# Patient Record
Sex: FEMALE | Race: WHITE | NOT HISPANIC OR LATINO | Employment: PART TIME | URBAN - METROPOLITAN AREA
[De-identification: names, ages, dates, MRNs, and addresses within clinical notes are randomized per-mention and may not be internally consistent; named-entity substitution may affect disease eponyms.]

---

## 2018-07-19 LAB — TSH SERPL-ACNC: 1.62 UIU/ML (ref 0.35–4.94)

## 2019-04-08 LAB
CHOLEST SERPL-MCNC: 193 MG/DL (ref 100–199)
HDLC SERPL-MCNC: 38 MG/DL
LDLC SERPL CALC-MCNC: 136 MG/DL
NONHDLC SERPL-MCNC: 155 MG/DL
PAP-ABSTRACT: NORMAL
TRIGL SERPL-MCNC: 93 MG/DL

## 2019-07-02 LAB — HEP C HIM: NORMAL

## 2019-08-09 LAB
CREAT SERPL-MCNC: 0.6 MG/DL (ref 0.57–1.11)
GFR SERPL CREATININE-BSD FRML MDRD: >60 ML/MIN/1.73M2
GLUCOSE SERPL-MCNC: 86 MG/DL (ref 65–100)
POTASSIUM SERPL-SCNC: 3.5 MMOL/L (ref 3.5–5)

## 2019-08-28 ENCOUNTER — TRANSFERRED RECORDS (OUTPATIENT)
Dept: MULTI SPECIALTY CLINIC | Facility: CLINIC | Age: 29
End: 2019-08-28

## 2019-08-28 LAB
C TRACH DNA SPEC QL PROBE+SIG AMP: NEGATIVE
N GONORRHOEA DNA SPEC QL PROBE+SIG AMP: NEGATIVE
SPECIMEN DESCRIP: NORMAL
SPECIMEN DESCRIPTION: NORMAL

## 2019-09-19 ENCOUNTER — NURSE TRIAGE (OUTPATIENT)
Dept: NURSING | Facility: CLINIC | Age: 29
End: 2019-09-19
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
## 2019-09-20 ENCOUNTER — PRENATAL OFFICE VISIT (OUTPATIENT)
Dept: OBGYN | Facility: CLINIC | Age: 29
End: 2019-09-20
Payer: COMMERCIAL

## 2019-09-20 VITALS
HEIGHT: 62 IN | BODY MASS INDEX: 34.27 KG/M2 | WEIGHT: 186.25 LBS | OXYGEN SATURATION: 99 % | SYSTOLIC BLOOD PRESSURE: 117 MMHG | HEART RATE: 108 BPM | DIASTOLIC BLOOD PRESSURE: 75 MMHG | TEMPERATURE: 98.3 F

## 2019-09-20 DIAGNOSIS — O34.219 PREVIOUS CESAREAN SECTION COMPLICATING PREGNANCY, ANTEPARTUM CONDITION OR COMPLICATION: Primary | ICD-10-CM

## 2019-09-20 DIAGNOSIS — F43.23 ADJUSTMENT DISORDER WITH MIXED ANXIETY AND DEPRESSED MOOD: ICD-10-CM

## 2019-09-20 LAB
ABO + RH BLD: NORMAL
ABO + RH BLD: NORMAL
BLD GP AB SCN SERPL QL: NORMAL
BLOOD BANK CMNT PATIENT-IMP: NORMAL
RUBV IGG SERPL IA-ACNC: 29 IU/ML
SPECIMEN EXP DATE BLD: NORMAL

## 2019-09-20 PROCEDURE — 86900 BLOOD TYPING SEROLOGIC ABO: CPT | Performed by: NURSE PRACTITIONER

## 2019-09-20 PROCEDURE — 36415 COLL VENOUS BLD VENIPUNCTURE: CPT | Performed by: NURSE PRACTITIONER

## 2019-09-20 PROCEDURE — 99207 ZZC PRENATAL VISIT: CPT | Performed by: NURSE PRACTITIONER

## 2019-09-20 PROCEDURE — 87086 URINE CULTURE/COLONY COUNT: CPT | Performed by: NURSE PRACTITIONER

## 2019-09-20 PROCEDURE — 86762 RUBELLA ANTIBODY: CPT | Performed by: NURSE PRACTITIONER

## 2019-09-20 PROCEDURE — 86901 BLOOD TYPING SEROLOGIC RH(D): CPT | Performed by: NURSE PRACTITIONER

## 2019-09-20 PROCEDURE — 86850 RBC ANTIBODY SCREEN: CPT | Performed by: NURSE PRACTITIONER

## 2019-09-20 RX ORDER — PYRIDOXINE HCL (VITAMIN B6) 25 MG
25 TABLET ORAL DAILY
COMMUNITY
End: 2019-12-16

## 2019-09-20 RX ORDER — CITALOPRAM HYDROBROMIDE 20 MG/1
TABLET ORAL
Qty: 30 TABLET | Refills: 1 | Status: SHIPPED | OUTPATIENT
Start: 2019-09-20 | End: 2019-12-16

## 2019-09-20 RX ORDER — ONDANSETRON 4 MG/1
4 TABLET, ORALLY DISINTEGRATING ORAL EVERY 8 HOURS PRN
COMMUNITY
End: 2020-01-13

## 2019-09-20 ASSESSMENT — ANXIETY QUESTIONNAIRES
3. WORRYING TOO MUCH ABOUT DIFFERENT THINGS: NEARLY EVERY DAY
IF YOU CHECKED OFF ANY PROBLEMS ON THIS QUESTIONNAIRE, HOW DIFFICULT HAVE THESE PROBLEMS MADE IT FOR YOU TO DO YOUR WORK, TAKE CARE OF THINGS AT HOME, OR GET ALONG WITH OTHER PEOPLE: VERY DIFFICULT
6. BECOMING EASILY ANNOYED OR IRRITABLE: NEARLY EVERY DAY
1. FEELING NERVOUS, ANXIOUS, OR ON EDGE: NEARLY EVERY DAY
5. BEING SO RESTLESS THAT IT IS HARD TO SIT STILL: NEARLY EVERY DAY
2. NOT BEING ABLE TO STOP OR CONTROL WORRYING: NEARLY EVERY DAY
7. FEELING AFRAID AS IF SOMETHING AWFUL MIGHT HAPPEN: MORE THAN HALF THE DAYS
GAD7 TOTAL SCORE: 20

## 2019-09-20 ASSESSMENT — PATIENT HEALTH QUESTIONNAIRE - PHQ9
SUM OF ALL RESPONSES TO PHQ QUESTIONS 1-9: 19
5. POOR APPETITE OR OVEREATING: NEARLY EVERY DAY

## 2019-09-20 ASSESSMENT — MIFFLIN-ST. JEOR: SCORE: 1529.1

## 2019-09-20 NOTE — PROGRESS NOTES
Patient presents for routine prenatal visit. Transfer from [facility]. Prenatal flowsheet reviewed and updated as needed.  Labs:   19: NIL pap  19:  HIV NR  VZV: Imm  CBC: WNL  HCV: NR  Antibody screen: Neg  HBSAg: NR  RPR: Neg  UA: Neg  Gonorrhea: Neg  Chlamydia: Neg  Early 1 hour GTT: normal  7/10/19: Dating us    Transfer of care due to insurance change.  Patient planning repeat  section.   Patient with periumbilical hernia this pregnancy.  History of anxiety and depression. Off medication, but would like to start. Previously used Prozac and Zoloft-did not like Zoloft.  PHQ-9 and GENARO given. Patient will start Celexa. Discussed taking it, possible side effects, amount of time before it may be effective. Prescription sent. Will follow up at next visit.  Plan 24 week labs at next visit.  Screening ultrasound ordered.  Denies vaginal bleeding, loss of fluid, contractions or cramping.    Some labs not completed this pregnancy and will be drawn today-Rubella, ABORh, Urine Culture.    Advice as per Anticipatory Guidance/Checklist updated.  PE: See OB vitals    Discussed prenatal visit schedule, delivery hospital, providers. Continue PNV, smoking cessation encouraged.  Questions asked and answered. Next OB visit in 4 week(s) with Dr. Grier.    Shannan DENG CNP

## 2019-09-20 NOTE — PROGRESS NOTES
"Patient presents for routine prenatal visit. Prenatal flowsheet reviewed and updated as needed.  Denies vaginal bleeding, loss of fluid, contractions or cramping.  Patient {w-w/o:935297::\"without\"} complaint. ***  Advice as per Anticipatory Guidance/Checklist updated.  PE: See OB vitals    Questions asked and answered. Next OB visit in *** week(s) with Dr. Grier.    Shannan Rodrigues APRN CNP    "

## 2019-09-20 NOTE — Clinical Note
Please abstract the following data from this visit with this patient into the appropriate field in Epic:Tests that can be patient reported without a hard copy:Pap smear done on this date: 4/8/19 (approximately), by this group: Kit, results were NIL. Other Tests found in the patient's chart through Chart Review/Care Everywhere:{Abstract Quality List (Optional):811720}Note to Abstraction: If this section is blank, no results were found via Chart Review/Care Everywhere.

## 2019-09-21 LAB
BACTERIA SPEC CULT: NORMAL
SPECIMEN SOURCE: NORMAL

## 2019-09-21 ASSESSMENT — ANXIETY QUESTIONNAIRES: GAD7 TOTAL SCORE: 20

## 2019-09-28 ENCOUNTER — OFFICE VISIT (OUTPATIENT)
Dept: URGENT CARE | Facility: URGENT CARE | Age: 29
End: 2019-09-28
Payer: COMMERCIAL

## 2019-09-28 VITALS
SYSTOLIC BLOOD PRESSURE: 107 MMHG | OXYGEN SATURATION: 97 % | TEMPERATURE: 98.4 F | HEART RATE: 110 BPM | DIASTOLIC BLOOD PRESSURE: 71 MMHG | WEIGHT: 186 LBS | BODY MASS INDEX: 34.3 KG/M2

## 2019-09-28 DIAGNOSIS — Z33.1 PREGNANT STATE, INCIDENTAL: ICD-10-CM

## 2019-09-28 DIAGNOSIS — M25.511 ACUTE PAIN OF RIGHT SHOULDER: Primary | ICD-10-CM

## 2019-09-28 PROCEDURE — 99204 OFFICE O/P NEW MOD 45 MIN: CPT | Performed by: INTERNAL MEDICINE

## 2019-09-28 NOTE — LETTER
September 28, 2019      Joanne Escalante  [address] [facility] [address]        To Whom It May Concern:    Joanne Escalante was seen in our clinic. She is not to lift, push, or pull or use her right arm for anything through 10/2/2019.   On 10/3/2019, she may return to work without restrictions.      Sincerely,        Bina Mead MD

## 2019-09-28 NOTE — PROGRESS NOTES
SUBJECTIVE:  Joanne Escalante is an 28 year old female who presents for right shoulder pain.  Has had pain for a couple weeks.  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  Today seemed to have worsening of the pain and having pain in shoulder radiating to neck and down arm to hand.  Had slight numbness for a little but better now.  Nothing new she did that triggered pain.  Has pain in shoulder when moves arm, for a while, but worse today.  No fevers, chills, sweats.  Is pregnant so some nausea and vomiting, but no changes.  Tylenol taken for pain which helps some.  No redness, bruising, or swelling.  No other joint pains.  No recent travel.  Pain increases when moves arm.    PMH:   has a past medical history of Anxiety, Depressive disorder, Endometriosis, and Periumbilical hernia.    Social History     Socioeconomic History     Marital status: Single     Spouse name: None     Number of children: None     Years of education: None     Highest education level: None   Occupational History     None   Social Needs     Financial resource strain: None     Food insecurity:     Worry: None     Inability: None     Transportation needs:     Medical: None     Non-medical: None   Tobacco Use     Smoking status: Current Every Day Smoker     Types: Cigarettes     Smokeless tobacco: Never Used   Substance and Sexual Activity     Alcohol use: Not Currently     Drug use: Never     Sexual activity: Yes     Partners: Male     Birth control/protection: None   Lifestyle     Physical activity:     Days per week: None     Minutes per session: None     Stress: None   Relationships     Social connections:     Talks on phone: None     Gets together: None     Attends Church service: None     Active member of club or organization: None     Attends meetings of clubs or organizations: None     Relationship status: None     Intimate partner violence:     Fear of current or ex partner: None      Emotionally abused: None     Physically abused: None     Forced sexual activity: None   Other Topics Concern     Parent/sibling w/ CABG, MI or angioplasty before 65F 55M? Not Asked   Social History Narrative     None     Family History   Problem Relation Age of Onset     Depression Mother      Depression Father        ALLERGIES:  Sulfa drugs    Current Outpatient Medications   Medication     doxylamine (UNISOM) 25 MG TABS tablet     ondansetron (ZOFRAN-ODT) 4 MG ODT tab     Prenatal Vit-Fe Fumarate-FA (PRENATAL VITAMIN PO)     vitamin B6 (VITAMIN  B-6) 25 MG tablet     citalopram (CELEXA) 20 MG tablet     No current facility-administered medications for this visit.          ROS:  ROS is done and is negative for general/constitutional, eye, ENT, Respiratory, cardiovascular, GI, , Skin, musculoskeletal except as noted elsewhere.  All other review of systems negative except as noted elsewhere.      OBJECTIVE:  /71   Pulse 110   Temp 98.4  F (36.9  C) (Oral)   Wt 84.4 kg (186 lb)   LMP 05/05/2019   SpO2 97%   Breastfeeding? No   BMI 34.30 kg/m    GENERAL APPEARANCE: Alert, in no acute distress, appears uncomfortable and is holding right arm still.  EYES: normal  NOSE:normal  OROPHARYNX:normal  NECK:No adenopathy,masses or thyromegaly  RESP: normal and clear to auscultation  CV:regular rate and rhythm and no murmurs, clicks, or gallops  ABDOMEN: Abdomen soft, non-tender. BS normal. No masses, organomegaly  SKIN: no ulcers, lesions or rash  MUSCULOSKELETAL: right shoulder - tenderness along anterior joint line, limited rom due to pain, no erythema, no edema, moderate muscle tightness in area extending to right neck region, no bruising  NEURO:  Strength 5/5 and symmetric in bilateral upper  extremities.  DTRs 2+ and symmetric in bilateral upper extremities.  Sensation to light touch grossly intact in bilateral upper extremities.    RESULTS  .  No results found for this or any previous visit (from the past 48  hour(s)).    ASSESSMENT/PLAN:    ASSESSMENT / PLAN:  (E12.880) Acute pain of right shoulder  (primary encounter diagnosis)  Comment: currently c/w musculoskeletal etiology.  Given location of tenderness along joint line, consider rotator cuff injury.    Plan: [facility] REFERRAL, ORTHO          REFERRAL        As is pregnant currently medication options are limited.  Continue prn tylenol.  Also advised icing the area frequently and letting area rest from any lifting, pushing, pulling, or carrying.  Refer to PT as they may have some non medication helps.  I reviewed supportive care, otc meds to use if needed, expected course, and signs of concern.  Follow up with ortho if she does not improve within 1 week(s) or if worsens in any way.  Reviewed red flag symptoms and is to go to the ER if experiences any of these.  Work note done.    (Z33.1) Pregnant state, incidental  Comment: her pregnancy reduces medication options to treat her shoulder pain  Plan: as above.   Continue routine f/u with ob.      See [facility] for orders, medications, letters, patient instructions    Bina Mead M.D.

## 2019-09-30 ENCOUNTER — ANCILLARY PROCEDURE (OUTPATIENT)
Dept: ULTRASOUND IMAGING | Facility: CLINIC | Age: 29
End: 2019-09-30
Attending: NURSE PRACTITIONER
Payer: COMMERCIAL

## 2019-09-30 DIAGNOSIS — O34.219 PREVIOUS CESAREAN SECTION COMPLICATING PREGNANCY, ANTEPARTUM CONDITION OR COMPLICATION: ICD-10-CM

## 2019-09-30 PROCEDURE — 76805 OB US >/= 14 WKS SNGL FETUS: CPT

## 2019-10-03 ENCOUNTER — ANCILLARY PROCEDURE (OUTPATIENT)
Dept: GENERAL RADIOLOGY | Facility: CLINIC | Age: 29
End: 2019-10-03
Attending: PHYSICIAN ASSISTANT
Payer: COMMERCIAL

## 2019-10-03 ENCOUNTER — OFFICE VISIT (OUTPATIENT)
Dept: ORTHOPEDICS | Facility: CLINIC | Age: 29
End: 2019-10-03
Payer: COMMERCIAL

## 2019-10-03 VITALS
BODY MASS INDEX: 34.3 KG/M2 | SYSTOLIC BLOOD PRESSURE: 116 MMHG | RESPIRATION RATE: 16 BRPM | DIASTOLIC BLOOD PRESSURE: 72 MMHG | WEIGHT: 186 LBS

## 2019-10-03 DIAGNOSIS — M25.511 CHRONIC RIGHT SHOULDER PAIN: Primary | ICD-10-CM

## 2019-10-03 DIAGNOSIS — S16.1XXA NECK STRAIN, INITIAL ENCOUNTER: ICD-10-CM

## 2019-10-03 DIAGNOSIS — G89.29 CHRONIC RIGHT SHOULDER PAIN: ICD-10-CM

## 2019-10-03 DIAGNOSIS — G89.29 CHRONIC RIGHT SHOULDER PAIN: Primary | ICD-10-CM

## 2019-10-03 DIAGNOSIS — M25.511 CHRONIC RIGHT SHOULDER PAIN: ICD-10-CM

## 2019-10-03 PROCEDURE — 99203 OFFICE O/P NEW LOW 30 MIN: CPT | Performed by: ORTHOPAEDIC SURGERY

## 2019-10-03 PROCEDURE — 73030 X-RAY EXAM OF SHOULDER: CPT | Mod: RT

## 2019-10-03 ASSESSMENT — PAIN SCALES - GENERAL: PAINLEVEL: MODERATE PAIN (5)

## 2019-10-03 NOTE — PROGRESS NOTES
"CHIEF COMPLAINT:   Chief Complaint   Patient presents with     Right Shoulder - Pain     Right shoulder pain that starts at the base of the neck and radiates down the lateral upper arm. This started about 2 weeks ago. 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 No treatment other than ice   .  Joanne Escalante is seen today in the [facility] for evaluation of right shoulder pain at the request of Dr. Bina Mead MD      HISTORY:  Joanne Escalante is a 28 year old female, right  -hand dominant, 22 weeks pregnant, who is seen in consultation at the request of Dr Bina Mead for right shoulder pain that started about 2 weeks ago. No known injury. 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 She locates the pain starting at the right side of her neck and over the top of the shoulder and at times down to the right elbow. Had some numbness and tingling at first but seems to be getting better. Felt like the shoulder \"locked up\" one time. Presented to Urgent Care and referred to ortho. Treatment with ice, tylenol, rest. She's not been working. Overall improving since being off work. She has been referred to Physical Therapy but hasn't started yet.    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    Other PMH:  has a past medical history of Anxiety, Depressive disorder, Endometriosis, and Periumbilical hernia.  Patient Active Problem List   Diagnosis     Evaluate anatomy not seen on prior sonogram       Surgical Hx:  " has a past surgical history that includes  section (2018); Laparoscopy diagnostic (gyn) (2015); and ORAL SURGERY PROCEDURE.    Medications:   Current Outpatient Medications:      citalopram (CELEXA) 20 MG tablet, Take 0.5 tablets (10 mg) by mouth daily for 10 days, THEN 1 tablet (20 mg) daily for 20 days., Disp: 30 tablet, Rfl: 1     doxylamine (UNISOM) 25 MG TABS tablet, Take 25 mg by mouth At Bedtime, Disp: , Rfl:      ondansetron (ZOFRAN-ODT) 4 MG ODT tab, Take 4 mg by mouth every 8 hours as needed for nausea, Disp: , Rfl:      Prenatal Vit-Fe Fumarate-FA (PRENATAL VITAMIN PO), , Disp: , Rfl:      vitamin B6 (VITAMIN  B-6) 25 MG tablet, Take 25 mg by mouth daily, Disp: , Rfl:     Allergies:   Allergies   Allergen Reactions     Sulfa Drugs        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 She has never used smokeless tobacco. She reports previous alcohol use. She reports that she does not use drugs.    Family Hx: family history includes Depression in her father and mother..    REVIEW OF SYSTEMS: 10 point ROS neg other than the symptoms noted above in the HPI and PMH. Notables include  CONSTITUTIONAL:NEGATIVE for fever, chills, change in weight  INTEGUMENTARY/SKIN: NEGATIVE for worrisome rashes, moles or lesions  MUSCULOSKELETAL:See HPI above  NEURO: NEGATIVE for weakness, dizziness or paresthesias    PHYSICAL EXAM:  /72   Resp 16   Wt 84.4 kg (186 lb)   LMP 2019   BMI 34.30 kg/m     GENERAL APPEARANCE: healthy, alert, no distress  SKIN: no suspicious lesions or rashes  NEURO: Normal strength and tone, mentation intact and speech normal  PSYCH:  mentation appears normal and affect normal, not anxious  RESPIRATORY: No increased work of breathing.  VASCULAR: Radial pulses 2+ and brisk cappillary refill   LYMPH: no palpable axillary lymphadenopathy or cervical neck lymphadenopathy.      MUSCULOSKELETAL:    NECK:  Cervical range of motion:  full, causes pain in neck, reproduces pain into top of shoulder  Posterior cervical spine nontender to palpation over midline bony prominences  There is moderate tender to palpation along neck paraspinals and trapezius muscles      RIGHT UPPER EXTREMITY:  Sensation intact to light touch in median, radial, ulnar and axillary nerve distributions  Palpable 2+ radial pulse, brisk capillary refill to all fingers, wwp  Intact epl fpl fdp edc wrist flexion/extension biceps triceps deltoid    RIGHT SHOULDER:  Shoulder Inspection: no swelling, bruising, discoloration, or obvious deformity or asymmetry  Tender: upper trapezius muscle, rhomboids and paraspinals  Non-tender: SC joint, proximal-mid clavicle, mid-distal clavicle, AC joint, acromion, anterior capsule, proximal bicep tendon, greater tuberosity, proximal humerus, supraspinatus  and infraspinatus  Range of Motion:   Active:all normal, pain-free  Strength: forward flexion 5/5, External rotation 5/5   Impingement: negative.  Special tests: Afton's: Negative  Yergason's: Negative  Speed: Negative  Spurling's: equivocal  Belly Press: Negative  Empty Can: Negative    LEFT UPPER EXTREMITY:  Sensation intact to light touch in median, radial, ulnar and axillary nerve distributions  Palpable 2+ radial pulse, brisk capillary refill to all fingers, wwp  Intact epl fpl fdp edc wrist flexion/extension biceps triceps deltoid    LEFT SHOULDER:  Shoulder Inspection: no swelling, bruising, discoloration, or obvious deformity or asymmetry  Tender: rhomboids  Non-tender: SC joint, proximal-mid clavicle, mid-distal clavicle, AC joint, acromion, anterior capsule, proximal bicep tendon, greater tuberosity, proximal humerus, supraspinatus  and infraspinatus  Range of Motion:   Active:all normal  Strength: forward flexion 5/5, External rotation 5/5   Impingement: negative.  Special tests: Empty Can: Negative      X-RAY INTERPRETATION: 3 views right  shoulder obtained 10/3/2019 were  reviewed personally in clinic today with the patient. On my review, No obvious fracture or dislocation. No obvious bony abnormality or lesion. Possible cyst of distal clavicle.      ASSESSMENT: Joanne Escalante is a 28 year old female, right  -hand dominant with acute right sided neck pain and strain.      PLAN:   * images and exam findings discussed. Appears more muscular of the neck and upper trapezius/shoulder, consistent with a strain.  * recommend rest, Physical Therapy, over the counter pain control as advised by your OB.  * plan return to work next week.  * return to clinic as needed. This may best be managed by Sports Medicine or nonsurgical orthopaedics in future as needed.    Alvaro Estevez M.D., M.S.  Dept. of Orthopaedic Surgery  [facility]

## 2019-10-03 NOTE — LETTER
[facility] [place]  [address]  [address]  [place] [address] [phone]      WORKABILITY    [facility], Dr. Alvaro Estevez M.D., [address] [place]        10/3/2019      RE: Joanne Escalante    [address]  [facility] [address]        To whom it may concern:     Joanne Escalante is under my professional care for right shoulder pain.     Ms. Escalante should not return to work at this time. She can return to work on 10/9/19 with no restrictions.      Next appointment: As needed        Zaire Blunt PA-C, CAQ (Ortho)  Supervising Physician: Alvaro Estevez M.D., M.S.  Dept. of Orthopaedic Surgery  [facility]

## 2019-10-03 NOTE — LETTER
"    10/3/2019         RE: Joanne Escalante  [address]  [place] [address]        Dear Colleague,    Thank you for referring your patient, Joanne Escalante, to the [facility] [place]. Please see a copy of my visit note below.    CHIEF COMPLAINT:   Chief Complaint   Patient presents with     Right Shoulder - Pain     Right shoulder pain that starts at the base of the neck and radiates down the lateral upper arm. This started about 2 weeks ago. 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 No treatment other than ice   .  Joanne Escalante is seen today in the [facility] for evaluation of right shoulder pain at the request of Dr. Bina Mead MD      HISTORY:  Joanne Escalante is a 28 year old female, right  -hand dominant, 22 weeks pregnant, who is seen in consultation at the request of Dr Bina Mead for right shoulder pain that started about 2 weeks ago. No known injury. 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 She locates the pain starting at the right side of her neck and over the top of the shoulder and at times down to the right elbow. Had some numbness and tingling at first but seems to be getting better. Felt like the shoulder \"locked up\" one time. Presented to Urgent Care and referred to ortho. Treatment with ice, tylenol, rest. She's not been working. Overall improving since being off work. She has been referred to Physical Therapy but hasn't started yet.    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    Other PMH:  has a past medical history of Anxiety, Depressive disorder, Endometriosis, and Periumbilical hernia.  Patient Active Problem List   Diagnosis     Evaluate anatomy not seen on prior sonogram       Surgical Hx:  has a past surgical history that includes  section (2018); Laparoscopy diagnostic (gyn) (2015); and ORAL SURGERY PROCEDURE.    Medications:   Current Outpatient Medications:      citalopram (CELEXA) 20 MG tablet, Take 0.5 tablets (10 mg) by mouth daily for 10 days, THEN 1 tablet (20 mg) daily for 20 days., Disp: 30 tablet, Rfl: 1     doxylamine (UNISOM) 25 MG TABS tablet, Take 25 mg by mouth At Bedtime, Disp: , Rfl:      ondansetron (ZOFRAN-ODT) 4 MG ODT tab, Take 4 mg by mouth every 8 hours as needed for nausea, Disp: , Rfl:      Prenatal Vit-Fe Fumarate-FA (PRENATAL VITAMIN PO), , Disp: , Rfl:      vitamin B6 (VITAMIN  B-6) 25 MG tablet, Take 25 mg by mouth daily, Disp: , Rfl:     Allergies:   Allergies   Allergen Reactions     Sulfa Drugs        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 She has never used smokeless tobacco. She reports previous alcohol use. She reports that she does not use drugs.    Family Hx: family history includes Depression in her father and mother..    REVIEW OF SYSTEMS: 10 point ROS neg other than the symptoms noted above in the HPI and PMH. Notables include  CONSTITUTIONAL:NEGATIVE for fever, chills, change in weight  INTEGUMENTARY/SKIN: NEGATIVE for worrisome rashes, moles or lesions  MUSCULOSKELETAL:See HPI above  NEURO: NEGATIVE for weakness, dizziness or paresthesias    PHYSICAL EXAM:  /72   Resp 16   Wt 84.4 kg (186 lb)   LMP 2019   BMI 34.30 kg/m      GENERAL APPEARANCE: healthy, alert, no distress  SKIN: no suspicious lesions or rashes  NEURO: Normal strength and tone, mentation intact and speech normal  PSYCH:  mentation appears normal and affect  normal, not anxious  RESPIRATORY: No increased work of breathing.  VASCULAR: Radial pulses 2+ and brisk cappillary refill   LYMPH: no palpable axillary lymphadenopathy or cervical neck lymphadenopathy.      MUSCULOSKELETAL:    NECK:  Cervical range of motion: full, causes pain in neck, reproduces pain into top of shoulder  Posterior cervical spine nontender to palpation over midline bony prominences  There is moderate tender to palpation along neck paraspinals and trapezius muscles      RIGHT UPPER EXTREMITY:  Sensation intact to light touch in median, radial, ulnar and axillary nerve distributions  Palpable 2+ radial pulse, brisk capillary refill to all fingers, wwp  Intact epl fpl fdp edc wrist flexion/extension biceps triceps deltoid    RIGHT SHOULDER:  Shoulder Inspection: no swelling, bruising, discoloration, or obvious deformity or asymmetry  Tender: upper trapezius muscle, rhomboids and paraspinals  Non-tender: SC joint, proximal-mid clavicle, mid-distal clavicle, AC joint, acromion, anterior capsule, proximal bicep tendon, greater tuberosity, proximal humerus, supraspinatus  and infraspinatus  Range of Motion:   Active:all normal, pain-free  Strength: forward flexion 5/5, External rotation 5/5   Impingement: negative.  Special tests: Morenci's: Negative  Yergason's: Negative  Speed: Negative  Spurling's: equivocal  Belly Press: Negative  Empty Can: Negative    LEFT UPPER EXTREMITY:  Sensation intact to light touch in median, radial, ulnar and axillary nerve distributions  Palpable 2+ radial pulse, brisk capillary refill to all fingers, wwp  Intact epl fpl fdp edc wrist flexion/extension biceps triceps deltoid    LEFT SHOULDER:  Shoulder Inspection: no swelling, bruising, discoloration, or obvious deformity or asymmetry  Tender: rhomboids  Non-tender: SC joint, proximal-mid clavicle, mid-distal clavicle, AC joint, acromion, anterior capsule, proximal bicep tendon, greater tuberosity, proximal humerus,  supraspinatus  and infraspinatus  Range of Motion:   Active:all normal  Strength: forward flexion 5/5, External rotation 5/5   Impingement: negative.  Special tests: Empty Can: Negative      X-RAY INTERPRETATION: 3 views right  shoulder obtained 10/3/2019 were reviewed personally in clinic today with the patient. On my review, No obvious fracture or dislocation. No obvious bony abnormality or lesion. Possible cyst of distal clavicle.      ASSESSMENT: Joanne Escalante is a 28 year old female, right  -hand dominant with acute right sided neck pain and strain.      PLAN:   * images and exam findings discussed. Appears more muscular of the neck and upper trapezius/shoulder, consistent with a strain.  * recommend rest, Physical Therapy, over the counter pain control as advised by your OB.  * plan return to work next week.  * return to clinic as needed. This may best be managed by Sports Medicine or nonsurgical orthopaedics in future as needed.    Alvaro Estevez M.D., M.S.  Dept. of Orthopaedic Surgery  [facility]    Again, thank you for allowing me to participate in the care of your patient.        Sincerely,        Alvaro Estevez MD

## 2019-10-14 ENCOUNTER — PRENATAL OFFICE VISIT (OUTPATIENT)
Dept: OBGYN | Facility: CLINIC | Age: 29
End: 2019-10-14
Payer: COMMERCIAL

## 2019-10-14 VITALS
SYSTOLIC BLOOD PRESSURE: 117 MMHG | BODY MASS INDEX: 34.85 KG/M2 | WEIGHT: 189 LBS | HEART RATE: 104 BPM | DIASTOLIC BLOOD PRESSURE: 81 MMHG

## 2019-10-14 DIAGNOSIS — O34.219 PREVIOUS CESAREAN SECTION COMPLICATING PREGNANCY, ANTEPARTUM CONDITION OR COMPLICATION: Primary | ICD-10-CM

## 2019-10-14 PROCEDURE — 99207 ZZC PRENATAL VISIT: CPT | Performed by: NURSE PRACTITIONER

## 2019-10-14 NOTE — NURSING NOTE
"Chief Complaint   Patient presents with     Prenatal Care     23w6d       Initial /81 (BP Location: Right arm, Cuff Size: Adult Large)   Pulse 104   Wt 85.7 kg (189 lb)   LMP 2019   BMI 34.85 kg/m   Estimated body mass index is 34.85 kg/m  as calculated from the following:    Height as of 19: 1.568 m (5' 1.75\").    Weight as of this encounter: 85.7 kg (189 lb).  BP completed using cuff size: regular        PHQ-9 score:    PHQ-9 SCORE 2019   PHQ-9 Total Score 19     Marcela Gant MA on 10/14/2019 at 8:21 AM        "

## 2019-10-17 ENCOUNTER — NURSE TRIAGE (OUTPATIENT)
Dept: NURSING | Facility: CLINIC | Age: 29
End: 2019-10-17

## 2019-10-18 NOTE — TELEPHONE ENCOUNTER
"    Reason for Disposition    [1] MILD abdominal pain (e.g., doesn't interfere with normal activities) AND [2] constant AND [3] present > 2 hours    Additional Information    Negative: Shock suspected (e.g., cold/pale/clammy skin, too weak to stand, low BP, rapid pulse)    Negative: Difficult to awaken or acting confused (e.g., disoriented, slurred speech)    Negative: Passed out (i.e., lost consciousness, collapsed and was not responding)    Negative: Sounds like a life-threatening emergency to the triager    Negative: Chest pain    Negative: Pain is mainly in upper abdomen  (if needed ask: \"is it mainly above the belly button?\")    Negative: Followed an abdomen (stomach) injury    Negative: [1] Abdominal pain AND [2] pregnant < 20 weeks    [1] Abdominal pain AND [2] pregnant > 20 weeks    Negative: Passed out (i.e., lost consciousness, collapsed and was not responding)    Negative: Shock suspected (e.g., cold/pale/clammy skin, too weak to stand, low BP, rapid pulse)    Negative: Difficult to awaken or acting confused (e.g., disoriented, slurred speech)    Negative: [1] SEVERE abdominal pain (e.g., excruciating) AND [2] constant AND [3] present > 1 hour    Negative: SEVERE vaginal bleeding (e.g., continuous red blood from vagina, or large blood clots)    Negative: Sounds like a life-threatening emergency to the triager    Negative: Followed an abdomen (stomach) injury    Negative: [1] Having contractions or other symptoms of labor (such as vaginal pressure) AND [2] >= 37 weeks pregnant (i.e., term pregnancy)    Negative: [1] Having contractions or other symptoms of labor (such as vaginal pressure) AND [2] < 37 weeks pregnant (i.e., )    Negative: [1] Abdominal pain AND [2] pregnant < 20 weeks    Negative: [1] Vomiting AND [2] contains red blood or black (\"coffee ground\") material  (Exception: few red streaks in vomit that only happened once)    Negative: MODERATE-SEVERE abdominal pain (e.g., interferes with " normal activities, awakens from sleep)    Negative: Vaginal bleeding or spotting    Negative: [1] Baby moving less today (e.g., kick count < 5 in 1 hour or < 10 in 2 hours) AND [2] pregnant 23 or more weeks    Negative: Leakage of fluid from vagina    Negative: New hand or face swelling    Negative: New blurred vision or vision changes    Negative: [1] SEVERE headache AND [2] not relieved with acetaminophen (e.g., Tylenol)    Protocols used: PREGNANCY - ABDOMINAL PAIN GREATER THAN 20 WEEKS EGA-A-AH, ABDOMINAL PAIN - FEMALE-A-AH    Patient reports that she believes she is having round ligament pain, worse over the past 2 days.  However, the patient reports that pain has become constant.  Writer advised that patient be seen in the ED per guideline.  Patient with plans to go to [place] ED for further evaluation and work up.    Francine Ortiz RN  [facility]

## 2019-10-24 ENCOUNTER — HOSPITAL ENCOUNTER (OUTPATIENT)
Dept: ULTRASOUND IMAGING | Facility: CLINIC | Age: 29
Discharge: HOME | End: 2019-10-24
Attending: NURSE PRACTITIONER | Admitting: NURSE PRACTITIONER
Payer: COMMERCIAL

## 2019-10-24 DIAGNOSIS — O34.219 PREVIOUS CESAREAN SECTION COMPLICATING PREGNANCY, ANTEPARTUM CONDITION OR COMPLICATION: ICD-10-CM

## 2019-10-24 PROCEDURE — 76816 OB US FOLLOW-UP PER FETUS: CPT

## 2019-10-27 ENCOUNTER — NURSE TRIAGE (OUTPATIENT)
Dept: NURSING | Facility: CLINIC | Age: 29
End: 2019-10-27

## 2019-10-28 NOTE — TELEPHONE ENCOUNTER
"Pregnant 26 weeks.  Abdominal pain 3/10 for couple days.    Should go to Hospital labor and delivery, but not sure if it is [place].     Paged on call provider to [phone] via answering service.     Angela Schaefer RN  [facility]          Reason for Disposition    [1] MILD abdominal pain (e.g., doesn't interfere with normal activities) AND [2] constant AND [3] present > 2 hours    Additional Information    Negative: Passed out (i.e., lost consciousness, collapsed and was not responding)    Negative: Shock suspected (e.g., cold/pale/clammy skin, too weak to stand, low BP, rapid pulse)    Negative: Difficult to awaken or acting confused (e.g., disoriented, slurred speech)    Negative: [1] SEVERE abdominal pain (e.g., excruciating) AND [2] constant AND [3] present > 1 hour    Negative: SEVERE vaginal bleeding (e.g., continuous red blood from vagina, or large blood clots)    Negative: Sounds like a life-threatening emergency to the triager    Negative: [1] Vomiting AND [2] contains red blood or black (\"coffee ground\") material  (Exception: few red streaks in vomit that only happened once)    Negative: MODERATE-SEVERE abdominal pain (e.g., interferes with normal activities, awakens from sleep)    Negative: Vaginal bleeding or spotting    Negative: [1] Baby moving less today (e.g., kick count < 5 in 1 hour or < 10 in 2 hours) AND [2] pregnant 23 or more weeks    Negative: Leakage of fluid from vagina    Negative: New hand or face swelling    Negative: New blurred vision or vision changes    Negative: [1] SEVERE headache AND [2] not relieved with acetaminophen (e.g., Tylenol)    Protocols used: PREGNANCY - ABDOMINAL PAIN GREATER THAN 20 WEEKS EGA-A-AH      "

## 2019-10-31 ENCOUNTER — NURSE TRIAGE (OUTPATIENT)
Dept: NURSING | Facility: CLINIC | Age: 29
End: 2019-10-31

## 2019-11-01 ENCOUNTER — PRENATAL OFFICE VISIT (OUTPATIENT)
Dept: OBGYN | Facility: CLINIC | Age: 29
End: 2019-11-01
Payer: COMMERCIAL

## 2019-11-01 VITALS
DIASTOLIC BLOOD PRESSURE: 81 MMHG | BODY MASS INDEX: 34.92 KG/M2 | SYSTOLIC BLOOD PRESSURE: 121 MMHG | HEART RATE: 114 BPM | WEIGHT: 189.4 LBS

## 2019-11-01 DIAGNOSIS — O34.219 PREVIOUS CESAREAN SECTION COMPLICATING PREGNANCY, ANTEPARTUM CONDITION OR COMPLICATION: Primary | ICD-10-CM

## 2019-11-01 PROCEDURE — 99207 ZZC PRENATAL VISIT: CPT | Performed by: OBSTETRICS & GYNECOLOGY

## 2019-11-01 NOTE — PROGRESS NOTES
26w3d She presents today due to billateral groin and periumbilical pains last night after trick and treating with her daughter. She thought her umbilical hernia was the problem.Denies any umbilical masses. The pains have all resolved this AM.   Routine anticipatory guidance.  US was normal.  She plans her 24 weeks labs next week.Orders had been placed at her prevoius OB visit.  She has her next appointment with Dr. Grier in 2 weeks to discuss and schedule her repeat C/S.    ICD-10-CM    1. Previous  section complicating pregnancy, antepartum condition or complication O34.219      CEPHAS AGBEH, MD.

## 2019-11-01 NOTE — PATIENT INSTRUCTIONS
If you have any questions regarding your visit, Please contact your care team.  [facility]: [phone]  [facility] CLINIC HOURS TELEPHONE NUMBER   Cephas Agbeh, M.D. Lisa -       Natasha Fox         Monday-[place]    8:00a.m-4:45 p.m    Tuesday--[place]     8:00a.m-4:45 p.m.    Thursday-[place]    8:00a.m-4:45 p.m.    Friday-[place]    8:00a.m-4:45 p.m    [facility]   [address] [place] [address]   [phone]-Ask for [facility]   Fax [phone]   [phone]     [facility]   [address] [place] [address] [place] [address]   [phone]    [facility]  [address] [facility] [address]  [phone]  [phone]   Urgent Care locations:    [facility] Monday-Friday  5 pm - 9 pm  Saturday and Sunday   9 am - 5 pm   Monday-Friday   5 pm - 9 pm  Saturday and Sunday  9 am - 5 pm    [phone]   If you need a medication refill, please contact your pharmacy. Please allow 3 business days for your refill to be completed.  As always, Thank you for trusting us with your healthcare needs!

## 2019-11-01 NOTE — TELEPHONE ENCOUNTER
Pregnant 26 weeks.  Has hernia above belly button and unable reduce it.  Is a bit painful and tender to touch. Will go to ER per protocol.    Angela Schaefer RN  [facility]      Reason for Disposition    Hernia is painful or tender to touch    Additional Information    Negative: SEVERE abdominal pain    Protocols used: HERNIA-A-AH

## 2019-11-03 ENCOUNTER — TELEPHONE (OUTPATIENT)
Dept: OBGYN | Facility: CLINIC | Age: 29
End: 2019-11-03

## 2019-11-03 ENCOUNTER — NURSE TRIAGE (OUTPATIENT)
Dept: NURSING | Facility: CLINIC | Age: 29
End: 2019-11-03

## 2019-11-03 NOTE — TELEPHONE ENCOUNTER
"Patient is 26 weeks pregnant. EDC 2/5/2020. Pregnancy #2.  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  States \"I used gloves and was exposed to Patient's urine.\"  States is very concerned and would like to discuss with Provider.    Currently denies vaginal bleeding.  Denies clear vaginal fluid leaking.  No contractions by report.  Baby moving as usual by report.  States saw Dr. Agbeh OB/Gyn at [facility] recently.    Protocol-  Information Only Call- Adult  Care advice reviewed.   Disposition- Paged the On Call OB/Gyn Provider.  Caller states understanding of the recommended disposition.   Advised to call back if further questions or concerns.     Patient's primary provider is Dr. Agbeh at the [facility]. Dr. Crowley is on call for this clinic and was paged at 8:07am via page  [phone] to call Patient at [phone].  Patient was advised to call back if they have not heard from the provider within 20 minutes.     Radha Parra, MILADISN RN  [facility]     Reason for Disposition    [1] Caller requesting NON-URGENT health information AND [2] PCP's office is the best resource    Protocols used: INFORMATION ONLY CALL-A-    "

## 2019-11-03 NOTE — TELEPHONE ENCOUNTER
8:37am Patient called [facility]NA back and spoke with this RN.  States has not received a call from OB/Gyn On call Provider.  8:39am This RN spoke with page  ([phone]) and On Call (Dr. Crowley) was re-paged.  Patient's telephone number verified ([phone]).     MAGGIE Ram RN  [facility]

## 2019-11-03 NOTE — TELEPHONE ENCOUNTER
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 At work, she had touched the skin of a patient who was later noted to be on some oral chemotherapy. She also handled some urine saturated items from that patient but was wearing gloves. No symptoms now. Type of chemotherapy for this possible limited exposure is unknown. No other significant or specific sx.  History and available/pertinent chart info rev'd.  Discussed condition, E&M considerations, prognosis, appropriate precautions and instructions and follow-up.   Reviewed clinic priority, specialty, Urgent Care, ED and Hospital evaluation indications as appropriate.  Medications and prescriptions given as noted.  I reviewed side effects, risks, benefits and instructions on proper use.  Reviewed supportive care measures.  Plan: I do not think that this exposure is likely to cause harm but suggested that patient report it to her supervisor and the Occupational Health Services there who could provide more information. Unlikely any detox or treatment needed. Discussed potential ways medication exposure could be harmful at this time in pregnancy including effects on fetal nervous system or growth but overall reassured.  Patient understands and agrees with plan. All questions answered. Patient to call if additional questions, concerns or significant changes in status.    Shimon Crowley MD

## 2019-11-16 ENCOUNTER — NURSE TRIAGE (OUTPATIENT)
Dept: NURSING | Facility: CLINIC | Age: 29
End: 2019-11-16

## 2019-11-16 NOTE — TELEPHONE ENCOUNTER
"Joanne is 28w4d pregnant    She reports a \"pulled muscle\" pain in the left side of her groin that started about 9:00 pm.    Pain raited about 2/10 when sitting  Rated about 6-8/10 when walking.    She was planning to be evaluated tomorrow and wanted to know if an urgent care could see her or would they want her to go to L&D.    Triage offered.  Per protocol, ER advised.  She is currently at work at [employer].  She will go up to the L&D department.    Ema Feldman RN  [facility]        Reason for Disposition    MODERATE-SEVERE abdominal pain (e.g., interferes with normal activities, awakens from sleep)    Additional Information    Negative: Passed out (i.e., lost consciousness, collapsed and was not responding)    Negative: Shock suspected (e.g., cold/pale/clammy skin, too weak to stand, low BP, rapid pulse)    Negative: Difficult to awaken or acting confused (e.g., disoriented, slurred speech)    Negative: [1] SEVERE abdominal pain (e.g., excruciating) AND [2] constant AND [3] present > 1 hour    Negative: SEVERE vaginal bleeding (e.g., continuous red blood from vagina, or large blood clots)    Negative: Sounds like a life-threatening emergency to the triager    Negative: [1] Vomiting AND [2] contains red blood or black (\"coffee ground\") material  (Exception: few red streaks in vomit that only happened once)    Protocols used: PREGNANCY - ABDOMINAL PAIN GREATER THAN 20 WEEKS EGA-A-AH      "

## 2019-11-21 ENCOUNTER — PRENATAL OFFICE VISIT (OUTPATIENT)
Dept: OBGYN | Facility: CLINIC | Age: 29
End: 2019-11-21
Payer: COMMERCIAL

## 2019-11-21 ENCOUNTER — TELEPHONE (OUTPATIENT)
Dept: OBGYN | Facility: CLINIC | Age: 29
End: 2019-11-21

## 2019-11-21 VITALS
DIASTOLIC BLOOD PRESSURE: 82 MMHG | WEIGHT: 189 LBS | OXYGEN SATURATION: 98 % | HEART RATE: 106 BPM | TEMPERATURE: 97.9 F | SYSTOLIC BLOOD PRESSURE: 129 MMHG | BODY MASS INDEX: 34.85 KG/M2

## 2019-11-21 DIAGNOSIS — R10.2 PELVIC PAIN COMPLICATING PREGNANCY, ANTEPARTUM: ICD-10-CM

## 2019-11-21 DIAGNOSIS — O26.899 PELVIC PAIN COMPLICATING PREGNANCY, ANTEPARTUM: ICD-10-CM

## 2019-11-21 DIAGNOSIS — O34.219 PREVIOUS CESAREAN DELIVERY, ANTEPARTUM CONDITION OR COMPLICATION: Primary | ICD-10-CM

## 2019-11-21 LAB
GLUCOSE 1H P 50 G GLC PO SERPL-MCNC: 142 MG/DL (ref 60–129)
HGB BLD-MCNC: 11.3 G/DL (ref 11.7–15.7)

## 2019-11-21 PROCEDURE — 87086 URINE CULTURE/COLONY COUNT: CPT | Performed by: OBSTETRICS & GYNECOLOGY

## 2019-11-21 PROCEDURE — 82950 GLUCOSE TEST: CPT | Performed by: NURSE PRACTITIONER

## 2019-11-21 PROCEDURE — 85018 HEMOGLOBIN: CPT | Performed by: NURSE PRACTITIONER

## 2019-11-21 PROCEDURE — 99207 ZZC PRENATAL VISIT: CPT | Performed by: OBSTETRICS & GYNECOLOGY

## 2019-11-21 PROCEDURE — 36415 COLL VENOUS BLD VENIPUNCTURE: CPT | Performed by: NURSE PRACTITIONER

## 2019-11-21 NOTE — PROGRESS NOTES
Patient presents for routine prenatal visit at 29w2d.  Patient with complaint. She reports pelvic pain, appears to be ligamentous  Recommend she see PT  PE: See OB vitals  There is no height or weight on file to calculate BMI.    Doing well.  No concerns today.  Schedule repeat C/S  No vaginal bleeding, loss of fluid, or contractions      Questions asked and answered.      Sebas Grier MD FACOG

## 2019-11-21 NOTE — TELEPHONE ENCOUNTER
Surgery Pre-Certification    Medical Record Number: [id number]  Joanne Escalante  YOB: 1990   Phone: [phone] (home)   Primary Provider: [facility]    Reason for Admit:  C/Section    Surgeon: CLARA Grier MD  Surgical Procedure: Repeat  Section   ICD-9 Coded: Z98.891  Date of Surgery: 2020  Consent signed? N/A     Hospital: [facility]  Inpatient- Length of stay:  3 days.    Requestor:  Nancy Flores MA     Location:  [facility] [phone]      Surgery Scheduled.    Date of Surgery 2020 Time of Surgery 7:30 AM  Procedure: Repeat  Section   Hospital/Surgical Facility: [facility]  Surgeon: Dr. Sebas Grier  Type of Anesthesia Anticipated: Spinal  Pre-op: will call  6 week post op will call  Pre-certification 2019  Consent signed: n/a  Hospital Stay 3 days        surgery packet mailed to patient's home address.  Patient instructed NPO 12 hours prior to surgery, arrive 1.5 hours prior to surgery, must  have a .  Patient understood and agrees to the plan.       Answer Comment   Procedure name(s) - multi select  Delivery    Reason for procedure Previous     Is this a multi surgeon case? No    Laterality N/A    Request for additional equipment Other (see comments) None   Anesthesia Spinal    Positioning (if different from preference card): Supine    Initiate Pre-op orders for above procedure: Yes, as ordered in Epic additional orders noted there also   Location of Case: [facility]    Operating room  requested: Yes    Urgency of Surgery: Routine    Surgeon Procedure Time (incision to closure) in minutes (per procedure as applicable) 60    Note:  Surgical Case Time Needed (in minutes)   Surgery Date:   or later   Patient Class (for admit prior to surgery, specify number of days in comments): Surgery admit admit day of surgery   Length of Stay: 3 days    H&P To Be Completed By: Other (See  Comments) Shannan   Post-Op Appointment 4 weeks    Vendor Needed? No

## 2019-11-22 DIAGNOSIS — O99.810 ABNORMAL GLUCOSE TOLERANCE TEST IN PREGNANCY: Primary | ICD-10-CM

## 2019-11-22 PROBLEM — D50.9 IRON DEFICIENCY ANEMIA: Status: ACTIVE | Noted: 2019-11-22

## 2019-11-22 LAB
BACTERIA SPEC CULT: NORMAL
SPECIMEN SOURCE: NORMAL

## 2019-11-25 ENCOUNTER — TELEPHONE (OUTPATIENT)
Dept: SCHEDULING | Facility: CLINIC | Age: 29
End: 2019-11-25

## 2019-11-25 NOTE — TELEPHONE ENCOUNTER
RN called and spoke with patient. She is currently at Urgent Care with her young child for assessment of chicken pox.     RN verified that mother/patient has had a history of chicken pox and shingles herself. She did verify this was true. RN had consulted with provider in clinic and advised patient that since she has been previously exposed that her and baby (in utero) are at a very low risk of reactivation. Baby is protected by mother's immunity while in utero.     Patient will monitor for reactivation symptoms on herself and updated us as needed.    Patient verbalized understanding and agreed to plan.     Joanne Liriano RN on 11/25/2019 at 11:18 AM

## 2019-11-25 NOTE — TELEPHONE ENCOUNTER
Reason for call:  Other   Patient called regarding (reason for call): call back  Additional comments: Patients son has chicken pox and she would like to speak with someone about her exposure. She has previously had chicken pox and shingles.     Phone number to reach patient:  Cell number on file:    Telephone Information:   Mobile [phone]       Best Time:  anytime    Can we leave a detailed message on this number?  YES

## 2019-11-29 ENCOUNTER — NURSE TRIAGE (OUTPATIENT)
Dept: NURSING | Facility: CLINIC | Age: 29
End: 2019-11-29

## 2019-11-29 ENCOUNTER — OFFICE VISIT (OUTPATIENT)
Dept: FAMILY MEDICINE | Facility: CLINIC | Age: 29
End: 2019-11-29
Payer: COMMERCIAL

## 2019-11-29 VITALS
SYSTOLIC BLOOD PRESSURE: 121 MMHG | TEMPERATURE: 97.7 F | WEIGHT: 191 LBS | BODY MASS INDEX: 35.22 KG/M2 | OXYGEN SATURATION: 99 % | HEART RATE: 93 BPM | DIASTOLIC BLOOD PRESSURE: 85 MMHG

## 2019-11-29 DIAGNOSIS — M25.431 WRIST SWELLING, RIGHT: Primary | ICD-10-CM

## 2019-11-29 PROCEDURE — 99202 OFFICE O/P NEW SF 15 MIN: CPT | Performed by: FAMILY MEDICINE

## 2019-11-29 NOTE — PROGRESS NOTES
Subjective     Joanne Escalante is a 28 year old female who presents to clinic today for the following health issues:    HPI   Hand Swelling/tingling      Duration: started last night    Description (location/character/radiation): right hand    Intensity:  moderate    Accompanying signs and symptoms: swelling, tingling, little pain    History (similar episodes/previous evaluation): None    Precipitating or alleviating factors: using hand    Therapies tried and outcome: None     Swelling and tingling of the right hand   No fall no injury no trauma.  Started last night , woke form sleep and was swollen   Can be painful occasionally  No weakness.   Patient was sick last night, vomiting and diarrhea   Now is better    Patient Active Problem List   Diagnosis     Iron deficiency anemia     Past Surgical History:   Procedure Laterality Date     C ORAL SURGERY PROCEDURE      [place] teeth      SECTION  2018     LAPAROSCOPY DIAGNOSTIC (GYN)  2015    Endometriosis       Social History     Tobacco Use     Smoking status: Current Every Day Smoker     Packs/day: 0.00     Types: Cigarettes     Smokeless tobacco: Never Used   Substance Use Topics     Alcohol use: Not Currently     Family History   Problem Relation Age of Onset     Depression Mother      Depression Father          Current Outpatient Medications   Medication Sig Dispense Refill     doxylamine (UNISOM) 25 MG TABS tablet Take 25 mg by mouth At Bedtime       ondansetron (ZOFRAN-ODT) 4 MG ODT tab Take 4 mg by mouth every 8 hours as needed for nausea       Prenatal Vit-Fe Fumarate-FA (PRENATAL VITAMIN PO)        vitamin B6 (VITAMIN  B-6) 25 MG tablet Take 25 mg by mouth daily       citalopram (CELEXA) 20 MG tablet Take 0.5 tablets (10 mg) by mouth daily for 10 days, THEN 1 tablet (20 mg) daily for 20 days. (Patient not taking: Reported on 10/14/2019) 30 tablet 1     Allergies   Allergen Reactions     Sulfa Drugs      Recent Labs   Lab Test 19  07/19/18   LDL  --  136*  --    HDL  --  38*  --    TRIG  --  93  --    CR 0.60  --   --    GFRESTIMATED >60  --   --    GFRESTBLACK >60  --   --    POTASSIUM 3.5  --   --    TSH  --   --  1.62      BP Readings from Last 3 Encounters:   11/29/19 121/85   11/21/19 129/82   11/01/19 121/81    Wt Readings from Last 3 Encounters:   11/29/19 86.6 kg (191 lb)   11/21/19 85.7 kg (189 lb)   11/01/19 85.9 kg (189 lb 6.4 oz)                    Reviewed and updated as needed this visit by Provider  Tobacco  Allergies  Meds  Problems  Med Hx  Surg Hx  Fam Hx         Review of Systems         Objective    /85   Pulse 93   Temp 97.7  F (36.5  C) (Oral)   Wt 86.6 kg (191 lb)   LMP 05/05/2019   SpO2 99%   BMI 35.22 kg/m    Body mass index is 35.22 kg/m .  Physical Exam  Vitals signs and nursing note reviewed.   Constitutional:       General: She is not in acute distress.     Appearance: Normal appearance. She is not toxic-appearing or diaphoretic.   HENT:      Head: Normocephalic and atraumatic.      Nose: Nose normal. No congestion or rhinorrhea.      Mouth/Throat:      Pharynx: No oropharyngeal exudate or posterior oropharyngeal erythema.   Eyes:      General:         Right eye: No discharge.         Left eye: No discharge.      Pupils: Pupils are equal, round, and reactive to light.   Neck:      Musculoskeletal: Normal range of motion and neck supple. No neck rigidity or muscular tenderness.   Cardiovascular:      Rate and Rhythm: Normal rate and regular rhythm.      Pulses: Normal pulses.      Heart sounds: Normal heart sounds. No murmur. No gallop.    Pulmonary:      Effort: Pulmonary effort is normal. No respiratory distress.      Breath sounds: No stridor. No wheezing or rales.   Chest:      Chest wall: No tenderness.   Musculoskeletal:      Right wrist: She exhibits normal range of motion, no tenderness, no bony tenderness, no swelling, no effusion, no crepitus, no deformity and no laceration.      Left  wrist: Normal.   Lymphadenopathy:      Cervical: No cervical adenopathy.   Skin:     Capillary Refill: Capillary refill takes less than 2 seconds.      Coloration: Skin is not jaundiced or pale.      Findings: No bruising, erythema, lesion or rash.   Neurological:      General: No focal deficit present.      Mental Status: She is alert and oriented to person, place, and time. Mental status is at baseline.      Cranial Nerves: No cranial nerve deficit.   Psychiatric:         Mood and Affect: Mood normal.                    Assessment & Plan     1. Wrist swelling, right  Patient presents with right hand swelling started last night after she woke up from sleep  Denies any trauma or injury.  No numbness or pain.  She reports mild tingling.  Exam unremarkable.  Normal strength, normal sensation,.  Vibration sense intact.  Patient was reassured.  Discussed red flags and warning signs that would mandate going to the emergency room.  Encouraged patient to use water intake.          Return if symptoms worsen or fail to improve.    Aleks Red MD  [facility]

## 2019-11-29 NOTE — TELEPHONE ENCOUNTER
"Patient reporting tingling and swelling in right hand. Symptoms starting this morning. Stating she has been awake since 930 a.m.. Hand is tight. Warm to touch. Patient is 30 weeks gestation.  Per guidelines advised to be seen with in 24 hours. Caller verbalized understanding. Denies further questions.    Josie Emmanuel RN  [facility]      Reason for Disposition    Numbness (i.e., loss of sensation) in hand or fingers (Exception: just tingling; numbness present > 2 weeks)    Additional Information    Negative: [1] Similar pain previously AND [2] it was from \"heart attack\"    Negative: [1] Similar pain previously AND [2] it was from \"angina\" AND [3] not relieved by nitroglycerin    Negative: Sounds like a life-threatening emergency to the triager    Negative: [1] Swollen joint AND [2] fever    Negative: [1] Red area or streak AND [2] fever    Negative: Patient sounds very sick or weak to the triager    Negative: [1] SEVERE pain (e.g., excruciating, unable to use hand at all) AND [2] not improved after 2 hours of pain medicine    Negative: [1] Looks infected (spreading redness, pus) AND [2] large red area (> 2 in. or 5 cm)    Negative: Weakness (i.e., loss of strength) of new onset in hand or fingers  (Exceptions: not truly weak, hand feels weak because of pain; weakness present > 2 weeks)    Protocols used: HAND AND WRIST PAIN-A-AH      "

## 2019-11-29 NOTE — PATIENT INSTRUCTIONS
Patient Education     Hand and Wrist Exercises: Finger  and Release      This exercise stretches and strengthens your hands and wrists. Before starting, read through all the instructions. While exercising, breathe normally. If you feel any pain, stop the exercise. If pain persists, inform your healthcare provider.    Make a tight fist. (Or you can grasp a sponge or ball.) Hold for _____ seconds. Then relax.    Spread your fingers as far apart as possible. Hold for _____ seconds. Then relax.    Repeat _____ times for each hand. Do _____ sets a day.  Date Last Reviewed: 2/1/2018 2000-2018 [employer]. [address] [place] [address]. All rights reserved. This information is not intended as a substitute for professional medical care. Always follow your healthcare professional's instructions.  Patient Education     Wrist Extension (Strength)     This exercise is written for your right elbow. Switch sides for your left elbow.  1. Sit in a chair. Hold a hand weight in your right hand. Your healthcare provider will tell you what size of hand weight to use.  2. Lean your forearm on your thigh or a table. Hang your wrist off the end of your knee or edge of the table, palm down.  3. Keep your forearm in place and bend your wrist upward. Lift the hand weight as high as you can.  4. Slowly lower the hand weight back down.  5. Repeat 5 times, or as instructed.  Date Last Reviewed: 3/10/2016    7757-2410 [employer]. [address] [place] [address]. All rights reserved. This information is not intended as a substitute for professional medical care. Always follow your healthcare professional's instructions.  Patient Education     Wrist Flexion (Flexibility)    1. Stand or sit and hold your arms straight out in front of you.  2. Dangle your right hand at the wrist. Gently press down on your right hand with your left hand. Feel the stretch in your right wrist and the top of your  hand.  3. Hold for 30 to 60 seconds, then relax.  4. Switch arms and repeat 2 times.   Date Last Reviewed: 3/10/2016    1330-9012 The "[employer] [address] [place] [address]. All rights reserved. This information is not intended as a substitute for professional medical care. Always follow your healthcare professional's instructions.    Patient Education     Wrist Extension (Strength)     This exercise is written for your right elbow. Switch sides for your left elbow.  6. Sit in a chair. Hold a hand weight in your right hand. Your healthcare provider will tell you what size of hand weight to use.  7. Lean your forearm on your thigh or a table. Hang your wrist off the end of your knee or edge of the table, palm down.  8. Keep your forearm in place and bend your wrist upward. Lift the hand weight as high as you can.  9. Slowly lower the hand weight back down.  10. Repeat 5 times, or as instructed.  Date Last Reviewed: 3/10/2016    7347-3667 The "[employer]". [address] [place] [address]. All rights reserved. This information is not intended as a substitute for professional medical care. Always follow your healthcare professional's instructions.

## 2019-11-30 NOTE — TELEPHONE ENCOUNTER
Reason for Disposition    [1] MILD abdominal pain (e.g., doesn't interfere with normal activities) AND [2] constant AND [3] present > 2 hours    Protocols used: PREGNANCY - ABDOMINAL PAIN GREATER THAN 20 WEEKS EGA-A-AH

## 2019-11-30 NOTE — TELEPHONE ENCOUNTER
"Joanne is 30 weeks pregnant and is calling with mild abdominal pain. She was diagnosed with an umbilical hernia at 16 weeks pregnant and is now reporting that her hernia looks \"opened up\". There is no bleeding or open areas. She is rating her abdominal pain 5/10. It started 2.5 hrs ago.  Paged on-call provider Dr. France at 6:30pm to call patient back at: [phone]. FNA advised patient to phone back FNA in 20 minutes if no response from on call MD and patient agreed.    Chanel Ballesteros, RN, BSN  [facility]    Additional Information    Negative: Passed out (i.e., lost consciousness, collapsed and was not responding)    Negative: Shock suspected (e.g., cold/pale/clammy skin, too weak to stand, low BP, rapid pulse)    Negative: Difficult to awaken or acting confused (e.g., disoriented, slurred speech)    Negative: [1] SEVERE abdominal pain (e.g., excruciating) AND [2] constant AND [3] present > 1 hour    Negative: SEVERE vaginal bleeding (e.g., continuous red blood from vagina, or large blood clots)    Negative: Sounds like a life-threatening emergency to the triager    Negative: Followed an abdomen (stomach) injury    Negative: [1] Having contractions or other symptoms of labor (such as vaginal pressure) AND [2] >= 37 weeks pregnant (i.e., term pregnancy)    Negative: [1] Having contractions or other symptoms of labor (such as vaginal pressure) AND [2] < 37 weeks pregnant (i.e., )    Negative: [1] Abdominal pain AND [2] pregnant < 20 weeks    Negative: [1] Vomiting AND [2] contains red blood or black (\"coffee ground\") material  (Exception: few red streaks in vomit that only happened once)    Negative: Vaginal bleeding or spotting    Negative: MODERATE-SEVERE abdominal pain (e.g., interferes with normal activities, awakens from sleep)    Negative: [1] Baby moving less today (e.g., kick count < 5 in 1 hour or < 10 in 2 hours) AND [2] pregnant 23 or more weeks    Negative: Leakage of fluid from vagina    " Negative: New hand or face swelling    Negative: New blurred vision or vision changes    Negative: [1] SEVERE headache AND [2] not relieved with acetaminophen (e.g., Tylenol)    Protocols used: PREGNANCY - ABDOMINAL PAIN GREATER THAN 20 WEEKS EGA-A-AH

## 2019-12-08 ENCOUNTER — NURSE TRIAGE (OUTPATIENT)
Dept: NURSING | Facility: CLINIC | Age: 29
End: 2019-12-08

## 2019-12-08 NOTE — TELEPHONE ENCOUNTER
"Caller is pregnant and MISAEL is 20. Caller states she has an abdominal hernia that is causing her severe pain. Caller rates pain 3/10 when sitting and 8/10 when moving around. Caller states she needs some relief. Tylenol taken, but the pain comes back. Triage guidelines recommend to go to ED. Caller verbalized and understands directives.    Reason for Disposition    [1] MILD abdominal pain (e.g., doesn't interfere with normal activities) AND [2] constant AND [3] present > 2 hours    Additional Information    Negative: Passed out (i.e., lost consciousness, collapsed and was not responding)    Negative: Shock suspected (e.g., cold/pale/clammy skin, too weak to stand, low BP, rapid pulse)    Negative: Difficult to awaken or acting confused (e.g., disoriented, slurred speech)    Negative: [1] SEVERE abdominal pain (e.g., excruciating) AND [2] constant AND [3] present > 1 hour    Negative: SEVERE vaginal bleeding (e.g., continuous red blood from vagina, or large blood clots)    Negative: Sounds like a life-threatening emergency to the triager    Negative: Followed an abdomen (stomach) injury    Negative: [1] Having contractions or other symptoms of labor (such as vaginal pressure) AND [2] >= 37 weeks pregnant (i.e., term pregnancy)    Negative: [1] Having contractions or other symptoms of labor (such as vaginal pressure) AND [2] < 37 weeks pregnant (i.e., )    Negative: [1] Abdominal pain AND [2] pregnant < 20 weeks    Negative: [1] Vomiting AND [2] contains red blood or black (\"coffee ground\") material  (Exception: few red streaks in vomit that only happened once)    Negative: MODERATE-SEVERE abdominal pain (e.g., interferes with normal activities, awakens from sleep)    Negative: Vaginal bleeding or spotting    Negative: [1] Baby moving less today (e.g., kick count < 5 in 1 hour or < 10 in 2 hours) AND [2] pregnant 23 or more weeks    Negative: Leakage of fluid from vagina    Negative: New hand or face " swelling    Negative: New blurred vision or vision changes    Negative: [1] SEVERE headache AND [2] not relieved with acetaminophen (e.g., Tylenol)    Protocols used: PREGNANCY - ABDOMINAL PAIN GREATER THAN 20 WEEKS EGA-A-AH

## 2019-12-11 ENCOUNTER — NURSE TRIAGE (OUTPATIENT)
Dept: NURSING | Facility: CLINIC | Age: 29
End: 2019-12-11

## 2019-12-11 NOTE — TELEPHONE ENCOUNTER
"Joanne is  GA [id number] weeks today. Has concerns of increased pelvic pressure which goes on for an hour, but is intermittent, feels increased pelvic tightness. States that \"feeling of pressures\" are far apart > 10 minutes. Baby moving okay. Also reports that she has abdominal hernia which may be causing the pressure. Patient states she missed hr prenatal appointment today due to a car problem. PCP Dr. Grier/Lilia GARCIA.    RN paged on call provider for Dr. Ge  via answering service at 4:59  pm to call Joanne back directly at [phone].      FNA advised patient to phone back FNA in 20 minutes if no response from on call MD and patient agreed.    Monet Ordoñez RN/[facility]      Reason for Disposition    Increased pressure in pelvic area    Additional Information    Negative: Passed out (i.e., lost consciousness, collapsed and was not responding)    Negative: Shock suspected (e.g., cold/pale/clammy skin, too weak to stand, low BP, rapid pulse)    Negative: Difficult to awaken or acting confused (e.g., disoriented, slurred speech)    Negative: [1] SEVERE abdominal pain (e.g., excruciating) AND [2] constant AND [3] present > 1 hour    Negative: Severe bleeding (e.g., continuous red blood from vagina, or large blood clots)    Negative: Uncontrollable urge to push (i.e., feels like baby is coming out now)    Negative: Umbilical cord hanging out of the vagina (shiny, white, curled appearance, \"like telephone cord\")    Negative: Can see baby    Negative: Sounds like a life-threatening emergency to the triager    Negative: MODERATE-SEVERE abdominal pain    Negative: Contractions < 10 minutes apart for 1 hour (i.e., 6 or more contractions an hour)    Negative: [1] Contractions > 10 minutes apart AND [2] persist > 24 hours AND [3] no improvement using CARE ADVICE    Negative: [1] Contractions AND [2] any vaginal bleeding (including: red blood, clots, spotting, or pink/brown mucous)    Negative: " Vaginal bleeding  (Exception: brief spotting after intercourse or pelvic exam, or slight pinkish or brownish mucous discharge)    Negative: Leakage of fluid from vagina    Negative: [1] Baby moving less today (e.g., kick count < 5 in 1 hour or < 10 in 2 hours) AND [2] pregnant 23 or more weeks    Negative: No movement of baby for 8 hours    Negative: Severe headache or headache that won't go away    Negative: New blurred vision or vision changes    Negative: Fever > 100.4 F (38.0 C)    Protocols used: PREGNANCY - LABOR - [id number]

## 2019-12-12 ENCOUNTER — TELEPHONE (OUTPATIENT)
Dept: ONCOLOGY | Facility: CLINIC | Age: 29
End: 2019-12-12

## 2019-12-12 NOTE — TELEPHONE ENCOUNTER
"Patient is a 29 year old, , currently 32 weeks and 2 days. She spoke with a on-call provider last night regarding symptoms and was advised to be seen before Monday. Her appointment yesterday was cancelled due to her car wouldn't start. She was disappointed cause she really felt like she needed to be seen.     RN called and spoke to patient. She continues to have \"a lot of pressure\". This started a couple days ago. She says the pressure and contraction are inconsistent, she is still up and moving around. She did note extra mucus this morning after using the bathroom but denies leakage of fluid. Patient states that baby is still very active and did not note any decreased fetal movement. However, this pressure is concerning to her as she did not have this with her last child.   With her inconsistent contractions she denies any uterine tightening but she is having hardened abdomen, and some upper abdominal pain. Patient states she has a hernia at top of belly button so it's not uncommon for her to have belly pain, however, she is feeling more pressure.     Patient tried taking a warm bath and rested in bed last night and this was helpful.     RN offered her to be seen in [place] with Adele Max today, but patient is adamant on being further evaluated at L&D, due to her concerns associated with symptoms. RN reassured her but also validated that it's best to be evaluated and cleared.     Patient has ride and assistance for 20 month old.    Patient verbalized understanding and agreed to plan.     Joanne Liriano RN on 2019 at 9:33 AM      "

## 2019-12-12 NOTE — TELEPHONE ENCOUNTER
"Per patient she needs to be seen before Monday per on-call MD.Having \"pressure\" unsure if contractions or not about 32 weeks. She can be reached at [phone].   "

## 2019-12-16 ENCOUNTER — PRENATAL OFFICE VISIT (OUTPATIENT)
Dept: OBGYN | Facility: CLINIC | Age: 29
End: 2019-12-16
Payer: COMMERCIAL

## 2019-12-16 VITALS
HEIGHT: 62 IN | HEART RATE: 110 BPM | TEMPERATURE: 98.4 F | WEIGHT: 187.8 LBS | SYSTOLIC BLOOD PRESSURE: 120 MMHG | DIASTOLIC BLOOD PRESSURE: 68 MMHG | OXYGEN SATURATION: 97 % | BODY MASS INDEX: 34.56 KG/M2

## 2019-12-16 DIAGNOSIS — Z23 NEED FOR TDAP VACCINATION: ICD-10-CM

## 2019-12-16 DIAGNOSIS — O34.219 PREVIOUS CESAREAN SECTION COMPLICATING PREGNANCY, ANTEPARTUM CONDITION OR COMPLICATION: Primary | ICD-10-CM

## 2019-12-16 PROCEDURE — 99207 ZZC PRENATAL VISIT: CPT | Performed by: NURSE PRACTITIONER

## 2019-12-16 PROCEDURE — 90471 IMMUNIZATION ADMIN: CPT | Performed by: NURSE PRACTITIONER

## 2019-12-16 PROCEDURE — 90715 TDAP VACCINE 7 YRS/> IM: CPT | Performed by: NURSE PRACTITIONER

## 2019-12-16 RX ORDER — PNV NO.95/FERROUS FUM/FOLIC AC 28MG-0.8MG
1 TABLET ORAL DAILY
COMMUNITY
End: 2020-02-18

## 2019-12-16 ASSESSMENT — MIFFLIN-ST. JEOR: SCORE: 1527.17

## 2019-12-16 ASSESSMENT — PAIN SCALES - GENERAL: PAINLEVEL: NO PAIN (0)

## 2019-12-16 NOTE — PROGRESS NOTES
Prior to immunization administration, verified patients identity using patient s name and date of birth. Please see Immunization Activity for additional information.     Screening Questionnaire for Adult Immunization    Are you sick today?   Yes   Do you have allergies to medications, food, a vaccine component or latex?   Yes   Have you ever had a serious reaction after receiving a vaccination?   No   Do you have a long-term health problem with heart, lung, kidney, or metabolic disease (e.g., diabetes), asthma, a blood disorder, no spleen, complement component deficiency, a cochlear implant, or a spinal fluid leak?  Are you on long-term aspirin therapy?   No   Do you have cancer, leukemia, HIV/AIDS, or any other immune system problem?   No   Do you have a parent, brother, or sister with an immune system problem?   No   In the past 3 months, have you taken medications that affect  your immune system, such as prednisone, other steroids, or anticancer drugs; drugs for the treatment of rheumatoid arthritis, Crohn s disease, or psoriasis; or have you had radiation treatments?   No   Have you had a seizure, or a brain or other nervous system problem?   No   During the past year, have you received a transfusion of blood or blood    products, or been given immune (gamma) globulin or antiviral drug?   No   For women: Are you pregnant or is there a chance you could become       pregnant during the next month?   Yes   Have you received any vaccinations in the past 4 weeks?   No     Immunization questionnaire was positive for at least one answer.  Notified Shannan DENG CNP.        Per orders of Shannan DENG CNP, injection of Tdap given by Terri Patiño CMA. Patient instructed to remain in clinic for 15 minutes afterwards, and to report any adverse reaction to me immediately.       Screening performed by Terri Patiño CMA on 12/16/2019 at 10:39 AM.

## 2019-12-16 NOTE — LETTER
[facility]  [address] [address]  [phone]    RE: Joanne Escalante  : 1990    DATE: 2019      To Whom It May Concern,        Joanne Escalante is under my care for her pregnancy. Due to complications at this time, we are recommending we restrict some of her work duties for the remainder of the pregnancy.   We are recommending:  * No lifting of more than 20 lbs at a time  * No repositioning or pulling of patients on the unit      Thank you.      Sincerely,            Shannan DENG CNP

## 2019-12-16 NOTE — PROGRESS NOTES
Patient presents for routine prenatal visit. Prenatal flowsheet reviewed and updated as needed.  Denies vaginal bleeding, loss of fluid, contractions or cramping. Denies headache, nausea/vomiting, upper abdominal pain, vision changes, lower extremity swelling, chest pain or shortness of breath.  Patient with complaint. Seen in L&D last week due to abdominal pain. Was monitored, SVE was closed, thick, high. Does have a known hernia-will be seeing general surgery for consult after delivery.  Also has appointment tomorrow with physical therapy for this pain.    Requesting light duty note for work, works as a nursing assistant part time. Letter provided.  Tdap given.  Still needs to return to clinic for 3 hour GTT-strongly encouraged to complete this week.   Advice as per Anticipatory Guidance/Checklist updated.  PE: See OB vitals    Questions asked and answered. Next OB visit in 2 week(s) with Dr. Grier.    Shannan DENG CNP

## 2019-12-21 ENCOUNTER — NURSE TRIAGE (OUTPATIENT)
Dept: NURSING | Facility: CLINIC | Age: 29
End: 2019-12-21

## 2019-12-22 ENCOUNTER — NURSE TRIAGE (OUTPATIENT)
Dept: NURSING | Facility: CLINIC | Age: 29
End: 2019-12-22

## 2019-12-22 NOTE — TELEPHONE ENCOUNTER
"    Reason for Disposition    [1] MILD abdominal pain (e.g., doesn't interfere with normal activities) AND [2] constant AND [3] present > 2 hours    Additional Information    Negative: Sounds like a life-threatening emergency to the triager    Negative: Injury to abdomen    Negative: [1] Pregnant > 36 weeks AND [2] having contractions or other symptoms of labor    Negative: [1] Pregnant < 37 weeks AND [2] having contractions or other symptoms of labor    [1] Pregnant > 20 weeks AND [2] abdominal pain    Negative: Passed out (i.e., lost consciousness, collapsed and was not responding)    Negative: Shock suspected (e.g., cold/pale/clammy skin, too weak to stand, low BP, rapid pulse)    Negative: Difficult to awaken or acting confused (e.g., disoriented, slurred speech)    Negative: [1] SEVERE abdominal pain (e.g., excruciating) AND [2] constant AND [3] present > 1 hour    Negative: SEVERE vaginal bleeding (e.g., continuous red blood from vagina, or large blood clots)    Negative: Sounds like a life-threatening emergency to the triager    Negative: Followed an abdomen (stomach) injury    Negative: [1] Having contractions or other symptoms of labor (such as vaginal pressure) AND [2] >= 37 weeks pregnant (i.e., term pregnancy)    Negative: [1] Having contractions or other symptoms of labor (such as vaginal pressure) AND [2] < 37 weeks pregnant (i.e., )    Negative: [1] Abdominal pain AND [2] pregnant < 20 weeks    Negative: [1] Vomiting AND [2] contains red blood or black (\"coffee ground\") material  (Exception: few red streaks in vomit that only happened once)    Negative: MODERATE-SEVERE abdominal pain (e.g., interferes with normal activities, awakens from sleep)    Negative: Vaginal bleeding or spotting    Negative: [1] Baby moving less today (e.g., kick count < 5 in 1 hour or < 10 in 2 hours) AND [2] pregnant 23 or more weeks    Negative: Leakage of fluid from vagina    Negative: New hand or face swelling    " "Negative: New blurred vision or vision changes    Negative: [1] SEVERE headache AND [2] not relieved with acetaminophen (e.g., Tylenol)    Answer Assessment - Initial Assessment Questions  1. FETAL MOVEMENT: \"Has the baby's movement decreased or changed significantly from normal?\" (e.g., yes, no; describe)    No mvement since noon has been felt   2. MISAEL: \"What date are you expecting to deliver?\"       01/27  3. PREGNANCY: \"How many weeks pregnant are you?\"       34  4. OTHER SYMPTOMS: \"Do you have any other symptoms?\" (e.g., abdominal pain, leaking fluid from vagina, vaginal bleeding, etc.)      Mild abdominal pain has been present throughout this pregnancy    Protocols used: PREGNANCY - ABDOMINAL PAIN GREATER THAN 20 WEEKS EGA-A-AH, PREGNANCY - DECREASED FETAL MOVEMENT-A-AH    Joanne has noticed she  Has not felt much movement today from baby. She thinks noon was last she remembers. She has laid down, and had juice, and ice water, and has been trying to poke her stomach, jostle and has not felt any movement.She has has low grade abdominal pain thru out this pregnancy, and has it today. She was having pain yesterday, and called in to talk to someone. Agrees to come in now.  "

## 2019-12-22 NOTE — TELEPHONE ENCOUNTER
"Caller is  30 weeks pregnant and has increased  clear vaginal discharge and some vaginal discomfort; denies any contraction, bleeding or  decreased fetal movement; urinary symptoms    triage protocol reviewed   advised in home care and to call or  Follow up with PCP  If an new or worsening symptoms     Caller understands and will comply   Kenia Major RN  FNA      Additional Information    Vaginal discharge is main symptom    Negative: [1] Vaginal bleeding AND [2] pregnant > 20 weeks    Negative: [1] Vaginal bleeding AND [2] pregnant < 20 weeks    Negative: [1] Having contractions or other symptoms of labor AND [2] < 37 weeks pregnant (i.e., )    Negative: [1] Having contractions or other symptoms of labor AND [2] > 36 weeks pregnant (i.e., term pregnancy)    Negative: Leakage of fluid (trickle, gush) from the vagina    Negative: Foreign body in vagina (e.g., tampon)    Negative: Pain or burning with urination is main symptom    Negative: [1] Pregnant 23 or more weeks AND [2] baby is moving less today (e.g., kick count < 5 in 1 hour or < 10 in 2 hours)    Negative: Patient sounds very sick or weak to the triager    Negative: [1] Constant abdominal pain AND [2] present > 2 hours    Negative: [1] Intermittent lower abdominal pain AND [2] present > 24 hours    Negative: [1] Pregnant 24-36 weeks () AND [2] pinkish or brownish mucous discharge    Negative: [1] Yellow or green vaginal discharge AND [2] fever    Negative: Painful rash with tiny water blisters    Negative: [1] Rash (e.g., redness, tiny bumps, sore) of genital area AND [2] present > 24 hours    Negative: Abnormal color vaginal discharge (i.e., yellow, green, gray)    Negative: Bad smelling vaginal discharge    Negative: Tender lump (swelling or \"ball\") at vaginal opening    Negative: [1] Symptoms of a \"yeast infection\" (i.e., itchy, white discharge, not bad smelling) AND [2] not improved > 3 days following CARE ADVICE    Negative: Patient is " worried about sexually transmitted disease (STD)    Negative: Pain with sexual intercourse (dyspareunia)    Normal vaginal discharge in pregnancy    Protocols used: PREGNANCY - VULVAR SYMPTOMS-A-AH, PREGNANCY - VAGINAL [id number]

## 2019-12-30 ENCOUNTER — PRENATAL OFFICE VISIT (OUTPATIENT)
Dept: OBGYN | Facility: CLINIC | Age: 29
End: 2019-12-30
Payer: COMMERCIAL

## 2019-12-30 VITALS
HEART RATE: 101 BPM | TEMPERATURE: 98.1 F | HEIGHT: 62 IN | OXYGEN SATURATION: 95 % | BODY MASS INDEX: 35.41 KG/M2 | SYSTOLIC BLOOD PRESSURE: 124 MMHG | WEIGHT: 192.4 LBS | DIASTOLIC BLOOD PRESSURE: 84 MMHG

## 2019-12-30 DIAGNOSIS — O34.219 PREVIOUS CESAREAN SECTION COMPLICATING PREGNANCY, ANTEPARTUM CONDITION OR COMPLICATION: Primary | ICD-10-CM

## 2019-12-30 PROCEDURE — 99207 ZZC PRENATAL VISIT: CPT | Performed by: NURSE PRACTITIONER

## 2019-12-30 ASSESSMENT — MIFFLIN-ST. JEOR: SCORE: 1548.03

## 2019-12-30 ASSESSMENT — PAIN SCALES - GENERAL: PAINLEVEL: MODERATE PAIN (4)

## 2019-12-30 NOTE — LETTER
[facility]  [address] [address]  [phone]      RE: Joanne Escalante  : 1990    DATE: 2019      To Whom It May Concern,        Joanne Escalante is under our care for her pregnancy. At this time, due to complications with her pregnancy, we recommend she be off of work for the remainder of her pregnancy.         Thank you.      Sincerely,            Shannan DENG CNP

## 2019-12-30 NOTE — PROGRESS NOTES
Patient presents for routine prenatal visit. Prenatal flowsheet reviewed and updated as needed.  Denies vaginal bleeding, loss of fluid, contractions or cramping.  Patient without complaint. Scheduled for 3 hour GTT tomorrow. If not done by next visit, needs growth ultrasound.  Per request, letter given to be off work remainder of pregnancy-will send FMLA paperwork if she needs it completed.  Has not done physical therapy.  Advice as per Anticipatory Guidance/Checklist updated.  PE: See OB vitals    Questions asked and answered. Next OB visit in 2 week(s) with Dr. Grier.    Shannan DENG CNP

## 2020-01-01 ENCOUNTER — NURSE TRIAGE (OUTPATIENT)
Dept: NURSING | Facility: CLINIC | Age: 30
End: 2020-01-01

## 2020-01-02 NOTE — TELEPHONE ENCOUNTER
Calling because super itchy, no rash, no redness. Started itching around 6pm, no changes in diet or laundry soap. No change in hygiene products.  Instructed patient to see physician within 24 hours. Looked up benadryl in pregnancy and class B. Instructed could try cool bath, non-scented lotion and no scratching. Verbalizes understanding.    Reason for Disposition    [1] MODERATE-SEVERE widespread itching (i.e., interferes with sleep, normal activities or school) AND [2] not improved after 24 hours of itching Care Advice    Additional Information    Negative: [1] Life-threatening reaction (anaphylaxis) in the past to similar substance (e.g., food, insect bite/sting, chemical, etc.) AND [2] < 2 hours since exposure    Negative: Difficulty breathing or wheezing    Negative: [1] Difficulty swallowing or slurred speech AND [2] sudden onset    Negative: Sounds like a life-threatening emergency to the triager    Negative: Patient sounds very sick or weak to the triager    Protocols used: ITCHING - WIDESPREAD-A-

## 2020-01-03 NOTE — TELEPHONE ENCOUNTER
Patient has [employer] insurance, when checking online. Checked on CPT code: 39307, code(s) are valid and billable, no auth or pre-d is needed on this plan, based on medical necessity. Call ref. # 01/03/20 8:50am

## 2020-01-07 ENCOUNTER — TELEPHONE (OUTPATIENT)
Dept: NURSING | Facility: CLINIC | Age: 30
End: 2020-01-07

## 2020-01-07 ENCOUNTER — PRENATAL OFFICE VISIT (OUTPATIENT)
Dept: OBGYN | Facility: CLINIC | Age: 30
End: 2020-01-07
Payer: COMMERCIAL

## 2020-01-07 VITALS
BODY MASS INDEX: 33.65 KG/M2 | SYSTOLIC BLOOD PRESSURE: 109 MMHG | DIASTOLIC BLOOD PRESSURE: 64 MMHG | WEIGHT: 181 LBS | HEART RATE: 108 BPM

## 2020-01-07 DIAGNOSIS — Z34.83 ENCOUNTER FOR SUPERVISION OF OTHER NORMAL PREGNANCY, THIRD TRIMESTER: Primary | ICD-10-CM

## 2020-01-07 DIAGNOSIS — O99.810 ABNORMAL GLUCOSE TOLERANCE TEST IN PREGNANCY: ICD-10-CM

## 2020-01-07 DIAGNOSIS — L29.9 ITCHING: ICD-10-CM

## 2020-01-07 LAB
ALBUMIN SERPL-MCNC: 2.7 G/DL (ref 3.4–5)
ALP SERPL-CCNC: 106 U/L (ref 40–150)
ALT SERPL W P-5'-P-CCNC: 8 U/L (ref 0–50)
AST SERPL W P-5'-P-CCNC: 11 U/L (ref 0–45)
BILIRUB DIRECT SERPL-MCNC: <0.1 MG/DL (ref 0–0.2)
BILIRUB SERPL-MCNC: 0.3 MG/DL (ref 0.2–1.3)
GLUCOSE 1H P 100 G GLC PO SERPL-MCNC: 226 MG/DL (ref 60–179)
GLUCOSE 2H P 100 G GLC PO SERPL-MCNC: 162 MG/DL (ref 60–154)
GLUCOSE 3H P 100 G GLC PO SERPL-MCNC: 47 MG/DL (ref 60–139)
GLUCOSE P FAST SERPL-MCNC: 101 MG/DL (ref 60–94)
PROT SERPL-MCNC: 6.3 G/DL (ref 6.8–8.8)

## 2020-01-07 PROCEDURE — 83789 MASS SPECTROMETRY QUAL/QUAN: CPT | Mod: 90 | Performed by: ADVANCED PRACTICE MIDWIFE

## 2020-01-07 PROCEDURE — 82952 GTT-ADDED SAMPLES: CPT | Performed by: NURSE PRACTITIONER

## 2020-01-07 PROCEDURE — 82951 GLUCOSE TOLERANCE TEST (GTT): CPT | Performed by: NURSE PRACTITIONER

## 2020-01-07 PROCEDURE — 80076 HEPATIC FUNCTION PANEL: CPT | Performed by: ADVANCED PRACTICE MIDWIFE

## 2020-01-07 PROCEDURE — 99000 SPECIMEN HANDLING OFFICE-LAB: CPT | Performed by: ADVANCED PRACTICE MIDWIFE

## 2020-01-07 PROCEDURE — 87653 STREP B DNA AMP PROBE: CPT | Performed by: ADVANCED PRACTICE MIDWIFE

## 2020-01-07 PROCEDURE — 87186 SC STD MICRODIL/AGAR DIL: CPT | Performed by: ADVANCED PRACTICE MIDWIFE

## 2020-01-07 PROCEDURE — 36415 COLL VENOUS BLD VENIPUNCTURE: CPT | Performed by: NURSE PRACTITIONER

## 2020-01-07 PROCEDURE — 99207 ZZC PRENATAL VISIT: CPT | Performed by: ADVANCED PRACTICE MIDWIFE

## 2020-01-07 NOTE — PROGRESS NOTES
Feeling well.  No complaints other than itching for the last few days   Started primarily on her feet.  Has used benadryl, which has helped.   Will do labs today to r/o ICP  Boy, will circ.  Will breast feed.  Uncertain about BC.  Partner will get vasectomy but doesn't know what she will use other than condoms until effective.   GBS today.  Has c/s scheduled.  Doing GTT today  RTC 1 week.  Adele Max, APRN, CNM

## 2020-01-07 NOTE — NURSING NOTE
"Chief Complaint   Patient presents with     Prenatal Care       Initial /64 (BP Location: Right arm, Patient Position: Sitting, Cuff Size: Adult Large)   Pulse 108   Wt 82.1 kg (181 lb)   LMP 05/05/2019   BMI 33.65 kg/m   Estimated body mass index is 33.65 kg/m  as calculated from the following:    Height as of 12/30/19: 1.562 m (5' 1.5\").    Weight as of this encounter: 82.1 kg (181 lb).  Medication Reconciliation: complete    Yen Pathak CMA    " no apparent

## 2020-01-08 ENCOUNTER — TELEPHONE (OUTPATIENT)
Dept: OBGYN | Facility: CLINIC | Age: 30
End: 2020-01-08

## 2020-01-08 DIAGNOSIS — O24.419 GESTATIONAL DIABETES MELLITUS (GDM) IN THIRD TRIMESTER, GESTATIONAL DIABETES METHOD OF CONTROL UNSPECIFIED: Primary | ICD-10-CM

## 2020-01-08 PROBLEM — O24.410 DIET CONTROLLED GESTATIONAL DIABETES MELLITUS (GDM): Status: ACTIVE | Noted: 2020-01-08

## 2020-01-08 LAB
GP B STREP DNA SPEC QL NAA+PROBE: POSITIVE
SPECIMEN SOURCE: ABNORMAL

## 2020-01-08 NOTE — TELEPHONE ENCOUNTER
"\"I had a 3 hour glucose test done this morning. I got a call earlier and I am returning it. I know that my glucose was 47 when I left there today. I stopped and got a muffin and drink. Since home I feel a lot better. I had dinner. I am just wondering if I need to come back in sooner than Monday?\" Nothing noted per epic other than results that were given to Dr. Miller.  Advised to call OB in am to discuss. Call back if you develop and sx, or as needed.  Melanie Jorge RN [facility]      "

## 2020-01-08 NOTE — TELEPHONE ENCOUNTER
Please contact patient-her 3 hour GTT was abnormal for all results. I have entered a referral to diabetes education. Given that she is 36 weeks gestation already, I would strongly recommend she get in to see them ASAP so they can start to get her glucose under control before her scheduled c/section in 3 weeks. Thank you. Shannan DENG CNP

## 2020-01-08 NOTE — TELEPHONE ENCOUNTER
Patient calling had 3 hour glucose done yesterday and received a phone call back with her results later in the day. Has further question she would like to ask on this and the numbers it showed.  Please call back today please.

## 2020-01-08 NOTE — TELEPHONE ENCOUNTER
Spoke with pt and relayed both of Shannan's messages below.  I gave pt the number to call diabetic ed. So she can schedule an appt asap.  Pt states her boyfriend has type 1 diabetes so she knows a little bit about testing her BS and what to eat and what not to eat.  I explained that that is very helpful and the diabetic educator will go through all of those things as well as how to test BS, when to test BS, what her BS should be and what she needs to do if BS are in a certain range.  Pt will call to schedule an appt with DE.    Pt was under the impression she was seeing an MD yesterday when she saw Adele Max CNM.  I explained to her that Adele is a certified midwife and it is important that she sees an MD next week as it has been awhile since she has seen an MD.  Pt understands.  Scheduled her with Dr. Grier on 1/13/2020.    Alison Cordoba RN

## 2020-01-08 NOTE — TELEPHONE ENCOUNTER
Also, patient was seen yesterday, needs appointment early next week and needs to see MD as she has not seen one in a few visits. Thank you. Shannan DENG CNP

## 2020-01-09 PROBLEM — O99.820 GROUP B STREPTOCOCCUS CARRIER, +RV CULTURE, CURRENTLY PREGNANT: Status: ACTIVE | Noted: 2020-01-09

## 2020-01-10 ENCOUNTER — ALLIED HEALTH/NURSE VISIT (OUTPATIENT)
Dept: EDUCATION SERVICES | Facility: CLINIC | Age: 30
End: 2020-01-10
Payer: COMMERCIAL

## 2020-01-10 VITALS — HEIGHT: 62 IN | WEIGHT: 196 LBS | BODY MASS INDEX: 36.07 KG/M2

## 2020-01-10 DIAGNOSIS — O24.410 DIET CONTROLLED GESTATIONAL DIABETES MELLITUS (GDM) IN THIRD TRIMESTER: Primary | ICD-10-CM

## 2020-01-10 LAB
BILE AC SERPL-SCNC: 0.3 UMOL/L (ref 0–2.5)
CDCAE SERPL-SCNC: 0.3 UMOL/L (ref 0–3.4)
CHOLATE SERPL-SCNC: 1.2 UMOL/L (ref 0–7)
DO-CHOLATE SERPL-SCNC: 0.3 UMOL/L (ref 0–1.9)
URSODEOXYCHOLATE SERPL-SCNC: 0.3 UMOL/L (ref 0–1)

## 2020-01-10 PROCEDURE — G0108 DIAB MANAGE TRN  PER INDIV: HCPCS

## 2020-01-10 ASSESSMENT — MIFFLIN-ST. JEOR: SCORE: 1568.33

## 2020-01-10 NOTE — PROGRESS NOTES
Diabetes Self-Management Education & Support    Gestational Diabetes Self-Management Education & Support    SUBJECTIVE/OBJECTIVE:  Presents for education related to gestational diabetes.    Accompanied by: Spouse( has Type 1)  Diabetes management related comments/concerns: Says concerned over BG dropping to 47 mg/dL.  Says last time passed by 1 number.     Says gets below 90 mg and feels shaky.  Went out and purchased the meter with her diagnosis so started to check blood glucose already.    Cultural Influences/Ethnic Background:  American      Estimated Date of Delivery: 2020    1 hour OGTT  Lab Results   Component Value Date    GLU1 142 (H) 2019       3 hour OGTT    Fasting  Lab Results   Component Value Date     (H) 2020       1 hour  Lab Results   Component Value Date    GL1 226 (H) 2020       2 hour  Lab Results   Component Value Date    GL2 162 (H) 2020       3 hour  Lab Results   Component Value Date    GL3 47 (L) 2020       Lifestyle and Health Behaviors:  Pre-pregnancy weight (lbs): 200  Exercise:: Currently not exercising(Not go to gym but chases 20 month toddler)  Barrier to exercise: Time  Meals include: Breakfast, Dinner, Snacks  Beverages: Water, Coffee, Diet soda  Cultural/Rastafari diet restrictions?: No  Biggest challenges to healthy eating: Portion control, Emotional eating, Evening snacking  Pre-vandana vitamin?: Yes  Supplements?: Yes  Experiencing nausea?: Yes      Diet recall:     Wakes: 7:30am  B: 8:30am: white toast (1-2 slices) with PB, coffee and water   AM: none  L: 11:30-noon: beef, cheddar and potato from [employer] OR Triscuits, hummus, 1 slice PB toast  PM: none  D: cheese, meat and 6 crackers OR 1 pancake, 2 eggs, corned beef hash  HS: none OR chips and salsa  Bed: 11-midnight   Trouble sleeping but says not eating.       Healthy Coping:  Emotional response to diabetes: Ready to learn, Acceptance  Informal Support system:: Children, Family,  Partner  Stage of change: ACTION (Actively working towards change)    Current Management:  Taking medications for gestational diabetes?: No  Difficulty affording diabetes management supplies?: No    ASSESSMENT:  Reviewed target blood glucose values, sharps disposal, diagnosis criteria for GDM and importance of good blood glucose management for health of mom and baby.   Patient advised to call if 3 blood glucose elevated before returns next week. Instructed on ketone checking and told to call if unable to get ketones negative.      Discussed carbohydrate sources and impact on blood glucose. Reviewed basics of healthy eating and incorporating a variety of foods into meal plan. Instructed on carbohydrate counting and label reading and recommended patient consume 2 CHO for breakfast, 4 CHO for lunch and dinner and 1-2 CHO for each snack, 3 snacks a day. Discussed importance of not going too low in CHO since that may cause liver to produce excess glucose and contribute to elevated blood glucose readings and ketone formation. To also help prevent against ketone formation, protein was encouraged with meals and snacks, especially with the night snack. Reviewed benefits of exercising to help lower blood glucose and encouraged her to target exercise after meals if feel consumed too many CHO or having problem with BG being elevated after a meal.     Since seeing elevated fasting blood glucose, requested patient check blood glucose at least 1 time overnight to rule out hypoglycemia overnight, which may be contributing to hyperglycemia the next morning.  If blood glucose normal overnight and fasting blood glucose high, may need insulin at bedtime.  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  Insulin injection technique reviewed using a Vial and Syringe. Patient verbalized understanding and was able to perform an  accurate return demonstration of injection technique.  Discussed sharps, new needle each use, action of insulin and hypoglycemia signs, symptoms and treatment.      Pt verbalized understanding of concepts discussed and recommendations provided.    Estimated Energy Needs: 2278 kcal/day (15-19 CHO)      INTERVENTION:  Patient already using Rel[employer]-On meter.  Blood glucose from past 3 days (usually before meals and 2 hours after eating):             Educational topics covered today:  GDM diagnosis, pathophysiology, Risks and Complications of GDM, Means of controlling GDM, Using a Blood Glucose Monitor, Blood Glucose Goals, Logging and Interpreting Glucose Results, Ketone Testing, When to Call a Diabetes Educator or OB Provider, Healthy Eating During Pregnancy, Counting Carbohydrates, Meal Planning for GDM, Insulin Use (Vial and Syringe), Hypoglycemia and Physical Activity    Educational materials provided today:   [facility] Understanding Gestational Diabetes  GDM Log Book    Pt verbalized understanding of concepts discussed and recommendations provided today.     PLAN:  Check glucose 4 times daily, before breakfast and 1 hour after each meal.     Check Ketones daily for one week, if negative, reduce testing to once a week.     Physical activity recommended: after meals as tolerated and per MD approval is seeing elevated blood glucose.    Meal plan: 2 carbs at breakfast, 3-4 carbs at lunch, 3-4 carbs at supper, 1-2 carbs at 3 snacks a day.  Follow consistent CHO meal plan, eat CHO and protein/fat at all meals/snacks.    EMAIL CONSENT SIGNED: [email or web address]    Call/e-mail/Thomas-Krenn message diabetes educator if 3 or more blood sugars are above the goal in 1 week, if ketones are positive, or with questions/concerns.    Maria Esther Keith RD, LD, CDE   Time Spent: 65 minutes  Encounter Type: Individual    Any diabetes medication dose changes were made via the CDE Protocol and Collaborative Practice Agreement with  the patient's referring provider. A copy of this encounter was shared with the provider.

## 2020-01-10 NOTE — PATIENT INSTRUCTIONS
1. Check blood glucose at least one time overnight.  If you find you are low (<70 mg), eat a few saltines (3-6 or 7-15 gm carbs).    2. Try trail mix (1/4 cup) OR sweeter cereal with fiber (1/2 cup Cinnamon Life or Frosted Mini wheats OR Special K with protein) for snacks    3. Very cheesy nachos with salsa (15-30 gm) OR chicken nuggets and fries (30 gm carbohydrates)    4. If insulin is started, make sure to have a source of rapid-acting carbs with you at all times (1/2 cup OJ OR 6 saltines OR carlyn crackers squares).    5. Check blood glucose before breakfast and 1 hour after start of each meal.     6. Check urine ketones when you wake up every morning for 7 days. If negative everyday, reduce testing to once a week.    8.  Follow the recommended meal plan: eat something every 2-3 hours, include protein/fat and carbohydrate at every meal and snack, have 2 carbs at breakfast, 3-4 carbs at lunch, 3-4 carbs at supper, 1-2 carbs at 3 snacks per day.     9. Add activity to every day, try walking or being active after each meal to help control blood sugar levels.    Call or e-mail educator if 3 or more blood sugars are above goal in 1 week. Call or e-mail with questions or concerns.    FOLLOW UP: Wednesday, January 15th at 2:30pm at [facility]    Maria Esther Keith RD, CARINA, CDE   [phone]  Instructions for emailing to the Diabetes Educator    If you need to communicate a non-urgent message with a Diabetes Educator via email, please send to [email or web address].    Please follow the following email guidelines:    Subject line: Secure: clinic name  Body of email include: First name, middle initial, last name and date of birth.    We will be in touch with you within one (1) business days.

## 2020-01-12 LAB
BACTERIA SPEC CULT: ABNORMAL
SPECIMEN SOURCE: ABNORMAL

## 2020-01-13 ENCOUNTER — PRENATAL OFFICE VISIT (OUTPATIENT)
Dept: OBGYN | Facility: CLINIC | Age: 30
End: 2020-01-13
Payer: COMMERCIAL

## 2020-01-13 VITALS
BODY MASS INDEX: 36.1 KG/M2 | HEART RATE: 105 BPM | SYSTOLIC BLOOD PRESSURE: 113 MMHG | WEIGHT: 195.8 LBS | DIASTOLIC BLOOD PRESSURE: 72 MMHG

## 2020-01-13 DIAGNOSIS — O99.820 GROUP B STREPTOCOCCUS CARRIER, +RV CULTURE, CURRENTLY PREGNANT: Primary | ICD-10-CM

## 2020-01-13 DIAGNOSIS — O24.410 DIET CONTROLLED GESTATIONAL DIABETES MELLITUS (GDM) IN THIRD TRIMESTER: ICD-10-CM

## 2020-01-13 PROCEDURE — 59426 ANTEPARTUM CARE ONLY: CPT | Performed by: OBSTETRICS & GYNECOLOGY

## 2020-01-13 PROCEDURE — 99207 ZZC PRENATAL VISIT: CPT | Performed by: OBSTETRICS & GYNECOLOGY

## 2020-01-13 NOTE — PATIENT INSTRUCTIONS
If you have any questions regarding your visit, Please contact your care team.    [facility]: [phone]      Women s Health CLINIC HOURS TELEPHONE NUMBER   MD Lauren Marsh CMA Susie -    DANIEL Rubio       Monday:       7:30-4:15 [place]  Wednesday:      Administrative  Thursday:       7:30-4:15 [place]  Friday:       Administrative     [facility]  [address] [place]  [address]  [phone] ask for [facility]  [address] [place] [address]  [phone] ask for [facility]    Imaging Scheduling for [place]:  [phone]    Imaging Scheduling for [place]: [phone]       Urgent Care locations:    [facility] Saturday and Sunday   9 am - 5 pm    Monday-Friday   12 pm - 8 pm  Saturday and Sunday   9 am - 5 pm   [phone]     [facility] Labor and Delivery:  [phone]    If you need a medication refill, please contact your pharmacy. Please allow 3 business days for your refill to be completed.  As always, Thank you for trusting us with your healthcare needs!

## 2020-01-13 NOTE — PROGRESS NOTES
Patient presents for routine prenatal visit at 36w6d.  Patient without complaint.   PE: See OB vitals  There is no height or weight on file to calculate BMI.  Doing well.  No concerns today.  No vaginal bleeding, LOF, contractions.  No HA, RUQ pain, N/V, visual changes.    Transabdominal ultrasound was performed to determine presentation.  A viable intrauterine pregnancy was seen.  The fetus is noted in VERTEX .  Fetal heart motion was visualized at 145 bpm.    Normal Amniotic Fluid Volume is present.  Questions asked and answered.    Sebas Grier MD FACOG

## 2020-01-16 ENCOUNTER — TELEPHONE (OUTPATIENT)
Dept: OBGYN | Facility: CLINIC | Age: 30
End: 2020-01-16

## 2020-01-16 ENCOUNTER — NURSE TRIAGE (OUTPATIENT)
Dept: NURSING | Facility: CLINIC | Age: 30
End: 2020-01-16

## 2020-01-16 NOTE — TELEPHONE ENCOUNTER
Pregnant, gestational diabetes, fever. I connected the patient with scheduling, for an appointment. Influenza in the household. Her symptoms started last night. She is 37 wk, 2 days pregnant.  Marisa Gutierrez RN-[facility]      Additional Information    Negative: Severe difficulty breathing (e.g., struggling for each breath, speaks in single words)    Negative: Bluish (or gray) lips or face    Negative: Shock suspected (e.g., cold/pale/clammy skin, too weak to stand, low BP, rapid pulse)    Negative: Sounds like a life-threatening emergency to the triager    Negative: Sounds like a cold and there is no fever     Symptoms started last night.    Negative: Cough and there is no fever    Negative: Severe cough    Negative: Throat pain and there is no fever    Negative: Severe sore throat    Negative: Influenza vaccine reaction is suspected    Negative: Headache and stiff neck (can't touch chin to chest)    Negative: Chest pain (EXCEPTION: MILD central chest pain, present only when coughing)    Negative: Difficulty breathing that is not severe and not relieved by cleaning out the nose    Negative: Patient sounds very sick or weak to the triager    Negative: Fever > 104 F (40 C)    Negative: Fever > 101 F (38.3 C) and over 60 years of age    Fever > 100.0 F (37.8 C) and diabetes mellitus or weak immune system (e.g., HIV positive, cancer chemo, splenectomy, organ transplant, chronic steroids)    Protocols used: INFLUENZA - SEASONAL-A-OH

## 2020-01-16 NOTE — TELEPHONE ENCOUNTER
Pt was scheduled on Shannan Rodrigues's schedule today, 1/16/20, for cold symptoms.  Called pt to triage her symptoms and if she wanted/needed to be seen to help her reschedule her appt with an FP provider instead of OB/GYN.    Pt states she has a head cold, nasal congestion.  Her blood sugars are high.    She is going to call the diabetic educator to see what medications she can take for her cold symptoms that won't affect her blood sugars.  I also suggested that if her blood sugars remain high even after discussing options with the diabetic educator that she should follow up with a family practice provider.  I let pt know that I was canceling her appt with Shannan since she needs to be seen by an FP provider for her symptoms.  She will call to schedule an appt with an FP provider after she talks with the DE.  Pt verbalized understanding and agreed to plan.    Alison Cordoba RN

## 2020-01-18 ENCOUNTER — NURSE TRIAGE (OUTPATIENT)
Dept: NURSING | Facility: CLINIC | Age: 30
End: 2020-01-18

## 2020-01-19 NOTE — TELEPHONE ENCOUNTER
"[phone]      Caller is 37.5 weeks second baby;  Water broke and  is leaking in spurts; no contractions   triage protocol reviewed   Advised to proceed to L&D at [facility]   L&D notifiedd   Kenia Major RN  FNA      Reason for Disposition    [1] Leakage of fluid from vagina AND [2] leakage started < 4 hours ago    Additional Information    Negative: Passed out (i.e., lost consciousness, collapsed and was not responding)    Negative: Shock suspected (e.g., cold/pale/clammy skin, too weak to stand, low BP, rapid pulse)    Negative: Difficult to awaken or acting confused (e.g., disoriented, slurred speech)    Negative: [1] SEVERE abdominal pain (e.g., excruciating) AND [2] constant AND [3] present > 1 hour    Negative: Severe bleeding (e.g., continuous red blood from vagina, or large blood clots)    Negative: Umbilical cord hanging out of the vagina (shiny, white, curled appearance, \"like telephone cord\")    Negative: Uncontrollable urge to push (i.e., feels like baby is coming out now)    Negative: Can see baby    Negative: Sounds like a life-threatening emergency to the triager    Negative: Pregnant < 37 weeks (i.e., )    Negative: [1] Uncertain delivery date AND [2] possibly pregnant < 37 weeks (i.e., )    Negative: [1] First baby (primipara) AND [2] contractions < 6 minutes apart  AND [3] present 2 hours    Negative: [1] History of prior delivery (multipara) AND [2] contractions < 10 minutes apart AND [3] present 1 hour    Negative: [1] History of rapid prior delivery AND [2] contractions < 10 minutes apart    Negative: [1] Leakage of fluid from vagina AND [2] green or brown in color    Negative: [1] Leakage of fluid from vagina AND [2] leakage started > 4 hours ago    Negative: Vaginal bleeding or spotting    Negative: Baby moving less today (e.g., kick count < 5 in 1 hour or < 10 in 2 hours)    Negative: Severe headache or headache that won't go away    Negative: New blurred vision or vision " changes    Protocols used: PREGNANCY - LABOR-A-AH

## 2020-01-22 ENCOUNTER — NURSE TRIAGE (OUTPATIENT)
Dept: NURSING | Facility: CLINIC | Age: 30
End: 2020-01-22

## 2020-01-23 NOTE — TELEPHONE ENCOUNTER
Caller states she is 5 days post-partum and is having swelling in legs up to knees. Caller denies any pain and states swelling started within the last 24 hours. Caller denies any fever and is taking in adequate fluids. Triage guidelines recommend to see pcp within 24 hours. Caller verbalized and understands directives.    Reason for Disposition    [1] MODERATE or MILD swelling AND [2] new onset or worsening    Additional Information    Negative: Severe difficulty breathing (e.g., struggling for each breath, speaks in single words)    Negative: Sounds like a life-threatening emergency to the triager    Negative: Followed a leg injury    Negative: [1] Small area of swelling AND [2] followed an insect bite to the area    Negative: Swelling only of ankle    Negative: Swelling only of knee    Negative: Chest pain    Negative: [1] Difficulty breathing AND [2] new onset or worsening    Negative: SEVERE leg swelling (e.g., swelling extends above knee, entire leg is swollen)    Negative: New blurred vision or vision changes    Negative: Severe headache    Negative: Upper abdominal pain lasts > 1 hour    Negative: [1] Red area or streak AND [2] fever    Negative: [1] Swelling is painful to touch AND [2] fever    Negative: [1] Cast on leg or ankle AND [2] now increased pain    Negative: Patient sounds very sick or weak to the triager    Negative: [1] Thigh or calf pain AND [2] only 1 side AND [3] present > 1 hour    Negative: Swelling of face, arm or hands  (Exception: slight puffiness of fingers)    Negative: Preeclampsia or high blood pressure during pregnancy    Negative: [1] Thigh, calf, or ankle swelling AND [2] only 1 side    Negative: [1] Thigh, calf, or ankle swelling AND [2] bilateral AND [3] 1 side is more swollen    Negative: [1] Red area or streak [2] large (> 2 in. or 5 cm)    Negative: [1] MODERATE leg swelling (e.g., more than just ankles, below knees) AND [2] lasts > 5 days postpartum    Protocols used:  POSTPARTUM - LEG SWELLING AND EDEMA-A-AH

## 2020-01-29 ENCOUNTER — TELEPHONE (OUTPATIENT)
Dept: OBGYN | Facility: CLINIC | Age: 30
End: 2020-01-29

## 2020-01-29 ENCOUNTER — NURSE TRIAGE (OUTPATIENT)
Dept: NURSING | Facility: CLINIC | Age: 30
End: 2020-01-29

## 2020-01-29 NOTE — TELEPHONE ENCOUNTER
Forms received at  [place] location for patient's FMLA, she needs to sign the forms giving permission for OB Dept to send needed information to [employer]. We will have forms at  for her. She needs to complete page 2 section 1. Please notify her. Thank you. Shannan DENG CNP

## 2020-01-29 NOTE — TELEPHONE ENCOUNTER
Reason for Call:  Other call back    Detailed comments: patient is returning call, please call to advise. Thank you.    Phone Number Patient can be reached at: Home number on file [phone] (home)    Best Time:     Can we leave a detailed message on this number? YES    Call taken on 1/29/2020 at 3:22 PM by Bonnie Vanegas

## 2020-01-29 NOTE — TELEPHONE ENCOUNTER
Called informed patient FMLA forms received. Instructed patient page 2 section 1 needs to be filled out prior to us completing forms.  Patient will stop in Friday 01/31/2020 and complete/sign FMLA forms.      Terri Patiño CMA

## 2020-01-30 ENCOUNTER — OFFICE VISIT (OUTPATIENT)
Dept: FAMILY MEDICINE | Facility: CLINIC | Age: 30
End: 2020-01-30
Payer: COMMERCIAL

## 2020-01-30 VITALS
WEIGHT: 181.6 LBS | SYSTOLIC BLOOD PRESSURE: 128 MMHG | HEIGHT: 62 IN | OXYGEN SATURATION: 99 % | HEART RATE: 82 BPM | TEMPERATURE: 97 F | BODY MASS INDEX: 33.42 KG/M2 | DIASTOLIC BLOOD PRESSURE: 85 MMHG | RESPIRATION RATE: 18 BRPM

## 2020-01-30 DIAGNOSIS — T81.41XA SUPERFICIAL INCISIONAL SURGICAL SITE INFECTION: Primary | ICD-10-CM

## 2020-01-30 DIAGNOSIS — Z98.891 S/P REPEAT LOW TRANSVERSE C-SECTION: ICD-10-CM

## 2020-01-30 PROCEDURE — 99213 OFFICE O/P EST LOW 20 MIN: CPT | Performed by: FAMILY MEDICINE

## 2020-01-30 RX ORDER — CEPHALEXIN 500 MG/1
500 CAPSULE ORAL 2 TIMES DAILY
Qty: 14 CAPSULE | Refills: 0 | Status: SHIPPED | OUTPATIENT
Start: 2020-01-30 | End: 2020-02-18

## 2020-01-30 ASSESSMENT — MIFFLIN-ST. JEOR: SCORE: 1503.01

## 2020-01-30 NOTE — PATIENT INSTRUCTIONS
Patient Education     Wound Check After Surgery: Infection  Your surgical wound has become infected. Infection after surgery usually involves just the top layers of skin. Sometimes the infection is deeper in the wound and may involve a collection of fluid or pus. Treatment will depend on the type of infection you have.  Once antibiotic treatment is started, the area of redness should not increase. Pain should start to decrease after 2 days of treatment.  Home care  Different types of surgery require different types of care and dressing changes. It is important to follow all instructions and advice from your surgeon, as well as other members of your healthcare team.  Wound care    Keep the wound clean, as directed by your healthcare provider.    Change the dressing as directed. Change the dressing sooner if it becomes wet or stained with blood or fluid from the wound.    Bathe with a sponge (no shower or tub baths) for the first few days, or until there is no more drainage from the wound. Unless your surgeon gave you different instructions, you can then shower. Don't soak the area in water (no baths or swimming) until the tape, sutures, or staples are removed and any wound opening has dried out and healed.    If you smoke, stop smoking. Ask your doctor about ways to quit.  Changing the dressing    Wash your hands with plain soap and water before changing the dressings. Or use an alcohol-based hand .    Carefully remove the dressing and tape. Don t just yank it off. If it sticks to the wound, you may need to wet it a little to remove it, unless your healthcare provider told you not to wet it.    Wash your hands again before putting on a new, clean dressing.    Gently clean the wound with clean water (or saline) using gauze or a clean washcloth. Don't rub it or pick at it.    Don't use soap, alcohol, hydrogen peroxide, or any other cleanser.    If you were told to dry the wound before putting on a new  dressing, gently pat it dry. Don't rub.    Put the old dressing in a sealed plastic bag and throw it out. Don't reuse it!    Wash your hands again when you are done.  Types of dressings  Your healthcare team will tell you what type of dressing to put on your wound. Follow your healthcare team s instructions carefully, and contact them if you have any questions. Two common types of dressings are described below. You may have one of these or another type.    Dry dressing. Use dry gauze. If the wound is still draining, use a  nonadherent  dressing, which shouldn t stick to the wound.    Wet-to-dry dressing. Wet the gauze and squeeze out the extra water (or saline) before putting it on. Then cover this with a dry pad.  Medicines    If you were given antibiotics, take them until they are used up or your healthcare provider tells you to stop. It is important to finish the antibiotics even though you feel better, to make sure the infection has cleared.    You can take acetaminophen or ibuprofen for pain, unless you were given a different pain medicine to use. Talk with your doctor before using these medicines if you have chronic liver or kidney disease. Also talk with your doctor if you have ever had a stomach ulcer or digestive bleeding, or are taking blood-thinner medicines.    Aspirin should never be used in anyone under 18 years of age who is ill with a fever. It may cause severe liver damage.  Follow-up care  Follow up with your healthcare provider for your next wound check or to remove your sutures, staples, or tape.    If a culture was done, you will be told if the results will affect your treatment. You can call as directed for the results.    If imaging tests, such as X-rays, an ultrasound, or CT scan were done, they will be looked at by a specialist. You will be told of the results, especially if they affect treatment.  Call 911  Call 911 if any of these occur:    Trouble breathing or swallowing,  wheezing    Hoarse voice or trouble speaking    Extreme confusion    Extreme drowsiness or trouble awakening    Fainting or loss of consciousness    Rapid heart rate or very slow heart rate    Vomiting blood, or large amounts of blood in stool    Discomfort in the center of the chest that feels like pressure, squeezing, a sense of fullness, or pain    Discomfort or pain in other upper body areas, such as the back, one or both arms, neck, jaw, or stomach    Stroke symptoms (spot a stroke  FAST ):  ? F: Face drooping. One side of the face is numb or droops.  ? A: Arm weakness. One arm feels weak or numb.  ? S: Speech difficulty: Speech is slurred, or you can't speak.  ? T: Time to call 911. Even if symptoms go away, call 911.  When to seek medical advice  Call your healthcare provider right away if any of these occur:    Increasing pain at the site of surgery    Pain not controlled by medicine prescribed    Fever of 100.4 F (38 C) or higher, or as directed by your healthcare provider    Increasing redness around the wound    Fluid, pus, or blood that continues to drain from the wound after 5 days of treatment    Vomiting, constipation, or diarrhea  Date Last Reviewed: 4/1/2018 2000-2019 [employer]. [address] [place] [address], [place] [address]. All rights reserved. This information is not intended as a substitute for professional medical care. Always follow your healthcare professional's instructions.

## 2020-01-30 NOTE — PROGRESS NOTES
Subjective     Joanne Escalante is a 29 year old female who presents to clinic today for the following health issues:    HPI     C- Section- Possible Infection  Had a repeat low transverse C- section done through [facility], POD # 12  Reporting odor and some drainage, some pain around the incision site.  Has been showering every other day and patting wound dry.   Some of her steri strips have came off, feels it is not healing properly.     Denies having any fever or chills.     Patient Active Problem List   Diagnosis     Iron deficiency anemia     Diet controlled gestational diabetes mellitus (GDM)     Group B Streptococcus carrier, +RV culture, currently pregnant     Past Surgical History:   Procedure Laterality Date     C ORAL SURGERY PROCEDURE      [place] teeth      SECTION  2018     LAPAROSCOPY DIAGNOSTIC (GYN)  2015    Endometriosis       Social History     Tobacco Use     Smoking status: Current Every Day Smoker     Packs/day: 0.25     Types: Cigarettes     Smokeless tobacco: Never Used   Substance Use Topics     Alcohol use: Not Currently     Family History   Problem Relation Age of Onset     Depression Mother      Depression Father          Current Outpatient Medications   Medication Sig Dispense Refill     cephALEXin (KEFLEX) 500 MG capsule Take 1 capsule (500 mg) by mouth 2 times daily for 7 days 14 capsule 0     Ferrous Sulfate (IRON) 325 (65 Fe) MG tablet Take 1 tablet by mouth daily       Prenatal Vit-Fe Fumarate-FA (PRENATAL VITAMIN PO)        acetone urine (KETOSTIX) test strip Use to check urine for ketones every morning. 20 each 3     doxylamine (UNISOM) 25 MG TABS tablet Take 25 mg by mouth At Bedtime       Allergies   Allergen Reactions     Sulfa Drugs        Reviewed and updated as needed this visit by Provider         Review of Systems   ROS COMP: Constitutional, HEENT, cardiovascular, pulmonary, gi and gu systems are negative, except as otherwise noted.     "  Objective    /85   Pulse 82   Temp 97  F (36.1  C) (Tympanic)   Resp 18   Ht 1.568 m (5' 1.75\")   Wt 82.4 kg (181 lb 9.6 oz)   LMP 2019   SpO2 99%   Breastfeeding No   BMI 33.48 kg/m    Body mass index is 33.48 kg/m .  Physical Exam   GENERAL: healthy, alert and no distress  SKIN: C section scar that appears erythematous with # 2 sub centimeter wound dehiscence along the surgical site with foul smelling discharged and wet steri-strips.     Diagnostic Test Results:  Labs reviewed in Epic        Assessment & Plan     Joanne was seen today for wound check.    Diagnoses and all orders for this visit:    Superficial incisional surgical site infection S/P repeat low transverse . POD # 12  -   The steri-strips were removed. The incision site was cleaned with alcohol wipes. Dried. Bacitracin ointment applied. Wound dressing applied.     - Start: cephALEXin (KEFLEX) 500 MG capsule; Take 1 capsule (500 mg) by mouth 2 times daily for 7 days      Patient education on daily wound dressing given and Handout with home care instructions given. See AVS for details.     Tobacco Cessation:   reports that she has been smoking cigarettes. She has been smoking about 0.25 packs per day. She has never used smokeless tobacco.  Tobacco Cessation Action Plan: Self help information given to patient        Return in about 1 week (around 2020), or if symptoms worsen or fail to improve.    Shantal Inman MD  [facility]      "

## 2020-01-30 NOTE — TELEPHONE ENCOUNTER
"Joanne had a  on 20. She is calling with concerns about her incision. She reports that it is \"red and smells funny\". Her current temperature is 98.2 (oral). Triage protocol recommends being seen within 24 hrs. Warm transferred to scheduling to make an appointment. Advised to call FNA back with any futher questions or concerns.    Chanel Ballesteros, RN, BSN  [facility]    Reason for Disposition    [1] Small red area or streak AND [2] no fever    Additional Information    Negative: [1] Wound gaping open AND [2] visible internal organs    Negative: Sounds like a life-threatening emergency to the triager    Negative: [1] Bleeding from incision AND [2] won't stop after 10 minutes of direct pressure    Negative: [1] Widespread rash AND [2] bright red, sunburn-like    Negative: Fever > 100.4 F (38.0 C)    Negative: Severe pain in the incision    Negative: [1] Incision gaping open AND [2] length of opening > 1/2 inch (1 cm)    Negative: Patient sounds very sick or weak to the triager    Negative: [1] Incision looks infected (spreading redness, pain) AND [2] fever > 99.5 F (37.5 C)    Negative: [1] Incision looks infected (spreading redness, pain) AND [2] large red area (> 2 in. or 5 cm)    Negative: [1] Pus or bad-smelling fluid draining from incision AND [2] no fever    Negative: Dressing soaked with blood or body fluid (e.g., drainage)    Negative: [1] Caller has URGENT question AND [2] triager unable to answer question    Negative: Suture or staple removal is overdue    Protocols used: POSTPARTUM -  INCISION SYMPTOMS-A-AH      "

## 2020-02-16 ENCOUNTER — OFFICE VISIT (OUTPATIENT)
Dept: URGENT CARE | Facility: URGENT CARE | Age: 30
End: 2020-02-16
Payer: COMMERCIAL

## 2020-02-16 VITALS
WEIGHT: 182.25 LBS | BODY MASS INDEX: 33.6 KG/M2 | HEART RATE: 107 BPM | OXYGEN SATURATION: 97 % | DIASTOLIC BLOOD PRESSURE: 77 MMHG | TEMPERATURE: 98.6 F | SYSTOLIC BLOOD PRESSURE: 118 MMHG | RESPIRATION RATE: 16 BRPM

## 2020-02-16 DIAGNOSIS — S76.912A STRAIN OF HIP AND THIGH, LEFT, INITIAL ENCOUNTER: ICD-10-CM

## 2020-02-16 DIAGNOSIS — T81.41XA SUPERFICIAL INCISIONAL SURGICAL SITE INFECTION: Primary | ICD-10-CM

## 2020-02-16 DIAGNOSIS — S76.012A STRAIN OF HIP AND THIGH, LEFT, INITIAL ENCOUNTER: ICD-10-CM

## 2020-02-16 PROCEDURE — 99214 OFFICE O/P EST MOD 30 MIN: CPT | Performed by: PHYSICIAN ASSISTANT

## 2020-02-16 PROCEDURE — 87077 CULTURE AEROBIC IDENTIFY: CPT | Performed by: PHYSICIAN ASSISTANT

## 2020-02-16 PROCEDURE — 87186 SC STD MICRODIL/AGAR DIL: CPT | Performed by: PHYSICIAN ASSISTANT

## 2020-02-16 PROCEDURE — 87070 CULTURE OTHR SPECIMN AEROBIC: CPT | Performed by: PHYSICIAN ASSISTANT

## 2020-02-16 RX ORDER — CEPHALEXIN 500 MG/1
500 CAPSULE ORAL 3 TIMES DAILY
Qty: 21 CAPSULE | Refills: 0 | Status: SHIPPED | OUTPATIENT
Start: 2020-02-16 | End: 2020-03-02

## 2020-02-16 RX ORDER — OXYCODONE HYDROCHLORIDE 5 MG/1
5-10 TABLET ORAL
COMMUNITY
Start: 2020-01-20 | End: 2020-02-18

## 2020-02-16 NOTE — PROGRESS NOTES
S: 29-year-old female presents for CC section incisional infection.      through  she was placed on Keflex for superficial  incision.  Now over the last 2 days she has noted small portion of the incision has opened.  Some oozing and draining.  No fever.  No increased incisional pain.    Also left hip pain for 2 days that she noted after chasing her kids around the house.  Felt a tweak as she stepped.  Did not fall.      Allergies   Allergen Reactions     Sulfa Drugs        Past Medical History:   Diagnosis Date     Anxiety      Depressive disorder      Endometriosis      Periumbilical hernia        oxyCODONE (ROXICODONE) 5 MG tablet, Take 5-10 mg by mouth  Prenatal Vit-Fe Fumarate-FA (PRENATAL VITAMIN PO),   acetone urine (KETOSTIX) test strip, Use to check urine for ketones every morning. (Patient not taking: Reported on 2020)  [] cephALEXin (KEFLEX) 500 MG capsule, Take 1 capsule (500 mg) by mouth 2 times daily for 7 days  doxylamine (UNISOM) 25 MG TABS tablet, Take 25 mg by mouth At Bedtime  Ferrous Sulfate (IRON) 325 (65 Fe) MG tablet, Take 1 tablet by mouth daily    No current facility-administered medications on file prior to visit.       Social History     Tobacco Use     Smoking status: Current Every Day Smoker     Packs/day: 0.25     Types: Cigarettes     Smokeless tobacco: Never Used   Substance Use Topics     Alcohol use: Not Currently     Drug use: Never       ROS:  General: negative for fever  SKIN: + as above  Musculoskeletal-as above    Physcial Exam:  /77 (BP Location: Left arm, Patient Position: Chair, Cuff Size: Adult Regular)   Pulse 107   Temp 98.6  F (37  C) (Oral)   Resp 16   Wt 82.7 kg (182 lb 4 oz)   LMP 2019   SpO2 97%   Breastfeeding Yes   BMI 33.60 kg/m      GENERAL: alert, no acute distress  EYES: conjunctival clear  RESP: Regular breathing rate  NEURO: awake .  SKIN:  incision has healed well other than a small open  area about 3 mm.  There is some serosanguineous fluid.  Wound culture obtained.  Does not appear like a wound dehiscence at all.  Incision is very moist with panniculi over lying on it. Very mild redness.    Left hip with tenderness over the trochanteric area.  Some discomfort with range of motion.    ASSESSMENT:    ICD-10-CM    1. Superficial incisional surgical site infection T81.41XA Wound Culture Aerobic Bacterial     cephALEXin (KEFLEX) 500 MG capsule   2. Strain of hip and thigh, left, initial encounter S76.012A     S76.912A        PLAN: We will restart Keflex.  She should see her OB/GYN in 1 to 2 days.  If the incision opens further in the meantime go to the ER.  I do not think it is wound dehiscence.  This is rather late for that.  I think part of the wound opened as the incision is so moist.  Told her to try to keep it dry with gauze.  No bacitracin.  Could do a alcohol swab to it daily.  Ice hip 2 times a day.  Can use Tylenol as needed for the pain.  Veronica De La Paz PA-C

## 2020-02-17 ENCOUNTER — TELEPHONE (OUTPATIENT)
Dept: NURSING | Facility: CLINIC | Age: 30
End: 2020-02-17

## 2020-02-17 ENCOUNTER — NURSE TRIAGE (OUTPATIENT)
Dept: NURSING | Facility: CLINIC | Age: 30
End: 2020-02-17

## 2020-02-17 NOTE — TELEPHONE ENCOUNTER
Patient seen in UC yesterday and wound culture was obtained and started on Keflex. Patient to continue antibiotics, keep scheduled appointment. If fever above 100.4 tonight, then needs to be seen in ED. Also to ED if open area on the incision gets bigger, sees redness surrounding the incision, worsening pain. Shannan DENG CNP

## 2020-02-17 NOTE — PROGRESS NOTES
"Subjective     Joanne Escalante is a 29 year old female who presents to clinic today for the following health issues:    HPI   Follow up superficial incisional surgical site infection    Patient had a repeat  section 2020. Was seen by FP provider 2020 for a superficial wound infection and started on Keflex BID x 7 days. Seen in Urgent Care 2020 for the same, had noted a small portion of the incision was open and oozing. Wound culture was obtained and she was started on Keflex TID x 1 week. Culture is still in process. Was to follow up with OB in few days.  Patient had low grade fever yesterday of 99.7. Did take 1 dose of Ibuprofen, but none since mid afternoon yesterday. Today is afebrile. Denies any redness or additional open areas.     Patient Active Problem List   Diagnosis     Iron deficiency anemia     Diet controlled gestational diabetes mellitus (GDM)     Group B Streptococcus carrier, +RV culture, currently pregnant     Past Surgical History:   Procedure Laterality Date     C ORAL SURGERY PROCEDURE      [place] teeth      SECTION  2018     LAPAROSCOPY DIAGNOSTIC (GYN)  2015    Endometriosis       Social History     Tobacco Use     Smoking status: Current Every Day Smoker     Packs/day: 0.25     Types: Cigarettes     Smokeless tobacco: Never Used   Substance Use Topics     Alcohol use: Not Currently     Family History   Problem Relation Age of Onset     Depression Mother      Depression Father          Reviewed and updated as needed this visit by Provider         Review of Systems   ROS COMP: Constitutional, HEENT, cardiovascular, pulmonary, gi and gu systems are negative, except as otherwise noted.      Objective    /83 (BP Location: Right arm, Patient Position: Sitting, Cuff Size: Adult Regular)   Pulse 87   Temp 98.3  F (36.8  C) (Oral)   Ht 1.562 m (5' 1.5\")   Wt 82.9 kg (182 lb 12.8 oz)   LMP 2019   SpO2 96%   Breastfeeding Yes   BMI 33.98 kg/m  "   Body mass index is 33.98 kg/m .  Physical Exam   GENERAL: healthy, alert and no distress  RESP: lungs clear to auscultation - no rales, rhonchi or wheezes  CV: regular rate and rhythm, normal S1 S2, no S3 or S4, no murmur, click or rub, no peripheral edema and peripheral pulses strong  ABDOMEN: soft, nontender, no hepatosplenomegaly, no masses and bowel sounds normal  Incision: overall, the incision is healing well. There is no induration or discharge. On the right side of the incision are 2 separate approximately 1-2 mm areas that are superficially open and with visible serosanguinous discharge. Mild erythema at the right edge of the incision-questionable skin irritation.  No palpable masses suggestive of seroma or hematoma.  MS: no gross musculoskeletal defects noted, no edema  SKIN: no suspicious lesions or rashes  PSYCH: mentation appears normal, affect normal/bright    Assessment & Plan     1. Superficial incisional infection of surgical site  Discussed with patient there does not appear to be any palpable masses, hematoma, seroma at this time. Defect in the incision is superficial. Incision cleaned and steri strips loosely applied to right portion of the incision. We reviewed home care measures. Patient to complete Keflex and await culture results. Discussed symptoms of progressive infection, abscess formation to monitor for and report. Reviewed when to proceed to ED. Plan follow up in clinic in 1-2 weeks for postpartum exam and can reevaluate incision, but needs to be seen sooner if symptoms worsening. Patient is given an opportunity to ask questions and have them answered.    ISH Butler [facility]

## 2020-02-17 NOTE — TELEPHONE ENCOUNTER
"   Message to provider re appt tomorrow - wondering if needs to be seen sooner         4wks post partum     C section keeps opening and getting infected       Now with fever   99.7F today     Was seen at Urgent care yesterday - was swabbed      Is opening where should be healed       Told to \"Keep dry\" at urgent care yesterday      Pt tele# [phone]        Prieto duggan rn                             "

## 2020-02-17 NOTE — TELEPHONE ENCOUNTER
Please see triage note from today re current status --     wondering if she needs to be seen sooner for eval

## 2020-02-17 NOTE — TELEPHONE ENCOUNTER
Pt is scheduled with ISH Arroyo CNP, on 2/18/2020.  Notified pt of Shannan's message below.  Pt verbalized understanding and agreed to plan.    Alison Cordoba RN

## 2020-02-18 ENCOUNTER — MEDICAL CORRESPONDENCE (OUTPATIENT)
Dept: HEALTH INFORMATION MANAGEMENT | Facility: CLINIC | Age: 30
End: 2020-02-18

## 2020-02-18 ENCOUNTER — DOCUMENTATION ONLY (OUTPATIENT)
Dept: PEDIATRICS | Facility: CLINIC | Age: 30
End: 2020-02-18

## 2020-02-18 ENCOUNTER — OFFICE VISIT (OUTPATIENT)
Dept: OBGYN | Facility: CLINIC | Age: 30
End: 2020-02-18
Payer: COMMERCIAL

## 2020-02-18 VITALS
WEIGHT: 182.8 LBS | HEART RATE: 87 BPM | BODY MASS INDEX: 33.64 KG/M2 | SYSTOLIC BLOOD PRESSURE: 131 MMHG | DIASTOLIC BLOOD PRESSURE: 83 MMHG | HEIGHT: 62 IN | TEMPERATURE: 98.3 F | OXYGEN SATURATION: 96 %

## 2020-02-18 DIAGNOSIS — T81.41XA SUPERFICIAL INCISIONAL INFECTION OF SURGICAL SITE: Primary | ICD-10-CM

## 2020-02-18 LAB
BACTERIA SPEC CULT: ABNORMAL
Lab: ABNORMAL
SPECIMEN SOURCE: ABNORMAL

## 2020-02-18 PROCEDURE — 99024 POSTOP FOLLOW-UP VISIT: CPT | Performed by: NURSE PRACTITIONER

## 2020-02-18 ASSESSMENT — MIFFLIN-ST. JEOR: SCORE: 1504.49

## 2020-02-18 ASSESSMENT — PAIN SCALES - GENERAL: PAINLEVEL: MILD PAIN (2)

## 2020-02-18 NOTE — PROGRESS NOTES
Routing this message to Shannan for your information. You saw patient today and will see in 1 to 2 weeks for post partum exam. Thanks !  Yen Pathak CMA

## 2020-02-18 NOTE — PROGRESS NOTES
EPDS was done in clinic today during child's 1 month visit and score was 14 with negative psychosis and negative screen for suicidal ideation.   Mom has situational issues that is triggering some depression, parents are getting  and dad is moving to [place].  Mo is going to start talk therapy next week. MGM has mental health issues, mom thinks she is may be bipolar.    Copy of EPDS was given to Hiram today as well as educational material about postpartum depression.     Plan:   Mom is starting therapy next week.  Before pregnant she had depression and was on meds and is hoping to not go back on meds.    Behavioral health referral was not placed.     Phoebe Padilla, ISH, CNP

## 2020-02-19 ENCOUNTER — NURSE TRIAGE (OUTPATIENT)
Dept: NURSING | Facility: CLINIC | Age: 30
End: 2020-02-19

## 2020-02-19 ENCOUNTER — TELEPHONE (OUTPATIENT)
Dept: URGENT CARE | Facility: URGENT CARE | Age: 30
End: 2020-02-19

## 2020-02-19 ENCOUNTER — TELEPHONE (OUTPATIENT)
Dept: OBGYN | Facility: CLINIC | Age: 30
End: 2020-02-19

## 2020-02-19 DIAGNOSIS — B37.0 THRUSH: Primary | ICD-10-CM

## 2020-02-19 DIAGNOSIS — B37.9 CANDIDA INFECTION: Primary | ICD-10-CM

## 2020-02-19 DIAGNOSIS — T81.41XA SUPERFICIAL INCISIONAL SURGICAL SITE INFECTION: Primary | ICD-10-CM

## 2020-02-19 RX ORDER — NYSTATIN 100000/ML
500000 SUSPENSION, ORAL (FINAL DOSE FORM) ORAL 4 TIMES DAILY
Qty: 200 ML | Refills: 0 | Status: SHIPPED | OUTPATIENT
Start: 2020-02-19 | End: 2020-03-02

## 2020-02-19 RX ORDER — CLOTRIMAZOLE 1 %
CREAM (GRAM) TOPICAL 2 TIMES DAILY
Qty: 30 G | Refills: 0 | Status: SHIPPED | OUTPATIENT
Start: 2020-02-19 | End: 2020-03-02

## 2020-02-19 RX ORDER — CIPROFLOXACIN 500 MG/1
500 TABLET, FILM COATED ORAL 2 TIMES DAILY
Qty: 14 TABLET | Refills: 0 | Status: SHIPPED | OUTPATIENT
Start: 2020-02-19 | End: 2020-03-02

## 2020-02-19 NOTE — TELEPHONE ENCOUNTER
Reason for call:  Other   Patient called regarding (reason for call): call back  Additional comments: Patient is breast feeding and has thrush on hr nipples, wants to know is there anything she can do or does she need to make an appt    Phone number to reach patient:  Cell number on file:    Telephone Information:   Mobile [phone]       Best Time:  Anytime    Can we leave a detailed message on this number?  YES    Travel screening: Not Applicable

## 2020-02-19 NOTE — TELEPHONE ENCOUNTER
Had not mentioned concern at visit yesterday. Agree with RN recommendation that baby likely needs treatment too.  I have sent a prescription for Clotrimazole. Can use twice daily for up to 14 days. Recommend wiping off gently prior to nursing. Shannan DENG CNP

## 2020-02-19 NOTE — TELEPHONE ENCOUNTER
Pt was just seen in clinic yesterday by ISH Arroyo CNP, regarding incision follow up.  Pt is currently taking an antibiotic for an incision infection.  Pt states she started noticing today that her nipples burn, itch, are red and flaky.  She states her son does not have any signs of thrush: no white patches in his mouth or diaper rash.  But he has been very fussy.  Explained to pt that since she is taking an antibiotic she is at greater risk of developing thrush on her nipples.  I also let her know that this is contagious and her son may get it as well.  She states her son did have his month check up yesterday but they did not mention anything about thrush.  I suggested that if Shannan is willing to treat her thrush on her nipples without being seen in clinic then she should also contact her son's pediatrician so he can be treated as well so they don't pass it back and forth.  Will route to Shannan to request rx for thrush on nipples.    Alison Cordoba RN

## 2020-02-19 NOTE — TELEPHONE ENCOUNTER
Relayed Shannan's message to pt.  Pt was wondering if she needs to pump or if she needs to get rid of milk that she has pumped.  I told her it was up to her if she wants to pump instead of nurse until her nipples heal.  I explained that her breast milk is safe for baby to continue to drink while being treated for thrush, just make sure to gently wipe the cream off of the nipple prior to nursing.  I suggested it may be more comfortable for her to pump and feed baby through a bottle instead of nurse until her thrush has resolved.  Pt has also contacted her son's pediatrician to get something to treat him so he doesn't get it.  They have not contacted her yet.    I suggested that if they do not treat and he does not have symptoms to pump/bottle feed until her thrush has healed so she doesn't pass it to him.  She believes her son has already gotten it since he has been nursing all day while she has had symptoms.    Alison Cordoba RN

## 2020-02-20 ENCOUNTER — NURSE TRIAGE (OUTPATIENT)
Dept: NURSING | Facility: CLINIC | Age: 30
End: 2020-02-20

## 2020-02-20 ENCOUNTER — TRANSFERRED RECORDS (OUTPATIENT)
Dept: HEALTH INFORMATION MANAGEMENT | Facility: CLINIC | Age: 30
End: 2020-02-20

## 2020-02-20 NOTE — TELEPHONE ENCOUNTER
----- Message from Ti Shelton PA-C sent at 2/19/2020  8:47 PM CST -----  Please let patient know that the antibiotic she is on most likely will not work for her infection.  I have sent a new Rx in to her pharmacy.  Stop the keflex and start the new antibiotic.    Ti Shelton PA-C

## 2020-02-20 NOTE — TELEPHONE ENCOUNTER
Patient is calling    She is breastfeeding her son   On antibiotics for thrush on her nipples  Baby also has thrush- baby was given a prescription from his pediatrician    Patient's antibiotic was switched today and she was prescribed Nystatin liquid tonight- pharmacy told her to swish and spit, and swab her mouth. Patient is wondering why something was prescribed for her mouth    Discussed that because she has thrush on the nipples, she has a higher chance of developing it elsewhere, in her mouth for example, so the prescription is meant to be used in the mouth.    Patient verbalized understanding and had no further questions.    Joana Lindquist, RN/M [facility] Nurse Advisors    Reason for Disposition    Caller has medication question only, adult not sick, and triager answers question    Additional Information    Negative: Drug overdose and nurse unable to answer question    Negative: Caller requesting information not related to medicine    Negative: Caller requesting a prescription for Strep throat and has a positive culture result    Negative: Rash while taking a medication or within 3 days of stopping it    Negative: Immunization reaction suspected    Negative: [1] Asthma and [2] having symptoms of asthma (cough, wheezing, etc)    Negative: MORE THAN A DOUBLE DOSE of a prescription or over-the-counter (OTC) drug    Negative: [1] DOUBLE DOSE (an extra dose or lesser amount) of over-the-counter (OTC) drug AND [2] any symptoms (e.g., dizziness, nausea, pain, sleepiness)    Negative: [1] DOUBLE DOSE (an extra dose or lesser amount) of prescription drug AND [2] any symptoms (e.g., dizziness, nausea, pain, sleepiness)    Negative: Took another person's prescription drug    Negative: [1] DOUBLE DOSE (an extra dose or lesser amount) of prescription drug AND [2] NO symptoms (Exception: a double dose of antibiotics)    Negative: Diabetes drug error or overdose (e.g., insulin or extra dose)    Negative: [1] Request  "for URGENT new prescription or refill of \"essential\" medication (i.e., likelihood of harm to patient if not taken) AND [2] triager unable to fill per unit policy    Negative: [1] Prescription not at pharmacy AND [2] was prescribed today by PCP    Negative: Pharmacy calling with prescription questions and triager unable to answer question    Negative: Caller has URGENT medication question about med that PCP prescribed and triager unable to answer question    Negative: Caller has medication question about med not prescribed by PCP and triager unable to answer question (e.g., compatibility with other med, storage)    Negative: Caller has NON-URGENT medication question about med that PCP prescribed and triager unable to answer question    Negative: Caller requesting a NON-URGENT new prescription or refill and triager unable to refill per unit policy    Negative: [1] DOUBLE DOSE (an extra dose or lesser amount) of over-the-counter (OTC) drug AND [2] NO symptoms    Negative: [1] DOUBLE DOSE (an extra dose or lesser amount) of antibiotic drug AND [2] NO symptoms    Protocols used: MEDICATION QUESTION CALL-A-AH      "

## 2020-02-20 NOTE — TELEPHONE ENCOUNTER
"Caller wants to verify that she is suppose to be taking cipro due to her antibiotic being changed because of sensitivities. Triager was able to validate she was ordered cipro. Caller verbalized and understands directives.    Reason for Disposition    Caller has medication question only, adult not sick, and triager answers question    Additional Information    Negative: Drug overdose and nurse unable to answer question    Negative: Caller requesting information not related to medicine    Negative: Caller requesting a prescription for Strep throat and has a positive culture result    Negative: Rash while taking a medication or within 3 days of stopping it    Negative: Immunization reaction suspected    Negative: [1] Asthma and [2] having symptoms of asthma (cough, wheezing, etc)    Negative: MORE THAN A DOUBLE DOSE of a prescription or over-the-counter (OTC) drug    Negative: [1] DOUBLE DOSE (an extra dose or lesser amount) of over-the-counter (OTC) drug AND [2] any symptoms (e.g., dizziness, nausea, pain, sleepiness)    Negative: [1] DOUBLE DOSE (an extra dose or lesser amount) of prescription drug AND [2] any symptoms (e.g., dizziness, nausea, pain, sleepiness)    Negative: Took another person's prescription drug    Negative: [1] DOUBLE DOSE (an extra dose or lesser amount) of prescription drug AND [2] NO symptoms (Exception: a double dose of antibiotics)    Negative: Diabetes drug error or overdose (e.g., insulin or extra dose)    Negative: [1] Request for URGENT new prescription or refill of \"essential\" medication (i.e., likelihood of harm to patient if not taken) AND [2] triager unable to fill per unit policy    Negative: [1] Prescription not at pharmacy AND [2] was prescribed today by PCP    Negative: Pharmacy calling with prescription questions and triager unable to answer question    Negative: Caller has URGENT medication question about med that PCP prescribed and triager unable to answer question    Negative: " Caller has NON-URGENT medication question about med that PCP prescribed and triager unable to answer question    Negative: Caller requesting a NON-URGENT new prescription or refill and triager unable to refill per unit policy    Negative: Caller has medication question about med not prescribed by PCP and triager unable to answer question (e.g., compatibility with other med, storage)    Negative: [1] DOUBLE DOSE (an extra dose or lesser amount) of over-the-counter (OTC) drug AND [2] NO symptoms    Negative: [1] DOUBLE DOSE (an extra dose or lesser amount) of antibiotic drug AND [2] NO symptoms    Protocols used: MEDICATION QUESTION CALL-A-AH

## 2020-02-20 NOTE — TELEPHONE ENCOUNTER
Has csection scar that is bothering her.    Has been feeling very yucky and had fever last night. Drinking ice water so cannot take current temp.    Had baby 5 weeks ago.    Started new antibiotic last night. Talked to provider yesterday but feeling worse today.    Was told to be seen if it feels like lump under scar and she says it does today.    She is going to the ED as she is feeling terrible and thinks she may need the incision opened.    Cesia Aden RN  [facility] Nurse Advisor      Reason for Disposition    Fever > 100.4 F (38.0 C)    Additional Information    Negative: [1] Wound gaping open AND [2] visible internal organs    Negative: Sounds like a life-threatening emergency to the triager    Negative: [1] Bleeding from incision AND [2] won't stop after 10 minutes of direct pressure    Negative: [1] Widespread rash AND [2] bright red, sunburn-like    Protocols used: POSTPARTUM -  INCISION SYMPTOMS-A-AH

## 2020-02-20 NOTE — TELEPHONE ENCOUNTER
Pt was ok with the new prescription. Pt has thrush. Will this help it or make it worse?   Call pt back before we leave.  Gina Gutierrez, CMA

## 2020-02-23 ENCOUNTER — NURSE TRIAGE (OUTPATIENT)
Dept: NURSING | Facility: CLINIC | Age: 30
End: 2020-02-23

## 2020-02-23 NOTE — TELEPHONE ENCOUNTER
"Patient calling, states her PCP told her to go to ER for evaluation and treatment of abscess. She wants to verify that this is what she should do. Advised that she should do as her PCP directed.  Rossi Dumont RN  [place] [facility]    Reason for Disposition    Patient sounds very sick or weak to the triager    Additional Information    Negative: [1] Widespread rash AND [2] bright red, sunburn-like AND [3] too weak to stand    Negative: Sounds like a life-threatening emergency to the triager    Negative: Painful lump or swelling at opening to anus (rectum)    Negative: Painful lump or swelling at opening to vagina (on labia)    Negative: Painful lump or swelling on scrotum    Negative: Impetigo suspected or diagnosed    Negative: Doesn't match the SYMPTOMS of a boil    Negative: MRSA, questions about (No boil or other skin lesion)    Negative: Widespread red rash    Negative: Black (necrotic) color or blisters develop in wound    Answer Assessment - Initial Assessment Questions  1. APPEARANCE of BOIL: \"What does the boil look like?\"       Getting larger  2. LOCATION: \"Where is the boil located?\"        incision  3. NUMBER: \"How many boils are there?\"       one  4. SIZE: \"How big is the boil?\" (e.g., inches, cm; compare to size of a coin or other object)      Getting larger  5. ONSET: \"When did the boil start?\"      Last week  6. PAIN: \"Is there any pain?\" If so, ask: \"How bad is the pain?\"   (Scale 1-10; or mild, moderate, severe)      yes  7. FEVER: \"Do you have a fever?\" If so, ask: \"What is it, how was it measured, and when did it start?\"       no  8. SOURCE: \"Have you been around anyone with boils or other Staph infections?\" \"Have you ever had boils before?\"      ?  9. OTHER SYMPTOMS: \"Do you have any other symptoms?\" (e.g., shaking chills, weakness, rash elsewhere on body)      no  10. PREGNANCY: \"Is there any chance you are pregnant?\" \"When was your last menstrual period?\"        S/p  " at [facility]    Protocols used: BOIL (SKIN ABSCESS)-A-AH

## 2020-03-02 ENCOUNTER — PRENATAL OFFICE VISIT (OUTPATIENT)
Dept: OBGYN | Facility: CLINIC | Age: 30
End: 2020-03-02
Payer: COMMERCIAL

## 2020-03-02 VITALS
HEIGHT: 62 IN | OXYGEN SATURATION: 97 % | WEIGHT: 179 LBS | TEMPERATURE: 98.1 F | SYSTOLIC BLOOD PRESSURE: 120 MMHG | BODY MASS INDEX: 32.94 KG/M2 | DIASTOLIC BLOOD PRESSURE: 77 MMHG | HEART RATE: 86 BPM

## 2020-03-02 DIAGNOSIS — F43.23 ADJUSTMENT DISORDER WITH MIXED ANXIETY AND DEPRESSED MOOD: ICD-10-CM

## 2020-03-02 PROBLEM — O99.820 GROUP B STREPTOCOCCUS CARRIER, +RV CULTURE, CURRENTLY PREGNANT: Status: RESOLVED | Noted: 2020-01-09 | Resolved: 2020-03-02

## 2020-03-02 PROCEDURE — 99207 ZZC POST PARTUM EXAM: CPT | Performed by: NURSE PRACTITIONER

## 2020-03-02 PROCEDURE — 99213 OFFICE O/P EST LOW 20 MIN: CPT | Mod: 24 | Performed by: NURSE PRACTITIONER

## 2020-03-02 RX ORDER — CITALOPRAM HYDROBROMIDE 20 MG/1
TABLET ORAL
Qty: 30 TABLET | Refills: 1 | Status: SHIPPED | OUTPATIENT
Start: 2020-03-02 | End: 2020-04-08

## 2020-03-02 RX ORDER — CEPHALEXIN 500 MG/1
CAPSULE ORAL
COMMUNITY
Start: 2020-02-16 | End: 2020-04-08

## 2020-03-02 RX ORDER — NYSTATIN 100000/ML
SUSPENSION, ORAL (FINAL DOSE FORM) ORAL
COMMUNITY
Start: 2020-02-19 | End: 2020-03-10

## 2020-03-02 ASSESSMENT — PAIN SCALES - GENERAL: PAINLEVEL: NO PAIN (0)

## 2020-03-02 ASSESSMENT — MIFFLIN-ST. JEOR: SCORE: 1487.25

## 2020-03-02 ASSESSMENT — PATIENT HEALTH QUESTIONNAIRE - PHQ9
SUM OF ALL RESPONSES TO PHQ QUESTIONS 1-9: 10
SUM OF ALL RESPONSES TO PHQ QUESTIONS 1-9: 10
10. IF YOU CHECKED OFF ANY PROBLEMS, HOW DIFFICULT HAVE THESE PROBLEMS MADE IT FOR YOU TO DO YOUR WORK, TAKE CARE OF THINGS AT HOME, OR GET ALONG WITH OTHER PEOPLE: VERY DIFFICULT

## 2020-03-02 NOTE — PROGRESS NOTES
Joanne is here for a postpartum checkup    She had a  section.  OB History    Para Term  AB Living   2 2 2 0 0 2   SAB TAB Ectopic Multiple Live Births   0 0 0 0 2      # Outcome Date GA Lbr Derrick/2nd Weight Sex Delivery Anes PTL Lv   2 Term 20 37w4d   M CS-LTranv   EDILBERTO   1 Term 19    M CS-LTranv   EDILBERTO      Since delivery, she has been breast feeding.  She has not had a normal menses.  She has not had intercourse.  Patient screened for postpartum depression and complaints are positive for depressed moods, decrease interested in activity, fatigue. Patient relays her parents are in the process of divorce and she will be purchasing their home. This is putting more stress on her as she is put in the middle. Feels this is situational, but feels it is time for medication, this had been prescribed in the pregnancy, but she decided not to take it. Bonding well with baby. Denies thoughts or plans for harm to self or others. Screening has also been completed for intimate partner violence.  Since delivery, seen several times for wound infection, treated with Keflex x 2, then changed to Cipro based on wound culture. Most recent seen in the ED x 2-last was a week ago and had Dr. Agbeh on call evaluate patient and patient was given reassurance. Started on Clindamycin and is still taking this.   She feels there is still a small open area and intermittent drainage that is clear. No longer foul odor with it. No further fever.     O: This is a well appearing female in no acute distress. Answers questions and maintains eye contact appropriately. Vital signs noted.  RESPIRATORY: Clear to auscultation bilaterally.  CV: Regular rate and rhythm without murmur, gallop, rub  ABDOMEN: Soft, nontender, nondistended, normoactive bowel sounds. No hepatosplenomegaly. No guarding, rebounding, or rigidity.  Incision: well healing-approximately 1/2cm of open area visible, towards right side. No current visible  drainage.   Vulva: No external lesions, normal hair distribution, no adenopathy  BUS:  Normal, no masses noted  Vagina: Moist, pink, no abnormal discharge, well rugated, no lesions  Cervix: Pink, nulliparous, midline. Without cervical motion tenderness.  Uterus: Normal size and shape, non-tender, mobile  Ovaries: No masses, non-tender, mobile    A/P  Routine Postpartum     - I discussed the new pap recommendations regarding screening.  Explained the rationale for increased intervals between paps.  Questions asked and answered.  She does agree to this regiment.   - Pap was not performed   - Contraception: Declines for now  - Incision: will finish Clindamycin and monitor at this time. We reviewed symptoms of infection for which follow up in clinic would be needed.  - Depression. We reviewed her PHQ-9, prior history. Patient now ready to start medication. Discussed use, possible side effects, how long before she may feel improvement and how long until fully effective. Discussed titrating her dose. Plan follow up after 4 weeks, sooner as needed. To ER immediately with thoughts of harm to self or other and she verbalizes understanding.     Shannan DENG CNP  Answers for HPI/ROS submitted by the patient on 3/2/2020   If you checked off any problems, how difficult have these problems made it for you to do your work, take care of things at home, or get along with other people?: Very difficult  PHQ9 TOTAL SCORE: 10

## 2020-03-03 ENCOUNTER — TELEPHONE (OUTPATIENT)
Dept: OBGYN | Facility: CLINIC | Age: 30
End: 2020-03-03

## 2020-03-03 ASSESSMENT — PATIENT HEALTH QUESTIONNAIRE - PHQ9: SUM OF ALL RESPONSES TO PHQ QUESTIONS 1-9: 10

## 2020-03-03 NOTE — TELEPHONE ENCOUNTER
Spoke with patient, patient would like to return to work 3/09/2020      Marcela Gant MA on 3/3/2020 at 3:44 PM

## 2020-03-03 NOTE — LETTER
RE: Joanne Escalante  : 1990    DATE: March 3, 2020      To Whom It May Concern,        Joanne Escalante is able to return to work without restrictions on 3/9/2020.      Thank you.      Sincerely,            Shannan DENG CNP

## 2020-03-10 ENCOUNTER — NURSE TRIAGE (OUTPATIENT)
Dept: NURSING | Facility: CLINIC | Age: 30
End: 2020-03-10

## 2020-03-10 ENCOUNTER — TRANSFERRED RECORDS (OUTPATIENT)
Dept: HEALTH INFORMATION MANAGEMENT | Facility: CLINIC | Age: 30
End: 2020-03-10

## 2020-03-10 ENCOUNTER — OFFICE VISIT (OUTPATIENT)
Dept: OBGYN | Facility: CLINIC | Age: 30
End: 2020-03-10
Payer: COMMERCIAL

## 2020-03-10 VITALS
DIASTOLIC BLOOD PRESSURE: 77 MMHG | SYSTOLIC BLOOD PRESSURE: 117 MMHG | HEART RATE: 84 BPM | WEIGHT: 183 LBS | BODY MASS INDEX: 34.02 KG/M2

## 2020-03-10 DIAGNOSIS — T81.49XA ABDOMINAL WALL ABSCESS AT SITE OF SURGICAL WOUND: Primary | ICD-10-CM

## 2020-03-10 LAB
CREAT SERPL-MCNC: 0.65 MG/DL (ref 0.55–1.02)
GFR SERPL CREATININE-BSD FRML MDRD: >60 ML/MIN
GLUCOSE SERPL-MCNC: 96 MG/DL (ref 70–110)
GRAM STN SPEC: ABNORMAL
Lab: ABNORMAL
POTASSIUM SERPL-SCNC: 4.2 MMOL/L (ref 3.5–5.1)
SPECIMEN SOURCE: ABNORMAL

## 2020-03-10 PROCEDURE — 87077 CULTURE AEROBIC IDENTIFY: CPT | Performed by: ADVANCED PRACTICE MIDWIFE

## 2020-03-10 PROCEDURE — 87205 SMEAR GRAM STAIN: CPT | Performed by: ADVANCED PRACTICE MIDWIFE

## 2020-03-10 PROCEDURE — 99024 POSTOP FOLLOW-UP VISIT: CPT | Performed by: ADVANCED PRACTICE MIDWIFE

## 2020-03-10 PROCEDURE — 87070 CULTURE OTHR SPECIMN AEROBIC: CPT | Performed by: ADVANCED PRACTICE MIDWIFE

## 2020-03-10 NOTE — TELEPHONE ENCOUNTER
Attempted to reach pt to further triage her symptoms and assist her in scheduling an appt if needed.    Unable to reach patient via phone. Left message to call clinic back at [phone] and ask for Women's Health.    Just noticed that pt is already scheduled to see Adele Max CNM, today at 10 am to assess her  incision.  Will close this encounter.    Alison Cordoba RN

## 2020-03-10 NOTE — NURSING NOTE
"Chief Complaint   Patient presents with     Wound Check     check  incision       Initial /77 (BP Location: Right arm, Patient Position: Sitting, Cuff Size: Adult Regular)   Pulse 84   Wt 83 kg (183 lb)   Breastfeeding Yes   BMI 34.02 kg/m   Estimated body mass index is 34.02 kg/m  as calculated from the following:    Height as of 3/2/20: 1.562 m (5' 1.5\").    Weight as of this encounter: 83 kg (183 lb).  Medication Reconciliation: complete    Yen Pathak CMA    "

## 2020-03-10 NOTE — TELEPHONE ENCOUNTER
8 weeks post c section, was bleeding this morning.    Hard for her to see.  Black dot with red around it.  No fever.    Had her 6 week check already.    She cannot see how big the opening is?? I/2 or less and bleeding has stopped.    Please call patient.  Needs to be seen today.    Cesia FISCHER [facility] Nurse Advisor        Reason for Disposition    Dressing soaked with blood or body fluid (e.g., drainage)    Additional Information    Negative: [1] Wound gaping open AND [2] visible internal organs    Negative: Sounds like a life-threatening emergency to the triager    Negative: [1] Bleeding from incision AND [2] won't stop after 10 minutes of direct pressure    Negative: [1] Widespread rash AND [2] bright red, sunburn-like    Negative: Fever > 100.4 F (38.0 C)    Negative: Severe pain in the incision    Negative: [1] Incision gaping open AND [2] length of opening > 1/2 inch (1 cm)    Negative: Patient sounds very sick or weak to the triager    Negative: [1] Incision looks infected (spreading redness, pain) AND [2] fever > 99.5 F (37.5 C)    Negative: [1] Incision looks infected (spreading redness, pain) AND [2] large red area (> 2 in. or 5 cm)    Negative: [1] Pus or bad-smelling fluid draining from incision AND [2] no fever    Protocols used: POSTPARTUM -  INCISION SYMPTOMS-A-AH

## 2020-03-10 NOTE — PROGRESS NOTES
Joanne Escalante is a 29 year old who presents to the clinic for evaluation of discharge from her  scar.   C/S   Was diagnosed with and incisional infection on .  Started on Keflex.   Seen again on  and re prescribed keflex and cultured  Seen again on  given cipro based on the culture results.   Seen again on  antibiotic changed to clinda.   Comes today with drainage, pain, increased redness and odor.   Denies fever and chills.       Histories reviewed and updated  Past Medical History:   Diagnosis Date     Anxiety      Depressive disorder      Endometriosis      Periumbilical hernia      Past Surgical History:   Procedure Laterality Date     C ORAL SURGERY PROCEDURE      [place] teeth      SECTION  2018     LAPAROSCOPY DIAGNOSTIC (GYN)  2015    Endometriosis     Social History     Socioeconomic History     Marital status: Single     Spouse name: Not on file     Number of children: Not on file     Years of education: Not on file     Highest education level: Not on file   Occupational History     Not on file   Social Needs     Financial resource strain: Not on file     Food insecurity     Worry: Not on file     Inability: Not on file     Transportation needs     Medical: Not on file     Non-medical: Not on file   Tobacco Use     Smoking status: Current Every Day Smoker     Packs/day: 0.25     Types: Cigarettes     Smokeless tobacco: Never Used   Substance and Sexual Activity     Alcohol use: Not Currently     Drug use: Never     Sexual activity: Not Currently     Partners: Male     Birth control/protection: Condom   Lifestyle     Physical activity     Days per week: Not on file     Minutes per session: Not on file     Stress: Not on file   Relationships     Social connections     Talks on phone: Not on file     Gets together: Not on file     Attends Confucianism service: Not on file     Active member of club or organization: Not on file     Attends meetings of clubs or  organizations: Not on file     Relationship status: Not on file     Intimate partner violence     Fear of current or ex partner: Not on file     Emotionally abused: Not on file     Physically abused: Not on file     Forced sexual activity: Not on file   Other Topics Concern     Parent/sibling w/ CABG, MI or angioplasty before 65F 55M? Not Asked   Social History Narrative     Not on file     Family History   Problem Relation Age of Onset     Depression Mother      Depression Father           ROS: 10 point ROS neg other than the symptoms noted above in the HPI.    EXAM:  /77 (BP Location: Right arm, Patient Position: Sitting, Cuff Size: Adult Regular)   Pulse 84   Wt 83 kg (183 lb)   Breastfeeding Yes   BMI 34.02 kg/m    GENERAL:  Pleasant female, no acute distress  EYES: sclera clear  RESP: breathing unlabored  CV: extremities without edema  M/S: no gross deformities  Neuro:  normal strength and sensation  ABD:  Incision is flaming red on the right side about 3 cm either side of the incision and from just past the incision to nearly the mid line.   There is a small opening about 2 cm long to the right of the midline.  There is an area of firmness superior to the incision in the same area that when pressure is applied causes a small amount of pus to drain from the incision.   : PELVIC EXAM:  Psych:  alert, with normal speech and behavior        ASSESSMENT/PLAN:  (T81.49XA) Abdominal wall abscess at site of surgical wound  (primary encounter diagnosis)  Comment:   Plan: Wound Culture Aerobic Bacterial, Gram stain            Consult with Dr Colbert.   Will plan to go to  ED.  Needs imaging to evaluate the need for drainage.       Visit length 20 minutes was spent face to face with the patient today discussing her history, diagnosis, and follow-up plan as noted above.  Over 50% of the visit was spent in counseling and coordination of care.    Time noted does not include time required to complete procedures.

## 2020-03-12 ENCOUNTER — OFFICE VISIT (OUTPATIENT)
Dept: OBGYN | Facility: CLINIC | Age: 30
End: 2020-03-12
Payer: COMMERCIAL

## 2020-03-12 VITALS
HEART RATE: 86 BPM | TEMPERATURE: 98.3 F | SYSTOLIC BLOOD PRESSURE: 131 MMHG | WEIGHT: 186.8 LBS | BODY MASS INDEX: 34.72 KG/M2 | DIASTOLIC BLOOD PRESSURE: 81 MMHG

## 2020-03-12 DIAGNOSIS — T14.8XXA WOUND INFECTION: Primary | ICD-10-CM

## 2020-03-12 DIAGNOSIS — L08.9 WOUND INFECTION: Primary | ICD-10-CM

## 2020-03-12 PROCEDURE — 99024 POSTOP FOLLOW-UP VISIT: CPT | Performed by: OBSTETRICS & GYNECOLOGY

## 2020-03-12 NOTE — LETTER
March 12, 2020      Joanne Escalante  [address] [facility] [address]            To whom it may concern,    We recommend Joanne remain off of work through Monday March 16th, 2020.        Sincerely,      Sebas Grier MD

## 2020-03-12 NOTE — PROGRESS NOTES
Joanne presents today for her post-operative check.  She has had a long history of an ongoing wound infection that won't heal.    She complains of feeling poorly and having some local pain.  She is on the Keflex, given in the ED.    Incisions: Most of the incision is intact, but there is a small defect and she is having drainage from it.  There is some induration and erythema around the defect.  The area is cleaned and injected with 1% lidocaine.  A 1/4 inch string gauze is then packed into the defect.  It accepted approx 4-5 cm.    Assessment:   6-7eek(s) status post  with a wound infection.  It has waxed and waned over that time.  She was seen and CT doesn't show a significant superficial or deep infection.  Plan: Continue ABX and follow up Monday to have the packing changed.   Return next week.  Sebas Grier MD FACOG

## 2020-03-13 LAB
BACTERIA SPEC CULT: ABNORMAL
BACTERIA SPEC CULT: ABNORMAL
Lab: ABNORMAL
SPECIMEN SOURCE: ABNORMAL

## 2020-03-16 ENCOUNTER — OFFICE VISIT (OUTPATIENT)
Dept: OBGYN | Facility: CLINIC | Age: 30
End: 2020-03-16
Payer: COMMERCIAL

## 2020-03-16 VITALS
HEART RATE: 93 BPM | TEMPERATURE: 98.4 F | BODY MASS INDEX: 33.91 KG/M2 | WEIGHT: 182.4 LBS | SYSTOLIC BLOOD PRESSURE: 124 MMHG | DIASTOLIC BLOOD PRESSURE: 84 MMHG

## 2020-03-16 DIAGNOSIS — T14.8XXA WOUND INFECTION: Primary | ICD-10-CM

## 2020-03-16 DIAGNOSIS — L08.9 WOUND INFECTION: Primary | ICD-10-CM

## 2020-03-16 PROCEDURE — 99024 POSTOP FOLLOW-UP VISIT: CPT | Performed by: OBSTETRICS & GYNECOLOGY

## 2020-03-22 NOTE — PROGRESS NOTES
Joanne is a 29 year old   here for follow up of her post-op wound infection.  She reports feeling much better.  She is still having some drainage..  ROS: No urinary frequency or dysuria, bladder or kidney problems, No fever or chills  ROS: Ten point review of systems was reviewed and negative except the above.    PMH: Her past medical, surgical, and obstetric histories were reviewed and are documented in their appropriate chart areas.    ALL/Meds: Her medication and allergy histories were reviewed and are documented in their appropriate chart areas.    SH/FMH: Reviewed and documented in the appropriate area.    PE: /84   Pulse 93   Temp 98.4  F (36.9  C)   Wt 82.7 kg (182 lb 6.4 oz)   Breastfeeding Yes   BMI 33.91 kg/m    Abd: soft, non tender, no masses  Incision: intact, no erythema, induration or discharge  wound opened, cleansed.  Dressing applied  ; instructions for cleansing and care reviewed with patient    .    A/P:   Joanne presents with (T14.8XXA,  L08.9) Wound infection  (primary encounter diagnosis)  Comment:   Plan: Continue on abx. And dressing changes.        -    No orders of the defined types were placed in this encounter.        Sebas Grier MD FACOG

## 2020-03-23 ENCOUNTER — MEDICAL CORRESPONDENCE (OUTPATIENT)
Dept: HEALTH INFORMATION MANAGEMENT | Facility: CLINIC | Age: 30
End: 2020-03-23

## 2020-03-23 ENCOUNTER — TELEPHONE (OUTPATIENT)
Dept: OBGYN | Facility: CLINIC | Age: 30
End: 2020-03-23

## 2020-03-23 ENCOUNTER — DOCUMENTATION ONLY (OUTPATIENT)
Dept: PEDIATRICS | Facility: CLINIC | Age: 30
End: 2020-03-23

## 2020-03-23 NOTE — TELEPHONE ENCOUNTER
RN called and assisted patient in scheduling appointment to be seen in clinic per Dr. Crowley advisement.    Isabel Buitrago RN on 3/23/2020 at 4:59 PM

## 2020-03-23 NOTE — TELEPHONE ENCOUNTER
Patient inquiring about her  incision and possible infection, has not heard from her provider yet on recommendations. RN will re-route to pool with high priority for advisement.      Joanne Liriano RN on 3/23/2020 at 3:12 PM

## 2020-03-23 NOTE — TELEPHONE ENCOUNTER
Reason for call:  Other   Patient called regarding (reason for call): call back    Additional comments: per patient , c-seaction scar is infected again and she wanted to know if she should make an appointment.     Phone number to reach patient:  Home number on file [phone] (home)    Best Time:  anytime    Can we leave a detailed message on this number?  NO    Travel screening: Not Applicable

## 2020-03-23 NOTE — PROGRESS NOTES
RN called and spoke to patient, relaying provider message below.     Patient verbalized understanding and agreed to plan.     RN assisted in scheduling her for telephone encounter in 2 weeks.     Patient also inquiring about her  incision and possible infection, has not heard from her provider yet on recommendations. RN will re-route to pool with high priority for advisement.     Joanne Liriano RN on 3/23/2020 at 3:12 PM

## 2020-03-23 NOTE — TELEPHONE ENCOUNTER
RN returned call to patient.    Patient states where her incision site was reopened has healed shut but she has a red bump the size of a quarter with a white head that is leaking. Patient states the bump is red, warm to touch with red streaks and is oozing yellow pus. Patient rates the pain a scale of 4/10.     Patient denies fever.    Patient states previously she was prescribed Keflex and that has helped but is wondering if she needs to come in to have it reopened.    RN routing to provider for further advisement.    Patient verbalized understanding and agreed to plan.     Patient was given the opportunity to ask additional questions and have them answered. Patient had no further questions.     Isabel Buitrago RN on 3/23/2020 at 10:27 AM

## 2020-03-23 NOTE — PROGRESS NOTES
Noted-patient will be starting on Celexa. Can we please reach out to her and see if she would like to schedule a phone visit in 2 weeks once the Celexa has been started? Shannan DENG CNP

## 2020-03-23 NOTE — TELEPHONE ENCOUNTER
Unable to reach patient via phone. Left message to call clinic back at [phone] and ask for Women's Health.    RN attempted to call patient to assist with getting her scheduled tomorrow or Wednesday in clinic in [place] for further evaluation of her  incision. When patient calls back please assist with getting her scheduled.     Joanne Liriano RN on 3/23/2020 at 4:30 PM

## 2020-03-23 NOTE — PROGRESS NOTES
EPDS was done in clinic today during child's 2 month visit and score was 17 with negative psychosis and negative screen for suicidal ideation.   Mom has situational depression in past and post partum depression with her first son.. Mom was supposed ot start on Celexa but was told by the pharmacist would not be safe to take when she was breast feeding so mom has not started it.  Mom's dad moved to [place], dad lost his job as a cook at [employer] and her incision is not healing.  Copy of EPDS was given to mom today as well as educational material about postpartum depression.     Plan:   Start Celexa as it is an L2 per Dr. Conte's book and would be safe to use.  Watch for sedation and / or irritability with Hiram.  Peak concentration in blood stream sis 2-4 hours so if she pumps at that time label pumped BM with a C (for citalopram)  and use this last.  Follow up with OB as recommended.  Behavioral health referral was not placed.    Chart sent to OB GYN pool.    Phoebe Padilla, APRN, CNP

## 2020-03-24 ENCOUNTER — TELEPHONE (OUTPATIENT)
Dept: NURSING | Facility: CLINIC | Age: 30
End: 2020-03-24

## 2020-03-24 NOTE — TELEPHONE ENCOUNTER
Patient calling because she is seeing her OB-GYN tomorrow about an incisional abscess that she has been dealing with. She is concerned that she may also have developed a hernia a the site as well.  Advised that her OB can evaluation the situation and refer if needed.Call back for further questions or concerns.  Rossi Dumont RN  [facility]

## 2020-03-25 ENCOUNTER — OFFICE VISIT (OUTPATIENT)
Dept: OBGYN | Facility: CLINIC | Age: 30
End: 2020-03-25
Payer: COMMERCIAL

## 2020-03-25 VITALS
WEIGHT: 183.8 LBS | DIASTOLIC BLOOD PRESSURE: 89 MMHG | OXYGEN SATURATION: 97 % | BODY MASS INDEX: 34.17 KG/M2 | SYSTOLIC BLOOD PRESSURE: 128 MMHG | HEART RATE: 83 BPM | TEMPERATURE: 97.7 F

## 2020-03-25 DIAGNOSIS — K42.9 UMBILICAL HERNIA WITHOUT OBSTRUCTION AND WITHOUT GANGRENE: Primary | ICD-10-CM

## 2020-03-25 PROCEDURE — 99024 POSTOP FOLLOW-UP VISIT: CPT | Performed by: OBSTETRICS & GYNECOLOGY

## 2020-03-25 NOTE — PATIENT INSTRUCTIONS
If you have any questions regarding your visit, Please contact your care team.    [facility]: [phone]      [facility] CLINIC HOURS TELEPHONE NUMBER   Leslie Ge .    [facility] -  [place] -       DANIEL Rosas, RN  Isabel, RN     Monday, Wednesday, Thursday and Friday, [place]  8:30a.m-5:00 p.m   [facility]  [address] [place] [address]  [phone] ask for [facility]    Imaging [phone]       Urgent Care locations:    [facility] Saturday and Sunday   9 am - 5 pm    Monday-Friday   12 pm - 8 pm  Saturday and Sunday   9 am - 5 pm   [phone]     [facility] Labor and Delivery:  [phone]    If you need a medication refill, please contact your pharmacy. Please allow 3 business days for your refill to be completed.  As always, Thank you for trusting us with your healthcare needs!

## 2020-03-25 NOTE — PROGRESS NOTES
This 28 y/o female, , presents for a postop check due to her concern that she has an ongoing incisional infection s/p C/S on 2020.  She states that she feels much better after 5 courses of po antibiotic but wants to make sure that healing is normal.  She has noted a foul odor recently but denies any pus-like drainage.  She also is concerned that she may have an umbilical hernia so would like a  referral for follow up.  /89   Pulse 83   Temp 97.7  F (36.5  C) (Oral)   Wt 83.4 kg (183 lb 12.8 oz)   SpO2 97%   Breastfeeding Yes   BMI 34.17 kg/m    ROS:  10 systems were reviewed and the positives were listed under problems.  Her incision appears to be healing well except for one small localized area about 1/3rd from the right end.  I probed this area with a sterile q-tip and a small seroma was drained.  However, she has a topical yeast infection superiorly and inferiorly to the incision and this is where the foul odor is originating from.  There are no excoriations of the skin.  Assessment - postop incisional seroma, topical yeast infection  Plan -  She is to apply topical Lotrimin cream to the affected skin area after showering and drying the area well.  Her seroma was drained today and she is to monitor its healing.  There is no evidence of pus/infection and her temperature is normal.  She denies having taken any Tylenol or Ibuprofen in weeks.

## 2020-03-31 ENCOUNTER — OFFICE VISIT (OUTPATIENT)
Dept: URGENT CARE | Facility: URGENT CARE | Age: 30
End: 2020-03-31
Payer: COMMERCIAL

## 2020-03-31 ENCOUNTER — NURSE TRIAGE (OUTPATIENT)
Dept: NURSING | Facility: CLINIC | Age: 30
End: 2020-03-31

## 2020-03-31 ENCOUNTER — RESULTS ONLY (OUTPATIENT)
Dept: LAB | Age: 30
End: 2020-03-31

## 2020-03-31 ENCOUNTER — VIRTUAL VISIT (OUTPATIENT)
Dept: FAMILY MEDICINE | Facility: OTHER | Age: 30
End: 2020-03-31

## 2020-03-31 DIAGNOSIS — Z20.822 SUSPECTED COVID-19 VIRUS INFECTION: Primary | ICD-10-CM

## 2020-03-31 PROCEDURE — 99207 ZZC NO BILLABLE SERVICE THIS VISIT: CPT

## 2020-03-31 NOTE — PATIENT INSTRUCTIONS
[facility] Employee Health team will receive your Covid 19 test results in the next 1-4 days.  Once your results are received, Employee Health will call you with your test result and discuss your Return to Work timeline and criteria.

## 2020-03-31 NOTE — PROGRESS NOTES
"Date: 2020 15:38:53  Clinician: Ro Bazan  Clinician NPI: [id number]  Patient: Joanne Escalante  Patient : 1990  Patient Address: [address] [place], [place] [address] [address]  Patient Phone: [phone]  Visit Protocol: URI  Patient Summary:  Joanne is a 29 year old ( : 1990 ) female who initiated a Visit for COVID-19 (Coronavirus) evaluation and screening. When asked the question \"Please sign me up to receive news, health information and promotions. \", Joanne responded \"Yes\".    Joanne states her symptoms started 1-2 days ago.   Her symptoms consist of tooth pain, a headache, facial pain or pressure, myalgia, chills, a sore throat, a cough, nasal congestion, malaise, enlarged lymph nodes, and ear pain. Joanne also feels feverish.   Symptom details     Nasal secretions: The color of her mucus is white and clear.    Cough: Joanne coughs a few times an hour and her cough is more bothersome at night. Phlegm does not come into her throat when she coughs. She does not believe her cough is caused by post-nasal drip.     Sore throat: Joanne reports having moderate throat pain (4-6 on a 10 point pain scale), does not have exudate on her tonsils, and can swallow liquids. The lymph nodes in her neck are enlarged. A rash has not appeared on the skin since the sore throat started.     Temperature: Her current temperature is 99.1 degrees Fahrenheit.     Facial pain or pressure: The facial pain or pressure feels worse when bending over or leaning forward.     Headache: She states the headache is mild (1-3 on a 10 point pain scale).     Tooth pain: The tooth pain is not caused by a cavity, recent dental work, or other mouth problems.      Joanne denies having rhinitis and wheezing. She also denies having a sinus infection within the past year and having recent facial or sinus surgery in the past 60 days. She is not experiencing dyspnea.   Precipitating events  Joanne is not sure if she has been exposed to someone " with strep throat. She has not recently been exposed to someone with influenza. Joanne has been in close contact with the following high risk individuals: children under the age of 5 and people with asthma, heart disease or diabetes.   Pertinent COVID-19 (Coronavirus) information  Joanne has not traveled internationally or to the areas where COVID-19 (Coronavirus) is widespread, including cruise ship travel in the last 14 days before the start of her symptoms.   Joanne has not had a close contact with a laboratory-confirmed COVID-19 patient within 14 days of symptom onset. She has had a close contact with a suspected COVID-19 patient within 14 days of symptom onset. 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 She provides direct patient care. She lives with a healthcare worker.   Pertinent medical history  Joanne has taken an antibiotic medication in the past month. Antibiotic details as reported by the patient (free text): Keandrews Rubio typically gets a yeast infection when she takes antibiotics. She has not used fluconazole (Diflucan) to treat previous yeast infections.   Joanne does not need a return to work/school note.   Weight: 186 lbs   Joanne smokes or uses smokeless tobacco.   She denies pregnancy and is breastfeeding. Her last period was over a month ago.   Weight: 186 lbs    MEDICATIONS: Celexa oral, ALLERGIES: Sulfa (Sulfonamide Antibiotics)  Clinician Response:  Dear Joanne,   Based on the information you have provided, you do have symptoms that are consistent with Coronavirus (COVID-19).  The coronavirus causes mild to severe respiratory illness with the most common symptoms including fever, cough and difficulty breathing. Unfortunately, many viruses cause similar symptoms and it can be difficult to distinguish between viruses, especially  in mild cases, so we are presuming that anyone with cough or fever has coronavirus at this time.  Coronavirus/COVID-19 has reached the point of community spread in [place], meaning that we are finding the virus in people with no known exposure risk for martha the virus. Given the increasing commonness of coronavirus in the community we are no longer testing patients who are not critically ill.  If you are a health care worker, you should refer to your employee health office for instructions about testing and returning to work.  For everyone else who has cough or fever, you should assume you are infected with coronavirus. Since you will not be tested but have symptoms that may be consistent with coronavirus, the CDC recommends you stay in self-isolation until these three things have happened:    You have had no fever for at least 72 hours (that is three full days of no fever without the use of medicine that reduces fevers)    AND   Other symptoms have improved (for example, when your cough or shortness of breath have improved)   AND   At least 7 days have passed since your symptoms first appeared.   How to Isolate:   Isolate yourself at home.  Do Not allow any visitors  Do Not go to work or school  Do Not go to Oriental orthodox,  centers, shopping, or other public places.  Do Not shake hands.  Avoid close contact with others (hugging, kissing).   Protect Others:   Cover Your Mouth and Nose with a mask, disposable tissue or wash cloth to avoid spreading germs to others.  Wash your hands and face frequently with soap and water.   We know it can be scary to hear that you might have COVID-19. Our team can help track your symptoms and make sure you are doing ok over the next two weeks using a program called [employer] to keep in touch. When you receive an email from [employer], please consider enrolling in our monitoring program. There is no cost to you for monitoring. Here is a URL where you can learn more:  [email or web address]  Managing Symptoms:   At this time, we primarily recommend Tylenol (Acetaminophen) for fever or pain. If you have liver or kidney problems, contact your primary care provider for instructions on use of tylenol. Adults can take 650 mg (two 325 mg pills) by mouth every 4-6 hours as needed OR 1,000 mg (two 500 mg pills) every 8 hours as needed. MAXIMUM DAILY DOSE: 3,000mg. For children, refer to dosing on bottle based on age or weight.   If you develop significant shortness of breath that prevents you from doing normal activities, please call 911 or proceed to the nearest emergency room and alert them immediately that you have been in self-isolation for possible coronavirus.  If you have a higher risk medical condition such as cancer, heart failure, end stage renal disease on dialysis or have a transplant, please reach out to your specialist's clinic to advise them of your [facility] visit should you not improve within the next two days.   For more information about COVID19 and options for caring for yourself at home, please visit the CDC website at https://www.cdc.gov/coronavirus/2019-ncov/about/steps-when-sick.htmlFor more options for care at [facility], please visit our website at [email or web address]    Diagnosis: Cough  Diagnosis ICD: R05

## 2020-03-31 NOTE — TELEPHONE ENCOUNTER
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 She has questions: she is to quarantine herself. She has 2 mo still breastfeeding. Boyfriend is also Type 1 Diabetic. She is most concerned about baby's possible exposure.   [facility] is baby's pediatrician.   Mother has been unable to find a mask anywhere.       Reason for Disposition    [1] Cough occurs AND [2] within 14 days of COVID-19 EXPOSURE    Protocols used: CORONAVIRUS (COVID-19) EXPOSURE-A- 3.17.20     Advised to call pediatrician's office and discuss.

## 2020-04-01 LAB
SARS-COV-2 RNA SPEC QL NAA+PROBE: NOT DETECTED
SPECIMEN SOURCE: NORMAL

## 2020-04-08 ENCOUNTER — VIRTUAL VISIT (OUTPATIENT)
Dept: OBGYN | Facility: CLINIC | Age: 30
End: 2020-04-08
Payer: COMMERCIAL

## 2020-04-08 DIAGNOSIS — F43.23 ADJUSTMENT DISORDER WITH MIXED ANXIETY AND DEPRESSED MOOD: ICD-10-CM

## 2020-04-08 PROBLEM — D50.9 IRON DEFICIENCY ANEMIA: Status: RESOLVED | Noted: 2019-11-22 | Resolved: 2020-04-08

## 2020-04-08 PROBLEM — O24.410 DIET CONTROLLED GESTATIONAL DIABETES MELLITUS (GDM): Status: RESOLVED | Noted: 2020-01-08 | Resolved: 2020-04-08

## 2020-04-08 PROCEDURE — 99213 OFFICE O/P EST LOW 20 MIN: CPT | Mod: TEL | Performed by: OBSTETRICS & GYNECOLOGY

## 2020-04-08 RX ORDER — CITALOPRAM HYDROBROMIDE 20 MG/1
20 TABLET ORAL DAILY
Qty: 90 TABLET | Refills: 1 | Status: SHIPPED | OUTPATIENT
Start: 2020-04-08 | End: 2020-05-22 | Stop reason: ALTCHOICE

## 2020-04-08 RX ORDER — CITALOPRAM HYDROBROMIDE 20 MG/1
TABLET ORAL
Qty: 30 TABLET | Refills: 1 | Status: CANCELLED | OUTPATIENT
Start: 2020-04-08

## 2020-04-08 ASSESSMENT — PATIENT HEALTH QUESTIONNAIRE - PHQ9: SUM OF ALL RESPONSES TO PHQ QUESTIONS 1-9: 10

## 2020-04-08 NOTE — PROGRESS NOTES
"Joanne Escalante is a 29 year old female who is being evaluated via a billable telephone visit.      The patient has been notified of following:     \"This telephone visit will be conducted via a call between you and your physician/provider. We have found that certain health care needs can be provided without the need for a physical exam.  This service lets us provide the care you need with a short phone conversation.  If a prescription is necessary we can send it directly to your pharmacy.  If lab work is needed we can place an order for that and you can then stop by our lab to have the test done at a later time.    Telephone visits are billed at different rates depending on your insurance coverage. During this emergency period, for some insurers they may be billed the same as an in-person visit.  Please reach out to your insurance provider with any questions.    If during the course of the call the physician/provider feels a telephone visit is not appropriate, you will not be charged for this service.\"    Patient has given verbal consent for Telephone visit?  Yes    Joanne Escalante complains of    Chief Complaint   Patient presents with     Recheck Medication     Celexa       I have reviewed and updated the patient's Past Medical History, Social History, Family History and Medication List.    ALLERGIES  Sulfa drugs    Additional provider notes: Joanne Escalante is a 29 year old year old who is here today for a recheck of depression.  See prior notes.     History of depression in the past with various med treatments. Current life stresses with GM ill, parents , and others. PHQ-9= 10. No harmful or psychotic thoughts. Has good insight into her problem and situation. Infant son doing well. Breast feeding. Has returned to work and managing. Still a smoker and plans to quit later. Tolerating Celexa fine but just started. Has counseling planned too. No signif signs, symptoms or concerns otherwise. C/S wd infection " resolved.     Past medical, obstetrical, surgical, family and social history reviewed and as noted or updated in chart.      ROS:     Systems reviewed include constitutional; breast; gastrointestinal; genitourinary; psychological; hematologic/lymphatic and endocrine. These systems were negative for significant symptoms except for the following additional: none ; see HPI.    Exam: There were no vitals taken for this visit. Constitutionally normal. Exam not repeated.    A/P: Total encounter time (physician together with patient) = 15min. Direct counseling, education and care coordination time (physician together with patient) = 10min. This included the following: I reviewed the condition, causes, differential diagnosis, prognosis, evaluation and management considerations and options. Questions answered and information given.    Satisfactory recheck. Continue Celexa. Plan Psych counseling.   Medications and prescriptions given as noted.  I reviewed side effects, risks, benefits and instructions on proper use.  Return to clinic in 6 months.     Joanne was seen today for recheck medication.    Diagnoses and all orders for this visit:    Adjustment disorder with mixed anxiety and depressed mood  -     citalopram (CELEXA) 20 MG tablet; Take 1 tablet (20 mg) by mouth daily            Phone call duration: 15 minutes    Shimon Crowley MD

## 2020-04-21 ENCOUNTER — NURSE TRIAGE (OUTPATIENT)
Dept: NURSING | Facility: CLINIC | Age: 30
End: 2020-04-21

## 2020-04-21 ENCOUNTER — TRANSFERRED RECORDS (OUTPATIENT)
Dept: HEALTH INFORMATION MANAGEMENT | Facility: CLINIC | Age: 30
End: 2020-04-21

## 2020-04-21 LAB
ALT SERPL-CCNC: 22 IU/L (ref 12–68)
AST SERPL-CCNC: 14 IU/L (ref 15–37)
CREAT SERPL-MCNC: 1.07 MG/DL (ref 0.55–1.02)
GFR SERPL CREATININE-BSD FRML MDRD: >60 ML/MIN
GLUCOSE SERPL-MCNC: 97 MG/DL (ref 70–110)
POTASSIUM SERPL-SCNC: 3.8 MMOL/L (ref 3.5–5.1)

## 2020-04-21 NOTE — TELEPHONE ENCOUNTER
Starting taking Celexa a few weeks ago.  Now taking full dose since Sunday.  Bad headache since taking the full dose.   Pt states her eyes are very dilated and headache behind her eyes.  Yesterday she was lethargic and everything was in slow motion.  Pt states she felt like she was high, states it was horrible.   Took it again yesterday, eyes are very dilated today and headache today.   Pt states she has severe eye pain in just one eye, right eye.   Due to symptoms and both protocols recommended ED evaluation, RN recommended patient go to ED to be evaluated tonight for symptoms.      Pt agreed to plan.     Lauren Melvin RN   [facility] RN Triage     COVID 19 Nurse Triage Plan/Patient Instructions    Please be aware that novel coronavirus (COVID-19) may be circulating in the community. If you develop symptoms such as fever, cough, or SOB or if you have concerns about the presence of another infection including coronavirus (COVID-19), please contact your health care provider or visit [email or web address].     Disposition/Instructions    Patient to go to ED and follow protocol based instructions. Follow System Ambulatory Workflow for COVID 19.     Bring Your Own Device:  Please also bring your smart device(s) (smart phones, tablets, laptops) and their charging cables for your personal use and to communicate with your care team during your visit.    Thank you for limiting contact with others, wearing a simple mask to cover your cough, practice good hand hygiene habits and accessing our virtual services where possible to limit the spread of this virus.    For more information about COVID19 and options for caring for yourself at home, please visit the CDC website at https://www.cdc.gov/coronavirus/2019-ncov/about/steps-when-sick.html  For more options for care at [facility], please visit our website at [email or web address]    For more information, please use the [facility]   Health COVID-19 Website: [email or web address]  [place] Department of Health ([facility]) COVID-19 Hotlines (Interpreters available):      Health questions: Phone Number: [phone] or [phone] and Hours: 7 a.m. to 7 p.m.    Schools and  questions: Phone Number: [phone] or [phone] and Hours 7 a.m. to 7 p.m.                  Reason for Disposition    Severe eye pain    Additional Information    Negative: Followed an eye injury    Negative: Eye pain from chemical in the eye    Negative: Eye pain from foreign body in eye    Negative: Has sinus pain or pressure    Negative: Difficult to awaken or acting confused (e.g., disoriented, slurred speech)    Negative: Weakness of the face, arm or leg on one side of the body and new onset    Negative: Numbness of the face, arm or leg on one side of the body and new onset    Negative: Loss of speech or garbled speech and new onset    Negative: Passed out (i.e., fainted, collapsed and was not responding)    Negative: Sounds like a life-threatening emergency to the triager    Negative: Followed a head injury within last 3 days    Negative: Traumatic Brain Injury (TBI) is suspected    Negative: Sinus pain of forehead and yellow or green nasal discharge    Negative: Pregnant    Negative: Unable to walk without falling    Negative: Stiff neck (can't touch chin to chest)    Negative: Possibility of carbon monoxide exposure    SEVERE headache, states 'worst headache' of life    Severe pain in one eye    Protocols used: EYE PAIN-A-OH, HEADACHE-A-OH

## 2020-05-07 ENCOUNTER — VIRTUAL VISIT (OUTPATIENT)
Dept: PSYCHOLOGY | Facility: CLINIC | Age: 30
End: 2020-05-07
Payer: COMMERCIAL

## 2020-05-07 ENCOUNTER — VIRTUAL VISIT (OUTPATIENT)
Dept: OBGYN | Facility: CLINIC | Age: 30
End: 2020-05-07
Payer: COMMERCIAL

## 2020-05-07 DIAGNOSIS — F33.2 MAJOR DEPRESSIVE DISORDER, RECURRENT EPISODE, SEVERE WITH ANXIOUS DISTRESS (H): Primary | ICD-10-CM

## 2020-05-07 DIAGNOSIS — F32.A ANXIETY AND DEPRESSION: Primary | ICD-10-CM

## 2020-05-07 DIAGNOSIS — F41.9 ANXIETY AND DEPRESSION: Primary | ICD-10-CM

## 2020-05-07 DIAGNOSIS — F43.23 ADJUSTMENT DISORDER WITH MIXED ANXIETY AND DEPRESSED MOOD: ICD-10-CM

## 2020-05-07 PROCEDURE — 90791 PSYCH DIAGNOSTIC EVALUATION: CPT | Mod: TEL | Performed by: SOCIAL WORKER

## 2020-05-07 PROCEDURE — 99213 OFFICE O/P EST LOW 20 MIN: CPT | Mod: 95 | Performed by: OBSTETRICS & GYNECOLOGY

## 2020-05-07 ASSESSMENT — COLUMBIA-SUICIDE SEVERITY RATING SCALE - C-SSRS
6. HAVE YOU EVER DONE ANYTHING, STARTED TO DO ANYTHING, OR PREPARED TO DO ANYTHING TO END YOUR LIFE?: NO
4. HAVE YOU HAD THESE THOUGHTS AND HAD SOME INTENTION OF ACTING ON THEM?: NO
3. HAVE YOU BEEN THINKING ABOUT HOW YOU MIGHT KILL YOURSELF?: NO
TOTAL  NUMBER OF ABORTED OR SELF INTERRUPTED ATTEMPTS PAST LIFETIME: NO
5. HAVE YOU STARTED TO WORK OUT OR WORKED OUT THE DETAILS OF HOW TO KILL YOURSELF? DO YOU INTEND TO CARRY OUT THIS PLAN?: NO
1. IN THE PAST MONTH, HAVE YOU WISHED YOU WERE DEAD OR WISHED YOU COULD GO TO SLEEP AND NOT WAKE UP?: YES
REASONS FOR IDEATION LIFETIME: COMPLETELY TO END OR STOP THE PAIN (YOU COULDN'T GO ON LIVING WITH THE PAIN OR HOW YOU WERE FEELING)
2. HAVE YOU ACTUALLY HAD ANY THOUGHTS OF KILLING YOURSELF LIFETIME?: NO
ATTEMPT LIFETIME: NO
TOTAL  NUMBER OF INTERRUPTED ATTEMPTS LIFETIME: NO
1. IN THE PAST MONTH, HAVE YOU WISHED YOU WERE DEAD OR WISHED YOU COULD GO TO SLEEP AND NOT WAKE UP?: YES
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
## 2020-05-07 NOTE — PROGRESS NOTES
"Joanne Escalante is a 29 year old female who is being evaluated via a billable telephone visit.      The patient has been notified of following:     \"This telephone visit will be conducted via a call between you and your physician/provider. We have found that certain health care needs can be provided without the need for a physical exam.  This service lets us provide the care you need with a short phone conversation.  If a prescription is necessary we can send it directly to your pharmacy.  If lab work is needed we can place an order for that and you can then stop by our lab to have the test done at a later time.    Telephone visits are billed at different rates depending on your insurance coverage. During this emergency period, for some insurers they may be billed the same as an in-person visit.  Please reach out to your insurance provider with any questions.    If during the course of the call the physician/provider feels a telephone visit is not appropriate, you will not be charged for this service.\"    Patient has given verbal consent for Telephone visit?  Yes    What phone number would you like to be contacted at?  [phone]    How would you like to obtain your AVS? Valeria    This 28 y/o female, , complains of worsening depression and anxiety.  She feels that she has had these issues prior to, during, and after pregnancy but discontinued use of Celexa since its using while breastfeeding is not ideal.  She is s/p repeat C/S on 2020 and denies feeling suicidal or like harming her children.  She is working with a therapist but also wants an oral medication to feel better.  She admits to additional stressors in the home - her 's job loss due to COVID, the divorce of her parents and the illness of her grandmother.  She would like to continue to breastfeed so I advised switching from Celexa to Zoloft and instructions were reviewed.  There is less transmission of this medication through the breast " milk so is considered safer to use during breastfeeding.  She is to call back if not feeling better after use or go to the ED if she does feel suicidal or has thoughts of harming her children.  She states that her  has been helpful and supportive with childcare but she is not sleeping well at night due to breastfeeding demands.  All of her questions and concerns were addressed.    Phone call duration: 12 minutes    Leslie Ge DO  FACOG, FACS

## 2020-05-07 NOTE — PATIENT INSTRUCTIONS
Client will return Friday, May 15 at 8am to complete treatment plan. She will contact her PCP to inquire about trying a new medication to treat depression, and practice self-care.

## 2020-05-19 ENCOUNTER — MEDICAL CORRESPONDENCE (OUTPATIENT)
Dept: HEALTH INFORMATION MANAGEMENT | Facility: CLINIC | Age: 30
End: 2020-05-19

## 2020-05-19 ENCOUNTER — DOCUMENTATION ONLY (OUTPATIENT)
Dept: PEDIATRICS | Facility: CLINIC | Age: 30
End: 2020-05-19

## 2020-05-20 ENCOUNTER — NURSE TRIAGE (OUTPATIENT)
Dept: NURSING | Facility: CLINIC | Age: 30
End: 2020-05-20

## 2020-05-20 NOTE — PROGRESS NOTES
This appears to be an FYI as this patient has already been seen by our  OB/GYN providers for depression on 5/7.  Will route to provider as an FYI.    Alison Cordoba RN

## 2020-05-20 NOTE — PROGRESS NOTES
EPDS was done in clinic today during child's 4 month visit and score was 15 with negative psychosis and positive screen for suicidal ideation.   Mom has depression..   Started Zoloft last week and is in therapy.  Copy of EPDS was given to Hiram today as well as educational material about postpartum depression.      Plan:   Follow up as recommended by provider  Behavioral health referral was not placed.     Phoebe Padilla, ISH, CNP

## 2020-05-21 NOTE — TELEPHONE ENCOUNTER
"Caller states she was getting ready for work tonight. She had a  C section 4 mo ago. She has had a yeast infection off and on in her lower abdomen. 15 min ago: when she lifted up her abdomen to put the cream on under the fold, her C section incision \"split open. There is a 1/2\" opening that looks a little deep, circular shaped: it gapes open. It is bleeding\": she put direct pressure on it and the bleeding has stopped. She has had problems with wound healing since her CSection: she had had a stitch abscess in the same area.  Triaged to a disposition of Go to ED now, which she intends to do.     Reason for Disposition    [1] Incision gaping open AND [2] < 48 hours since wound re-opened    Additional Information    Negative: [1] Wound gaping open AND [2] visible internal organs    Negative: Sounds like a life-threatening emergency to the triager    Negative: [1] Bleeding from incision AND [2] won't stop after 10 minutes of direct pressure    Negative: [1] Widespread rash AND [2] bright red, sunburn-like    Negative: Fever > 100.4 F (38.0 C)    Negative: Severe pain in the incision    Negative: [1] Incision gaping open AND [2] length of opening > 2 inches (5 cm)    Protocols used: POSTPARTUM -  INCISION SYMPTOMS-A-AH    COVID 19 Nurse Triage Plan/Patient Instructions    Please be aware that novel coronavirus (COVID-19) may be circulating in the community. If you develop symptoms such as fever, cough, or SOB or if you have concerns about the presence of another infection including coronavirus (COVID-19), please contact your health care provider or visit [email or web address].     Disposition/Instructions    Patient to go to ED and follow protocol based instructions. Follow System Ambulatory Workflow for COVID 19.     Bring Your Own Device:  Please also bring your smart device(s) (smart phones, tablets, laptops) and their charging cables for your personal use and to communicate with your care team during your " visit.    Thank you for limiting contact with others, wearing a simple mask to cover your cough, practice good hand hygiene habits and accessing our virtual services where possible to limit the spread of this virus.    For more information about COVID19 and options for caring for yourself at home, please visit the CDC website at https://www.cdc.gov/coronavirus/2019-ncov/about/steps-when-sick.html  For more options for care at [facility], please visit our website at [email or web address]    For more information, please use the [place] Department of Health COVID-19 Website: https://www.health.[facility].[place]/diseases/coronavirus/index.html  [place] Department of Health ([facility]) COVID-19 Hotlines (Interpreters available):      Health questions: Phone Number: [phone] or [phone] and Hours: 7 a.m. to 7 p.m.    Schools and  questions: Phone Number: [phone] or [phone] and Hours 7 a.m. to 7 p.m.

## 2020-05-22 ENCOUNTER — OFFICE VISIT (OUTPATIENT)
Dept: OBGYN | Facility: CLINIC | Age: 30
End: 2020-05-22
Payer: COMMERCIAL

## 2020-05-22 VITALS
SYSTOLIC BLOOD PRESSURE: 113 MMHG | HEART RATE: 78 BPM | OXYGEN SATURATION: 97 % | BODY MASS INDEX: 35.93 KG/M2 | WEIGHT: 193.3 LBS | DIASTOLIC BLOOD PRESSURE: 80 MMHG

## 2020-05-22 DIAGNOSIS — Z09 POSTOP CHECK: Primary | ICD-10-CM

## 2020-05-22 PROCEDURE — 99213 OFFICE O/P EST LOW 20 MIN: CPT | Performed by: OBSTETRICS & GYNECOLOGY

## 2020-05-22 NOTE — LETTER
[address]  [facility] [address]  Phone: [phone]    05/22/20    Joanne Escalante  [address]  [place] [facility] [address]      To whom it may concern:     Joanne needed to be off from work on May 20-22, 2020 due to a medical issue.  She may return back to work on May 23rd but should have assistance with lifting till further notice since her current solo weight limit is 20 lbs.     Sincerely,        Leslie Ge DO  Dept of OB/GYN  [phone]

## 2020-05-22 NOTE — PROGRESS NOTES
This 28 y/o female, , presents for an incision check s/p repeat C/S on 2020.  She states that 2 days ago she went into the ED because she noted a small amount of clear fluid drain from the right aspect of her skin incision but denied any fever, chills, or pain.  Prior to this time, she had been on a 5-day course of po antibiotic and since then has not noted any redness of the peripheral skin.  She does feel that she gets a topical yeast infection, off and on, so is using a topical antifungal for treatment.  She had GDM during this last pregnancy but her recent glucose test on 2020 was normal.  /80   Pulse 78   Wt 87.7 kg (193 lb 4.8 oz)   SpO2 97%   BMI 35.93 kg/m    ROS:  10 systems were reviewed and the positives were listed under problems.  Her incision appears to be healing well except for a 1 cm shallow opening at the right aspect.  However, the depth measures only 2 mm per sterile gauge and there is no pus or seroma currently.  There is no topical yeast infection but the s/s were reviewed with the patient and she will restart the OTC antifungal prn.  Assessment - C/S incision recheck  Plan - She would like a letter for work to excuse her from her job on May 20- due to her ED visit.  F/u care and instructions were reviewed with the patient and she will return prn.  This was a 15-minute visit and over 50% of the time was spent in direct patient consultation.

## 2020-05-27 ENCOUNTER — MYC MEDICAL ADVICE (OUTPATIENT)
Dept: OBGYN | Facility: CLINIC | Age: 30
End: 2020-05-27

## 2020-05-27 ENCOUNTER — TELEPHONE (OUTPATIENT)
Dept: OBGYN | Facility: CLINIC | Age: 30
End: 2020-05-27

## 2020-05-27 NOTE — TELEPHONE ENCOUNTER
See mychart encounters from today, 5/27/2020.  Pt needs an adjustment on a work note that was written for her today.  The [employer] request was already routed to Dr. Ge so I will close this encounter.    Alison Cordoba RN

## 2020-05-27 NOTE — TELEPHONE ENCOUNTER
[facility] Call Center    Phone Message    May a detailed message be left on voicemail: yes     Reason for Call: Other: patient needing to speak with provider/clinic re a work note she was recently given. says it is time sensitive...    Action Taken: Message routed to:  [phone]    Travel Screening: Not Applicable

## 2020-05-27 NOTE — TELEPHONE ENCOUNTER
This is the letter that Dr. Ge wrote pt today:  To Whom It May Concern,     Joanne may return to work after May 22nd without restrictions since her previous medical issue has now resolved.  Please call if you have any questions or concerns.     Best regards,  Dr. Leslie Ge  [phone]      Pt needs it to say may return to work starting May, 27th without restrictions since she already missed work all weekend due to the restrictions.    Will route back to Dr. Ge to update letter.    Alison Cordoba RN

## 2020-06-03 ENCOUNTER — NURSE TRIAGE (OUTPATIENT)
Dept: NURSING | Facility: CLINIC | Age: 30
End: 2020-06-03

## 2020-06-03 NOTE — TELEPHONE ENCOUNTER
Joanne has a seroma that opened up a couple of weeks ago and went to hospital.  Pain is in  scar opened up a pocket full of fluid.  Denies fever.  Joanne is having slight drainage and smells bad from scar.  Yellowish and bloody discharge.  Joanne was put on weight restrictions for work and asked to be taken off and now this happened.  Joanne states that pain is getting severe.  Joanne currently is in the middle of moving.      Reason for Disposition    [1] SEVERE pain (e.g., excruciating) AND [2] present > 1 hour    Additional Information    Negative: Shock suspected (e.g., cold/pale/clammy skin, too weak to stand, low BP, rapid pulse)    Negative: Difficult to awaken or acting confused (e.g., disoriented, slurred speech)    Negative: Passed out (i.e., lost consciousness, collapsed and was not responding)    Negative: Sounds like a life-threatening emergency to the triager    Protocols used: ABDOMINAL PAIN - FEMALE-A-AH

## 2020-06-04 ENCOUNTER — OFFICE VISIT (OUTPATIENT)
Dept: OBGYN | Facility: CLINIC | Age: 30
End: 2020-06-04
Payer: COMMERCIAL

## 2020-06-04 VITALS
BODY MASS INDEX: 35.41 KG/M2 | HEART RATE: 84 BPM | SYSTOLIC BLOOD PRESSURE: 108 MMHG | TEMPERATURE: 98.7 F | DIASTOLIC BLOOD PRESSURE: 64 MMHG | WEIGHT: 190.5 LBS

## 2020-06-04 DIAGNOSIS — L08.9 WOUND INFECTION: Primary | ICD-10-CM

## 2020-06-04 DIAGNOSIS — T14.8XXA WOUND INFECTION: Primary | ICD-10-CM

## 2020-06-04 PROCEDURE — 99212 OFFICE O/P EST SF 10 MIN: CPT | Performed by: OBSTETRICS & GYNECOLOGY

## 2020-06-04 RX ORDER — CEPHALEXIN 500 MG/1
500 CAPSULE ORAL 4 TIMES DAILY
Qty: 40 CAPSULE | Refills: 1 | Status: SHIPPED | OUTPATIENT
Start: 2020-06-04 | End: 2020-06-22

## 2020-06-10 ENCOUNTER — MEDICAL CORRESPONDENCE (OUTPATIENT)
Dept: HEALTH INFORMATION MANAGEMENT | Facility: CLINIC | Age: 30
End: 2020-06-10

## 2020-06-16 ENCOUNTER — TELEPHONE (OUTPATIENT)
Dept: OBGYN | Facility: CLINIC | Age: 30
End: 2020-06-16

## 2020-06-18 ENCOUNTER — OFFICE VISIT (OUTPATIENT)
Dept: OBGYN | Facility: CLINIC | Age: 30
End: 2020-06-18
Payer: COMMERCIAL

## 2020-06-18 VITALS
DIASTOLIC BLOOD PRESSURE: 82 MMHG | TEMPERATURE: 98.3 F | BODY MASS INDEX: 35.32 KG/M2 | SYSTOLIC BLOOD PRESSURE: 114 MMHG | WEIGHT: 190 LBS

## 2020-06-18 DIAGNOSIS — T14.8XXA WOUND INFECTION: Primary | ICD-10-CM

## 2020-06-18 DIAGNOSIS — L08.9 WOUND INFECTION: Primary | ICD-10-CM

## 2020-06-18 PROCEDURE — 99213 OFFICE O/P EST LOW 20 MIN: CPT | Performed by: OBSTETRICS & GYNECOLOGY

## 2020-06-18 NOTE — PATIENT INSTRUCTIONS
If you have any questions regarding your visit, Please contact your care team.    [employer]: [phone]      Women s Health CLINIC HOURS TELEPHONE NUMBER   MD Lauren Marsh CMA Susie -    DANIEL Rubio       Monday:       7:30-4:15 [place]  Wednesday:      Administrative  Thursday:       7:30-4:15 [place]  Friday:       Administrative     [facility]  [address] [place]  [address]  [phone] ask for Women's [facility]  [address] [place] [address]  [phone] ask for [facility]    Imaging Scheduling for [place]:  [phone]    Imaging Scheduling for [place]: [phone]       Urgent Care locations:    [facility] Saturday and Sunday   9 am - 5 pm    Monday-Friday   12 pm - 8 pm  Saturday and Sunday   9 am - 5 pm   [phone]     [facility] and Delivery:  [phone]    If you need a medication refill, please contact your pharmacy. Please allow 3 business days for your refill to be completed.  As always, Thank you for trusting us with your healthcare needs!

## 2020-06-18 NOTE — PROGRESS NOTES
Joanne presents today for her post-operative check.    She complains of still having some drainage, but it is clear..    Incisions: there is a very small opening, but no erythema or fluctuance.  /82   Temp 98.3  F (36.8  C)   Wt 86.2 kg (190 lb)   Breastfeeding Yes   BMI 35.32 kg/m      Assessment:   Status post  with wound infection.  Plan: Continue cleaning the incision with H2O2   Return 1-2 weeks.  Sebas Grier MD FACOG

## 2020-06-19 ENCOUNTER — TRANSFERRED RECORDS (OUTPATIENT)
Dept: HEALTH INFORMATION MANAGEMENT | Facility: CLINIC | Age: 30
End: 2020-06-19

## 2020-06-22 ENCOUNTER — OFFICE VISIT (OUTPATIENT)
Dept: FAMILY MEDICINE | Facility: CLINIC | Age: 30
End: 2020-06-22
Payer: COMMERCIAL

## 2020-06-22 VITALS
WEIGHT: 192.6 LBS | SYSTOLIC BLOOD PRESSURE: 102 MMHG | DIASTOLIC BLOOD PRESSURE: 72 MMHG | RESPIRATION RATE: 16 BRPM | TEMPERATURE: 96.6 F | HEART RATE: 84 BPM | BODY MASS INDEX: 35.8 KG/M2

## 2020-06-22 DIAGNOSIS — M54.42 ACUTE LEFT-SIDED LOW BACK PAIN WITH LEFT-SIDED SCIATICA: Primary | ICD-10-CM

## 2020-06-22 PROCEDURE — 99213 OFFICE O/P EST LOW 20 MIN: CPT | Performed by: NURSE PRACTITIONER

## 2020-06-22 RX ORDER — METHYLPREDNISOLONE 4 MG
TABLET, DOSE PACK ORAL
Qty: 21 TABLET | Refills: 0 | Status: SHIPPED | OUTPATIENT
Start: 2020-06-22 | End: 2020-06-25

## 2020-06-22 ASSESSMENT — ENCOUNTER SYMPTOMS
SHORTNESS OF BREATH: 0
SORE THROAT: 0
LIGHT-HEADEDNESS: 0
NAUSEA: 0
COUGH: 0
EYE ITCHING: 0
DIZZINESS: 0
DIARRHEA: 0
SINUS PRESSURE: 0
HEADACHES: 0
CHILLS: 0
WHEEZING: 0
FATIGUE: 0
DIAPHORESIS: 0
CONSTIPATION: 0
NUMBNESS: 1
FEVER: 0
RHINORRHEA: 0
BACK PAIN: 1
EYE DISCHARGE: 0

## 2020-06-22 ASSESSMENT — PAIN SCALES - GENERAL: PAINLEVEL: SEVERE PAIN (6)

## 2020-06-22 NOTE — PATIENT INSTRUCTIONS
Continue with ibuprofen and tylenol as needed    Continue with stretching exercises    Continue with ice 20 minutes three times per day.     Let me know if you do not improve.

## 2020-06-22 NOTE — LETTER
My Depression Action Plan  Name: Joanne Escalante   Date of Birth 1990  Date: 6/22/2020    My doctor: Marcela France   My clinic: [facility]  [address]  [place] [id number] [phone]          GREEN    ZONE   Good Control    What it looks like:     Things are going generally well. You have normal up s and down s. You may even feel depressed from time to time, but bad moods usually last less than a day.   What you need to do:  1. Continue to care for yourself (see self care plan)  2. Check your depression survival kit and update it as needed  3. Follow your physician s recommendations including any medication.  4. Do not stop taking medication unless you consult with your physician first.           YELLOW         ZONE Getting Worse    What it looks like:     Depression is starting to interfere with your life.     It may be hard to get out of bed; you may be starting to isolate yourself from others.    Symptoms of depression are starting to last most all day and this has happened for several days.     You may have suicidal thoughts but they are not constant.   What you need to do:     1. Call your care team, your response to treatment will improve if you keep your care team informed of your progress. Yellow periods are signs an adjustment may need to be made.     2. Continue your self-care, even if you have to fake it!    3. Talk to someone in your support network    4. Open up your depression survival kit           RED    ZONE Medical Alert - Get Help    What it looks like:     Depression is seriously interfering with your life.     You may experience these or other symptoms: You can t get out of bed most days, can t work or engage in other necessary activities, you have trouble taking care of basic hygiene, or basic responsibilities, thoughts of suicide or death that will not go away, self-injurious behavior.     What you need to do:  1. Call your care team and request a  same-day appointment. If they are not available (weekends or after hours) call your local crisis line, emergency room or 911.      Electronically signed by: April Gibbs, June 22, 2020    Depression Self Care Plan / Survival Kit    Self-Care for Depression  Here s the deal. Your body and mind are really not as separate as most people think.  What you do and think affects how you feel and how you feel influences what you do and think. This means if you do things that people who feel good do, it will help you feel better.  Sometimes this is all it takes.  There is also a place for medication and therapy depending on how severe your depression is, so be sure to consult with your medical provider and/ or Behavioral Health Consultant if your symptoms are worsening or not improving.     In order to better manage my stress, I will:    Exercise  Get some form of exercise, every day. This will help reduce pain and release endorphins, the  feel good  chemicals in your brain. This is almost as good as taking antidepressants!  This is not the same as joining a gym and then never going! (they count on that by the way ) It can be as simple as just going for a walk or doing some gardening, anything that will get you moving.      Hygiene   Maintain good hygiene (Get out of bed in the morning, Make your bed, Brush your teeth, Take a shower, and Get dressed like you were going to work, even if you are unemployed).  If your clothes don't fit try to get ones that do.    Diet  I will strive to eat foods that are good for me, drink plenty of water, and avoid excessive sugar, caffeine, alcohol, and other mood-altering substances.  Some foods that are helpful in depression are: complex carbohydrates, B vitamins, flaxseed, fish or fish oil, fresh fruits and vegetables.    Psychotherapy  I agree to participate in Individual Therapy (if recommended).    Medication  If prescribed medications, I agree to take them.  Missing doses can result in  serious side effects.  I understand that drinking alcohol, or other illicit drug use, may cause potential side effects.  I will not stop my medication abruptly without first discussing it with my provider.    Staying Connected With Others  I will stay in touch with my friends, family members, and my primary care provider/team.    Use your imagination  Be creative.  We all have a creative side; it doesn t matter if it s oil painting, sand castles, or mud pies! This will also kick up the endorphins.    Witness Beauty  (AKA stop and smell the roses) Take a look outside, even in mid-winter. Notice colors, textures. Watch the squirrels and birds.     Service to others  Be of service to others.  There is always someone else in need.  By helping others we can  get out of ourselves  and remember the really important things.  This also provides opportunities for practicing all the other parts of the program.    Humor  Laugh and be silly!  Adjust your TV habits for less news and crime-drama and more comedy.    Control your stress  Try breathing deep, massage therapy, biofeedback, and meditation. Find time to relax each day.     My support system    Clinic Contact:  Phone number:    Contact 1:  Phone number:    Contact 2:  Phone number:    [employer]/:  Phone number:    Therapist:  Phone number:    Local crisis center:    Phone number:    Other community support:  Phone number:

## 2020-06-22 NOTE — PROGRESS NOTES
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, doesn't recall any specific injury    Description:   Location of pain: low back left  Character of pain: sharp and dull ache  Pain radiation:radiates into the left buttocks and radiates into the left leg, stops at left knee  New numbness or weakness in legs, not attributed to pain:  no     Intensity: Currently 6/10    History:   Pain interferes with job: YES  History of back problems: YES-sciatic pain during pregnancy  Any previous MRI or X-rays: None  Sees a specialist for back pain:  No  Therapies tried without relief: ice, heat, Tylenol, ibuprofen, stretching    Alleviating factors:   Improved by: none      Precipitating factors:  Worsened by: stretching, walking        Accompanying Signs & Symptoms:  Risk of Fracture:  None  Risk of Cauda Equina:  None  Risk of Infection:  None  Risk of Cancer:  None  Risk of Ankylosing Spondylitis:  Onset at age <35, male, AND morning back stiffness. no        Current Outpatient Medications   Medication Sig Dispense Refill     methylPREDNISolone (MEDROL DOSEPAK) 4 MG tablet therapy pack Follow Package Directions 21 tablet 0     Prenatal Vit-Fe Fumarate-FA (PRENATAL VITAMIN PO)        sertraline (ZOLOFT) 50 MG tablet Take 1 tablet (50 mg) by mouth daily 90 tablet 1     Allergies   Allergen Reactions     Sulfa Drugs        Reviewed and updated as needed this visit by Provider       Is a CNA on the neuro floor, and has to do a lot of heavy lifting.  Also, has 2 young children that lives frequently no known injury.  Yesterday AM woke up with the pain to left lower back-going down left buttock into leg.. Works overnights. Lower left back pain that goes into buttock and down to about knee. Does have slight numbness and tingling that comes and goes. Has had  this in the past with last pregnancy about 5 months ago.  Went to physical therapy at that time.  Is breast feeding. No loss of control of bowel or bladder. Has been taking ibuprofen, tylenol, using ice and heat at home. Does feel like that helped a bit. Has a known umbilical hernia and the ibuprofen really upsets her tummy.       Objective    /72 (BP Location: Left arm, Patient Position: Sitting, Cuff Size: Adult Large)   Pulse 84   Temp 96.6  F (35.9  C) (Tympanic)   Resp 16   Wt 87.4 kg (192 lb 9.6 oz)   Breastfeeding Yes   BMI 35.80 kg/m    Body mass index is 35.8 kg/m .              Assessment & Plan      Review of Systems   Constitutional: Negative for chills, diaphoresis, fatigue and fever.   HENT: Negative for ear discharge, ear pain, hearing loss, rhinorrhea, sinus pressure and sore throat.    Eyes: Negative for discharge and itching.   Respiratory: Negative for cough, shortness of breath and wheezing.    Gastrointestinal: Negative for constipation, diarrhea and nausea.   Musculoskeletal: Positive for back pain (lower, going into left buttock and leg).   Skin: Negative for rash.   Neurological: Positive for numbness (comes and goes). Negative for dizziness, light-headedness and headaches.       Physical Exam  Constitutional:       Appearance: She is well-developed.   Cardiovascular:      Rate and Rhythm: Normal rate and regular rhythm.   Pulmonary:      Effort: Pulmonary effort is normal.      Breath sounds: Normal breath sounds.   Musculoskeletal:        Back:    Skin:     General: Skin is warm.   Neurological:      Mental Status: She is alert.           1. Acute left-sided low back pain with left-sided sciatica  Educated on use of steroid.  Continue with over-the-counter ibuprofen and Tylenol as needed.  Continue with stretching exercises.  Continue with ice 20 minutes 3 times per day.  Educated regarding warning signs to watch for and when would need to seek immediate medical  attention.  Notify if no improvement over the next week.  - methylPREDNISolone (MEDROL DOSEPAK) 4 MG tablet therapy pack; Follow Package Directions  Dispense: 21 tablet; Refill: 0      No follow-ups on file.    ISH Ingram [facility]

## 2020-06-22 NOTE — LETTER
[facility]  [address] [place] [address]  Phone: [phone]    June 22, 2020        Joanne Escalante  [address]  [place] [address]          RE: Joanne Escalante    Patient was seen and treated today at our clinic.  Please excuse her from work 6/21/20. She may return to work as scheduled on 6/25/2020 but should have a 20 pound lifting restriction. No pushing, pulling, lifting, tugging over 20 pounds. She may return to work without restriction on July 1, 2020.    Please contact me for questions or concerns.      Sincerely,        ISH Ingram CNP

## 2020-06-24 NOTE — PROGRESS NOTES
Joanne presents today for her post-operative incicioncheck.    She complains of drainage from the small defect in the center of the incision.  There is no significant sub-q defect or any fluctuance.    Incisions: drainage as above    Assessment:   Status post  with wound infection.  Plan: Antibiotics given   Return 2 weeks  Sebas Grier MD FACOG

## 2020-06-25 ENCOUNTER — TELEPHONE (OUTPATIENT)
Dept: OBGYN | Facility: CLINIC | Age: 30
End: 2020-06-25

## 2020-06-25 ENCOUNTER — OFFICE VISIT (OUTPATIENT)
Dept: OBGYN | Facility: CLINIC | Age: 30
End: 2020-06-25
Payer: COMMERCIAL

## 2020-06-25 ENCOUNTER — NURSE TRIAGE (OUTPATIENT)
Dept: NURSING | Facility: CLINIC | Age: 30
End: 2020-06-25

## 2020-06-25 VITALS
TEMPERATURE: 97.7 F | SYSTOLIC BLOOD PRESSURE: 110 MMHG | OXYGEN SATURATION: 98 % | DIASTOLIC BLOOD PRESSURE: 74 MMHG | HEART RATE: 101 BPM | WEIGHT: 191.2 LBS | BODY MASS INDEX: 35.19 KG/M2 | HEIGHT: 62 IN

## 2020-06-25 DIAGNOSIS — Z98.890 POST-OPERATIVE STATE: Primary | ICD-10-CM

## 2020-06-25 PROCEDURE — 99212 OFFICE O/P EST SF 10 MIN: CPT | Performed by: OBSTETRICS & GYNECOLOGY

## 2020-06-25 RX ORDER — ONDANSETRON 4 MG/1
4 TABLET, ORALLY DISINTEGRATING ORAL EVERY 8 HOURS PRN
COMMUNITY
Start: 2020-06-19 | End: 2021-05-14

## 2020-06-25 ASSESSMENT — PAIN SCALES - GENERAL: PAINLEVEL: NO PAIN (0)

## 2020-06-25 ASSESSMENT — MIFFLIN-ST. JEOR: SCORE: 1542.59

## 2020-06-25 NOTE — PROGRESS NOTES
"Joanne is a 29 year old   here for follow up of of her  incision.  .  ROS: No urinary frequency or dysuria, bladder or kidney problems  ROS: Ten point review of systems was reviewed and negative except the above.    PMH: Her past medical, surgical, and obstetric histories were reviewed and are documented in their appropriate chart areas.    ALL/Meds: Her medication and allergy histories were reviewed and are documented in their appropriate chart areas.    SH/FMH: Reviewed and documented in the appropriate area.    PE: /74 (BP Location: Right arm, Patient Position: Sitting, Cuff Size: Adult Regular)   Pulse 101   Temp 97.7  F (36.5  C) (Tympanic)   Ht 1.562 m (5' 1.5\")   Wt 86.7 kg (191 lb 3.2 oz)   SpO2 98%   Breastfeeding Yes   BMI 35.54 kg/m      There is a small defect, but no tract..    A/P:   Joanne presents with incision defect      -  Treated with silver nitrate  No orders of the defined types were placed in this encounter.        Sebas Grier MD FACOG  "

## 2020-06-25 NOTE — TELEPHONE ENCOUNTER
"Pt has umbilical hernia, wound not having improvement since last visit.    Pt states she was instructed to call back 1 week from last appt on 6/18/2020 .    Pt having open wound, changing dressing frequently, but leaving mostly open to air. Pt states she feels \"off\", area is warm to touch, denies fever, but area is \"gooey\" not leaking fluids \"but seems infected\".     RN assisted pt in scheduling appt.    Patient verbalized understanding and agreed to plan.   Patient was given the opportunity to ask additional questions and have them answered. Patient had no further questions.   Isabel Buitrago RN on 6/25/2020 at 1:15 PM    "

## 2020-06-25 NOTE — TELEPHONE ENCOUNTER
Reason for Call:  Other call back    Detailed comments: patient is calling stating has seroma still hasn't closed and would like to discuss. Thank you.    Phone Number Patient can be reached at: Home number on file [phone] (home)    Best Time:     Can we leave a detailed message on this number? YES    Call taken on 6/25/2020 at 1:01 PM by Bonnie Vanegas

## 2020-06-26 NOTE — TELEPHONE ENCOUNTER
"Joanne reports that he  incision site appears to have \"necrotic tissue around the flap, with white around it.\"  done in 2020 and wound still has not healed.     She was seen in clinic today and had a medication \"injected on wound to close it up.\" Patient unable to recall name of medication. OB note from today is incomplete, no documentation of medication injected.    Joanne also reports that there is 4/10 pain on the incision site.    RN paged on call provider Dr. France via smart web at 7:30 pm. Per Dr. France, no information on the type of medication administered. May possibly be silver nitrate, and after application wound bed would appear like a \"cigarette burn with white around.\" May follow up with Dr. Grier tomorrow.    FNA phoned Joanne to update. She says she does recall that it is silver nitrate. FNA advised to follow up with Dr. Grier tomorrow. Go to ED if incision pain becomes severe. She verbalized understanding.    Monet Ordoñez RN/[facility] Nurse Advisor        Reason for Disposition    [1] Caller has URGENT question AND [2] triager unable to answer question    Additional Information    Negative: [1] Wound gaping open AND [2] visible internal organs    Negative: Sounds like a life-threatening emergency to the triager    Negative: [1] Bleeding from incision AND [2] won't stop after 10 minutes of direct pressure    Negative: [1] Widespread rash AND [2] bright red, sunburn-like    Negative: Fever > 100.4 F (38.0 C)    Negative: Severe pain in the incision    Negative: [1] Incision gaping open AND [2] < 48 hours since wound re-opened    Negative: [1] Incision gaping open AND [2] length of opening > 2 inches (5 cm)    Negative: Patient sounds very sick or weak to the triager    Negative: [1] Incision looks infected (spreading redness, pain) AND [2] fever > 99.5 F (37.5 C)    Negative: [1] Incision looks infected (spreading redness, pain) AND [2] large red area (> 2 in. or 5 cm)    " Negative: [1] Pus or bad-smelling fluid draining from incision AND [2] no fever    Negative: Dressing soaked with blood or body fluid (e.g., drainage)    Protocols used: POSTPARTUM -  INCISION SYMPTOMS-A-AH

## 2020-06-29 ENCOUNTER — TELEPHONE (OUTPATIENT)
Dept: SURGERY | Facility: CLINIC | Age: 30
End: 2020-06-29

## 2020-06-29 ENCOUNTER — HOSPITAL ENCOUNTER (OUTPATIENT)
Facility: AMBULATORY SURGERY CENTER | Age: 30
End: 2020-06-29
Attending: SURGERY | Admitting: SURGERY
Payer: COMMERCIAL

## 2020-06-29 ENCOUNTER — VIRTUAL VISIT (OUTPATIENT)
Dept: SURGERY | Facility: CLINIC | Age: 30
End: 2020-06-29
Attending: OBSTETRICS & GYNECOLOGY
Payer: COMMERCIAL

## 2020-06-29 DIAGNOSIS — Z11.59 ENCOUNTER FOR SCREENING FOR OTHER VIRAL DISEASES: Primary | ICD-10-CM

## 2020-06-29 DIAGNOSIS — K42.9 UMBILICAL HERNIA WITHOUT OBSTRUCTION AND WITHOUT GANGRENE: ICD-10-CM

## 2020-06-29 PROCEDURE — 99202 OFFICE O/P NEW SF 15 MIN: CPT | Mod: 95 | Performed by: SURGERY

## 2020-06-29 ASSESSMENT — PAIN SCALES - GENERAL: PAINLEVEL: SEVERE PAIN (6)

## 2020-06-29 NOTE — PROGRESS NOTES
"Joanne Escalante is a 29 year old female who is being evaluated via a billable video visit.      The patient has been notified of following:     \"This video visit will be conducted via a call between you and your physician/provider. We have found that certain health care needs can be provided without the need for an in-person physical exam.  This service lets us provide the care you need with a video conversation.  If a prescription is necessary we can send it directly to your pharmacy.  If lab work is needed we can place an order for that and you can then stop by our lab to have the test done at a later time.    Video visits are billed at different rates depending on your insurance coverage.  Please reach out to your insurance provider with any questions.    If during the course of the call the physician/provider feels a video visit is not appropriate, you will not be charged for this service.\"    Patient has given verbal consent for Video visit? Yes  How would you like to obtain your AVS? Valeria   Patient would like the video invitation sent by: Text to cell phone: [phone] Elsa   Will anyone else be joining your video visit? No       LXIONG3, MEDICAL ASSISTANT         Video-Visit Details    Type of service:  Video Visit    Video Start Time: 10:00  Video End Time: 10:30    Originating Location (pt. Location): Home    Distant Location (provider location):  [facility]     Platform used for Video Visit: Elsa   Chief Complaint: umbilical protrusion    History of Present Illness:   29 year old woman who is self referred for an umbilical protrusion. She notes that this appeared about half way through her pregnancy. She delivered 4 months ago, and has noticed that it has continued to be present. There is some localized discomfort which is worsened with lifting. She denies any episodes of acute incarceration, but has been seen in the ED due to her pain. She has had a diagnostic laparoscopy for " endometriosis with a port site at the location of the protrusion.           Past Medical History:   Diagnosis Date     Anxiety      Depressive disorder      Endometriosis      Periumbilical hernia      Past Surgical History:   Procedure Laterality Date     C ORAL SURGERY PROCEDURE      [place] teeth      SECTION  , 2020    x 2     LAPAROSCOPY DIAGNOSTIC (GYN)  2015    Endometriosis     Current Outpatient Medications   Medication     ondansetron (ZOFRAN-ODT) 4 MG ODT tab     Prenatal Vit-Fe Fumarate-FA (PRENATAL VITAMIN PO)     sertraline (ZOLOFT) 50 MG tablet     No current facility-administered medications for this visit.         Allergies   Allergen Reactions     Sulfa Drugs      Social History     Socioeconomic History     Marital status: Single     Spouse name: partner- Jacob     Number of children: 2     Years of education: Not on file     Highest education level: Not on file   Occupational History     Occupation: nursing asst   Social Needs     Financial resource strain: Not on file     Food insecurity     Worry: Not on file     Inability: Not on file     Transportation needs     Medical: Not on file     Non-medical: Not on file   Tobacco Use     Smoking status: Current Every Day Smoker     Packs/day: 0.25     Types: Cigarettes     Smokeless tobacco: Never Used   Substance and Sexual Activity     Alcohol use: Not Currently     Drug use: Never     Sexual activity: Not Currently     Partners: Male     Birth control/protection: Condom   Lifestyle     Physical activity     Days per week: Not on file     Minutes per session: Not on file     Stress: Not on file   Relationships     Social connections     Talks on phone: Not on file     Gets together: Not on file     Attends Evangelical service: Not on file     Active member of club or organization: Not on file     Attends meetings of clubs or organizations: Not on file     Relationship status: Not on file     Intimate partner violence     Fear of  current or ex partner: Not on file     Emotionally abused: Not on file     Physically abused: Not on file     Forced sexual activity: Not on file   Other Topics Concern     Parent/sibling w/ CABG, MI or angioplasty before 65F 55M? Not Asked   Social History Narrative     Smokes 0.5-1 PPDx 10 years     Family History   Problem Relation Age of Onset     Depression Mother      Depression Father            Review of Systems:  No chest pain, dyspnea, weight loss, fevers or night sweats.   All other systems questioned and negative.     Exam:  Vitals - patient reports weight to be 190 lbs  Gen - sitting comfortably, NAD, able to ambulate without difficulty  Heent- grossly normocephalic, no jaundice or icterus noted, extraocular  movements intact  Resp- breathing comfortably, verbalized clearly w/o sob or cough  CV - extremities with no visible edema, fingertips well perfused  Abd- patient able to palpate abd in all 4 quadrants, appears soft, non  distended, small umbilical hernia noted  Skin - no visible rashes  Neuro grossly intact  Psych appropriate without obvious decreased mood or psychosis    Laboratory Studies:    Latest CBC:    Lab Results   Component Value Date    HGB 11.3 11/21/2019         Latest Basic Metabolic Panel:    Lab Results   Component Value Date    POTASSIUM 3.8 04/21/2020       Lab Results   Component Value Date    CR 1.07 04/21/2020     Lab Results   Component Value Date    GLC 97 04/21/2020       Latest Liver Studies:  Lab Results   Component Value Date    PROTTOTAL 6.3 01/07/2020     Lab Results   Component Value Date    ALBUMIN 2.7 01/07/2020     Lab Results   Component Value Date    BILITOTAL 0.3 01/07/2020     Lab Results   Component Value Date    ALKPHOS 106 01/07/2020     Lab Results   Component Value Date    AST 14 04/21/2020     Lab Results   Component Value Date    ALT 22 04/21/2020       IMPRESSION AND PLAN:  Symptomatic umbilical hernia vs port site incisional hernia in a young woman. We  discussed options and she would like to proceed with an open umbilical hernia repair. We did discuss how smoking and obesity are risk factors for complications from hernia repair and for recurrence. She expressed understanding.   Risks of the procedure including visceral injury, bleeding, infection, and recurrence explained to the patient who understands and is willing to proceed.   We also discussed the risks of worsening of COVID infection following elective surgery in asymptomatic covid positive patients.     Monique Mcdowell MD

## 2020-06-29 NOTE — TELEPHONE ENCOUNTER
Case Request: Case Info     Panel 1 (Provider: Monique Mcdowell MD)     Procedures  Laterality  Anesthesia  Region    HERNIORRHAPHY, UMBILICAL, OPEN  N/A  Combined MAC with Local        Requested date:     Location:  OR    Patient class:        Pre-op diagnoses:  Umbilical hernia without obstruction and without gangrene    Scheduling Instructions     60 minutes      CHERELLEIONG3, MEDICAL ASSISTANT

## 2020-06-29 NOTE — LETTER
"    6/29/2020         RE: Joanne Escalante  [address]  [place] [address]        Dear Colleague,    Thank you for referring your patient, Joanne Escalante, to the [facility]. Please see a copy of my visit note below.    Joanne Escalante is a 29 year old female who is being evaluated via a billable video visit.      The patient has been notified of following:     \"This video visit will be conducted via a call between you and your physician/provider. We have found that certain health care needs can be provided without the need for an in-person physical exam.  This service lets us provide the care you need with a video conversation.  If a prescription is necessary we can send it directly to your pharmacy.  If lab work is needed we can place an order for that and you can then stop by our lab to have the test done at a later time.    Video visits are billed at different rates depending on your insurance coverage.  Please reach out to your insurance provider with any questions.    If during the course of the call the physician/provider feels a video visit is not appropriate, you will not be charged for this service.\"    Patient has given verbal consent for Video visit? Yes  How would you like to obtain your AVS? [facility]   Patient would like the video invitation sent by: Text to cell phone: [phone] Elsa   Will anyone else be joining your video visit? No       LXIONG3, MEDICAL ASSISTANT         Video-Visit Details    Type of service:  Video Visit    Video Start Time: 10:00  Video End Time: 10:30    Originating Location (pt. Location): Home    Distant Location (provider location):  [facility]     Platform used for Video Visit: Elsa   Chief Complaint: umbilical protrusion    History of Present Illness:   29 year old woman who is self referred for an umbilical protrusion. She notes that this appeared about half way through her pregnancy. She delivered 4 months ago, and has " noticed that it has continued to be present. There is some localized discomfort which is worsened with lifting. She denies any episodes of acute incarceration, but has been seen in the ED due to her pain. She has had a diagnostic laparoscopy for endometriosis with a port site at the location of the protrusion.           Past Medical History:   Diagnosis Date     Anxiety      Depressive disorder      Endometriosis      Periumbilical hernia      Past Surgical History:   Procedure Laterality Date     C ORAL SURGERY PROCEDURE      [place] teeth      SECTION  2018, 2020    x 2     LAPAROSCOPY DIAGNOSTIC (GYN)  2015    Endometriosis     Current Outpatient Medications   Medication     ondansetron (ZOFRAN-ODT) 4 MG ODT tab     Prenatal Vit-Fe Fumarate-FA (PRENATAL VITAMIN PO)     sertraline (ZOLOFT) 50 MG tablet     No current facility-administered medications for this visit.         Allergies   Allergen Reactions     Sulfa Drugs      Social History     Socioeconomic History     Marital status: Single     Spouse name: partnerAlberto James     Number of children: 2     Years of education: Not on file     Highest education level: Not on file   Occupational History     Occupation: nursing asst   Social Needs     Financial resource strain: Not on file     Food insecurity     Worry: Not on file     Inability: Not on file     Transportation needs     Medical: Not on file     Non-medical: Not on file   Tobacco Use     Smoking status: Current Every Day Smoker     Packs/day: 0.25     Types: Cigarettes     Smokeless tobacco: Never Used   Substance and Sexual Activity     Alcohol use: Not Currently     Drug use: Never     Sexual activity: Not Currently     Partners: Male     Birth control/protection: Condom   Lifestyle     Physical activity     Days per week: Not on file     Minutes per session: Not on file     Stress: Not on file   Relationships     Social connections     Talks on phone: Not on file     Gets together: Not on  file     Attends Gnosticism service: Not on file     Active member of club or organization: Not on file     Attends meetings of clubs or organizations: Not on file     Relationship status: Not on file     Intimate partner violence     Fear of current or ex partner: Not on file     Emotionally abused: Not on file     Physically abused: Not on file     Forced sexual activity: Not on file   Other Topics Concern     Parent/sibling w/ CABG, MI or angioplasty before 65F 55M? Not Asked   Social History Narrative     Smokes 0.5-1 PPDx 10 years     Family History   Problem Relation Age of Onset     Depression Mother      Depression Father            Review of Systems:  No chest pain, dyspnea, weight loss, fevers or night sweats.   All other systems questioned and negative.     Exam:  Vitals - patient reports weight to be 190 lbs  Gen - sitting comfortably, NAD, able to ambulate without difficulty  Heent- grossly normocephalic, no jaundice or icterus noted, extraocular  movements intact  Resp- breathing comfortably, verbalized clearly w/o sob or cough  CV - extremities with no visible edema, fingertips well perfused  Abd- patient able to palpate abd in all 4 quadrants, appears soft, non  distended, small umbilical hernia noted  Skin - no visible rashes  Neuro grossly intact  Psych appropriate without obvious decreased mood or psychosis    Laboratory Studies:    Latest CBC:    Lab Results   Component Value Date    HGB 11.3 11/21/2019         Latest Basic Metabolic Panel:    Lab Results   Component Value Date    POTASSIUM 3.8 04/21/2020       Lab Results   Component Value Date    CR 1.07 04/21/2020     Lab Results   Component Value Date    GLC 97 04/21/2020       Latest Liver Studies:  Lab Results   Component Value Date    PROTTOTAL 6.3 01/07/2020     Lab Results   Component Value Date    ALBUMIN 2.7 01/07/2020     Lab Results   Component Value Date    BILITOTAL 0.3 01/07/2020     Lab Results   Component Value Date    ALKPHOS  106 01/07/2020     Lab Results   Component Value Date    AST 14 04/21/2020     Lab Results   Component Value Date    ALT 22 04/21/2020       IMPRESSION AND PLAN:  Symptomatic umbilical hernia vs port site incisional hernia in a young woman. We discussed options and she would like to proceed with an open umbilical hernia repair. We did discuss how smoking and obesity are risk factors for complications from hernia repair and for recurrence. She expressed understanding.   Risks of the procedure including visceral injury, bleeding, infection, and recurrence explained to the patient who understands and is willing to proceed.   We also discussed the risks of worsening of COVID infection following elective surgery in asymptomatic covid positive patients.     Monique Mcdowell MD          Again, thank you for allowing me to participate in the care of your patient.        Sincerely,        Monique Mcdowell MD

## 2020-06-29 NOTE — TELEPHONE ENCOUNTER
Date Scheduled: 8-3-20  Facility: [facility]  Surgeon: Dr. Mcdowell   Post-op appointment scheduled:    scheduled?: No  Surgery packet/instructions confirmed received?  Yes, mailed  Special Considerations:   Heidi Hurley, Surgery Scheduling Coordinator

## 2020-06-29 NOTE — TELEPHONE ENCOUNTER
Pt called and information below reviewed with pt. Pt verbalized understanding and states that her HR dept will be sending forms that need to be filled out. RN verbalized understanding and will watch for forms...Kiesha Jones RN            Surgery Instructions    Always follow your surgeon s instructions. If you don t, your surgery could be cancelled. Please use the following checklist.  Your surgery is on: The surgery scheduler will contact you within 1 week of your consult with the surgeon. If you do not hear from them, please call the clinic or RN Care Coordinator for your provider.    Time: Prearrival times can vary depending on location/type of surgery.  [place] - 2 hour pre-arrival  [facility] [place] - 2 hour pre-arrival  [place] - 1 hour pre-arrival    Note:  These times may change. A nurse will call you before surgery to confirm. If you have not received a call or if you have more questions, please call us on the working day before your surgery:  ? [place]: [phone] or [phone] (9am to 5:30pm)  ? [facility] [place]: [phone] (8am to 6pm)  ? [place]: [phone] (9am to 5pm)  ? [facility] [phone] (7am to 4pm)  Prior to surgery  ? Have a pre-op physical exam with your Primary Doctor within 30 days of surgery  - Ask your doctor to send all of your results to the surgery center/hospital before surgery. Your doctor also may ask you to bring the results with you on the day of surgery.  - Tell your doctor if:  - You are allergic to latex or rubber (latex and rubber gloves are often used in medical care).  - You are taking any medicines (including aspirin), vitamins, or herbal products. You may need to stop taking some medicines before surgery.  - You have any medical problems (allergies, diabetes, or heart disease, for example).  - You have a pacemaker or an AICD (automatic implanted cardiac defibrillator). If you do, please bring the ID card with you on the day of surgery.  - People who  smoke have a higher risk of infection after surgery. Ask your doctor how you can quit smoking.  - If you Primary Doctor is not within the [facility] [employer] system, you will need to have your pre-op physical faxed to us to be scanned into your chart.  - [place]: [phone] or [phone]  - [facility] ([place]): [phone]  - [facility] ([facility]): [phone]  ? Call your insurance company. Ask if you need pre-approval for your surgery. If you do not have insurance, please let us know. If you wish to speak to the , please alert the clinic staff so this can be arranged.  ? Arrange for someone to drive you home after surgery.  will need to be a responsible adult (18 years or older) that will provide transportation to and from surgery and stay in the waiting room during your surgery. You may not drive yourself or take public transportation to and from surgery.  ? Arrange for someone to stay with you for 24 hours after you go home. This person must be a responsible adult (18 years or older).  ? Call your surgeon or their nurse if there is any change in your health (cold, flu, infections, hospitalizations).  ? Do not smoke, drink alcohol, or take over-the-counter medicine for 24 hours before and after surgery.  ? If you take prescribed drugs, you may need to stop them until after the surgery.  Discuss what medications to take or not take prior to surgery with your Primary Doctor at your pre-op physical. Avoid over-the-counter blood-thinning medications such as Aspirin, Ibuprofen, vitamin E, or fish oil 7 days prior to surgery (unless otherwise directed by your Primary Doctor). Tylenol is a good alternative for mild pain relief prior to surgery.  ? Eating and drinking guidelines prior to surgery:  - Stop all solid food consumption 8 hours prior to surgery  - You may drink clear liquids up to 2 hours prior to surgery (water, fruit juices without pulp, jello, tea/coffee without  creamer, sports drinks, clear-fat free broth (bouillon or consomme), popsicles (without milk, bits of fruit, or seeds/nuts)  ? Follow instructions given for showering or bathing before surgery.    - Use 8 ounces of antiseptic surgical soap, like:  - Hibiclens, Scrub Care, or Exidine  - You can find it at your local pharmacy, clinic, or retail store. If you have trouble, ask your pharmacist to help you find the right substitute.  - Please wash with one of the above soaps twice before coming to the hospital for your surgery. This will decrease bacteria (germs) on your skin. It will also help reduce your chance of infection after surgery.  - Items you will need for showering:  - 4 newly washed washcloths  - 2 newly washed towels  - 8 ounces of one of the above soaps  - Following these instructions:  - The evening before surgery: Shower or bathe as you normally would, using your regular soap and a clean washcloth. Give special attention to places where your incision (surgical cut) or catheters will be. This includes your groin area. Rinse well. You may wash your hair with your regular shampoo. Next, wash your body with 4 ounces of the antiseptic soap. Use a clean, damp washcloth and gently clean your body (from the chin down). If your surgery involves your head, use the special soap on your head and scalp. Rinse well and dry off using a newly washed towel.  - The morning of surgery: Repeat the same process as the evening shower.  - Other suggestions:    Do not shave within 12 inches of your incision (surgical cut) area for at least 3 days before surgery. Shaving can make small cuts in the skin. This puts you at higher risk of infection.    Wear freshly washed pajamas or clothing after your evening shower.    Wear freshly washed clothes the day of surgery.    Wash and change your bed sheets the day before surgery to have clean bed sheets after your shower and when you get home from surgery.    If you have trouble washing  all areas, make sure someone helps you.    Don't use any deodorant, lotion or powder after your shower.    Women who are menstruating should wear a fresh sanitary pad to the hospital.  ? Do not wear or add deodorant, cologne, lotion, makeup, nail polish or jewelry to surgery. If you wear fake nails, please remove at least one nail before coming to surgery (an oxygen monitor needs to be placed on your finger during surgery).  ? Bring these items to the surgery center/hospital:  - Insurance card  - Money for parking and co-pays, if needed  - A list of all the medicines you take. Include vitamins, minerals, herbs, and over-the-counter drugs.  Note any drug allergies.  - A copy of your advance health care directive, if you have one. This tells us what treatment you would want--and who would make health care decisions--if you could no longer speak for yourself. You may request this form in advance or download it from [email or web address].  - A case for glasses, contact lenses, hearing aids, or dentures.  - Your inhaler or CPAP machine, if you use these at home.  ? Leave extra cash, jewelry, and other valuables at home.  When you arrive  When you get to the surgery center/hospital, you will:  ? Check in. If you are under age 18, you must be with a parent or legal guardian.  ? Sign consent forms, if you haven t already. These forms state that you know the risks and benefits of surgery. When you sign the forms, you give us permission to do the surgery. Do not sign them unless you understand what will happen during and after your surgery. If you have any questions about your surgery, ask to speak with your doctor before you sign the forms. If you don t understand the answers, ask again.  ? Receive a copy of the Patient s Bill of Rights. If you do not receive a copy, please ask for one.  ? Change into hospital clothes. Your belongings will be placed in a bag. We will return them to you after surgery.  ? Meet with the  anesthesia provider. He or she will tell you what kind of anesthesia (medicine) will be used to keep you comfortable during surgery.  Remember: it s okay to remind doctors and nurses to wash their hands before touching you.  In most cases, your surgeon will use a marker to write his or her initials on the surgery site. This ensures that the exact site is operated on.  For safety reasons, we will ask you the same questions many times. For example, we may ask your name and birth date over and over again.  Friends and family can stay with you until it s time for surgery. While you re in surgery, they will be in the waiting area. Please note that cell phones are not allowed in some patient care areas.  If you have questions about what will happen in the operating room, talk to your care team.  After surgery  We will move you to a recovery room, where we will watch you closely. If you have any pain or discomfort, tell your nurse. He or she will try to make you comfortable.  If you are staying overnight, we will move you to your hospital room after you are awake.  If you are going home, we will move you to another room. Friends and family may be able to join you. The length of time you spend in recovery depend on the type of medicine you received, your medical condition, the type of surgery you had, or your response to the anesthesia given during your procedure.  When you are discharged from the recovery room, the nurses will review instructions with you and your caregiver.  ? Please wash your hands every time you touch the wound or change bandages or dressings.  ? Do not submerge the wound in water.  You may not use a bathtub or hot tub until the wound is closed. The wait time frame is generally 2-3 weeks, but any open area can be a source of incoming bacteria, so it is better to be on the safe side and avoid water submersion until your wound is fully healed.  ? You may take a shower 24 hours after surgery. Double check  with your surgeon if it is OK for water to run over the wound, whether it has been sutured, stapled, glued, or is open. You may gently wash the wound using the antiseptic soap provided for your pre-surgery showering (do not use a washcloth). Any mild soap will work as well.  ? Many surgical wounds will have small white strips of tape on them called steri-strips.  Do not remove these. The edges will curl and fall off within 7-10 days with normal showering.  ? If you are going home with sutures (stitches) or staples, you must return to the clinic to have them taken out, usually within 1-2 weeks. Some stitches are dissolvable and do not require removal. Make sure to clarify with your surgeon or surgery nurse reviewing discharge paperwork what kind of sutures you have.  ? Signs and symptoms of infection include:  - Fever, temperature over 101.5   F  - Redness  - Swelling  - Increased pain  - Green or yellow drainage which may or may not have a foul odor  Dealing with pain  A nurse will check your comfort level often during your stay. He or she will work with you to manage your pain.  Remember:  ? All pain is real. There are many ways to control pain. We can help you decide what works best for you.  ? Ask for pain medicine when you need it. Don t try to  tough it out --this can make you feel worse. Always take your medicine as ordered.  ? Medicine doesn t work the same for everyone. If your medicine isn t working, tell your nurse. There may be other medicines or treatments we can try.  Going home  We will let you know when you re ready to leave the surgery center or hospital. Before you leave, we will tell you how to care for yourself at home and prevent infections. If you do not understand something, please say so. We will answer any questions you have. We will then help you get ready to leave.  Remember, you must have a responsible adult (18 years or older) to stay with you 24 hours after you leave the hospital.  Take  it easy when you get home. You will need some time to recover--you may be more tired than you realize at first. Rest and relax for at least the first 24 hours at home. You ll feel better and heal faster if you take good care of yourself.  Follow the discharge instructions that are given to you when you leave the surgery center or hospital  Please call the clinic if you experience any problems during regular clinic hours (Monday-Friday 8:00am-5:00pm).  If you experience problems during non-clinic hours, please call the [facility] ER on-call line at [phone] and ask the  to page the on-call Provider for your specialty. The on-call Provider will call you back and can triage your symptoms and further advise. If you are having an emergency, always call 911 or seek immediate evaluation at the Emergency Room.  Locations  [facility]  Same-day surgery center - 2nd floor, check-in #5  [address] [place] [address]  [phone]  www.[email or web address].[facility] ([facility])  [address] [place] [address]  [phone]   [email or web address]    [address] [address]  [phone] (patient registration)  [phone] (main line)  [email or web address]  [facility]  [address]  [place], [place] [address] [place] [address], 3rd floor for check-in  [phone] (patient registration)  [phone] (main line)  [email or web address]  [facility]  [address] [place] [address]  [phone]  www.[email or web address].[place].[facility]  [address] [place] [address]  [phone]  [email or web address]  [place] [address] [place]  [address]  [phone]  [email or web address]  [facility]  [address] [place] [address]  [phone]  [email or web address]  [facility]  [address] [place] [address]  [phone]  [email or web address]  [facility]  [address] [place] [address]  [phone]  [email or web address]

## 2020-07-01 ENCOUNTER — ANCILLARY PROCEDURE (OUTPATIENT)
Dept: GENERAL RADIOLOGY | Facility: CLINIC | Age: 30
End: 2020-07-01
Attending: PHYSICIAN ASSISTANT
Payer: COMMERCIAL

## 2020-07-01 ENCOUNTER — OFFICE VISIT (OUTPATIENT)
Dept: URGENT CARE | Facility: URGENT CARE | Age: 30
End: 2020-07-01
Payer: COMMERCIAL

## 2020-07-01 VITALS
HEART RATE: 81 BPM | BODY MASS INDEX: 35.13 KG/M2 | RESPIRATION RATE: 16 BRPM | DIASTOLIC BLOOD PRESSURE: 85 MMHG | OXYGEN SATURATION: 98 % | TEMPERATURE: 96.8 F | SYSTOLIC BLOOD PRESSURE: 120 MMHG | WEIGHT: 189 LBS

## 2020-07-01 DIAGNOSIS — S69.92XA HAND INJURY, LEFT, INITIAL ENCOUNTER: Primary | ICD-10-CM

## 2020-07-01 PROCEDURE — 73130 X-RAY EXAM OF HAND: CPT | Mod: LT

## 2020-07-01 PROCEDURE — 99213 OFFICE O/P EST LOW 20 MIN: CPT | Performed by: PHYSICIAN ASSISTANT

## 2020-07-01 RX ORDER — HYDROCODONE BITARTRATE AND ACETAMINOPHEN 5; 325 MG/1; MG/1
1 TABLET ORAL EVERY 6 HOURS PRN
Qty: 10 TABLET | Refills: 0 | Status: SHIPPED | OUTPATIENT
Start: 2020-07-01 | End: 2020-07-23

## 2020-07-01 ASSESSMENT — ENCOUNTER SYMPTOMS
WOUND: 0
COLOR CHANGE: 1
NECK PAIN: 0
BACK PAIN: 0
COUGH: 0
FEVER: 0
ARTHRALGIAS: 1
MYALGIAS: 1
CARDIOVASCULAR NEGATIVE: 1
RESPIRATORY NEGATIVE: 1
NECK STIFFNESS: 0
PALPITATIONS: 0
WHEEZING: 0
JOINT SWELLING: 1
CHILLS: 0
CONSTITUTIONAL NEGATIVE: 1
SHORTNESS OF BREATH: 0
FATIGUE: 0

## 2020-07-01 ASSESSMENT — PAIN SCALES - GENERAL: PAINLEVEL: SEVERE PAIN (7)

## 2020-07-01 NOTE — LETTER
[address] [address]    2020    Re: Joanne Escalante  [address]  [place] [facility] [id number] [phone] (home)     : 1990      To Whom It May Concern:      Joanne Escalante was seen in clinic today and I would recommend no heavy lifting, pushing, or pulling with the L hand.  Please feel free to contact me via phone if you have any questions or concerns.        Sincerely,      Naomy See NII Canseco

## 2020-07-02 NOTE — PROGRESS NOTES
Subjective   Joanne Escalante is a 29 year old female who presents to clinic today for the following health issues:  HPI   Musculoskeletal problem/pain    Duration: today    Description  Location: L hand    Intensity:  moderate    Accompanying signs and symptoms: No radicular pain, numbness, tingling or weakness.  Reports swelling and bruising but no redness, drainage or fevers.  R hand dominant.    History  Previous similar problem: no   Previous evaluation:  none    Precipitating or alleviating factors:  Trauma or overuse: YES- sustained a R hand injury after her hand struck the car door earlier today.  Aggravating factors include: palpation, movement and use    Therapies tried and outcome: rest/inactivity, ice, immobilization, acetaminophen, Ibuprofen with minimal relief    Patient Active Problem List   Diagnosis     Umbilical hernia without obstruction and without gangrene     Past Surgical History:   Procedure Laterality Date     C ORAL SURGERY PROCEDURE      [place] teeth      SECTION  , 2020    x 2     LAPAROSCOPY DIAGNOSTIC (GYN)  2015    Endometriosis       Social History     Tobacco Use     Smoking status: Current Every Day Smoker     Packs/day: 0.25     Types: Cigarettes     Smokeless tobacco: Never Used   Substance Use Topics     Alcohol use: Not Currently     Family History   Problem Relation Age of Onset     Depression Mother      Depression Father          Current Outpatient Medications   Medication Sig Dispense Refill     ondansetron (ZOFRAN-ODT) 4 MG ODT tab        Prenatal Vit-Fe Fumarate-FA (PRENATAL VITAMIN PO)        sertraline (ZOLOFT) 50 MG tablet Take 1 tablet (50 mg) by mouth daily 90 tablet 1     Allergies   Allergen Reactions     Sulfa Drugs      Reviewed and updated as needed this visit by Provider       Review of Systems   Constitutional: Negative.  Negative for chills, fatigue and fever.   Respiratory: Negative.  Negative for cough, shortness of breath and wheezing.     Cardiovascular: Negative.  Negative for chest pain, palpitations and peripheral edema.   Genitourinary: Negative.    Musculoskeletal: Positive for arthralgias, joint swelling and myalgias. Negative for back pain, gait problem, neck pain and neck stiffness.   Skin: Positive for color change. Negative for pallor, rash and wound.   All other systems reviewed and are negative.           Objective    /85 (BP Location: Left arm, Patient Position: Sitting, Cuff Size: Adult Large)   Pulse 81   Temp 96.8  F (36  C) (Tympanic)   Resp 16   Wt 85.7 kg (189 lb)   SpO2 98%   Breastfeeding Yes   BMI 35.13 kg/m    Body mass index is 35.13 kg/m .  Physical Exam  Vitals signs and nursing note reviewed.   Constitutional:       General: She is not in acute distress.     Appearance: Normal appearance. She is well-developed and normal weight. She is not ill-appearing.   Musculoskeletal:      Left wrist: Normal. She exhibits normal range of motion, no tenderness, no bony tenderness, no swelling, no effusion, no crepitus, no deformity and no laceration.      Right hand: Normal. She exhibits normal range of motion, no tenderness, no bony tenderness, normal two-point discrimination, normal capillary refill, no deformity, no laceration and no swelling. Normal sensation noted. Normal strength noted. She exhibits no finger abduction and no wrist extension trouble.      Left hand: She exhibits tenderness, bony tenderness (over the dorsal apsect) and swelling. She exhibits normal range of motion, normal two-point discrimination, normal capillary refill, no deformity and no laceration. Normal sensation noted. Normal strength noted.   Skin:     General: Skin is warm and dry.      Capillary Refill: Capillary refill takes less than 2 seconds.      Comments: Distal pulses are 2+ and symmetric.  No peripheral edema.   Neurological:      Mental Status: She is alert and oriented to person, place, and time.      Sensory: Sensation is  intact. No sensory deficit.      Motor: Motor function is intact.      Gait: Gait is intact. Gait normal.      Deep Tendon Reflexes: Reflexes are normal and symmetric.   Psychiatric:         Mood and Affect: Mood normal.         Behavior: Behavior normal.         Thought Content: Thought content normal.         Judgment: Judgment normal.     Diagnostic Test Results:  Labs reviewed in Epic  Results for orders placed or performed in visit on 07/01/20 (from the past 24 hour(s))   XR Hand Left G/E 3 Views    Narrative    HAND LEFT THREE OR MORE VIEWS   7/1/2020 8:57 PM     HISTORY:  Hand injury, left, initial encounter, pain.      Impression    IMPRESSION: Unremarkable exam.             Assessment & Plan   Hand injury, left, initial encounter: Xrays are negative for acute fractures or dislocations. Most likely strain/sprain/contusion.  Recommend RICE and will give norco#10 prn pain.  Will avoid NSAIDS due to renal impairment. Discussed risks and benefits of medication along with side effects, direction for use.  No driving or operating machinery due to sedation.  Recheck in clinic if symptoms worsen or if symptoms do not improve.   -     XR Hand Left G/E 3 Views  -     HYDROcodone-acetaminophen (NORCO) 5-325 MG tablet; Take 1 tablet by mouth every 6 hours as needed for pain or moderate to severe pain           Naomy See NII Canseco  [facility]

## 2020-07-06 ENCOUNTER — TELEPHONE (OUTPATIENT)
Dept: OBGYN | Facility: OTHER | Age: 30
End: 2020-07-06

## 2020-07-06 NOTE — TELEPHONE ENCOUNTER
Reason for Call:  Other call back    Detailed comments: her scroma is not healed.  Wondering if she needs to come back in.  Call and discuss  Phone Number Patient can be reached at: Cell number on file:    Telephone Information:   Mobile [phone]       Best Time: any    Can we leave a detailed message on this number? YES    Call taken on 7/6/2020 at 1:10 PM by Chrissie Fry

## 2020-07-06 NOTE — TELEPHONE ENCOUNTER
I have not seen this patient in the past, but will be happy to if needed.  It looks like she last saw Dr. Grier 2020 for a follow up for her  section. It was treated with silver nitrate.

## 2020-07-06 NOTE — TELEPHONE ENCOUNTER
Marcela France MD  [facility] Triage 36 minutes ago (1:36 PM)       I have never seen this patient so I am not sure why it was routed to me.  It looks like she had the  in January with Dr. Ge.  Please call patient to get more information.  It looks like Dr. Ge has some openings in her schedule this afternoon.        Spoke to pt.  She was seen on  with Dr. Grier for a follow up on her  incision.  Dr. Grier treated it with silver nitrate.  Pt states that it has not improved since then.  She states when she went to see Dr. Grier on , there was a 1mm opening on her incision and after the treatment with silver nitrate it opened it up a little further and it is still open and draining.  Pt also describes a red patch to the side of the opening.  She is unsure if it is a fungal/yeast infection.    Pt denies pain, fever, pus or thick discharge.      Advised that pt needs to have this further evaluated in clinic since it has not improved since last seen in clinic.  Unable to schedule with Dr. Grier again as his schedule is full today and he is out the rest of the week.  No other appointments available until Thursday in .  Scheduled pt on Thursday.  Advised that if the incision/opening worsens, redness worsens, develops fever or pus then needs to be further evaluated at urgent care sooner.    Pt verbalized understanding and agreed to plan.    Alison Cordoba RN

## 2020-07-10 ENCOUNTER — OFFICE VISIT (OUTPATIENT)
Dept: OBGYN | Facility: CLINIC | Age: 30
End: 2020-07-10
Payer: COMMERCIAL

## 2020-07-10 VITALS
SYSTOLIC BLOOD PRESSURE: 108 MMHG | TEMPERATURE: 98.6 F | HEART RATE: 76 BPM | BODY MASS INDEX: 35.53 KG/M2 | DIASTOLIC BLOOD PRESSURE: 64 MMHG | WEIGHT: 191.13 LBS

## 2020-07-10 DIAGNOSIS — T81.31XD DEHISCENCE OF OPERATIVE WOUND, SUBSEQUENT ENCOUNTER: Primary | ICD-10-CM

## 2020-07-10 PROCEDURE — 99212 OFFICE O/P EST SF 10 MIN: CPT | Performed by: ADVANCED PRACTICE MIDWIFE

## 2020-07-10 NOTE — PROGRESS NOTES
Joanne Escalante is a 29 year old who presents to the clinic for follow up from cautery of  incision from two weeks ago.   No drainage no fever or chills. Remains numb but no pain.     Histories reviewed and updated  Past Medical History:   Diagnosis Date     Anxiety      Depressive disorder      Endometriosis      Periumbilical hernia      Past Surgical History:   Procedure Laterality Date     C ORAL SURGERY PROCEDURE      [facility] teeth      SECTION  , 2020    x 2     LAPAROSCOPY DIAGNOSTIC (GYN)  2015    Endometriosis     Social History     Socioeconomic History     Marital status: Single     Spouse name: partner- Jacob     Number of children: 2     Years of education: Not on file     Highest education level: Not on file   Occupational History     Occupation: nursing asst   Social Needs     Financial resource strain: Not on file     Food insecurity     Worry: Not on file     Inability: Not on file     Transportation needs     Medical: Not on file     Non-medical: Not on file   Tobacco Use     Smoking status: Current Every Day Smoker     Packs/day: 0.25     Types: Cigarettes     Smokeless tobacco: Never Used   Substance and Sexual Activity     Alcohol use: Not Currently     Drug use: Never     Sexual activity: Not Currently     Partners: Male     Birth control/protection: Condom   Lifestyle     Physical activity     Days per week: Not on file     Minutes per session: Not on file     Stress: Not on file   Relationships     Social connections     Talks on phone: Not on file     Gets together: Not on file     Attends Buddhist service: Not on file     Active member of club or organization: Not on file     Attends meetings of clubs or organizations: Not on file     Relationship status: Not on file     Intimate partner violence     Fear of current or ex partner: Not on file     Emotionally abused: Not on file     Physically abused: Not on file     Forced sexual activity: Not on file   Other  Topics Concern     Parent/sibling w/ CABG, MI or angioplasty before 65F 55M? Not Asked   Social History Narrative     Not on file     Family History   Problem Relation Age of Onset     Depression Mother      Depression Father           ROS: 10 point ROS neg other than the symptoms noted above in the HPI.    EXAM:  /64 (BP Location: Right arm, Patient Position: Sitting, Cuff Size: Adult Large)   Pulse 76   Temp 98.6  F (37  C) (Oral)   Wt 86.7 kg (191 lb 2 oz)   LMP  (LMP Unknown)   Breastfeeding Yes   BMI 35.53 kg/m    GENERAL:  Pleasant female, no acute distress  Incision is clean, dry and healed. Psych:  alert, with normal speech and behavior        ASSESSMENT/PLAN:    (T81.31XD) Dehiscence of operative wound, subsequent encounter  (primary encounter diagnosis)  Comment:   Plan:     Continue usual cares  Keep area dry.   ISH Iyer, CNM            .    Time noted does not include time required to complete procedures.

## 2020-07-17 ENCOUNTER — NURSE TRIAGE (OUTPATIENT)
Dept: NURSING | Facility: CLINIC | Age: 30
End: 2020-07-17

## 2020-07-17 DIAGNOSIS — K42.9 UMBILICAL HERNIA WITHOUT OBSTRUCTION AND WITHOUT GANGRENE: Primary | ICD-10-CM

## 2020-07-17 NOTE — TELEPHONE ENCOUNTER
issues with recent healed dehiscence.      Today noticed a quarter-sized bruise in a different area on abdomen with a small dot where some drops of blood oozed through.  Incision area remains intact.No current bleeding, and size of bruise not growing.  Denies fever, pain, drainage, other areas of bleeding.    Will see/call provider on Monday and call back for any changes, worsening symptoms.    Angela Schaefer RN  [facility]      Additional Information    Negative: [1] Bleeding from incision AND [2] won't stop after 10 minutes of direct pressure    Negative: [1] Widespread rash AND [2] bright red, sunburn-like    Negative: Fever > 100.4 F (38.0 C)    Negative: Severe pain in the incision    Negative: [1] Incision gaping open AND [2] < 48 hours since wound re-opened    Negative: [1] Incision gaping open AND [2] length of opening > 2 inches (5 cm)    Negative: Patient sounds very sick or weak to the triager    Negative: [1] Incision looks infected (spreading redness, pain) AND [2] fever > 99.5 F (37.5 C)    Negative: [1] Incision looks infected (spreading redness, pain) AND [2] large red area (> 2 in. or 5 cm)    Negative: [1] Pus or bad-smelling fluid draining from incision AND [2] no fever    Negative: Dressing soaked with blood or body fluid (e.g., drainage)    Negative: [1] Caller has URGENT question AND [2] triager unable to answer question    Negative: [1] Clear or blood-tinged fluid draining from wound AND [2] no fever    Negative: Suture or staple removal is overdue    Negative: [1] Small red area or streak AND [2] no fever    Negative: [1] 48 hours since surgery AND [2] wound is becoming more tender    Negative: [1] Incision gaping open AND [2] > 48 hours since wound re-opened AND [3] length of opening > 1/2 inch (12 mm)    Negative: Shock suspected (e.g., cold/pale/clammy skin, too weak to stand, low BP, rapid pulse)    Negative: Sounds like a life-threatening emergency to the  triager    Negative: Dizziness or lightheadedness    Negative: [1] Bruise on head/face, chest, or abdomen AND [2]  taking Coumadin (warfarin) or other strong blood thinner, or known bleeding disorder (e.g., thrombocytopenia)    Negative: Unexplained bleeding from another site (e.g., gums, nose, urine) as well    Negative: Patient sounds very sick or weak to the triager    Negative: [1] Not caused by an injury AND [2] 5 or more bruises now    Negative: [1] Raised bruise AND [2] size > 2 inches (5 cm) AND [3] getting bigger    Negative: [1] SEVERE pain AND [2] not improved 2 hours after pain medicine/ice packs    Negative: Suspicious history for the injury    Negative: Taking Coumadin (warfarin) or other strong blood thinner, or known bleeding disorder (e.g., thrombocytopenia)    [1] Not caused by an injury AND [2] < 5 unexplained bruises    Protocols used: POSTPARTUM -  INCISION SYMPTOMS-A-AH, BRUISES-A-AH

## 2020-07-18 ENCOUNTER — NURSE TRIAGE (OUTPATIENT)
Dept: NURSING | Facility: CLINIC | Age: 30
End: 2020-07-18

## 2020-07-18 ENCOUNTER — OFFICE VISIT (OUTPATIENT)
Dept: URGENT CARE | Facility: URGENT CARE | Age: 30
End: 2020-07-18
Payer: COMMERCIAL

## 2020-07-18 VITALS
TEMPERATURE: 97.5 F | OXYGEN SATURATION: 98 % | WEIGHT: 191 LBS | SYSTOLIC BLOOD PRESSURE: 127 MMHG | HEIGHT: 62 IN | BODY MASS INDEX: 35.15 KG/M2 | DIASTOLIC BLOOD PRESSURE: 83 MMHG | HEART RATE: 103 BPM

## 2020-07-18 DIAGNOSIS — T81.89XA POSTPROCEDURAL NON-HEALING WOUND, INITIAL ENCOUNTER: ICD-10-CM

## 2020-07-18 DIAGNOSIS — L03.311 CELLULITIS OF ABDOMINAL WALL: Primary | ICD-10-CM

## 2020-07-18 PROCEDURE — 99214 OFFICE O/P EST MOD 30 MIN: CPT | Performed by: PHYSICIAN ASSISTANT

## 2020-07-18 RX ORDER — NYSTATIN 100000 [USP'U]/G
POWDER TOPICAL 2 TIMES DAILY
Qty: 1 BOTTLE | Refills: 2 | Status: SHIPPED | OUTPATIENT
Start: 2020-07-18 | End: 2021-01-06

## 2020-07-18 RX ORDER — CEPHALEXIN 500 MG/1
500 CAPSULE ORAL 3 TIMES DAILY
Qty: 30 CAPSULE | Refills: 0 | Status: SHIPPED | OUTPATIENT
Start: 2020-07-18 | End: 2020-07-29

## 2020-07-18 RX ORDER — MUPIROCIN 20 MG/G
OINTMENT TOPICAL 3 TIMES DAILY
Qty: 30 G | Refills: 0 | Status: SHIPPED | OUTPATIENT
Start: 2020-07-18 | End: 2020-07-29

## 2020-07-18 ASSESSMENT — ENCOUNTER SYMPTOMS
RESPIRATORY NEGATIVE: 1
PALPITATIONS: 0
CHILLS: 0
WOUND: 1
MUSCULOSKELETAL NEGATIVE: 1
COUGH: 0
WHEEZING: 0
FEVER: 0
COLOR CHANGE: 1
FATIGUE: 0
GASTROINTESTINAL NEGATIVE: 1
CONSTITUTIONAL NEGATIVE: 1
CARDIOVASCULAR NEGATIVE: 1
SHORTNESS OF BREATH: 0
CHEST TIGHTNESS: 0

## 2020-07-18 ASSESSMENT — MIFFLIN-ST. JEOR: SCORE: 1541.68

## 2020-07-18 NOTE — TELEPHONE ENCOUNTER
"Joanne reports she noticed blood leaking from her  incision this morning. Had  6 months ago. This was observed as a bruise yesterday.      There is a small opening on incision, < 1 inch in size. Applied direct pressure but continues to bleed. \"I covered it with a small gauze and has blood in it, about 1/4 of the gauze. Gauze is changed every 10 minutes\"    There is mild pain on the incision. She reports that site still feels numb. There is a red patch to the side of incision, but she attributes it to yeast infection.     No fever, no pus, no red streak on her abdomen.    RN paged on call provider Dr. Ge via smart web at 8:31 am. Per on call MD, apply direct pressure and if bleeding subsides, can be seen in clinic on . If bleeding continues or worsens despite direct pressure, patient should be seen at ED.    FNA phoned patient and she verbalized understanding, She works as a nursing assistant on the spine floor, which requires heavy lifting and asks if there are work restrictions. FNA advised to request recommendation/note from provider re: work or lifting restrictions. She verbalized agreement.    Monet Ordoñez RN/[facility]            Reason for Disposition    Dressing soaked with blood or body fluid (e.g., drainage)    Additional Information    Negative: [1] Wound gaping open AND [2] visible internal organs    Negative: Sounds like a life-threatening emergency to the triager    Negative: [1] Bleeding from incision AND [2] won't stop after 10 minutes of direct pressure    Negative: [1] Widespread rash AND [2] bright red, sunburn-like    Negative: Fever > 100.4 F (38.0 C)    Negative: Severe pain in the incision    Negative: [1] Incision gaping open AND [2] < 48 hours since wound re-opened    Negative: [1] Incision gaping open AND [2] length of opening > 2 inches (5 cm)    Negative: Patient sounds very sick or weak to the triager    Negative: [1] Incision looks " infected (spreading redness, pain) AND [2] fever > 99.5 F (37.5 C)    Negative: [1] Incision looks infected (spreading redness, pain) AND [2] large red area (> 2 in. or 5 cm)    Negative: [1] Pus or bad-smelling fluid draining from incision AND [2] no fever    Protocols used: POSTPARTUM -  INCISION SYMPTOMS-A-AH

## 2020-07-18 NOTE — PROGRESS NOTES
Subjective   Joanne Escalante is a 29 year old female who presents to clinic today for the following health issues  HPI  Skin infection    Duration: 4days ago    Description (location/character/radiation):  Around her  incision from 6months ago    Intensity:  mild    Accompanying signs and symptoms: No radicular pain, numbness, tingling or weakness.  Reports mild swelling, redness, drainage but no fevers.  No abdominal pain, n/v, constipation, diarrhea, bloody or black tarry stools.      History (similar episodes/previous evaluation):  Had a non-healing wound from her  6months ago and was put on multiple rounds of antibiotics with good relief until recently when she noticed another opening present again.    Precipitating or alleviating factors: None    Therapies tried and outcome: warm compresses with minimal relief       Patient Active Problem List   Diagnosis     Umbilical hernia without obstruction and without gangrene     Past Surgical History:   Procedure Laterality Date     C ORAL SURGERY PROCEDURE      [place] teeth      SECTION  , 2020    x 2     LAPAROSCOPY DIAGNOSTIC (GYN)  2015    Endometriosis       Social History     Tobacco Use     Smoking status: Current Every Day Smoker     Packs/day: 0.25     Types: Cigarettes     Smokeless tobacco: Never Used   Substance Use Topics     Alcohol use: Not Currently     Family History   Problem Relation Age of Onset     Depression Mother      Depression Father          Current Outpatient Medications   Medication Sig Dispense Refill     Acetaminophen (TYLENOL PO) Take by mouth every 6 hours as needed for mild pain or fever       IBUPROFEN PO Take by mouth every 4 hours as needed for moderate pain       ondansetron (ZOFRAN-ODT) 4 MG ODT tab every 8 hours as needed        Prenatal Vit-Fe Fumarate-FA (PRENATAL VITAMIN PO)        sertraline (ZOLOFT) 50 MG tablet Take 1 tablet (50 mg) by mouth daily 90 tablet 1     Allergies   Allergen  "Reactions     Sulfa Drugs        Reviewed and updated as needed this visit by Provider       Review of Systems   Constitutional: Negative.  Negative for chills, fatigue and fever.   Respiratory: Negative.  Negative for cough, chest tightness, shortness of breath and wheezing.    Cardiovascular: Negative.  Negative for chest pain, palpitations and peripheral edema.   Gastrointestinal: Negative.    Musculoskeletal: Negative.    Skin: Positive for color change, rash and wound. Negative for pallor.   All other systems reviewed and are negative.         Objective    /83   Pulse 103   Temp 97.5  F (36.4  C) (Tympanic)   Ht 1.562 m (5' 1.5\")   Wt 86.6 kg (191 lb)   LMP  (LMP Unknown)   SpO2 98%   BMI 35.50 kg/m    Body mass index is 35.5 kg/m .  Physical Exam  Vitals signs and nursing note reviewed.   Constitutional:       General: She is not in acute distress.     Appearance: Normal appearance. She is well-developed. She is obese. She is not ill-appearing.   Skin:     General: Skin is warm and dry.      Findings: Erythema and wound (3mm wound present at the surgical incision site of her  with mild erythema but no discharge or tenderness) present. No abrasion, abscess, bruising, burn, signs of injury, laceration, petechiae or rash.   Neurological:      Mental Status: She is alert and oriented to person, place, and time.   Psychiatric:         Mood and Affect: Mood normal.         Behavior: Behavior normal.         Thought Content: Thought content normal.         Judgment: Judgment normal.              Assessment & Plan   Cellulitis of abdominal wall:  Will give [id number], [id number] and nystatin powder as well.  Keep clean and dry.  Avoid triggers and irritating agents.  Avoid scratching to present secondary infection.  To the ER if she develops worsening redness, swelling, drainage or fevers.  Will also send to wound care for further evaluation and management.   -     cephALEXin (KEFLEX) 500 " MG capsule; Take 1 capsule (500 mg) by mouth 3 times daily for 10 days  -     mupirocin (BACTROBAN) 2 % external ointment; Apply topically 3 times daily for 7 days  -     nystatin (MYCOSTATIN) 056533 UNIT/GM external powder; Apply topically 2 times daily Prevention of rash    Postprocedural non-healing wound, initial encounter  -     WOUND CARE REFERRAL           Naomy Canseco PA-C  [facility]

## 2020-07-21 ENCOUNTER — TELEPHONE (OUTPATIENT)
Dept: WOUND CARE | Facility: CLINIC | Age: 30
End: 2020-07-21

## 2020-07-21 NOTE — TELEPHONE ENCOUNTER
Spoke with pt regarding c section wound which has been there since January. 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 Recommend wound is just opened up so it can heal from the inside out. She will contact her surgeon

## 2020-07-23 ENCOUNTER — OFFICE VISIT (OUTPATIENT)
Dept: OBGYN | Facility: CLINIC | Age: 30
End: 2020-07-23
Payer: COMMERCIAL

## 2020-07-23 VITALS
DIASTOLIC BLOOD PRESSURE: 80 MMHG | SYSTOLIC BLOOD PRESSURE: 118 MMHG | TEMPERATURE: 98.5 F | BODY MASS INDEX: 35.88 KG/M2 | WEIGHT: 193 LBS | HEART RATE: 101 BPM

## 2020-07-23 PROCEDURE — 99213 OFFICE O/P EST LOW 20 MIN: CPT | Performed by: OBSTETRICS & GYNECOLOGY

## 2020-07-23 NOTE — PATIENT INSTRUCTIONS
If you have any questions regarding your visit, Please contact your care team.    [facility]: [phone]      Women s Health CLINIC HOURS TELEPHONE NUMBER   MD Lauren Marsh CMA Susie -    DANIEL Rubio       Monday:       7:30-4:15 [place]  Wednesday:      Administrative  Thursday:       7:30-4:15 [place]  Friday:       Administrative     [facility]  [address] [place]  [address]  [phone] ask for [facility]  [address] [place] [address]  [phone] ask for [facility]    Imaging Scheduling for [place]:  [phone]    Imaging Scheduling for [place]: [phone]       Urgent Care locations:    [facility] Saturday and Sunday   9 am - 5 pm    Monday-Friday   12 pm - 8 pm  Saturday and Sunday   9 am - 5 pm   [phone]     [facility] Labor and Delivery:  [phone]    If you need a medication refill, please contact your pharmacy. Please allow 3 business days for your refill to be completed.  As always, Thank you for trusting us with your healthcare needs!

## 2020-07-23 NOTE — PROGRESS NOTES
Joanne is a 29 year old   here for follow up of her  scar still draining.  .  ROS: No urinary frequency or dysuria, bladder or kidney problems  ROS: Ten point review of systems was reviewed and negative except the above.    PMH: Her past medical, surgical, and obstetric histories were reviewed and are documented in their appropriate chart areas.    ALL/Meds: Her medication and allergy histories were reviewed and are documented in their appropriate chart areas.    SH/FMH: Reviewed and documented in the appropriate area.    PE: /80   Pulse 101   Temp 98.5  F (36.9  C) (Tympanic)   Wt 87.5 kg (193 lb)   LMP  (LMP Unknown)   Breastfeeding Yes   BMI 35.88 kg/m      There is no obvious tract or infection. I recommend referral to General surgery to see if she needs to have the scar excised.    A/P:   Joanne presents with (O90.9)  section wound complication  (primary encounter diagnosis)  Comment:   Plan: GENERAL SURG ADULT REFERRAL                -    Orders Placed This Encounter   Procedures     GENERAL SURG ADULT REFERRAL         Sebas Grier MD FACOG

## 2020-07-24 ENCOUNTER — OFFICE VISIT (OUTPATIENT)
Dept: SURGERY | Facility: CLINIC | Age: 30
End: 2020-07-24
Payer: COMMERCIAL

## 2020-07-24 VITALS
DIASTOLIC BLOOD PRESSURE: 80 MMHG | HEART RATE: 97 BPM | BODY MASS INDEX: 36.44 KG/M2 | HEIGHT: 61 IN | SYSTOLIC BLOOD PRESSURE: 120 MMHG | WEIGHT: 193 LBS

## 2020-07-24 DIAGNOSIS — T81.49XA INFECTED INCISION: Primary | ICD-10-CM

## 2020-07-24 PROCEDURE — 99203 OFFICE O/P NEW LOW 30 MIN: CPT | Performed by: SURGERY

## 2020-07-24 ASSESSMENT — MIFFLIN-ST. JEOR: SCORE: 1542.82

## 2020-07-24 NOTE — LETTER
"    2020         RE: Joanne Escalante  [address]  [place] [address]        Dear Colleague,    Thank you for referring your patient, Joanne Escalante, to the [facility]. Please see a copy of my visit note below.    Dear Marcela Batista  I was asked to see this patient by Marcela France for please see below.  I have seen Joanne Escalante and as you know his chief complaint is  Patient had a c section in  of this year.  Had an area on the right c section line that was not healing well  Then it drained more and infected.  Seemed to have healed.  Then a week later had a seroma open up and drain.   Has had silver nitrate treatment on it.     HPI:  Patient is a 29 year old female  with complaints see above  The patient noticed the symptoms about 6 months ago.    Patient has family history of hernia problems  father  nothing makes the episode better.      Review Of Systems  Respiratory: No shortness of breath, dyspnea on exertion, cough, or hemoptysis  Cardiovascular: negative  Gastrointestinal: nausea  Endocrine: negative  :  negative    10 Point review of systems all others are negative.   /80   Pulse 97   Ht 1.549 m (5' 1\")   Wt 87.5 kg (193 lb)   LMP  (LMP Unknown)   BMI 36.47 kg/m      Past Medical History:   Diagnosis Date     Anxiety      Depressive disorder      Endometriosis      Periumbilical hernia        Past Surgical History:   Procedure Laterality Date     C ORAL SURGERY PROCEDURE      [place] teeth      SECTION  , 2020    x 2     LAPAROSCOPY DIAGNOSTIC (GYN)  2015    Endometriosis       Social History     Socioeconomic History     Marital status: Single     Spouse name: partner- Jacob     Number of children: 2     Years of education: Not on file     Highest education level: Not on file   Occupational History     Occupation: nursing asst   Social Needs     Financial resource strain: Not on file     Food insecurity     Worry: Not on " file     Inability: Not on file     Transportation needs     Medical: Not on file     Non-medical: Not on file   Tobacco Use     Smoking status: Current Every Day Smoker     Packs/day: 0.25     Types: Cigarettes     Smokeless tobacco: Never Used   Substance and Sexual Activity     Alcohol use: Not Currently     Drug use: Never     Sexual activity: Not Currently     Partners: Male     Birth control/protection: Condom   Lifestyle     Physical activity     Days per week: Not on file     Minutes per session: Not on file     Stress: Not on file   Relationships     Social connections     Talks on phone: Not on file     Gets together: Not on file     Attends Quaker service: Not on file     Active member of club or organization: Not on file     Attends meetings of clubs or organizations: Not on file     Relationship status: Not on file     Intimate partner violence     Fear of current or ex partner: Not on file     Emotionally abused: Not on file     Physically abused: Not on file     Forced sexual activity: Not on file   Other Topics Concern     Parent/sibling w/ CABG, MI or angioplasty before 65F 55M? Not Asked   Social History Narrative     Not on file       Current Outpatient Medications   Medication Sig Dispense Refill     Prenatal Vit-Fe Fumarate-FA (PRENATAL VITAMIN PO)        sertraline (ZOLOFT) 50 MG tablet Take 1 tablet (50 mg) by mouth daily 90 tablet 1     Acetaminophen (TYLENOL PO) Take by mouth every 6 hours as needed for mild pain or fever       cephALEXin (KEFLEX) 500 MG capsule Take 1 capsule (500 mg) by mouth 3 times daily for 10 days (Patient not taking: Reported on 7/24/2020) 30 capsule 0     IBUPROFEN PO Take by mouth every 4 hours as needed for moderate pain       mupirocin (BACTROBAN) 2 % external ointment Apply topically 3 times daily for 7 days (Patient not taking: Reported on 7/24/2020) 30 g 0     nystatin (MYCOSTATIN) 042769 UNIT/GM external powder Apply topically 2 times daily Prevention of  "rash (Patient not taking: Reported on 2020) 1 Bottle 2     ondansetron (ZOFRAN-ODT) 4 MG ODT tab every 8 hours as needed          Above was reviewed  Physical exam: /80   Pulse 97   Ht 1.549 m (5' 1\")   Wt 87.5 kg (193 lb)   LMP  (LMP Unknown)   BMI 36.47 kg/m     Patient able to get up on table without difficulty.   Patient is alert and orientated.   Head eyes, nose and mouth within normal limits.  No supraclavicular or cervical adenopathy palpated.  Thyroid within normal limits.  No carotid bruits auscultated.  Lungs are clear to auscultation  Heart is regular rate and rhythm with no murmur or thrills noted.  No costal vertebral angle tenderness noted.  Abdomen is abdomen is soft without significant tenderness, masses, organomegaly or guarding  bowel sounds are positive and no caput medusa noted.  Has an umbiical hernias noted.     Skin was warm and pink  Normal Affect    Lower extremity edema is not present.    On the right side of the phanisteal incision is 2 areas.  One that seems to have healed which is lateral and the other is more closer to the midline and that looks like it is open to the skin.  Can not tell how deep it is.   But patient noted that it felt deep before the seroma started to drain.  I see perhaps a little more inflammation on the right side on the ct scan but no obvious seroma or abscess.     Status: Final result    Study Result     EXAM: CT SCAN OF ABDOMEN AND PELVIS WITH IV CONTRAST MATERIAL ENHANCEMENT 3/10/2020 2:32 PM     CLINICAL:  Possible abdominal abscess near  incision site.     COMPARISON: None.     TECHNIQUE: Helical scan through the abdomen and pelvis after administration of intravenous contrast material.  Oral enteric contrast material not administered. Axial, coronal and sagittal images were reconstructed off the helical data set.  100 mL of Omnipaque 350 given intravenously.     FINDINGS:      No abscess near the  section scar, or elsewhere " within the abdomen or pelvis.  Liver: Normal.  Gallbladder and Bile Ducts: Normal.  Spleen: Normal.  Pancreas: Normal.  Adrenals: Normal.  Kidneys: No acute finding. 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 No pyelonephritis. No urinary tract stone or hydronephrosis. No renal mass. No renal cortical atrophy or scarring.  Urinary Bladder: Normal.  Aorta: Normal.  No adenopathy.  No ascites.  No significant  bowel abnormality.  No inflammation associated with large or small bowel.  The stomach looks normal.  Normal appendix.  Small fat-containing umbilical hernia. Slight diastasis of the rectus muscles with intervening fascial laxity near the umbilicus.  No adnexal lesion.  Negative for fracture or destructive bone lesion.     IMPRESSION  IMPRESSION:      1.  No abscess near the  section scar or elsewhere.     2.  Crossed fused renal ectopia. Otherwise normal solid abdominal organs and urinary tract. No acute finding involving the urinary tract. No urinary tract stone or hydronephrosis. No pyelonephritis.     3.  No biliary tract abnormality.     4.  No bowel abnormality. Normal appendix. Normal stomach.     5.  No adnexal lesion.     6.  Small fat-containing umbilical hernia.     REPORT SIGNED BY DR. Cordell Candelaria       Labs show:  Wbc 10.7 3 months ago but was elevated for several months after delivery.  11.7    Assessment: On the right side of the phanisteal incision is 2 areas.  One that seems to have healed which is lateral and the other is more closer to the midline and that looks like it is open to the skin.  Can not tell how deep it is.   But patient noted that it felt deep before the seroma started to drain.  I see perhaps a little more inflammation on the right side on the ct scan but no obvious seroma or abscess.    Plan to do patient history is of an improved draining sites that got better with antibiotics. But came back so will explore the wound with wires to see  where this is down to.  Then excise the area of concern and leave open.     Risks of surgery include damage to nerves, bleeding, infection, damage to  Vessels, recurrence.      Time spent with the patient with greater that 50% of the time in discussion was 30 minutes.  In discussing the plan.      Juanpablo Dallas MD        Again, thank you for allowing me to participate in the care of your patient.        Sincerely,        Juanpablo Dallas MD

## 2020-07-24 NOTE — PATIENT INSTRUCTIONS
Status: Final result    Study Result     EXAM: CT SCAN OF ABDOMEN AND PELVIS WITH IV CONTRAST MATERIAL ENHANCEMENT 3/10/2020 2:32 PM     CLINICAL:  Possible abdominal abscess near  incision site.     COMPARISON: None.     TECHNIQUE: Helical scan through the abdomen and pelvis after administration of intravenous contrast material.  Oral enteric contrast material not administered. Axial, coronal and sagittal images were reconstructed off the helical data set.  100 mL of Omnipaque 350 given intravenously.     FINDINGS:      No abscess near the  section scar, or elsewhere within the abdomen or pelvis.  Liver: Normal.  Gallbladder and Bile Ducts: Normal.  Spleen: Normal.  Pancreas: Normal.  Adrenals: Normal.  Kidneys: No acute finding. 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 No pyelonephritis. No urinary tract stone or hydronephrosis. No renal mass. No renal cortical atrophy or scarring.  Urinary Bladder: Normal.  Aorta: Normal.  No adenopathy.  No ascites.  No significant  bowel abnormality.  No inflammation associated with large or small bowel.  The stomach looks normal.  Normal appendix.  Small fat-containing umbilical hernia. Slight diastasis of the rectus muscles with intervening fascial laxity near the umbilicus.  No adnexal lesion.  Negative for fracture or destructive bone lesion.     IMPRESSION  IMPRESSION:      1.  No abscess near the  section scar or elsewhere.     2.  Crossed fused renal ectopia. Otherwise normal solid abdominal organs and urinary tract. No acute finding involving the urinary tract. No urinary tract stone or hydronephrosis. No pyelonephritis.     3.  No biliary tract abnormality.     4.  No bowel abnormality. Normal appendix. Normal stomach.     5.  No adnexal lesion.     6.  Small fat-containing umbilical hernia.     REPORT SIGNED BY DR. Cordell Candelaria       Labs show:  Wbc 10.7 3 months ago but was elevated for several months after  delivery.  11.7    Assessment: On the right side of the phanisteal incision is 2 areas.  One that seems to have healed which is lateral and the other is more closer to the midline and that looks like it is open to the skin.  Can not tell how deep it is.   But patient noted that it felt deep before the seroma started to drain.  I see perhaps a little more inflammation on the right side on the ct scan but no obvious seroma or abscess.    Plan to do patient history is of an improved draining sites that got better with antibiotics. But came back so will explore the wound with wires to see where this is down to.  Then excise the area of concern and leave open.     Risks of surgery include damage to nerves, bleeding, infection, damage to  Vessels, recurrence.        SKIN AND SUBCUTANEOUS SURGERY DISCHARGE INSTRUCTIONS  DR. MAXINE RAY    1. You may resume your regular diet when you feel you are ready to. DO NOT drink alcoholic beverages for 24 hours or while you are taking prescription medication.    2. Limit your activities for the first 48 hours. Gradually, increase them as tolerated. You may use stairs. I encourage you to walk as tolerated.     3. You will have some discomfort at the incision sites. This is expected. This should improve over the next 2-3 days. Ice and pain medication will help with this pain. Use prescribed pain medication as instructed.    4. Bruising and mild swelling is normal after surgery. The area below and around the incision(s) will be hard and elevated. This is normal. I call it the healing ridge. This will resolve slowly over the next several months. If you feel the pain is increasing and cannot explain it by increasing activity please call us at [phone].    5. The dressing will often have some blood on it. You may shower 24 hours after surgery. Clean gently over incision site. If clear plastic covering or steri-strip comes off and there is still some bleeding or drainage then  cover with gauze or band-aid. If no bleeding, there is no reason to cover site. If you were given an abdominal binder at time of surgery it may be removed after 24 hours after surgery. You may continue to wear it however for comfort. I suggest  you wear an old t-shirt under the abdominal binder for a more comfortable wear.    6. Avoid Aspirin for the first 72 hours after the procedure. This medication may increase the tendency to bleed.    7. Use the following medications (in addition to your normal meds) as shown:  a. Percocet 5 mg 1-2 every 6 hours as needed for severe pain. This contains 325 mg of Tylenol (acetaminophen) per tablet.  Please do not take more than 4 grams of Tylenol (acetaminophen) per day. For example, you may take 1 Percocet and 1 Tylenol, or 2 Percocet and no Tylenol, or 2 Tylenol and no Percocet every 6 hours.  b. Tylenol (acetaminophen) 500 mg every 6 hours as needed for mild pain. Do not take more than 1000 mg every 6 hours. (see above).  c. Motrin (ibuprofen) 200-800 mg every 6 hours as needed for mild to moderate pain. Take with food.     8. Notify Dr. Dallas's clinic at [phone] if:    Your discomfort is not relieved by your pain medication.    You have signs of infection such as temperature above 100.5 degrees orally, chills, or increasing daily discomfort.    Incision site is becoming more red and/or there is purulent drainage.    You have questions or concerns.    9. Please call [phone] to schedule a follow up appointment in about 2 weeks.  If you have not already been given one.     10. When taking narcotics (pain medication more than Tylenol [acetaminophen] and Motrin [ibuprofen]) it is important to keep your stools soft to avoid constipation and pain with straining. This is best done by drinking fluids (non-alcoholic and non-caffeinated) and taking a stool softener (i.e. Metamucil or milk of magnesia). You may be able to use non-narcotics for pain relief especially by  the 3rd post- operative day. Tylenol (acetaminophen) 500 mg every 6 hours and/or Motrin (ibuprofen) 200-800 mg every 6 hours. Please do not take more than 4 grams of Tylenol (acetaminophen) per day. Remember your Percocet does have Tylenol (acetaminophen) already in it. Please take Motrin (ibuprofen) with food to help protect the stomach. If you have a history of stomach ulcers or stomach problems, do not take Motrin (ibuprofen).     11. Do not drive or operate heavy machinery for 24 hours after surgery or when taking narcotics. You may resume driving when feel that you can safely avoid an accident and are not taking narcotics. This is usually 5 to 7 days after surgery. You should not be alone for 24 hours after surgery.    12. Have milk of magnesia available at home so that when you take the pain medications you take 1-2 teaspoons a day,  to help reduce problems with constipation.

## 2020-07-24 NOTE — PROGRESS NOTES
"Dear Marcela Batista  I was asked to see this patient by Marcela France for please see below.  I have seen Joanne Escalante and as you know his chief complaint is  Patient had a c section in  of this year.  Had an area on the right c section line that was not healing well  Then it drained more and infected.  Seemed to have healed.  Then a week later had a seroma open up and drain.   Has had silver nitrate treatment on it.     HPI:  Patient is a 29 year old female  with complaints see above  The patient noticed the symptoms about 6 months ago.    Patient has family history of hernia problems  father  nothing makes the episode better.      Review Of Systems  Respiratory: No shortness of breath, dyspnea on exertion, cough, or hemoptysis  Cardiovascular: negative  Gastrointestinal: nausea  Endocrine: negative  :  negative    10 Point review of systems all others are negative.   /80   Pulse 97   Ht 1.549 m (5' 1\")   Wt 87.5 kg (193 lb)   LMP  (LMP Unknown)   BMI 36.47 kg/m      Past Medical History:   Diagnosis Date     Anxiety      Depressive disorder      Endometriosis      Periumbilical hernia        Past Surgical History:   Procedure Laterality Date     C ORAL SURGERY PROCEDURE      [place] teeth      SECTION  2018, 2020    x 2     LAPAROSCOPY DIAGNOSTIC (GYN)  2015    Endometriosis       Social History     Socioeconomic History     Marital status: Single     Spouse name: partner- Jacob     Number of children: 2     Years of education: Not on file     Highest education level: Not on file   Occupational History     Occupation: nursing asst   Social Needs     Financial resource strain: Not on file     Food insecurity     Worry: Not on file     Inability: Not on file     Transportation needs     Medical: Not on file     Non-medical: Not on file   Tobacco Use     Smoking status: Current Every Day Smoker     Packs/day: 0.25     Types: Cigarettes     Smokeless tobacco: Never " "Used   Substance and Sexual Activity     Alcohol use: Not Currently     Drug use: Never     Sexual activity: Not Currently     Partners: Male     Birth control/protection: Condom   Lifestyle     Physical activity     Days per week: Not on file     Minutes per session: Not on file     Stress: Not on file   Relationships     Social connections     Talks on phone: Not on file     Gets together: Not on file     Attends [employer] service: Not on file     Active member of club or organization: Not on file     Attends meetings of clubs or organizations: Not on file     Relationship status: Not on file     Intimate partner violence     Fear of current or ex partner: Not on file     Emotionally abused: Not on file     Physically abused: Not on file     Forced sexual activity: Not on file   Other Topics Concern     Parent/sibling w/ CABG, MI or angioplasty before 65F 55M? Not Asked   Social History Narrative     Not on file       Current Outpatient Medications   Medication Sig Dispense Refill     Prenatal Vit-Fe Fumarate-FA (PRENATAL VITAMIN PO)        sertraline (ZOLOFT) 50 MG tablet Take 1 tablet (50 mg) by mouth daily 90 tablet 1     Acetaminophen (TYLENOL PO) Take by mouth every 6 hours as needed for mild pain or fever       cephALEXin (KEFLEX) 500 MG capsule Take 1 capsule (500 mg) by mouth 3 times daily for 10 days (Patient not taking: Reported on 7/24/2020) 30 capsule 0     IBUPROFEN PO Take by mouth every 4 hours as needed for moderate pain       mupirocin (BACTROBAN) 2 % external ointment Apply topically 3 times daily for 7 days (Patient not taking: Reported on 7/24/2020) 30 g 0     nystatin (MYCOSTATIN) 951446 UNIT/GM external powder Apply topically 2 times daily Prevention of rash (Patient not taking: Reported on 7/24/2020) 1 Bottle 2     ondansetron (ZOFRAN-ODT) 4 MG ODT tab every 8 hours as needed          Above was reviewed  Physical exam: /80   Pulse 97   Ht 1.549 m (5' 1\")   Wt 87.5 kg (193 lb)   LMP "  (LMP Unknown)   BMI 36.47 kg/m     Patient able to get up on table without difficulty.   Patient is alert and orientated.   Head eyes, nose and mouth within normal limits.  No supraclavicular or cervical adenopathy palpated.  Thyroid within normal limits.  No carotid bruits auscultated.  Lungs are clear to auscultation  Heart is regular rate and rhythm with no murmur or thrills noted.  No costal vertebral angle tenderness noted.  Abdomen is abdomen is soft without significant tenderness, masses, organomegaly or guarding  bowel sounds are positive and no caput medusa noted.  Has an umbiical hernias noted.     Skin was warm and pink  Normal Affect    Lower extremity edema is not present.    On the right side of the phanisteal incision is 2 areas.  One that seems to have healed which is lateral and the other is more closer to the midline and that looks like it is open to the skin.  Can not tell how deep it is.   But patient noted that it felt deep before the seroma started to drain.  I see perhaps a little more inflammation on the right side on the ct scan but no obvious seroma or abscess.     Status: Final result    Study Result     EXAM: CT SCAN OF ABDOMEN AND PELVIS WITH IV CONTRAST MATERIAL ENHANCEMENT 3/10/2020 2:32 PM     CLINICAL:  Possible abdominal abscess near  incision site.     COMPARISON: None.     TECHNIQUE: Helical scan through the abdomen and pelvis after administration of intravenous contrast material.  Oral enteric contrast material not administered. Axial, coronal and sagittal images were reconstructed off the helical data set.  100 mL of Omnipaque 350 given intravenously.     FINDINGS:      No abscess near the  section scar, or elsewhere within the abdomen or pelvis.  Liver: Normal.  Gallbladder and Bile Ducts: Normal.  Spleen: Normal.  Pancreas: Normal.  Adrenals: Normal.  Kidneys: No acute finding. 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 No  pyelonephritis. No urinary tract stone or hydronephrosis. No renal mass. No renal cortical atrophy or scarring.  Urinary Bladder: Normal.  Aorta: Normal.  No adenopathy.  No ascites.  No significant  bowel abnormality.  No inflammation associated with large or small bowel.  The stomach looks normal.  Normal appendix.  Small fat-containing umbilical hernia. Slight diastasis of the rectus muscles with intervening fascial laxity near the umbilicus.  No adnexal lesion.  Negative for fracture or destructive bone lesion.     IMPRESSION  IMPRESSION:      1.  No abscess near the  section scar or elsewhere.     2.  Crossed fused renal ectopia. Otherwise normal solid abdominal organs and urinary tract. No acute finding involving the urinary tract. No urinary tract stone or hydronephrosis. No pyelonephritis.     3.  No biliary tract abnormality.     4.  No bowel abnormality. Normal appendix. Normal stomach.     5.  No adnexal lesion.     6.  Small fat-containing umbilical hernia.     REPORT SIGNED BY DR. Cordell Candelaria       Labs show:  Wbc 10.7 3 months ago but was elevated for several months after delivery.  11.7    Assessment: On the right side of the phanisteal incision is 2 areas.  One that seems to have healed which is lateral and the other is more closer to the midline and that looks like it is open to the skin.  Can not tell how deep it is.   But patient noted that it felt deep before the seroma started to drain.  I see perhaps a little more inflammation on the right side on the ct scan but no obvious seroma or abscess.    Plan to do patient history is of an improved draining sites that got better with antibiotics. But came back so will explore the wound with wires to see where this is down to.  Then excise the area of concern and leave open.     Risks of surgery include damage to nerves, bleeding, infection, damage to  Vessels, recurrence.      Time spent with the patient with greater that 50% of the time in  discussion was 30 minutes.  In discussing the plan.      Juanpablo Dallas MD

## 2020-07-27 ENCOUNTER — TELEPHONE (OUTPATIENT)
Dept: SURGERY | Facility: CLINIC | Age: 30
End: 2020-07-27

## 2020-07-27 NOTE — TELEPHONE ENCOUNTER
Type of surgery: incision and drainage abscess complex right, abdominal   CPT 06922   Infected incision T81.49XA    Location of surgery: [facility]  Date and time of surgery: 08/05/2020 / 10:30 am   Surgeon: Burt  Pre-Op Appt Date: 07/29/2020  Post-Op Appt Date: 08/18/2020   Packet sent out: Yes  Pre-cert/Authorization completed:  No prior auth needed, per Pref one online list.   Date: 07/28/2020    Thank you,   Bina El   [facility] Department  [phone]

## 2020-07-29 ENCOUNTER — OFFICE VISIT (OUTPATIENT)
Dept: FAMILY MEDICINE | Facility: CLINIC | Age: 30
End: 2020-07-29
Payer: COMMERCIAL

## 2020-07-29 ENCOUNTER — TELEPHONE (OUTPATIENT)
Dept: SURGERY | Facility: CLINIC | Age: 30
End: 2020-07-29

## 2020-07-29 VITALS
RESPIRATION RATE: 12 BRPM | HEART RATE: 80 BPM | BODY MASS INDEX: 36.88 KG/M2 | TEMPERATURE: 98.2 F | DIASTOLIC BLOOD PRESSURE: 68 MMHG | SYSTOLIC BLOOD PRESSURE: 118 MMHG | WEIGHT: 195.2 LBS

## 2020-07-29 DIAGNOSIS — L03.311 CELLULITIS OF ABDOMINAL WALL: ICD-10-CM

## 2020-07-29 DIAGNOSIS — Z01.818 PREOP GENERAL PHYSICAL EXAM: Primary | ICD-10-CM

## 2020-07-29 LAB
ANION GAP SERPL CALCULATED.3IONS-SCNC: 6 MMOL/L (ref 3–14)
BASOPHILS # BLD AUTO: 0.1 10E9/L (ref 0–0.2)
BASOPHILS NFR BLD AUTO: 0.5 %
BUN SERPL-MCNC: 12 MG/DL (ref 7–30)
CALCIUM SERPL-MCNC: 9.1 MG/DL (ref 8.5–10.1)
CHLORIDE SERPL-SCNC: 107 MMOL/L (ref 94–109)
CO2 SERPL-SCNC: 26 MMOL/L (ref 20–32)
CREAT SERPL-MCNC: 0.73 MG/DL (ref 0.52–1.04)
DIFFERENTIAL METHOD BLD: ABNORMAL
EOSINOPHIL # BLD AUTO: 0.1 10E9/L (ref 0–0.7)
EOSINOPHIL NFR BLD AUTO: 0.9 %
ERYTHROCYTE [DISTWIDTH] IN BLOOD BY AUTOMATED COUNT: 12.6 % (ref 10–15)
GFR SERPL CREATININE-BSD FRML MDRD: >90 ML/MIN/{1.73_M2}
GLUCOSE SERPL-MCNC: 91 MG/DL (ref 70–99)
HCG UR QL: NEGATIVE
HCT VFR BLD AUTO: 42.5 % (ref 35–47)
HGB BLD-MCNC: 14.7 G/DL (ref 11.7–15.7)
LYMPHOCYTES # BLD AUTO: 2.9 10E9/L (ref 0.8–5.3)
LYMPHOCYTES NFR BLD AUTO: 22.8 %
MCH RBC QN AUTO: 30.2 PG (ref 26.5–33)
MCHC RBC AUTO-ENTMCNC: 34.6 G/DL (ref 31.5–36.5)
MCV RBC AUTO: 87 FL (ref 78–100)
MONOCYTES # BLD AUTO: 0.7 10E9/L (ref 0–1.3)
MONOCYTES NFR BLD AUTO: 5.1 %
NEUTROPHILS # BLD AUTO: 9.1 10E9/L (ref 1.6–8.3)
NEUTROPHILS NFR BLD AUTO: 70.7 %
PLATELET # BLD AUTO: 288 10E9/L (ref 150–450)
POTASSIUM SERPL-SCNC: 4 MMOL/L (ref 3.4–5.3)
RBC # BLD AUTO: 4.87 10E12/L (ref 3.8–5.2)
SODIUM SERPL-SCNC: 139 MMOL/L (ref 133–144)
WBC # BLD AUTO: 12.9 10E9/L (ref 4–11)

## 2020-07-29 PROCEDURE — 85025 COMPLETE CBC W/AUTO DIFF WBC: CPT | Performed by: NURSE PRACTITIONER

## 2020-07-29 PROCEDURE — 81025 URINE PREGNANCY TEST: CPT | Performed by: NURSE PRACTITIONER

## 2020-07-29 PROCEDURE — 80048 BASIC METABOLIC PNL TOTAL CA: CPT | Performed by: NURSE PRACTITIONER

## 2020-07-29 PROCEDURE — 36415 COLL VENOUS BLD VENIPUNCTURE: CPT | Performed by: NURSE PRACTITIONER

## 2020-07-29 PROCEDURE — 99214 OFFICE O/P EST MOD 30 MIN: CPT | Performed by: NURSE PRACTITIONER

## 2020-07-29 RX ORDER — CEPHALEXIN 500 MG/1
500 CAPSULE ORAL 4 TIMES DAILY
Qty: 28 CAPSULE | Refills: 0 | Status: SHIPPED | OUTPATIENT
Start: 2020-07-29 | End: 2020-08-05

## 2020-07-29 ASSESSMENT — PAIN SCALES - GENERAL: PAINLEVEL: MODERATE PAIN (4)

## 2020-07-29 NOTE — TELEPHONE ENCOUNTER
Pt has chills, muscle aches, and is fatigued. Progressed to worse yesterday.  She saw her MD for her pro op and did not mention if wound looked infected to pt, but did place her on more abx. She has a BMP in progress and a CBC which is complete. I let her know I will message MD and see if he would like to add any other orders to blood specimen or what his thoughts are. It also appears that she is going to try to get COVID testing done.    Kasey Patton RN Specialty Triage 7/29/2020 12:10 PM

## 2020-07-29 NOTE — TELEPHONE ENCOUNTER
Called pt back and let her know to add heat. Message to be sent to surgery schedulers to see if pt can be moved up.    Ksaey Patton RN Specialty Triage 7/29/2020 2:10 PM

## 2020-07-29 NOTE — PROGRESS NOTES
[facility]  [address]  [place] [address]  [phone]  Dept: [phone]    PRE-OP EVALUATION:  Today's date: 2020    Joanne Escalante (: 1990) presents for pre-operative evaluation assessment as requested by Dr. Dallas.  She requires evaluation and anesthesia risk assessment prior to undergoing surgery/procedure for treatment of infected  incision .    Proposed Surgery/ Procedure: incision and drainage abscess complex right abdomen  Date of Surgery/ Procedure: 2020  Time of Surgery/ Procedure: 10:30  Hospital/Surgical Facility: [facility]   Surgery Fax Number: [phone]  Primary Physician: Marcela France  Type of Anesthesia Anticipated: General    Preoperative Questionnaire:   No - Have you ever had a heart attack or stroke?  No - Have you ever had surgery on your heart or blood vessels, such as a stent, coronary (heart) bypass, or surgery on an artery in the head, neck, heart, or legs?  No - Do you have chest pain when you are physically active?  No - Do you have a history of heart failure?  No - Do you currently have a cold, bronchitis, or symptoms of other respiratory (head and chest) infections?  No - Do you have a cough, shortness of breath, or wheezing?  No - Do you or anyone in your family have a history of blood clots?  No - Do you or anyone in your family have a serious bleeding problem, such as long-lasting bleeding after surgeries or cuts?  YES - Have you ever had anemia or been told to take iron pills? When was pregnant.   No - Have you had any abnormal blood loss such as black, tarry or bloody stools, or abnormal vaginal bleeding?  No - Have you ever had a blood transfusion?  Yes - Are you willing to have a blood transfusion if it is medically needed before, during, or after your surgery?  No - Have you or anyone in your family ever had problems with anesthesia (sedation for surgery)?  No - Do you have sleep apnea,  excessive snoring, or daytime drowsiness?   No - Do you have any artifical heart valves or other implanted medical devices, such as a pacemaker, defibrillator, or continuous glucose monitor?  No - Do you have any artifical joints?  No - Are you allergic to latex?  No - Is there any chance that you may be pregnant?    Patient has a Health Care Directive or Living Will:  NO    HPI:     HPI related to upcoming procedure: Is going to have debridement of wound.  Had  in January.  Has been struggling since with seromas, infection and wound dehiscence.  Today, is not feeling well. Is having chills.  Was worse yesterday.  Has been feeling hot and cold. Does have some muscle and body aches.  No fever at home that is aware of finished keflex about 1 week ago. Was feeling poorly even when was on it but is worse now. Is currently breast feeding.     Risk for COVID-19 is low.  Has not been out in the community. Has not been able to work because is not feeling well and because of wound.    Also has hernia-umbilical.  Plans to get that repaired in the future    Plans to call today and try to schedule COVID-19 testing.     See problem list for active medical problems.  Problems all longstanding and stable, except as noted/documented.  See ROS for pertinent symptoms related to these conditions.      MEDICAL HISTORY:     Patient Active Problem List    Diagnosis Date Noted     Umbilical hernia without obstruction and without gangrene 2020     Priority: Medium     Added automatically from request for surgery [id number]        Past Medical History:   Diagnosis Date     Anxiety      Depressive disorder      Endometriosis      Periumbilical hernia      Past Surgical History:   Procedure Laterality Date     C ORAL SURGERY PROCEDURE      [place] teeth      SECTION  2018, 2020    x 2     LAPAROSCOPY DIAGNOSTIC (GYN)  2015    Endometriosis     Current Outpatient Medications   Medication Sig Dispense Refill      Acetaminophen (TYLENOL PO) Take by mouth every 6 hours as needed for mild pain or fever       cephALEXin (KEFLEX) 500 MG capsule Take 1 capsule (500 mg) by mouth 4 times daily for 7 days 28 capsule 0     IBUPROFEN PO Take by mouth every 4 hours as needed for moderate pain       nystatin (MYCOSTATIN) 457931 UNIT/GM external powder Apply topically 2 times daily Prevention of rash 1 Bottle 2     ondansetron (ZOFRAN-ODT) 4 MG ODT tab every 8 hours as needed        Prenatal Vit-Fe Fumarate-FA (PRENATAL VITAMIN PO)        sertraline (ZOLOFT) 50 MG tablet Take 1 tablet (50 mg) by mouth daily 90 tablet 1     OTC products: None, except as noted above    Allergies   Allergen Reactions     Sulfa Drugs       Latex Allergy: NO    Social History     Tobacco Use     Smoking status: Current Every Day Smoker     Packs/day: 0.25     Types: Cigarettes     Smokeless tobacco: Never Used   Substance Use Topics     Alcohol use: Not Currently     History   Drug Use Unknown       REVIEW OF SYSTEMS:   Review of Systems   Constitutional: Negative for activity change and fatigue.   HENT: Negative for ear pain, rhinorrhea and sore throat.    Respiratory: Negative for cough, chest tightness, shortness of breath and wheezing.    Cardiovascular: Negative for chest pain, palpitations and leg swelling.   Gastrointestinal: Negative for abdominal distention, abdominal pain, constipation, diarrhea, nausea and vomiting.   Endocrine: Negative for cold intolerance, heat intolerance, polydipsia, polyphagia and polyuria.   Genitourinary: Negative for difficulty urinating, enuresis, frequency and urgency.   Musculoskeletal: Negative for arthralgias.   Skin: Negative for rash.         incision that is tender    Neurological: Negative for dizziness, light-headedness, numbness and headaches.   Psychiatric/Behavioral: Negative for dysphoric mood and sleep disturbance. The patient is not nervous/anxious.          EXAM:   /68 (BP Location: Right  arm, Patient Position: Sitting, Cuff Size: Adult Large)   Pulse 80   Temp 98.2  F (36.8  C) (Tympanic)   Resp 12   Wt 88.5 kg (195 lb 3.2 oz)   LMP  (LMP Unknown)   BMI 36.88 kg/m    Physical Exam  Constitutional:       Appearance: Normal appearance. She is well-developed.   HENT:      Head: Normocephalic and atraumatic.      Right Ear: Tympanic membrane and external ear normal. No middle ear effusion.      Left Ear: Tympanic membrane and external ear normal.  No middle ear effusion.      Nose: No mucosal edema.      Mouth/Throat:      Pharynx: Uvula midline.   Eyes:      Pupils: Pupils are equal, round, and reactive to light.   Neck:      Musculoskeletal: Normal range of motion.      Thyroid: No thyromegaly.      Vascular: No carotid bruit.   Cardiovascular:      Rate and Rhythm: Normal rate and regular rhythm.      Pulses:           Femoral pulses are 2+ on the right side and 2+ on the left side.     Heart sounds: Normal heart sounds.   Pulmonary:      Effort: Pulmonary effort is normal.      Breath sounds: Normal breath sounds.   Abdominal:      General: Bowel sounds are normal.      Palpations: Abdomen is soft.      Tenderness: There is no abdominal tenderness.       Musculoskeletal: Normal range of motion.   Skin:     General: Skin is warm and dry.      Findings: No rash.   Neurological:      Mental Status: She is alert.   Psychiatric:         Behavior: Behavior normal.           DIAGNOSTICS:     Labs Drawn and in Process:     Lab Results   Component Value Date    WBC 12.9 07/29/2020     Lab Results   Component Value Date    RBC 4.87 07/29/2020     Lab Results   Component Value Date    HGB 14.7 07/29/2020     Lab Results   Component Value Date    HCT 42.5 07/29/2020     No components found for: MCT  Lab Results   Component Value Date    MCV 87 07/29/2020     Lab Results   Component Value Date    MCH 30.2 07/29/2020     Lab Results   Component Value Date    MCHC 34.6 07/29/2020     Lab Results   Component  Value Date    RDW 12.6 07/29/2020     Lab Results   Component Value Date     07/29/2020     Recent Labs   Lab Test 07/29/20  1124 04/21/20     --    POTASSIUM 4.0 3.8   CHLORIDE 107  --    CO2 26  --    ANIONGAP 6  --    GLC 91 97   BUN 12  --    CR 0.73 1.07*   WILDA 9.1  --          IMPRESSION:   Reason for surgery/procedure: Wound dehiscence  Diagnosis/reason for consult: Optimization    The proposed surgical procedure is considered INTERMEDIATE risk.    REVISED CARDIAC RISK INDEX  The patient has the following serious cardiovascular risks for perioperative complications such as (MI, PE, VFib and 3  AV Block):  No serious cardiac risks  INTERPRETATION: 0 risks: Class I (very low risk - 0.4% complication rate)    The patient has the following additional risks for perioperative complications:  No identified additional risks      ICD-10-CM    1. Preop general physical exam  Z01.818 HCG qualitative urine     CBC with platelets differential     Basic metabolic panel   2. Cellulitis of abdominal wall  L03.311 cephALEXin (KEFLEX) 500 MG capsule     Will plan to restart on Keflex here today based on current symptoms and labs.    RECOMMENDATIONS:     --Consult hospital rounder / IM to assist post-op medical management    --Patient is on no chronic medications    APPROVAL GIVEN to proceed with proposed procedure       Signed Electronically by: ISH Ingram CNP    Copy of this evaluation report is provided to requesting physician.    [facility] Preop Guidelines    Revised Cardiac Risk Index

## 2020-07-29 NOTE — TELEPHONE ENCOUNTER
Unable to move up due to COVID testing. Warm transfer to [phone] [facility] to schedule COVID test for 8-5-20 surgery.

## 2020-07-29 NOTE — TELEPHONE ENCOUNTER
Reason for Call:  Other Patient request    Detailed comments: Patient calling, states she had her pre-op today and she was put on anti-biotics. But she hasn't been getting better and doesn't feel well at all. She thinks the incision is infected. She also had labs draw at the [facility]. Patient would like a call back to discuss what she should do now.    Phone Number Patient can be reached at: Cell number on file:    Telephone Information:   Mobile [phone]       Best Time: any    Can we leave a detailed message on this number? YES    Call taken on 7/29/2020 at 11:37 AM by Ryan Vanegas    *Patient is calling to get a covid test for her surgery, please call back to discuss and help.  [phone]

## 2020-07-30 ENCOUNTER — VIRTUAL VISIT (OUTPATIENT)
Dept: FAMILY MEDICINE | Facility: OTHER | Age: 30
End: 2020-07-30

## 2020-07-30 ASSESSMENT — ENCOUNTER SYMPTOMS
ABDOMINAL DISTENTION: 0
POLYPHAGIA: 0
PALPITATIONS: 0
NAUSEA: 0
SHORTNESS OF BREATH: 0
DIZZINESS: 0
CHEST TIGHTNESS: 0
ARTHRALGIAS: 0
FREQUENCY: 0
RHINORRHEA: 0
NUMBNESS: 0
DYSPHORIC MOOD: 0
NERVOUS/ANXIOUS: 0
ABDOMINAL PAIN: 0
LIGHT-HEADEDNESS: 0
ACTIVITY CHANGE: 0
POLYDIPSIA: 0
SLEEP DISTURBANCE: 0
CONSTIPATION: 0
DIARRHEA: 0
WHEEZING: 0
FATIGUE: 0
HEADACHES: 0
COUGH: 0
SORE THROAT: 0
VOMITING: 0
DIFFICULTY URINATING: 0

## 2020-07-30 NOTE — PROGRESS NOTES
"Date: 2020 15:28:13  Clinician: Varghese Le  Clinician NPI: [id number]  Patient: Joanne Escalante  Patient : 1990  Patient Address: [address] [place], [place] [address] [address]  Patient Phone: [phone]  Visit Protocol: URI  Patient Summary:  Joanne is a 29 year old ( : 1990 ) female who initiated a Visit for COVID-19 (Coronavirus) evaluation and screening. When asked the question \"Please sign me up to receive news, health information and promotions. \", Joanne responded \"No\".    Joanne states her symptoms started 1-2 days ago.   Her symptoms consist of nausea, myalgia, a sore throat, facial pain or pressure, a cough, nasal congestion, malaise, a headache, chills, and enlarged lymph nodes. She is experiencing difficulty breathing due to nasal congestion but she is not short of breath. Joanne also feels feverish but was unable to measure her temperature.   Symptom details     Nasal secretions: The color of her mucus is clear and white.    Cough: Joanne coughs a few times an hour and her cough is more bothersome at night. Phlegm does not come into her throat when she coughs. She believes her cough is caused by post-nasal drip.     Sore throat: Joanne reports having mild throat pain (1-3 on a 10 point pain scale), does not have exudate on her tonsils, and can swallow liquids. The lymph nodes in her neck are enlarged. A rash has not appeared on the skin since the sore throat started.     Facial pain or pressure: The facial pain or pressure feels worse when bending over or leaning forward.     Headache: She states the headache is moderate (4-6 on a 10 point pain scale).      Joanne denies having wheezing, teeth pain, ageusia, diarrhea, anosmia, vomiting, rhinitis, and ear pain. She also denies having recent facial or sinus surgery in the past 60 days and having a sinus infection within the past year.   Precipitating events  Within the past week, Joanne has not been exposed to someone with strep throat. She has " not recently been exposed to someone with influenza. Joanne has been in close contact with the following high risk individuals: people with asthma, heart disease or diabetes and children under the age of 5.   Pertinent COVID-19 (Coronavirus) information  In the past 14 days, Joanne has not worked in a congregate living setting.   She does not work or volunteer as healthcare worker or a  and does not work or volunteer in a healthcare facility.   Joanne also has not lived in a congregate living setting in the past 14 days. She does not live with a healthcare worker.   Joanne has not had a close contact with a laboratory-confirmed COVID-19 patient within 14 days of symptom onset.   Pertinent medical history  Joanne has taken an antibiotic medication in the past month. Antibiotic details as reported by the patient (free text): Lacie Rubio typically gets a yeast infection when she takes antibiotics. She is not sure if she has used fluconazole (Diflucan) to treat previous yeast infections.   Joanne needs a return to work/school note.   Weight: 195 lbs   Joanne smokes or uses smokeless tobacco.   She denies pregnancy and is breastfeeding. Her last period was over a month ago.   Additional information as reported by the patient (free text): I am also dealing with an infection from my c section back in January. I have a surgery scheduled for next week to get it debrided.   Weight: 195 lbs    MEDICATIONS: prenatal vitamins with calcium-iron fumarate-folic acid oral, Zoloft oral, Celexa oral, ALLERGIES: Sulfa (Sulfonamide Antibiotics)  Clinician Response:  Dear Joanne,   Your symptoms show that you may have coronavirus (COVID-19). This illness can cause fever, cough and trouble breathing. Many people get a mild case and get better on their own. Some people can get very sick.  What should I do?  We would like to test you for this virus.   1. Please call [phone] to schedule your visit. Explain that you were  "[facility]-19 test. Be ready to share your [facility] visit ID number.  The following will serve as your written order for this COVID Test, ordered by me, for the indication of suspected COVID [Z20.828]: The test will be ordered in [employer], our electronic health record, after you are scheduled. It will show as ordered and authorized by Dennis Boston MD.  Order: COVID-19 (Coronavirus) PCR for SYMPTOMATIC testing from [facility].      2. When it's time for your COVID test:  Stay at least 6 feet away from others. (If someone will drive you to your test, stay in the backseat, as far away from the  as you can.)   Cover your mouth and nose with a mask, tissue or washcloth.  Go straight to the testing site. Don't make any stops on the way there or back.      3.Starting now: Stay home and away from others (self-isolate) until:   You've had no fever---and no medicine that reduces fever---for 3 full days (72 hours). And...   Your other symptoms have gotten better. For example, your cough or breathing has improved. And...   At least 10 days have passed since your symptoms started.       During this time, don't leave the house except for testing or medical care.   Stay in your own room, even for meals. Use your own bathroom if you can.   Stay away from others in your home. No hugging, kissing or shaking hands. No visitors.  Don't go to work, school or anywhere else.    Clean \"high touch\" surfaces often (doorknobs, counters, handles, etc.). Use a household cleaning spray or wipes. You'll find a full list of  on the EPA website: www.epa.gov/pesticide-registration/list-n-disinfectants-use-against-sars-cov-2.   Cover your mouth and nose with a mask, tissue or washcloth to avoid spreading germs.  Wash your hands and face often. Use soap and water.  Caregivers in these groups are at risk for severe illness due to COVID-19:  o People 65 years and older  o People who live in a nursing home or long-term care " facility  o People with chronic disease (lung, heart, cancer, diabetes, kidney, liver, immunologic)  o People who have a weakened immune system, including those who:   Are in cancer treatment  Take medicine that weakens the immune system, such as corticosteroids  Had a bone marrow or organ transplant  Have an immune deficiency  Have poorly controlled HIV or AIDS  Are obese (body mass index of 40 or higher)  Smoke regularly   o Caregivers should wear gloves while washing dishes, handling laundry and cleaning bedrooms and bathrooms.  o Use caution when washing and drying laundry: Don't shake dirty laundry, and use the warmest water setting that you can.  o For more tips, go to www.cdc.gov/coronavirus/2019-ncov/downloads/10Things.pdf.    4.Sign up for [employer]. We know it's scary to hear that you might have COVID-19. We want to track your symptoms to make sure you're okay over the next 2 weeks. Please look for an email from [employer]---this is a free, online program that we'll use to keep in touch. To sign up, follow the link in the email. Learn more at [email or web address]  How can I take care of myself?   Get lots of rest. Drink extra fluids (unless a doctor has told you not to).   Take Tylenol (acetaminophen) for fever or pain. If you have liver or kidney problems, ask your family doctor if it's okay to take Tylenol.   Adults can take either:    650 mg (two 325 mg pills) every 4 to 6 hours, or...   1,000 mg (two 500 mg pills) every 8 hours as needed.    Note: Don't take more than 3,000 mg in one day. Acetaminophen is found in many medicines (both prescribed and over-the-counter medicines). Read all labels to be sure you don't take too much.   For children, check the Tylenol bottle for the right dose. The dose is based on the child's age or weight.    If you have other health problems (like cancer, heart failure, an organ transplant or severe kidney disease): Call your specialty clinic if you  don't feel better in the next 2 days.       Know when to call 911. Emergency warning signs include:    Trouble breathing or shortness of breath Pain or pressure in the chest that doesn't go away Feeling confused like you haven't felt before, or not being able to wake up Bluish-colored lips or face.  Where can I get more information?   [facility] -- About COVID-19: [email or web address]   CDC -- What to Do If You're Sick: www.cdc.gov/coronavirus/2019-ncov/about/steps-when-sick.html   [facility] -- Ending Home Isolation: www.cdc.gov/coronavirus/2019-ncov/hcp/disposition-in-home-patients.html   [facility] -- Caring for Someone: www.cdc.gov/coronavirus/2019-ncov/if-you-are-sick/care-for-someone.html   [facility] -- Interim Guidance for Hospital Discharge to Home: www.health.[facility].[place]./diseases/coronavirus/hcp/hospdischarge.pdf   [facility] clinical trials (COVID-19 research studies): clinicalaffairs.[facility]-clinical-trials    Below are the COVID-19 hotlines at the [facility] ([facility]). Interpreters are available.    For health questions: Call 810-537-4435 or 1-824.122.7988 (7 a.m. to 7 p.m.) For questions about schools and childcare: Call 153-514-9702 or 1-883.222.1992 (7 a.m. to 7 p.m.)       Rapid Strep Test - [facility]    I am sorry you are not feeling well. Your strep test has . Based on the information provided, it is possible that you could have strep throat. When left untreated, strep can spread to other areas of the body and cause a more serious infection.  A strep test is the only way to determine if a bacterial infection or a virus is causing your sore throat. This is done in a lab where a swab of the back of your throat is collected tested for the bacteria that causes strep.  Since you chose not to use the lab order, please be seen:     In a clinic or urgent care    Within 24 hours     Call 911 or go to the emergency room if you suddenly develop a rash, are drooling or  unable to swallow fluids, if you are having difficulty breathing, or feel that your throat is closing off.   Diagnosis: Pain in throat  Diagnosis ICD: R07.0  [facility] Results: Rapid Strep Test - [place] -   [facility] Secondary Results: null

## 2020-07-30 NOTE — PROGRESS NOTES
"Date: 2020 15:47:23  Clinician: Varghese Le  Clinician NPI: [id number]  Patient: Joanne Escalante  Patient : 1990  Patient Address: [address] [place], [place] [address] [address]  Patient Phone: [phone]  Visit Protocol: URI  Patient Summary:  Joanne is a 29 year old ( : 1990 ) female who initiated a Visit for COVID-19 (Coronavirus) evaluation and screening. When asked the question \"Please sign me up to receive news, health information and promotions. \", Joanne responded \"No\".    Joanne states her symptoms started 1-2 days ago.   Her symptoms consist of nausea, myalgia, a sore throat, facial pain or pressure, a cough, nasal congestion, malaise, ear pain, a headache, chills, and enlarged lymph nodes. She is experiencing difficulty breathing due to nasal congestion but she is not short of breath. Joanne also feels feverish but was unable to measure her temperature.   Symptom details     Nasal secretions: The color of her mucus is clear and white.    Cough: Joanne coughs a few times an hour and her cough is more bothersome at night. Phlegm does not come into her throat when she coughs. She believes her cough is caused by post-nasal drip.     Sore throat: Joanne reports having mild throat pain (1-3 on a 10 point pain scale), does not have exudate on her tonsils, and can swallow liquids. The lymph nodes in her neck are enlarged. A rash has not appeared on the skin since the sore throat started.     Facial pain or pressure: The facial pain or pressure feels worse when bending over or leaning forward.     Headache: She states the headache is moderate (4-6 on a 10 point pain scale).      Joanne denies having wheezing, teeth pain, ageusia, diarrhea, anosmia, vomiting, and rhinitis. She also denies having recent facial or sinus surgery in the past 60 days and having a sinus infection within the past year.   Precipitating events  Within the past week, Joanne has not been exposed to someone with strep throat. She has " not recently been exposed to someone with influenza. Joanne has been in close contact with the following high risk individuals: people with asthma, heart disease or diabetes and children under the age of 5.   Pertinent COVID-19 (Coronavirus) information  In the past 14 days, Joanne has not worked in a congregate living setting.   She does not work or volunteer as healthcare worker or a  and does not work or volunteer in a healthcare facility.   Joanne also has not lived in a congregate living setting in the past 14 days. She does not live with a healthcare worker.   Joanne has not had a close contact with a laboratory-confirmed COVID-19 patient within 14 days of symptom onset.   Pertinent medical history  Joanne has taken an antibiotic medication in the past month. Antibiotic details as reported by the patient (free text): Lacie Rubio does not get yeast infections when she takes antibiotics.   Joanne needs a return to work/school note.   Weight: 195 lbs   Joanne smokes or uses smokeless tobacco.   She denies pregnancy and is breastfeeding. Her last period was over a month ago.   Additional information as reported by the patient (free text): I have an infection currently also. Planning on having surgery next week for a wound debridment   Weight: 195 lbs  Reason for repeat visit for the same protocol within 24 hours:  It said strep, but I do not have any white spots in my throat.  See the History of referred by protocol and completed visits section for details on previous visits (visits currently in queue to be diagnosed will not appear in this section).    MEDICATIONS: prenatal vitamins with calcium-iron fumarate-folic acid oral, Zoloft oral, Celexa oral, ALLERGIES: Sulfa (Sulfonamide Antibiotics)  Clinician Response:  Dear Joanne,   Your symptoms show that you may have coronavirus (COVID-19). This illness can cause fever, cough and trouble breathing. Many people get a mild case and get better on their own.  "Some people can get very sick.  What should I do?  We would like to test you for this virus.   1. Please call [phone] to schedule your visit. Explain that [facility]. Be ready to share your [facility] visit ID number.  The following will serve as your written order for this COVID Test, ordered by me, for the indication of suspected COVID [Z20.828]: The test will be ordered in [facility], our electronic health record, after you are scheduled. It will show as ordered and authorized by Dennis Boston MD.  Order: COVID-19 (Coronavirus) PCR for SYMPTOMATIC testing from [facility].      2. When it's time for your COVID test:  Stay at least 6 feet away from others. (If someone will drive you to your test, stay in the backseat, as far away from the  as you can.)   Cover your mouth and nose with a mask, tissue or washcloth.  Go straight to the testing site. Don't make any stops on the way there or back.      3.Starting now: Stay home and away from others (self-isolate) until:   You've had no fever---and no medicine that reduces fever---for 3 full days (72 hours). And...   Your other symptoms have gotten better. For example, your cough or breathing has improved. And...   At least 10 days have passed since your symptoms started.       During this time, don't leave the house except for testing or medical care.   Stay in your own room, even for meals. Use your own bathroom if you can.   Stay away from others in your home. No hugging, kissing or shaking hands. No visitors.  Don't go to work, school or anywhere else.    Clean \"high touch\" surfaces often (doorknobs, counters, handles, etc.). Use a household cleaning spray or wipes. You'll find a full list of  on the EPA website: www.epa.gov/pesticide-registration/list-n-disinfectants-use-against-sars-cov-2.   Cover your mouth and nose with a mask, tissue or washcloth to avoid spreading germs.  Wash your hands and face often. Use soap and " water.  Caregivers in these groups are at risk for severe illness due to COVID-19:  o People 65 years and older  o People who live in a nursing home or long-term care facility  o People with chronic disease (lung, heart, cancer, diabetes, kidney, liver, immunologic)  o People who have a weakened immune system, including those who:   Are in cancer treatment  Take medicine that weakens the immune system, such as corticosteroids  Had a bone marrow or organ transplant  Have an immune deficiency  Have poorly controlled HIV or AIDS  Are obese (body mass index of 40 or higher)  Smoke regularly   o Caregivers should wear gloves while washing dishes, handling laundry and cleaning bedrooms and bathrooms.  o Use caution when washing and drying laundry: Don't shake dirty laundry, and use the warmest water setting that you can.  o For more tips, go to www.cdc.gov/coronavirus/2019-ncov/downloads/10Things.pdf.    4.Sign up for [email or web address]. We know it's scary to hear that you might have COVID-19. We want to track your symptoms to make sure you're okay over the next 2 weeks. Please look for an email from [email or web address]---this is a free, online program that we'll use to keep in touch. To sign up, follow the link in the email. Learn more at [email or web address]  How can I take care of myself?   Get lots of rest. Drink extra fluids (unless a doctor has told you not to).   Take Tylenol (acetaminophen) for fever or pain. If you have liver or kidney problems, ask your family doctor if it's okay to take Tylenol.   Adults can take either:    650 mg (two 325 mg pills) every 4 to 6 hours, or...   1,000 mg (two 500 mg pills) every 8 hours as needed.    Note: Don't take more than 3,000 mg in one day. Acetaminophen is found in many medicines (both prescribed and over-the-counter medicines). Read all labels to be sure you don't take too much.   For children, check the Tylenol bottle for the right dose. The dose is based on the child's  age or weight.    If you have other health problems (like cancer, heart failure, an organ transplant or severe kidney disease): Call your specialty clinic if you don't feel better in the next 2 days.       Know when to call 911. Emergency warning signs include:    Trouble breathing or shortness of breath Pain or pressure in the chest that doesn't go away Feeling confused like you haven't felt before, or not being able to wake up Bluish-colored lips or face.  Where can I get more information?   [facility] -- About COVID-19: [email or web address]   CDC -- What to Do If You're Sick: www.cdc.gov/coronavirus/2019-ncov/about/steps-when-sick.html   CDC -- Ending Home Isolation: www.cdc.gov/coronavirus/2019-ncov/hcp/disposition-in-home-patients.html   [facility] -- Caring for Someone: www.cdc.gov/coronavirus/2019-ncov/if-you-are-sick/care-for-someone.html   [facility] -- Interim Guidance for Hospital Discharge to Home: www.[facility].[place]./diseases/coronavirus/hcp/hospdischarge.pdf   [facility] clinical trials (COVID-19 research studies): clinicalaffairs.[facility]-clinical-trials    Below are the COVID-19 hotlines at the [place] Department of Health ([facility]). Interpreters are available.    For health questions: Call 734-055-2076 or 1-536.173.4730 (7 a.m. to 7 p.m.) For questions about schools and childcare: Call 918-041-9103 or 1-102.828.6561 (7 a.m. to 7 p.m.)       Rapid Strep Test - [place]    I am sorry you are not feeling well. Your strep test has . Based on the information provided, it is possible that you could have strep throat. When left untreated, strep can spread to other areas of the body and cause a more serious infection.  A strep test is the only way to determine if a bacterial infection or a virus is causing your sore throat. This is done in a lab where a swab of the back of your throat is collected tested for the bacteria that causes strep.  Since you chose not to use the lab  order, please be seen:     In a clinic or urgent care    Within 24 hours     Call 911 or go to the emergency room if you suddenly develop a rash, are drooling or unable to swallow fluids, if you are having difficulty breathing, or feel that your throat is closing off.   Diagnosis: Pain in throat  Diagnosis ICD: R07.0  [facility] Results: Rapid Strep Test - [place] -   [facility]Ticket Secondary Results: null

## 2020-08-01 ENCOUNTER — VIRTUAL VISIT (OUTPATIENT)
Dept: FAMILY MEDICINE | Facility: OTHER | Age: 30
End: 2020-08-01

## 2020-08-01 NOTE — PROGRESS NOTES
"Date: 2020 09:22:16  Clinician: Arjun Jefferson  Clinician NPI: [id number]  Patient: Joanne Escalante  Patient : 1990  Patient Address: [address] [place], [place] [address] [address]  Patient Phone: [phone]  Visit Protocol: URI  Patient Summary:  Joanne is a 29 year old ( : 1990 ) female who initiated a Visit for COVID-19 (Coronavirus) evaluation and screening. When asked the question \"Please sign me up to receive news, health information and promotions. \", Joanne responded \"No\".    Joanne states her symptoms started 1-2 days ago.   Her symptoms consist of nausea, ageusia, diarrhea, myalgia, a sore throat, anosmia, facial pain or pressure, a cough, nasal congestion, rhinitis, malaise, a headache, chills, and enlarged lymph nodes. She is experiencing mild difficulty breathing with activities but can speak normally in full sentences. Joanne also feels feverish but was unable to measure her temperature.   Symptom details     Nasal secretions: The color of her mucus is yellow.    Cough: Joanne coughs every 5-10 minutes and her cough is more bothersome at night. Phlegm comes into her throat when she coughs. She does not believe her cough is caused by post-nasal drip. The color of the phlegm is white and yellow.     Sore throat: Joanne reports having moderate throat pain (4-6 on a 10 point pain scale), does not have exudate on her tonsils, and can swallow liquids. The lymph nodes in her neck are enlarged. A rash has not appeared on the skin since the sore throat started.     Facial pain or pressure: The facial pain or pressure feels worse when bending over or leaning forward.     Headache: She states the headache is moderate (4-6 on a 10 point pain scale).      Joanne denies having wheezing, teeth pain, vomiting, and ear pain. She also denies having recent facial or sinus surgery in the past 60 days and having a sinus infection within the past year.   Precipitating events  Within the past week, Joanne has not been " exposed to someone with strep throat. She has not recently been exposed to someone with influenza. Joanne has been in close contact with the following high risk individuals: people with asthma, heart disease or diabetes and children under the age of 5.   Pertinent COVID-19 (Coronavirus) information  In the past 14 days, Joanne has not worked in a congregate living setting.   She does not work or volunteer as healthcare worker or a  and does not work or volunteer in a healthcare facility.   Joanne also has not lived in a congregate living setting in the past 14 days. She does not live with a healthcare worker.   Joanne has not had a close contact with a laboratory-confirmed COVID-19 patient within 14 days of symptom onset.   Pertinent medical history  Joanne has taken an antibiotic medication in the past month. Antibiotic details as reported by the patient (free text): Lacie Rubio does not get yeast infections when she takes antibiotics.   Joanne needs a return to work/school note.   Weight: 195 lbs   Joanne smokes or uses smokeless tobacco.   She denies pregnancy and is breastfeeding. Her last period was over a month ago.   Additional information as reported by the patient (free text): I've had an infection from seromas popping where my c section is. I'm supposed to have surgery on Wednesday to debride the wound   Weight: 195 lbs  Reason for repeat visit for the same protocol within 24 hours:  I can swallow, I pressed the wrong button and couldn't get back.  See the History of referred by protocol and completed visits section for details on previous visits (visits currently in queue to be diagnosed will not appear in this section).    MEDICATIONS: Keflex oral, prenatal vitamins with calcium-iron fumarate-folic acid oral, Zoloft oral, Celexa oral, ALLERGIES: Sulfa (Sulfonamide Antibiotics)  Clinician Response:  Dear Joanne,   Your symptoms show that you may have coronavirus (COVID-19). This illness can  "cause fever, cough and trouble breathing. Many people get a mild case and get better on their own. Some people can get very sick.  What should I do?  We would like to test you for this virus.   1. Please call [phone] to schedule your visit. Explain that [facility]. Be ready to share your [facility] visit ID number.  The following will serve as your written order for this COVID Test, ordered by me, for the indication of suspected COVID [Z20.828]: The test will be ordered in [facility], our electronic health record, after you are scheduled. It will show as ordered and authorized by Dennis Boston MD.  Order: COVID-19 (Coronavirus) PCR for SYMPTOMATIC testing from [facility].      2. When it's time for your COVID test:  Stay at least 6 feet away from others. (If someone will drive you to your test, stay in the backseat, as far away from the  as you can.)   Cover your mouth and nose with a mask, tissue or washcloth.  Go straight to the testing site. Don't make any stops on the way there or back.      3.Starting now: Stay home and away from others (self-isolate) until:   You've had no fever---and no medicine that reduces fever---for 3 full days (72 hours). And...   Your other symptoms have gotten better. For example, your cough or breathing has improved. And...   At least 10 days have passed since your symptoms started.       During this time, don't leave the house except for testing or medical care.   Stay in your own room, even for meals. Use your own bathroom if you can.   Stay away from others in your home. No hugging, kissing or shaking hands. No visitors.  Don't go to work, school or anywhere else.    Clean \"high touch\" surfaces often (doorknobs, counters, handles, etc.). Use a household cleaning spray or wipes. You'll find a full list of  on the EPA website: www.epa.gov/pesticide-registration/list-n-disinfectants-use-against-sars-cov-2.   Cover your mouth and nose with a " mask, tissue or washcloth to avoid spreading germs.  Wash your hands and face often. Use soap and water.  Caregivers in these groups are at risk for severe illness due to COVID-19:  o People 65 years and older  o People who live in a nursing home or long-term care facility  o People with chronic disease (lung, heart, cancer, diabetes, kidney, liver, immunologic)  o People who have a weakened immune system, including those who:   Are in cancer treatment  Take medicine that weakens the immune system, such as corticosteroids  Had a bone marrow or organ transplant  Have an immune deficiency  Have poorly controlled HIV or AIDS  Are obese (body mass index of 40 or higher)  Smoke regularly   o Caregivers should wear gloves while washing dishes, handling laundry and cleaning bedrooms and bathrooms.  o Use caution when washing and drying laundry: Don't shake dirty laundry, and use the warmest water setting that you can.  o For more tips, go to www.cdc.gov/coronavirus/2019-ncov/downloads/10Things.pdf.    4.Sign up for [employer]. We know it's scary to hear that you might have COVID-19. We want to track your symptoms to make sure you're okay over the next 2 weeks. Please look for an email from [employer]---this is a free, online program that we'll use to keep in touch. To sign up, follow the link in the email. Learn more at [email or web address]  How can I take care of myself?   Get lots of rest. Drink extra fluids (unless a doctor has told you not to).   Take Tylenol (acetaminophen) for fever or pain. If you have liver or kidney problems, ask your family doctor if it's okay to take Tylenol.   Adults can take either:    650 mg (two 325 mg pills) every 4 to 6 hours, or...   1,000 mg (two 500 mg pills) every 8 hours as needed.    Note: Don't take more than 3,000 mg in one day. Acetaminophen is found in many medicines (both prescribed and over-the-counter medicines). Read all labels to be sure you don't take too  much.   For children, check the Tylenol bottle for the right dose. The dose is based on the child's age or weight.    If you have other health problems (like cancer, heart failure, an organ transplant or severe kidney disease): Call your specialty clinic if you don't feel better in the next 2 days.       Know when to call 911. Emergency warning signs include:    Trouble breathing or shortness of breath Pain or pressure in the chest that doesn't go away Feeling confused like you haven't felt before, or not being able to wake up Bluish-colored lips or face.  Where can I get more information?   [facility] -- About COVID-19: [email or web address]   CDC -- What to Do If You're Sick: www.cdc.gov/coronavirus/2019-ncov/about/steps-when-sick.html   CDC -- Ending Home Isolation: www.cdc.gov/coronavirus/2019-ncov/hcp/disposition-in-home-patients.html   [facility] -- Caring for Someone: www.cdc.gov/coronavirus/2019-ncov/if-you-are-sick/care-for-someone.html   [facility] -- Interim Guidance for Hospital Discharge to Home: www.[facility].mn.us/diseases/coronavirus/hcp/hospdischarge.pdf   [facility] clinical trials (COVID-19 research studies): clinicalaffairs.[facility]-clinical-trials    Below are the COVID-19 hotlines at the [place] Department of Health ([facility]). Interpreters are available.    For health questions: Call 719-239-5370 or 1-538.536.2498 (7 a.m. to 7 p.m.) For questions about schools and childcare: Call 785-127-3558 or 1-163.455.8093 (7 a.m. to 7 p.m.)    Diagnosis: Myalgia, unspecified site  Diagnosis ICD: M79.10  Additional Clinician Notes: [facility] CANNOT ASSESS YOUR SURGICAL SITE INFECTION, PLEASE CALL YOUR ON CALL CARE TEAM AS COVID SYMPTOM DO NOT RULE OUT WORSENING INFECTION AT SURGICAL SITE AND YOU MAY NEED ADDITIONAL ASSESSMENT THIS WEEKEND.

## 2020-08-03 ENCOUNTER — DOCUMENTATION ONLY (OUTPATIENT)
Dept: PEDIATRICS | Facility: CLINIC | Age: 30
End: 2020-08-03

## 2020-08-03 ENCOUNTER — MEDICAL CORRESPONDENCE (OUTPATIENT)
Dept: HEALTH INFORMATION MANAGEMENT | Facility: CLINIC | Age: 30
End: 2020-08-03

## 2020-08-03 DIAGNOSIS — T81.49XA INFECTED INCISION: ICD-10-CM

## 2020-08-03 PROCEDURE — U0003 INFECTIOUS AGENT DETECTION BY NUCLEIC ACID (DNA OR RNA); SEVERE ACUTE RESPIRATORY SYNDROME CORONAVIRUS 2 (SARS-COV-2) (CORONAVIRUS DISEASE [COVID-19]), AMPLIFIED PROBE TECHNIQUE, MAKING USE OF HIGH THROUGHPUT TECHNOLOGIES AS DESCRIBED BY CMS-2020-01-R: HCPCS | Performed by: SURGERY

## 2020-08-03 NOTE — PROGRESS NOTES
EPDS was done in clinic today during child's 6 month visit and score was 13 with negative psychosis and negative screen for suicidal ideation.   mom has history of depression treated with and is currently being treated with Zoloft 50 mg. she reports she is feeling better.  Copy of EPDS was given to Hiram today as well as educational material about postpartum depression.     Plan:   Follow up as directed with her care team  Behavioral health referral was not placed.     Phoebe Padilla, ISH, CNP

## 2020-08-04 LAB
SARS-COV-2 RNA SPEC QL NAA+PROBE: NOT DETECTED
SPECIMEN SOURCE: NORMAL

## 2020-08-04 NOTE — PROGRESS NOTES
I am reviewing for the provider who is away. Noted and follow-up is planned.   Shimon Crowley MD

## 2020-08-05 ENCOUNTER — NURSE TRIAGE (OUTPATIENT)
Dept: NURSING | Facility: CLINIC | Age: 30
End: 2020-08-05

## 2020-08-05 ENCOUNTER — TELEPHONE (OUTPATIENT)
Dept: OBGYN | Facility: OTHER | Age: 30
End: 2020-08-05

## 2020-08-06 ENCOUNTER — OFFICE VISIT (OUTPATIENT)
Dept: SURGERY | Facility: CLINIC | Age: 30
End: 2020-08-06
Payer: COMMERCIAL

## 2020-08-06 ENCOUNTER — TELEPHONE (OUTPATIENT)
Dept: SURGERY | Facility: CLINIC | Age: 30
End: 2020-08-06

## 2020-08-06 ENCOUNTER — NURSE TRIAGE (OUTPATIENT)
Dept: NURSING | Facility: CLINIC | Age: 30
End: 2020-08-06

## 2020-08-06 VITALS
SYSTOLIC BLOOD PRESSURE: 141 MMHG | BODY MASS INDEX: 36.82 KG/M2 | WEIGHT: 195 LBS | HEIGHT: 61 IN | HEART RATE: 78 BPM | DIASTOLIC BLOOD PRESSURE: 91 MMHG

## 2020-08-06 DIAGNOSIS — T81.49XA INFECTED INCISION: Primary | ICD-10-CM

## 2020-08-06 PROCEDURE — 99024 POSTOP FOLLOW-UP VISIT: CPT | Performed by: SURGERY

## 2020-08-06 ASSESSMENT — MIFFLIN-ST. JEOR: SCORE: 1551.89

## 2020-08-06 NOTE — TELEPHONE ENCOUNTER
I connected the patient with scheduling for an appointment today. I advised her if they have no appointment, she needs to go in to urgent care for them to check the wound and dressing. She stated the dressing is saturated. She had a wound debridement done and was told to leave the dressing intact.  Marisa Gutierrez RN  [facility]      Additional Information    Negative: Major abdominal surgical incision and wound gaping open with visible internal organs    Negative: Sounds like a life-threatening emergency to the triager    Negative: Bleeding from incision and won't stop after 10 minutes of direct pressure    Negative: Widespread rash and bright red, sunburn-like    Negative: Severe pain in the incision     Pain at 6, taking percocet and ibuprofen.    Negative: Incision gaping open and < 2 days (48 hours) since wound re-opened    Negative: Incision gaping open and length of opening > 2 inches (5 cm)    Negative: Patient sounds very sick or weak to the triager    Negative: Sounds like a serious complication to the triager    Negative: Fever > 100.4 F (38.0 C)    Negative: Incision looks infected (spreading redness, pain)    Negative: Red streak runs from the incision and longer than 1 inch (2.5 cm)    Negative: Pus or bad-smelling fluid draining from incision    Dressing soaked with blood or body fluid (e.g., drainage)    Protocols used: POST-OP INCISION SYMPTOMS AND [id number]

## 2020-08-06 NOTE — TELEPHONE ENCOUNTER
"\"I had surgery today and I can't express milk out of my left breast\".  Caller reports that she feels as if her breast is getting engorged, it's painful and she can only express about an ounce.  She pumped and dumped once initially after surgery.    Gave caller phone number for lactation consultant, left telephone message for clinic care team to touch base with her and I also gave her a phone number for the [employer].      Reason for Disposition    [1] Breast pain AND [2] present > 7 days    Additional Information    Negative: Baby is difficult to awaken or keep awake (Exception: baby needs normal sleep)    Negative: [1] Baby < 1 year old AND [2] very weak (doesn't move or make eye contact)    Negative: Sounds like a life-threatening emergency to the triager    Negative: Baby is formula fed    Negative: Baby spitting up is the main concern    Negative: Baby jaundice up is the main concern    Negative: [1] Baby < 12 weeks old AND [2] fever  > 100.4 F (38.0 C) rectally    Negative: Baby has suspected dehydration (e.g., no urine > 8 hours, brick dust urine 3 or more times, sunken soft spot, very dry mouth) (Exception: no urine > 12 hours on day 2 of life OR > 8 hours on day 3-4 of life and without other signs of dehydration)    Negative: [1] Baby day 2 of life AND [2] no urine > 12 hours AND [3] after given supplemental feeding    Negative: [1] Baby day 3-4 of life AND [2] no urine > 8 hours AND [3] after given supplemental feeding    Negative: [1] Baby < 1 month old ()  AND [2] starts to look or act sick in any way    Negative: [1] Baby < 1 month old AND [2] refuses to breastfeed AND [3] for > 6 hours    Negative: [1] Baby < 12 months AND [2] weak suck / weak muscles AND [3] new onset    Negative: Baby sounds very sick to the triager    Negative: Patient (mother) sounds very sick or weak to the triager    Negative: [1] Baby refuses to drink anything (including supplemental formula or expressed breastmilk) " AND [2] for > 8 hours    Negative: [1] Breast looks infected (e.g, spreading redness, feels hot or painful to touch) AND [2] fever > 100.4 F (38.0 C)    Negative: [1] Baby day 2 of life AND [2] no urine > 12 hours    Negative: [1] Baby day 3 or 4 of life AND [2] no urine > 8 hours    Negative: [1] Baby day 2 of life AND [2] requires supplemental feeding to urinate every 12 hours    Negative: [1] Baby day 2 or 3 of life AND [2] no stool in 24 hours AND [3] exclusively     Negative: [1] Baby day 3 or 4 of life AND [2] requires supplemental feeding to urinate every 8 hours    Negative: [1] Baby day 4 of life or later AND [2] bowel movements are < 3 per day (should be yellow-colored by day 5) (Exception: normal infrequent stools after 4 weeks)    Negative: Wet diapers are < 6 per day  (Exception:  can be normal while milk volume is increasing during 1- 4 days of life)    Negative: [1] Baby < 1 month old AND [2] seems hungry after feedings (cries after nursing OR wants to feed over 12 times/day)    Negative: [1] Baby < 1 month old AND [2] needs to be repeatedly awakened for feedings (every 3 hours during the day) BUT [3] alert and feeds well when awakened    Negative: [1] Breast looks infected (e.g, spreading redness) AND [2] no fever    Protocols used: POSTPARTUM - BREASTFEEDING [id number] Soliz RN  [facility]

## 2020-08-06 NOTE — LETTER
"    8/6/2020         RE: Joanne Escalante  [address]  [place] [address]        Dear Colleague,    Thank you for referring your patient, Joanne Escalante, to the [facility]. Please see a copy of my visit note below.    General Surgery Post Op    Pt returns for follow up visit s/p Incision an drainage of incisional infection    Patient has been doing well, tolerating diet. Bowels moving well. Pain controlled. No issues with wound healing/redness. No fevers. She is complaining of copious drainage.       Physical exam: Vitals: BP (!) 141/91   Pulse 78   Ht 1.549 m (5' 1\")   Wt 88.5 kg (195 lb)   LMP  (LMP Unknown)   BMI 36.84 kg/m    BMI= Body mass index is 36.84 kg/m .    Exam:  Constitutional: healthy, alert and no distress  Respiratory: Non-labored  Gastrointestinal: Abdomen soft, non-tender. BS normal. No masses, organomegaly  Incision is healing well with no erythema, she has serosanguinous exudate       Assessment: Pt is doing well s/p I &D    Plan: Pt doing well and can follow up with Dr Burt Davis, DO        Again, thank you for allowing me to participate in the care of your patient.        Sincerely,        Dandy Davis, DO    "

## 2020-08-06 NOTE — TELEPHONE ENCOUNTER
Reason for Call:  Other Patient Request    Detailed comments: Patient had surgery yesterday. States she has a lot of drainage and it's bloody but brown bloody. Please call back to advise.    Phone Number Patient can be reached at: Home number on file [phone] (home)    Best Time: any    Can we leave a detailed message on this number? YES    Call taken on 8/6/2020 at 12:08 PM by Ryan Vanegas

## 2020-08-06 NOTE — TELEPHONE ENCOUNTER
"Returned call to pt who has c/o copious amounts of bloody drainage, stating that her abd is saturated and she would be able to \"wring it out\". In reviewing her note is was to remove her packing tomorrow. She is very anxious, she was added to general surgeon's in office schedule.    Kasey Patton RN Specialty Triage 8/6/2020 12:58 PM     "

## 2020-08-06 NOTE — TELEPHONE ENCOUNTER
Spoke with pt.  She states that her left breast is firm and tender and engorged.    Denies redness or fever.    I advised that she use warm packs or a warm wet wash cloth on her left breast prior to nursing or pumping, encouraged her to massage her breast towards the nipple prior and during pumping or nursing, and drink lots of fluids.  Pt is unable to take a warm shower due to surgery on a hernia yesterday but she will try the warm washcloth.  Advised to call back if she develops a fever or symptoms do not improve with the above tips after 24-48 hours.    Pt verbalized understanding and agreed to plan.    Alison Cordoba RN

## 2020-08-06 NOTE — TELEPHONE ENCOUNTER
Clinic Action Needed:Yes, please return call    Reason for Call: Joanne had general surgery today and tonight she is having trouble expressing breastmilk from her left breast.  See triage note.    Please return call to discuss further.  Thank you.    Routed to:  OB/GYN Triage    Caty Soliz RN  [facility]

## 2020-08-07 ENCOUNTER — OFFICE VISIT (OUTPATIENT)
Dept: SURGERY | Facility: CLINIC | Age: 30
End: 2020-08-07
Payer: COMMERCIAL

## 2020-08-07 VITALS
WEIGHT: 195 LBS | DIASTOLIC BLOOD PRESSURE: 80 MMHG | HEART RATE: 98 BPM | HEIGHT: 61 IN | BODY MASS INDEX: 36.82 KG/M2 | SYSTOLIC BLOOD PRESSURE: 120 MMHG

## 2020-08-07 DIAGNOSIS — T81.49XA INFECTED INCISION: Primary | ICD-10-CM

## 2020-08-07 PROCEDURE — 99024 POSTOP FOLLOW-UP VISIT: CPT | Performed by: SURGERY

## 2020-08-07 RX ORDER — OXYCODONE AND ACETAMINOPHEN 5; 325 MG/1; MG/1
1 TABLET ORAL EVERY 6 HOURS PRN
Qty: 15 TABLET | Refills: 0 | Status: SHIPPED | OUTPATIENT
Start: 2020-08-07 | End: 2020-08-10

## 2020-08-07 ASSESSMENT — MIFFLIN-ST. JEOR: SCORE: 1551.89

## 2020-08-07 NOTE — PROGRESS NOTES
Joanne is a 29 year old female who is status post removal of skin and fat at her c section incision that had chronic drainage when it became infected.   with no chills and nofever.      Patient's  Pain is  improving.  But would like to have more pain medications and this will be helpful at dressing changes    Wound(s)  Is/are   clean  with no evidence of infection.  Can see new tissue on the fascia already.       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  Was what we thought it would be.    Plan: follow up in 2 weeks  Pull the dressing out and then take a shower and let the water run into the wound.  Towel off the skin and then place one piece of gauze in there all the way to the bottom of the wound and just past the skin like we did in the clinic.    Use panty shields on your clothes to protect them and try not to use tape as this does irritate the skin.     Time spent with the patient with greater that 50% of the time in discussion was 15 minutes.  Juanpablo Dallas MD

## 2020-08-07 NOTE — LETTER
2020         RE: Joanne Escalante  [address]  [place] [address]        Dear Colleague,    Thank you for referring your patient, Joanne Escalante, to the [facility]. Please see a copy of my visit note below.    Joanne is a 29 year old female who is status post removal of skin and fat at her c section incision that had chronic drainage when it became infected.   with no chills and nofever.      Patient's  Pain is  improving.  But would like to have more pain medications and this will be helpful at dressing changes    Wound(s)  Is/are   clean  with no evidence of infection.  Can see new tissue on the fascia already.       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  Was what we thought it would be.    Plan: follow up in 2 weeks  Pull the dressing out and then take a shower and let the water run into the wound.  Towel off the skin and then place one piece of gauze in there all the way to the bottom of the wound and just past the skin like we did in the clinic.    Use panty shields on your clothes to protect them and try not to use tape as this does irritate the skin.     Time spent with the patient with greater that 50% of the time in discussion was 15 minutes.  Juanpablo Dallas MD                    Again, thank you for allowing me to participate in the care of your patient.        Sincerely,        Juanpablo Dallas MD

## 2020-08-07 NOTE — PATIENT INSTRUCTIONS
Joanne is a 29 year old female who is status post removal of skin and fat at her c section incision that had chronic drainage when it became infected.   with no chills and nofever.      Patient's  Pain is  improving.  But would like to have more pain medications and this will be helpful at dressing changes    Wound(s)  Is/are   clean  with no evidence of infection.  Can see new tissue on the fascia already.       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  Was what we thought it would be.    Plan: follow up in 2 weeks  Pull the dressing out and then take a shower and let the water run into the wound.  Towel off the skin and then place one piece of gauze in there all the way to the bottom of the wound and just past the skin like we did in the clinic.    Use panty shields on your clothes to protect them and try not to use tape as this does irritate the skin.

## 2020-08-10 NOTE — PROGRESS NOTES
Patient was seen in clinic on 8/7/20 for an infected wound.  She has been doing dressing changes at home but says it is very painful and is requesting a referral for in clinic wound care.      I informed patient I would forward message to Dr. Dallas for review.    Lucinda Choudhury MA

## 2020-08-11 ENCOUNTER — OFFICE VISIT (OUTPATIENT)
Dept: SURGERY | Facility: CLINIC | Age: 30
End: 2020-08-11
Payer: COMMERCIAL

## 2020-08-11 VITALS
SYSTOLIC BLOOD PRESSURE: 147 MMHG | HEIGHT: 61 IN | DIASTOLIC BLOOD PRESSURE: 97 MMHG | HEART RATE: 88 BPM | BODY MASS INDEX: 36.63 KG/M2 | WEIGHT: 194 LBS

## 2020-08-11 DIAGNOSIS — T14.8XXA WOUND INFECTION: Primary | ICD-10-CM

## 2020-08-11 DIAGNOSIS — L08.9 WOUND INFECTION: Primary | ICD-10-CM

## 2020-08-11 PROCEDURE — 99214 OFFICE O/P EST MOD 30 MIN: CPT | Performed by: SURGERY

## 2020-08-11 RX ORDER — TRAMADOL HYDROCHLORIDE 50 MG/1
50 TABLET ORAL EVERY 6 HOURS PRN
Qty: 40 TABLET | Refills: 1 | Status: SHIPPED | OUTPATIENT
Start: 2020-08-11 | End: 2020-08-14

## 2020-08-11 RX ORDER — CEPHALEXIN 500 MG/1
500 CAPSULE ORAL 4 TIMES DAILY
Qty: 40 CAPSULE | Refills: 0 | Status: SHIPPED | OUTPATIENT
Start: 2020-08-11 | End: 2020-08-16

## 2020-08-11 RX ORDER — SODIUM HYPOCHLORITE 1.25 MG/ML
SOLUTION TOPICAL DAILY
Qty: 1 BOTTLE | Refills: 1 | Status: SHIPPED | OUTPATIENT
Start: 2020-08-11 | End: 2020-10-28

## 2020-08-11 ASSESSMENT — MIFFLIN-ST. JEOR: SCORE: 1547.36

## 2020-08-11 NOTE — LETTER
2020         RE: Joanne Escalante  [address]  [place] [address]        Dear Colleague,    Thank you for referring your patient, Joanne Escalante, to the [facility]. Please see a copy of my visit note below.    Joanne is a 29 year old female who is status post removal of infected fat and skin  with no chills and nofever.      Patient's  Pain is getting worse.  Appetite is also decreasing.    Wound(s)  Is/are   clean dry and filling in with good granulation tissue. But there is some cellulitis of the wound.  Marked it out.     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  Was what we thought it would be.      Plan: follow up in 2 weeks  Do daily damp to dry dressing changes and may shower to get the area damp.   if the redness goes out side of the lines will need to change your antibiotics probably to Augmentin.    We have ordered home health care to see her and see if she would benefit from wound vac.    Since  dressing is slightly green will change to 1/4 strength Dakins damp to dry   Have ordered it. Just use this to get the gauze wet.     Time spent with the patient with greater that 50% of the time in discussion was 30 minutes.  Juanpablo Dallas MD                    Again, thank you for allowing me to participate in the care of your patient.        Sincerely,        Juanpablo Dallas MD

## 2020-08-11 NOTE — PATIENT INSTRUCTIONS
Joanne is a 29 year old female who is status post removal of infected fat and skin  with no chills and nofever.      Patient's  Pain is getting worse.  Appetite is also decreasing.    Wound(s)  Is/are   clean dry and filling in with good granulation tissue. But there is some cellulitis of the wound.  Marked it out.     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  Was what we thought it would be.      Plan: follow up in 2 weeks  Do daily damp to dry dressing changes and may shower to get the area damp.   if the redness goes out side of the lines will need to change your antibiotics probably to Augmentin.    We have ordered home health care to see her and see if she would benefit from wound vac.    Since  dressing is slightly green will change to 1/4 strength Dakins damp to dry   Have ordered it. Just use this to get the gauze wet.

## 2020-08-11 NOTE — PROGRESS NOTES
Joanne is a 29 year old female who is status post removal of infected fat and skin  with no chills and nofever.      Patient's  Pain is getting worse.  Appetite is also decreasing.    Wound(s)  Is/are   clean dry and filling in with good granulation tissue. But there is some cellulitis of the wound.  Marked it out.     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  Was what we thought it would be.      Plan: follow up in 2 weeks  Do daily damp to dry dressing changes and may shower to get the area damp.   if the redness goes out side of the lines will need to change your antibiotics probably to Augmentin.    We have ordered home health care to see her and see if she would benefit from wound vac.    Since  dressing is slightly green will change to 1/4 strength Dakins damp to dry   Have ordered it. Just use this to get the gauze wet.     Time spent with the patient with greater that 50% of the time in discussion was 30 minutes.  Juanpablo Dallas MD

## 2020-08-12 NOTE — PROGRESS NOTES
"General Surgery Post Op    Pt returns for follow up visit s/p Incision an drainage of incisional infection    Patient has been doing well, tolerating diet. Bowels moving well. Pain controlled. No issues with wound healing/redness. No fevers. She is complaining of copious drainage.       Physical exam: Vitals: BP (!) 141/91   Pulse 78   Ht 1.549 m (5' 1\")   Wt 88.5 kg (195 lb)   LMP  (LMP Unknown)   BMI 36.84 kg/m    BMI= Body mass index is 36.84 kg/m .    Exam:  Constitutional: healthy, alert and no distress  Respiratory: Non-labored  Gastrointestinal: Abdomen soft, non-tender. BS normal. No masses, organomegaly  Incision is healing well with no erythema, she has serosanguinous exudate       Assessment: Pt is doing well s/p I &D    Plan: Pt doing well and can follow up with Dr Burt Davis, DO      "

## 2020-08-13 ENCOUNTER — DOCUMENTATION ONLY (OUTPATIENT)
Dept: CARE COORDINATION | Facility: CLINIC | Age: 30
End: 2020-08-13

## 2020-08-13 ENCOUNTER — TELEPHONE (OUTPATIENT)
Dept: SURGERY | Facility: CLINIC | Age: 30
End: 2020-08-13

## 2020-08-13 ENCOUNTER — NURSE TRIAGE (OUTPATIENT)
Dept: NURSING | Facility: CLINIC | Age: 30
End: 2020-08-13

## 2020-08-13 DIAGNOSIS — L03.311 CELLULITIS OF ABDOMINAL WALL: Primary | ICD-10-CM

## 2020-08-13 NOTE — TELEPHONE ENCOUNTER
Joanne had a wound débridement done last Wednesday and feels she has cellulitis around wound and was given antibiotics.  Today Joanne states that the redness is spreading.      COVID 19 Nurse Triage Plan/Patient Instructions    Please be aware that novel coronavirus (COVID-19) may be circulating in the community. If you develop symptoms such as fever, cough, or SOB or if you have concerns about the presence of another infection including coronavirus (COVID-19), please contact your health care provider or visit [email or web address].     Disposition/Instructions    In-Person Visit with provider recommended. Reference Visit Selection Guide.    Thank you for taking steps to prevent the spread of this virus.  o Limit your contact with others.  o Wear a simple mask to cover your cough.  o Wash your hands well and often.    Resources    [facility]: About COVID-19: [email or web address]    CDC: What to Do If You're Sick: www.cdc.gov/coronavirus/2019-ncov/about/steps-when-sick.html    CDC: Ending Home Isolation: www.cdc.gov/coronavirus/2019-ncov/hcp/disposition-in-home-patients.html     CDC: Caring for Someone: www.cdc.gov/coronavirus/2019-ncov/if-you-are-sick/care-for-someone.html     [facility]: Interim Guidance for Hospital Discharge to Home: www.health.[facility].[place]/diseases/coronavirus/hcp/hospdischarge.pdf    [facility] clinical trials (COVID-19 research studies): clinicalaffairs.[facility]-clinical-trials     Below are the COVID-19 hotlines at the [facility] ([facility]). Interpreters are available.   o For health questions: Call 552-672-1600 or 1-263.443.6173 (7 a.m. to 7 p.m.)  o For questions about schools and childcare: Call 828-106-0419 or 1-760.483.2739 (7 a.m. to 7 p.m.)                       Additional Information    Negative: Shock suspected (e.g., cold/pale/clammy skin, too weak to stand, low BP, rapid pulse)    Negative: Sounds like a life-threatening emergency to the triager     Negative: SEVERE pain    Negative: [1] SEVERE pain with bending of finger (or toe) AND [2] cellulitis on hand (or foot)    Negative: Fever > 104 F (40 C)    Negative: [1] Widespread rash AND [2] bright red, sunburn-like    Negative: Black (necrotic), dark purple, or blisters develop in area of cellulitis    Negative: Patient sounds very sick or weak to the triager    Negative: [1] Taking antibiotic > 24 hours AND [2] fever > 100.5 F (38.1 C)    Negative: [1] Taking antibiotic > 24 hours AND [2] red streak (or line) runs from area of infection    Negative: [1] Fever > 100.0 F (37.8 C) AND [2] new onset    Negative: [1] Red streak runs from area of infection AND [2] new onset    Negative: [1] Caller has URGENT question AND [2] triager unable to answer question    [1] Taking antibiotic > 24 hours AND [2] cellulitis symptoms are WORSE (e.g., spreading redness, pain, swelling)    Protocols used: CELLULITIS ON ANTIBIOTIC FOLLOW-UP CALL-A-

## 2020-08-13 NOTE — TELEPHONE ENCOUNTER
Patient called in stating her red line has extended outside of the drawing from Tuesday for her cellulitis. She feels feverish and not herself. Would like to have the Augmentin called in for her please. Ismael on [place] and egret. Radha Choudhury Cma

## 2020-08-14 ENCOUNTER — NURSE TRIAGE (OUTPATIENT)
Dept: NURSING | Facility: CLINIC | Age: 30
End: 2020-08-14

## 2020-08-14 ENCOUNTER — MEDICAL CORRESPONDENCE (OUTPATIENT)
Dept: HEALTH INFORMATION MANAGEMENT | Facility: CLINIC | Age: 30
End: 2020-08-14

## 2020-08-14 NOTE — PROGRESS NOTES
Dear Dr. France,  Medicare Home Health regulations requires [facility] to provide an initial assessment visit either within 48 hours of the patient's return home, or on the physician ordered Start of Care date.    There will be a delay in the Initial Assessment for Joanne Escalante; MRN [id number]  Her admission visit has been scheduled and confirmed for 8/14/20.      Sincerely [facility]  Caty Murry RN  [phone]

## 2020-08-15 NOTE — TELEPHONE ENCOUNTER
Patient calling reporting she has cellulitis on her abdomen that has some yellow drainage. Rates pain at 8/10. States pain is constant and has not decreased after taking pain medication.   Per guideline, advised patient to be seen at the emergency department.     Caller verbalized understanding. Denies further questions.      Carroll Lockett RN  [facility]     COVID 19 Nurse Triage Plan/Patient Instructions    Please be aware that novel coronavirus (COVID-19) may be circulating in the community. If you develop symptoms such as fever, cough, or SOB or if you have concerns about the presence of another infection including coronavirus (COVID-19), please contact your health care provider or visit [email or web address].     Disposition/Instructions    ED Visit recommended. Follow protocol based instructions.     Bring Your Own Device:  Please also bring your smart device(s) (smart phones, tablets, laptops) and their charging cables for your personal use and to communicate with your care team during your visit.    Thank you for taking steps to prevent the spread of this virus.  o Limit your contact with others.  o Wear a simple mask to cover your cough.  o Wash your hands well and often.    Resources    [facility]: About COVID-19: [email or web address]    CDC: What to Do If You're Sick: www.cdc.gov/coronavirus/2019-ncov/about/steps-when-sick.html    CDC: Ending Home Isolation: www.cdc.gov/coronavirus/2019-ncov/hcp/disposition-in-home-patients.html     CDC: Caring for Someone: www.cdc.gov/coronavirus/2019-ncov/if-you-are-sick/care-for-someone.html     [facility]: Interim Guidance for Hospital Discharge to Home: www.health.[facility].[place]/diseases/coronavirus/hcp/hospdischarge.pdf    [facility] clinical trials (COVID-19 research studies): clinicalaffairs.[facility]/umn-clinical-trials     Below are the COVID-19 hotlines at the [place] [facility] ([facility]). Interpreters are available.    o For health questions: Call 280-774-6277 or 1-944.930.8225 (7 a.m. to 7 p.m.)  o For questions about schools and childcare: Call 251-518-2650 or 1-879.847.2588 (7 a.m. to 7 p.m.)                     Reason for Disposition    SEVERE pain    Additional Information    Negative: Shock suspected (e.g., cold/pale/clammy skin, too weak to stand, low BP, rapid pulse)    Negative: Sounds like a life-threatening emergency to the triager    Protocols used: CELLULITIS ON ANTIBIOTIC FOLLOW-UP CALL-A-

## 2020-08-16 ENCOUNTER — NURSE TRIAGE (OUTPATIENT)
Dept: NURSING | Facility: CLINIC | Age: 30
End: 2020-08-16

## 2020-08-16 ENCOUNTER — OFFICE VISIT (OUTPATIENT)
Dept: URGENT CARE | Facility: URGENT CARE | Age: 30
End: 2020-08-16
Payer: COMMERCIAL

## 2020-08-16 VITALS
TEMPERATURE: 97.6 F | DIASTOLIC BLOOD PRESSURE: 87 MMHG | HEART RATE: 86 BPM | SYSTOLIC BLOOD PRESSURE: 128 MMHG | OXYGEN SATURATION: 98 %

## 2020-08-16 DIAGNOSIS — T81.89XD SURGICAL WOUND, NON HEALING, SUBSEQUENT ENCOUNTER: ICD-10-CM

## 2020-08-16 DIAGNOSIS — T14.8XXA WOUND INFECTION: Primary | ICD-10-CM

## 2020-08-16 DIAGNOSIS — L08.9 WOUND INFECTION: Primary | ICD-10-CM

## 2020-08-16 PROCEDURE — 87077 CULTURE AEROBIC IDENTIFY: CPT | Performed by: INTERNAL MEDICINE

## 2020-08-16 PROCEDURE — 87070 CULTURE OTHR SPECIMN AEROBIC: CPT | Performed by: INTERNAL MEDICINE

## 2020-08-16 PROCEDURE — 99214 OFFICE O/P EST MOD 30 MIN: CPT | Performed by: INTERNAL MEDICINE

## 2020-08-16 PROCEDURE — 87186 SC STD MICRODIL/AGAR DIL: CPT | Performed by: INTERNAL MEDICINE

## 2020-08-16 RX ORDER — CLINDAMYCIN HCL 300 MG
300 CAPSULE ORAL 3 TIMES DAILY
Qty: 21 CAPSULE | Refills: 0 | Status: ON HOLD | OUTPATIENT
Start: 2020-08-16 | End: 2020-08-21

## 2020-08-16 NOTE — TELEPHONE ENCOUNTER
"Pt calls in with concern of wound being infected   Pt was seen   [facility]  8/10/2020  Wound evaluation/care  The patient reports that she recently had wound debridement on Wednesday after having multiple abscesses on her abdomen following a  section in January    Pt says was doing dressing change today   And there is green pus like drainage   Pt says \" it does NOT look good \"    All ready on ABXs    No fever  Feels \"worn out\"    Per protocol to be seen within 24 hours    Pt will either go to UC or ED NOW for evaluation     Protocol and care advice reviewed  Caller states understanding of the recommended disposition  Advised to call back if further questions or concerns    Syed Morales , RN / [facility]        Additional Information    Negative: [1] Widespread rash AND [2] bright red, sunburn-like AND [3] too weak to stand    Negative: Sounds like a life-threatening emergency to the triager    Negative: [1] Widespread rash AND [2] bright red, sunburn-like    Negative: Severe pain in the wound    Negative: Black (necrotic) or blisters develop in wound    Negative: Patient sounds very sick or weak to the triager    Negative: [1] Looks infected (spreading redness, red streak, pus) AND [2] fever    Negative: [1] Red streak runs from the wound AND [2] longer than 1 inch (2.5 cm)    Negative: [1] Skin around the wound has become red AND [2] larger than 2 inches (5 cm)    Negative: [1] Face wound AND [2] looks infected (e.g., spreading redness)    Negative: [1] Finger wound AND [2] entire finger swollen    [1] Taking antibiotic > 72 hours (3 days) AND [2] infected wound not improved (i.e., pain, pus, redness)    Negative: [1] Taking antibiotic > 48 hours (2 days) AND [2] fever persists    Negative: Other signs of wound infection    Negative: [1] Wound > 48 hours old AND [2] it becomes more tender    Negative: [1] Pus or cloudy fluid draining from wound AND [2] no " fever    Negative: [1] Red area or streak AND [2] no fever    Protocols used: WOUND INFECTION-A-AH

## 2020-08-16 NOTE — PROGRESS NOTES
SUBJECTIVE:  Joanne Escalante is an 29 year old female who presents for concern about wound.  Had  in  and incision did not heal well and had infected stitch and has had ongoing seromas that open up. had a wound debridement done on 2020  Was seen five days ago and started on keflex for cellulitis.  Wound care saw her two or three days ago and has f/u scheduled with wound care.  Yesterday started having some whitish drainage.  And today when changed her gauze it looked green which is not how it has been looking.  No fevers, chills, sweats.  Is quite tired.  Mild nausea.  No v/d.  Currently is on augmentin started yesterday and had been on keflex a few days before that.  No recent travel.  Pt shows photo of dressing change done earlier in day which shows significant green drainage which pt reports she has not had before as usually is blood tinged or a little yellow in color.    PMH:   has a past medical history of Anxiety, Depressive disorder, Endometriosis, and Periumbilical hernia.  Patient Active Problem List   Diagnosis     Umbilical hernia without obstruction and without gangrene     Social History     Socioeconomic History     Marital status: Single     Spouse name: partner- Jacob     Number of children: 2     Years of education: None     Highest education level: None   Occupational History     Occupation: nursing asst   Social Needs     Financial resource strain: None     Food insecurity     Worry: None     Inability: None     Transportation needs     Medical: None     Non-medical: None   Tobacco Use     Smoking status: Current Every Day Smoker     Packs/day: 0.25     Types: Cigarettes     Smokeless tobacco: Never Used   Substance and Sexual Activity     Alcohol use: Not Currently     Drug use: Never     Sexual activity: Not Currently     Partners: Male     Birth control/protection: Condom   Lifestyle     Physical activity     Days per week: None     Minutes per session: None     Stress: None    Relationships     Social connections     Talks on phone: None     Gets together: None     Attends Jain service: None     Active member of club or organization: None     Attends meetings of clubs or organizations: None     Relationship status: None     Intimate partner violence     Fear of current or ex partner: None     Emotionally abused: None     Physically abused: None     Forced sexual activity: None   Other Topics Concern     Parent/sibling w/ CABG, MI or angioplasty before 65F 55M? Not Asked   Social History Narrative     None     Family History   Problem Relation Age of Onset     Depression Mother      Depression Father        ALLERGIES:  Sulfa drugs    Current Outpatient Medications   Medication     Acetaminophen (TYLENOL PO)     amoxicillin-clavulanate (AUGMENTIN) 875-125 MG tablet     IBUPROFEN PO     nystatin (MYCOSTATIN) 040998 UNIT/GM external powder     ondansetron (ZOFRAN-ODT) 4 MG ODT tab     Prenatal Vit-Fe Fumarate-FA (PRENATAL VITAMIN PO)     sertraline (ZOLOFT) 50 MG tablet     sodium hypochlorite (QUARTER-STRENGTH DAKINS) external solution     No current facility-administered medications for this visit.          ROS:  ROS is done and is negative for general/constitutional, eye, ENT, Respiratory, cardiovascular, GI, , Skin, musculoskeletal except as noted elsewhere.  All other review of systems negative except as noted elsewhere.      OBJECTIVE:  /87   Pulse 86   Temp 97.6  F (36.4  C) (Tympanic)   SpO2 98%   GENERAL APPEARANCE: Alert, in no acute distress  EYES: normal  NOSE:normal  OROPHARYNX:normal  NECK:No adenopathy,masses or thyromegaly  RESP: normal and clear to auscultation  CV:regular rate and rhythm and no murmurs, clicks, or gallops  ABDOMEN: Abdomen soft.  BS normal. Large deep open wound approx 14 inches in length with gaping approx 5cm at widest point and tapering to ends. At this time small amount of yellowish drainage present on deepest pars of wound and moderate  tenderness of wound.  Culture swab taken from wound and sent to lab.  SKIN: no ulcers, lesions or rash except abdomen as above.  MUSCULOSKELETAL:Musculoskeletal normal      RESULTS  No results found for any visits on 08/16/20..  No results found for this or any previous visit (from the past 48 hour(s)).    ASSESSMENT/PLAN:    ASSESSMENT / PLAN:  (T14.8XXA,  L08.9) Wound infection  (primary encounter diagnosis)  (T81.89XD) Surgical wound, non healing, subsequent encounter  Comment: given new presence of green drainage on her dressing which is a change, concern for possible MRSA.  Wound cx sent but no significant drainage present at the time done. 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 Consider adjustment of abx or stopping one depending on wound cx results.  Has appt with surgeon in 2 days and is to keep that appt; hopefully cx results may be available by that visit.  Pt to continue with wound care and has f/u with them tomorrow.  Reviewed medication instructions and side effects including nausea, diarrhea or c diff sxs. Follow up if experiences side effects.  I reviewed supportive care, otc meds to use if needed, expected course, and signs of concern.  Follow up with wound care and surgeon in 2 day(s) or sooner if worsens in any way.  Reviewed red flag symptoms and is to go to the ER if experiences any of these.          See [facility] for orders, medications, letters, patient instructions    Bina Mead M.D.

## 2020-08-18 ENCOUNTER — TELEPHONE (OUTPATIENT)
Dept: OBGYN | Facility: OTHER | Age: 30
End: 2020-08-18

## 2020-08-18 NOTE — TELEPHONE ENCOUNTER
RN returned call to pt and notified her of information in note below per Veronica De La Paz PA-C.  RN also notified pt of the additional provider result note:   Bian Mead MD   8/18/2020  3:18 PM       Pt on augmentin and clindamycin.  Await sensitivities.  Also has upcoming f/u appt with surgeon, and ongoing care with wound care team.        Pt states she disagrees with her surgeon's advice and cancelled her f/u appointment with the surgeon. She states she will no longer see this surgeon.    Pt states she has wound care involved, and plans to follow-up with Cesia Benavidez NP, as scheduled. Pt states she intends to request a referral from this provider to see a different surgeon.  Next 5 appointments (look out 90 days)    Aug 20, 2020 10:10 AM CDT  [facility] [address] [facility] [address] [phone]        RN advised that keep 8/20/20 appointment and pt also encouraged her to contact her insurance company in advance of the 8/20/20 appointment to find out what other providers she has coverage to see for a second opinion, with another surgeon from the same or similar specialty that is familiar with the procedure to evaluate and advise. Pt states she will contact insurance to find out what her other covered provider options are so she can request that at the time of her 8/20/20 appointment if referral is required.    RN emphasized importance of follow-up for surgical incision infection. RN advised if pt develops fever or other new or worsening sx while awaiting C&S results that pt needs to be seen again promptly for reevaluation. Pt indicates understanding of issues and agrees with the plan.    Pt awaiting sensitivity results and plan when available.    MILADIS PadillaN, RN

## 2020-08-18 NOTE — TELEPHONE ENCOUNTER
Only prelim is back. Shows E. Coli bacteria but are waiting for final report to see what antibiotics it is sensitive  to prior to contacting.  Veronica De La Paz PA-C

## 2020-08-18 NOTE — TELEPHONE ENCOUNTER
Reason for Call:  Other call back    Detailed comments: pt wants to know results of wound swab.  Please call and discuss    Phone Number Patient can be reached at: Cell number on file:    Telephone Information:   Mobile [phone]       Best Time: any    Can we leave a detailed message on this number? YES    Call taken on 8/18/2020 at 12:53 PM by Chrissie Fry

## 2020-08-19 ENCOUNTER — HOSPITAL ENCOUNTER (INPATIENT)
Facility: CLINIC | Age: 30
LOS: 1 days | Discharge: HOME HEALTH | DRG: 863 | End: 2020-08-21
Attending: EMERGENCY MEDICINE | Admitting: INTERNAL MEDICINE
Payer: COMMERCIAL

## 2020-08-19 ENCOUNTER — TELEPHONE (OUTPATIENT)
Dept: URGENT CARE | Facility: URGENT CARE | Age: 30
End: 2020-08-19

## 2020-08-19 ENCOUNTER — APPOINTMENT (OUTPATIENT)
Dept: CT IMAGING | Facility: CLINIC | Age: 30
DRG: 863 | End: 2020-08-19
Attending: EMERGENCY MEDICINE
Payer: COMMERCIAL

## 2020-08-19 DIAGNOSIS — R10.9 ABDOMINAL WALL PAIN: ICD-10-CM

## 2020-08-19 DIAGNOSIS — Z16.12 ESBL (EXTENDED SPECTRUM BETA-LACTAMASE) PRODUCING BACTERIA INFECTION: ICD-10-CM

## 2020-08-19 DIAGNOSIS — L03.311 ABDOMINAL WALL CELLULITIS: Primary | ICD-10-CM

## 2020-08-19 DIAGNOSIS — A49.9 ESBL (EXTENDED SPECTRUM BETA-LACTAMASE) PRODUCING BACTERIA INFECTION: ICD-10-CM

## 2020-08-19 DIAGNOSIS — T14.8XXA WOUND INFECTION: ICD-10-CM

## 2020-08-19 DIAGNOSIS — L08.9 WOUND INFECTION: ICD-10-CM

## 2020-08-19 PROBLEM — Z98.891 HISTORY OF CESAREAN SECTION: Status: ACTIVE | Noted: 2020-08-19

## 2020-08-19 LAB
ALBUMIN SERPL-MCNC: 3.8 G/DL (ref 3.4–5)
ALP SERPL-CCNC: 52 U/L (ref 40–150)
ALT SERPL W P-5'-P-CCNC: 21 U/L (ref 0–50)
ANION GAP SERPL CALCULATED.3IONS-SCNC: 6 MMOL/L (ref 3–14)
AST SERPL W P-5'-P-CCNC: 14 U/L (ref 0–45)
BACTERIA SPEC CULT: ABNORMAL
BASOPHILS # BLD AUTO: 0 10E9/L (ref 0–0.2)
BASOPHILS NFR BLD AUTO: 0.4 %
BILIRUB SERPL-MCNC: 0.3 MG/DL (ref 0.2–1.3)
BUN SERPL-MCNC: 9 MG/DL (ref 7–30)
CALCIUM SERPL-MCNC: 9.3 MG/DL (ref 8.5–10.1)
CHLORIDE SERPL-SCNC: 100 MMOL/L (ref 94–109)
CO2 SERPL-SCNC: 25 MMOL/L (ref 20–32)
CREAT SERPL-MCNC: 0.72 MG/DL (ref 0.52–1.04)
DIFFERENTIAL METHOD BLD: ABNORMAL
EOSINOPHIL # BLD AUTO: 0.2 10E9/L (ref 0–0.7)
EOSINOPHIL NFR BLD AUTO: 1.6 %
ERYTHROCYTE [DISTWIDTH] IN BLOOD BY AUTOMATED COUNT: 12 % (ref 10–15)
GFR SERPL CREATININE-BSD FRML MDRD: >90 ML/MIN/{1.73_M2}
GLUCOSE SERPL-MCNC: 94 MG/DL (ref 70–99)
HCG SERPL QL: NEGATIVE
HCT VFR BLD AUTO: 40.6 % (ref 35–47)
HGB BLD-MCNC: 14.4 G/DL (ref 11.7–15.7)
IMM GRANULOCYTES # BLD: 0 10E9/L (ref 0–0.4)
IMM GRANULOCYTES NFR BLD: 0.3 %
LACTATE BLD-SCNC: 1 MMOL/L (ref 0.7–2)
LYMPHOCYTES # BLD AUTO: 3.2 10E9/L (ref 0.8–5.3)
LYMPHOCYTES NFR BLD AUTO: 28.8 %
Lab: ABNORMAL
MCH RBC QN AUTO: 30.3 PG (ref 26.5–33)
MCHC RBC AUTO-ENTMCNC: 35.5 G/DL (ref 31.5–36.5)
MCV RBC AUTO: 85 FL (ref 78–100)
MONOCYTES # BLD AUTO: 0.6 10E9/L (ref 0–1.3)
MONOCYTES NFR BLD AUTO: 5.4 %
NEUTROPHILS # BLD AUTO: 7.1 10E9/L (ref 1.6–8.3)
NEUTROPHILS NFR BLD AUTO: 63.5 %
NRBC # BLD AUTO: 0 10*3/UL
NRBC BLD AUTO-RTO: 0 /100
PLATELET # BLD AUTO: 406 10E9/L (ref 150–450)
POTASSIUM SERPL-SCNC: 3.8 MMOL/L (ref 3.4–5.3)
PROT SERPL-MCNC: 7.3 G/DL (ref 6.8–8.8)
RBC # BLD AUTO: 4.76 10E12/L (ref 3.8–5.2)
SODIUM SERPL-SCNC: 131 MMOL/L (ref 133–144)
SPECIMEN SOURCE: ABNORMAL
WBC # BLD AUTO: 11.1 10E9/L (ref 4–11)

## 2020-08-19 PROCEDURE — 80053 COMPREHEN METABOLIC PANEL: CPT | Performed by: EMERGENCY MEDICINE

## 2020-08-19 PROCEDURE — 84703 CHORIONIC GONADOTROPIN ASSAY: CPT | Performed by: EMERGENCY MEDICINE

## 2020-08-19 PROCEDURE — 25000128 H RX IP 250 OP 636: Performed by: EMERGENCY MEDICINE

## 2020-08-19 PROCEDURE — 99285 EMERGENCY DEPT VISIT HI MDM: CPT | Mod: 25

## 2020-08-19 PROCEDURE — 12000000 ZZH R&B MED SURG/OB

## 2020-08-19 PROCEDURE — 85025 COMPLETE CBC W/AUTO DIFF WBC: CPT | Performed by: EMERGENCY MEDICINE

## 2020-08-19 PROCEDURE — 74177 CT ABD & PELVIS W/CONTRAST: CPT

## 2020-08-19 PROCEDURE — 96375 TX/PRO/DX INJ NEW DRUG ADDON: CPT

## 2020-08-19 PROCEDURE — 25000125 ZZHC RX 250: Performed by: EMERGENCY MEDICINE

## 2020-08-19 PROCEDURE — 96365 THER/PROPH/DIAG IV INF INIT: CPT | Mod: 59

## 2020-08-19 PROCEDURE — 87040 BLOOD CULTURE FOR BACTERIA: CPT | Performed by: EMERGENCY MEDICINE

## 2020-08-19 PROCEDURE — 99222 1ST HOSP IP/OBS MODERATE 55: CPT | Mod: AI | Performed by: INTERNAL MEDICINE

## 2020-08-19 PROCEDURE — 83605 ASSAY OF LACTIC ACID: CPT | Performed by: EMERGENCY MEDICINE

## 2020-08-19 RX ORDER — IBUPROFEN 200 MG
400 TABLET ORAL EVERY 4 HOURS PRN
COMMUNITY
End: 2022-11-24

## 2020-08-19 RX ORDER — HYDROMORPHONE HYDROCHLORIDE 1 MG/ML
INJECTION, SOLUTION INTRAMUSCULAR; INTRAVENOUS; SUBCUTANEOUS
Status: DISCONTINUED
Start: 2020-08-19 | End: 2020-08-19 | Stop reason: HOSPADM

## 2020-08-19 RX ORDER — TRAMADOL HYDROCHLORIDE 50 MG/1
50 TABLET ORAL EVERY 6 HOURS PRN
Status: ON HOLD | COMMUNITY
End: 2020-08-21

## 2020-08-19 RX ORDER — ONDANSETRON 4 MG/1
4 TABLET, ORALLY DISINTEGRATING ORAL EVERY 6 HOURS PRN
Status: DISCONTINUED | OUTPATIENT
Start: 2020-08-19 | End: 2020-08-20

## 2020-08-19 RX ORDER — TRAMADOL HYDROCHLORIDE 50 MG/1
50 TABLET ORAL EVERY 6 HOURS PRN
Status: DISCONTINUED | OUTPATIENT
Start: 2020-08-19 | End: 2020-08-21

## 2020-08-19 RX ORDER — PIPERACILLIN SODIUM, TAZOBACTAM SODIUM 4; .5 G/20ML; G/20ML
4.5 INJECTION, POWDER, LYOPHILIZED, FOR SOLUTION INTRAVENOUS ONCE
Status: COMPLETED | OUTPATIENT
Start: 2020-08-19 | End: 2020-08-19

## 2020-08-19 RX ORDER — IOPAMIDOL 755 MG/ML
95 INJECTION, SOLUTION INTRAVASCULAR ONCE
Status: COMPLETED | OUTPATIENT
Start: 2020-08-19 | End: 2020-08-19

## 2020-08-19 RX ORDER — PRENATAL VIT/IRON FUM/FOLIC AC 27MG-0.8MG
1 TABLET ORAL DAILY
Status: DISCONTINUED | OUTPATIENT
Start: 2020-08-20 | End: 2020-08-21 | Stop reason: HOSPADM

## 2020-08-19 RX ORDER — ONDANSETRON 2 MG/ML
4 INJECTION INTRAMUSCULAR; INTRAVENOUS ONCE
Status: COMPLETED | OUTPATIENT
Start: 2020-08-19 | End: 2020-08-19

## 2020-08-19 RX ORDER — ACETAMINOPHEN 500 MG
1000 TABLET ORAL EVERY 6 HOURS PRN
COMMUNITY
End: 2022-11-24

## 2020-08-19 RX ORDER — NICOTINE 21 MG/24HR
1 PATCH, TRANSDERMAL 24 HOURS TRANSDERMAL DAILY
Status: DISCONTINUED | OUTPATIENT
Start: 2020-08-20 | End: 2020-08-21 | Stop reason: HOSPADM

## 2020-08-19 RX ORDER — NALOXONE HYDROCHLORIDE 0.4 MG/ML
.1-.4 INJECTION, SOLUTION INTRAMUSCULAR; INTRAVENOUS; SUBCUTANEOUS
Status: DISCONTINUED | OUTPATIENT
Start: 2020-08-19 | End: 2020-08-20

## 2020-08-19 RX ORDER — HYDROMORPHONE HYDROCHLORIDE 1 MG/ML
0.5 INJECTION, SOLUTION INTRAMUSCULAR; INTRAVENOUS; SUBCUTANEOUS ONCE
Status: COMPLETED | OUTPATIENT
Start: 2020-08-19 | End: 2020-08-19

## 2020-08-19 RX ORDER — IBUPROFEN 600 MG/1
600 TABLET, FILM COATED ORAL 3 TIMES DAILY PRN
Status: DISCONTINUED | OUTPATIENT
Start: 2020-08-19 | End: 2020-08-21 | Stop reason: HOSPADM

## 2020-08-19 RX ORDER — PIPERACILLIN SODIUM, TAZOBACTAM SODIUM 3; .375 G/15ML; G/15ML
3.38 INJECTION, POWDER, LYOPHILIZED, FOR SOLUTION INTRAVENOUS EVERY 6 HOURS
Status: DISCONTINUED | OUTPATIENT
Start: 2020-08-20 | End: 2020-08-21

## 2020-08-19 RX ADMIN — ONDANSETRON 4 MG: 2 INJECTION INTRAMUSCULAR; INTRAVENOUS at 19:07

## 2020-08-19 RX ADMIN — SODIUM CHLORIDE 67 ML: 9 INJECTION, SOLUTION INTRAVENOUS at 21:30

## 2020-08-19 RX ADMIN — IOPAMIDOL 95 ML: 755 INJECTION, SOLUTION INTRAVENOUS at 21:30

## 2020-08-19 RX ADMIN — HYDROMORPHONE HYDROCHLORIDE 0.5 MG: 1 INJECTION, SOLUTION INTRAMUSCULAR; INTRAVENOUS; SUBCUTANEOUS at 21:21

## 2020-08-19 RX ADMIN — PIPERACILLIN SODIUM AND TAZOBACTAM SODIUM 4.5 G: 4; .5 INJECTION, POWDER, LYOPHILIZED, FOR SOLUTION INTRAVENOUS at 21:23

## 2020-08-19 ASSESSMENT — ENCOUNTER SYMPTOMS
WOUND: 1
NAUSEA: 1
VOMITING: 1
APPETITE CHANGE: 1
DIARRHEA: 1

## 2020-08-19 ASSESSMENT — MIFFLIN-ST. JEOR: SCORE: 1537.14

## 2020-08-19 NOTE — ED TRIAGE NOTES
Had c section incision debridement on 8/5, went to UC on Sunday for green discharge from wound- cultured and pt was called today to come in for iv antibiotics  As culture was  E coli +  Pt is on 2 po antibiotics, having excessive diarrhea

## 2020-08-19 NOTE — TELEPHONE ENCOUNTER
I called pt with her wound cx results which showed E. Coli EXBL with multiple resistances.  None of the meds it was sensitive to are oral.  Pt reports she is feeling worse than she did when seen 3 days ago, but not having fevers.  She also cancelled her appt with the surgeon that was for yesterday and scheduled with a different surgeon for next week.  Because she is feeling worse and because of what cx grew, she is advised to go to the ER now for evaluation and treatment. Pt agrees and indicates she will go to the ER today.

## 2020-08-20 LAB — LACTATE BLD-SCNC: 0.7 MMOL/L (ref 0.7–2)

## 2020-08-20 PROCEDURE — 25000132 ZZH RX MED GY IP 250 OP 250 PS 637: Performed by: HOSPITALIST

## 2020-08-20 PROCEDURE — 25000132 ZZH RX MED GY IP 250 OP 250 PS 637: Performed by: INTERNAL MEDICINE

## 2020-08-20 PROCEDURE — 25800030 ZZH RX IP 258 OP 636: Performed by: INTERNAL MEDICINE

## 2020-08-20 PROCEDURE — 99214 OFFICE O/P EST MOD 30 MIN: CPT | Mod: 58 | Performed by: SURGERY

## 2020-08-20 PROCEDURE — G0463 HOSPITAL OUTPT CLINIC VISIT: HCPCS

## 2020-08-20 PROCEDURE — 97606 NEG PRS WND THER DME>50 SQCM: CPT

## 2020-08-20 PROCEDURE — 12000000 ZZH R&B MED SURG/OB

## 2020-08-20 PROCEDURE — 83605 ASSAY OF LACTIC ACID: CPT | Performed by: INTERNAL MEDICINE

## 2020-08-20 PROCEDURE — 25000128 H RX IP 250 OP 636: Performed by: HOSPITALIST

## 2020-08-20 PROCEDURE — 25000128 H RX IP 250 OP 636: Performed by: INTERNAL MEDICINE

## 2020-08-20 PROCEDURE — 99232 SBSQ HOSP IP/OBS MODERATE 35: CPT | Performed by: HOSPITALIST

## 2020-08-20 PROCEDURE — 36415 COLL VENOUS BLD VENIPUNCTURE: CPT | Performed by: INTERNAL MEDICINE

## 2020-08-20 PROCEDURE — 97605 NEG PRS WND THER DME<=50SQCM: CPT | Performed by: SURGERY

## 2020-08-20 RX ORDER — ACETAMINOPHEN 325 MG/1
650 TABLET ORAL EVERY 4 HOURS PRN
Status: DISCONTINUED | OUTPATIENT
Start: 2020-08-20 | End: 2020-08-21 | Stop reason: HOSPADM

## 2020-08-20 RX ORDER — OXYCODONE AND ACETAMINOPHEN 5; 325 MG/1; MG/1
1-2 TABLET ORAL EVERY 4 HOURS PRN
Status: DISCONTINUED | OUTPATIENT
Start: 2020-08-20 | End: 2020-08-20

## 2020-08-20 RX ORDER — LIDOCAINE 40 MG/G
CREAM TOPICAL
Status: DISCONTINUED | OUTPATIENT
Start: 2020-08-20 | End: 2020-08-21 | Stop reason: HOSPADM

## 2020-08-20 RX ORDER — OXYCODONE HYDROCHLORIDE 5 MG/1
5 TABLET ORAL EVERY 4 HOURS PRN
Status: DISCONTINUED | OUTPATIENT
Start: 2020-08-20 | End: 2020-08-21 | Stop reason: HOSPADM

## 2020-08-20 RX ORDER — SODIUM CHLORIDE 9 MG/ML
INJECTION, SOLUTION INTRAVENOUS CONTINUOUS
Status: DISCONTINUED | OUTPATIENT
Start: 2020-08-20 | End: 2020-08-21

## 2020-08-20 RX ORDER — NALOXONE HYDROCHLORIDE 0.4 MG/ML
.1-.4 INJECTION, SOLUTION INTRAMUSCULAR; INTRAVENOUS; SUBCUTANEOUS
Status: DISCONTINUED | OUTPATIENT
Start: 2020-08-20 | End: 2020-08-21 | Stop reason: HOSPADM

## 2020-08-20 RX ORDER — ONDANSETRON 4 MG/1
4 TABLET, ORALLY DISINTEGRATING ORAL EVERY 6 HOURS PRN
Status: DISCONTINUED | OUTPATIENT
Start: 2020-08-20 | End: 2020-08-21 | Stop reason: HOSPADM

## 2020-08-20 RX ORDER — ONDANSETRON 2 MG/ML
4 INJECTION INTRAMUSCULAR; INTRAVENOUS EVERY 6 HOURS PRN
Status: DISCONTINUED | OUTPATIENT
Start: 2020-08-20 | End: 2020-08-21 | Stop reason: HOSPADM

## 2020-08-20 RX ORDER — HYDROMORPHONE HYDROCHLORIDE 1 MG/ML
0.4 INJECTION, SOLUTION INTRAMUSCULAR; INTRAVENOUS; SUBCUTANEOUS ONCE
Status: COMPLETED | OUTPATIENT
Start: 2020-08-20 | End: 2020-08-20

## 2020-08-20 RX ADMIN — PIPERACILLIN SODIUM AND TAZOBACTAM SODIUM 3.38 G: 3; .375 INJECTION, POWDER, LYOPHILIZED, FOR SOLUTION INTRAVENOUS at 16:51

## 2020-08-20 RX ADMIN — SODIUM CHLORIDE, PRESERVATIVE FREE: 5 INJECTION INTRAVENOUS at 00:46

## 2020-08-20 RX ADMIN — NICOTINE 1 PATCH: 14 PATCH, EXTENDED RELEASE TRANSDERMAL at 17:43

## 2020-08-20 RX ADMIN — SERTRALINE HYDROCHLORIDE 50 MG: 50 TABLET ORAL at 09:45

## 2020-08-20 RX ADMIN — OXYCODONE HYDROCHLORIDE AND ACETAMINOPHEN 1 TABLET: 5; 325 TABLET ORAL at 00:55

## 2020-08-20 RX ADMIN — ACETAMINOPHEN 650 MG: 325 TABLET ORAL at 06:45

## 2020-08-20 RX ADMIN — HYDROMORPHONE HYDROCHLORIDE 0.4 MG: 1 INJECTION, SOLUTION INTRAMUSCULAR; INTRAVENOUS; SUBCUTANEOUS at 11:56

## 2020-08-20 RX ADMIN — SODIUM CHLORIDE, PRESERVATIVE FREE: 5 INJECTION INTRAVENOUS at 14:07

## 2020-08-20 RX ADMIN — PIPERACILLIN SODIUM AND TAZOBACTAM SODIUM 3.38 G: 3; .375 INJECTION, POWDER, LYOPHILIZED, FOR SOLUTION INTRAVENOUS at 09:48

## 2020-08-20 RX ADMIN — ACETAMINOPHEN 650 MG: 325 TABLET ORAL at 17:41

## 2020-08-20 RX ADMIN — NICOTINE 1 PATCH: 14 PATCH, EXTENDED RELEASE TRANSDERMAL at 09:45

## 2020-08-20 RX ADMIN — PRENATAL VITAMINS-IRON FUMARATE 27 MG IRON-FOLIC ACID 0.8 MG TABLET 1 TABLET: at 09:45

## 2020-08-20 RX ADMIN — PIPERACILLIN SODIUM AND TAZOBACTAM SODIUM 3.38 G: 3; .375 INJECTION, POWDER, LYOPHILIZED, FOR SOLUTION INTRAVENOUS at 22:33

## 2020-08-20 RX ADMIN — SODIUM CHLORIDE, POTASSIUM CHLORIDE, SODIUM LACTATE AND CALCIUM CHLORIDE 1000 ML: 600; 310; 30; 20 INJECTION, SOLUTION INTRAVENOUS at 05:13

## 2020-08-20 RX ADMIN — IBUPROFEN 600 MG: 600 TABLET ORAL at 09:45

## 2020-08-20 RX ADMIN — OXYCODONE HYDROCHLORIDE 5 MG: 5 TABLET ORAL at 11:32

## 2020-08-20 RX ADMIN — PIPERACILLIN SODIUM AND TAZOBACTAM SODIUM 3.38 G: 3; .375 INJECTION, POWDER, LYOPHILIZED, FOR SOLUTION INTRAVENOUS at 04:40

## 2020-08-20 ASSESSMENT — ACTIVITIES OF DAILY LIVING (ADL)
ADLS_ACUITY_SCORE: 10

## 2020-08-20 NOTE — ED NOTES
"[facility]  ED Nurse Handoff Report    ED Chief complaint: Post-op Problem      ED Diagnosis:   Final diagnoses:   ESBL (extended spectrum beta-lactamase) producing bacteria infection   Wound infection       Code Status: Full Code    Allergies:   Allergies   Allergen Reactions     Sulfa Drugs        Patient Story: Pt from home. Has been struggling with  wound infection, cellulitis, redness and drainage. Has had I&D. To clinic and wound culture shows e coli.     Focused Assessment:  Pt reports pain (6-7/10) at wound site. Reports frequent diarrhea that has slowed down now due to antibiotic use. Pt has 2 year old and baby at home and is breastfeeding. Brought pump. Pt alert and oriented. Pleasant and cooperative.     Treatments and/or interventions provided: Labs, blood cultures, medication    Patient's response to treatments and/or interventions: Tolerated well    To be done/followed up on inpatient unit:  C diff PCR (has not had stool yet)    Does this patient have any cognitive concerns?: none    Activity level - Baseline/Home:  Independent  Activity Level - Current:   Independent    Patient's Preferred language: English   Needed?: No    Isolation: None and Enteric  Infection:   C-Diff Pending  Bariatric?: No    Vital Signs:   Vitals:    20 1855   BP: 129/85   Pulse: 94   Resp: 16   Temp: 97.7  F (36.5  C)   TempSrc: Temporal   SpO2: 98%   Weight: 86.2 kg (190 lb)   Height: 1.562 m (5' 1.5\")       Cardiac Rhythm:     Was the PSS-3 completed:   Yes  What interventions are required if any?               Family Comments: Not present  OBS brochure/video discussed/provided to patient/family: No              Name of person given brochure if not patient: n/a              Relationship to patient: n/a    For the majority of the shift this patient's behavior was Green.   Behavioral interventions performed were Frequent rounding.    ED NURSE PHONE NUMBER: *[id number]         "

## 2020-08-20 NOTE — H&P
[facility]    History and Physical  Hospitalist       Date of Admission:  2020    Assessment & Plan   29 year old female with past medical hx of  complicated by infection, who presents with ongoing abd wound with culture positive for E.coli ESBL:    Summary:    Principal Problem:    Abdominal wall cellulitis -- related to complication from her 2020       ESBL (extended spectrum beta-lactamase) producing bacteria infection   -- cultured from abd wall drainage on , 20, ?true infection vs colonized   -- Consult ID for opinion and duration of antibiotics    Active Problems:    Smoker   -- encouraged smoking cessation   -- she would prefer patch      Breast feeding   -- discussed Zosyn goes in breast milk, and if wants to avoid her baby being exposed to it would suggest dumping breast milk at this time.        Plan: As above    DVT Prophylaxis: Pneumatic Compression Devices  Code Status: Full Code    Disposition: Expected discharge in 2-3 days    Marko Jay MD  Pager: [phone]  Cell Phone:  [phone]     Primary Care Physician   Marcela France    Chief Complaint   Abdominal wall infection     History is obtained from Patient    History of Present Illness     29 year old female with a history of endometriosis, who underwent a  in 2020, which was complicated by abd wall infection, who presents for evaluation of positive culture from abd wall drainage for ESBL:     She underwent  section in [place] in 2020, and has had open wound until March with intermittent antibiotic usage. She has since had a complex course of events and has presented multiple times for drainage and ineffective wound healing. She states that she has had around 8 rounds of antibiotics. She is no longer being seen by Dr. Grier, her OB, as he attempted to cauterize it with silver nitrate, which was unsuccessful, causing her to be moved to a  general surgery patient. She underwent incision and drainage on , and has been packing the wound since then, and has been on Keflex and Augmentin.  Because of increased green drainage she when to [facility] in [place] on , , and culture has grown out E.Coli, ESBL, and only sensitive to Meropenem and Zosyn and Amikacin.       She states that she does not agree with her surgeons care plan noting that she disagrees with him not reffering her for wound care.  She dose have mild nausea which she relates to the antibiotics, and slight chills but no fever.  Does have 4-5 loose stools a day since being on the antibiotic.     In ER, temp 97.7, WBC 11.1 and sodium 131, lactate 1.0.  Abdominal CT with no evidence of abdominal abscess.       PAST MEDICAL HISTORY    Past Medical History:   Diagnosis Date     Anxiety      Depressive disorder      Endometriosis      Periumbilical hernia         PAST SURGICAL HISTORY    Past Surgical History:   Procedure Laterality Date     C ORAL SURGERY PROCEDURE      [place] teeth      SECTION  2018, 2020    x 2     LAPAROSCOPY DIAGNOSTIC (GYN)  2015    Endometriosis        Prior to Admission Medications   Prior to Admission Medications   Prescriptions Last Dose Informant Patient Reported? Taking?   Prenatal Vit-Fe Fumarate-FA (PRENATAL VITAMIN PO) 2020 at am  Yes Yes   Sig: Take 1 tablet by mouth daily    acetaminophen (TYLENOL) 500 MG tablet 2020 at 1730  Yes Yes   Sig: Take 1,000 mg by mouth every 6 hours as needed for mild pain   amoxicillin-clavulanate (AUGMENTIN) 875-125 MG tablet 2020 at x1  No Yes   Sig: Take 1 tablet by mouth 2 times daily   clindamycin (CLEOCIN) 300 MG capsule 2020 at x1  No Yes   Sig: Take 1 capsule (300 mg) by mouth 3 times daily for 7 days   ibuprofen (ADVIL/MOTRIN) 200 MG tablet 2020 at 1000  Yes Yes   Sig: Take 400 mg by mouth every 4 hours as needed for mild pain   nystatin (MYCOSTATIN) 918968  UNIT/GM external powder Unknown at Unknown time  No No   Sig: Apply topically 2 times daily Prevention of rash   ondansetron (ZOFRAN-ODT) 4 MG ODT tab Unknown at Unknown time  Yes No   Si mg every 8 hours as needed    sertraline (ZOLOFT) 50 MG tablet 2020 at am  No Yes   Sig: Take 1 tablet (50 mg) by mouth daily   sodium hypochlorite (QUARTER-STRENGTH DAKINS) external solution 2020 at am  No Yes   Sig: Apply topically daily Use this damp to dry when doing the dressing changes.   traMADol (ULTRAM) 50 MG tablet 2020 at 1600  Yes Yes   Sig: Take 50 mg by mouth every 6 hours as needed for severe pain      Facility-Administered Medications: None     Allergies   Allergies   Allergen Reactions     Sulfa Drugs        SOCIAL HISTORY    Social History     Social History Narrative    Lives with her boyfriend, they have 2 children, and lives in a house.  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  (last updated 2020)       Social History     Tobacco Use     Smoking status: Current Every Day Smoker     Packs/day: 0.50     Types: Cigarettes     Smokeless tobacco: Never Used   Substance Use Topics     Alcohol use: Not Currently     Drug use: Never        FAMILY HISTORY    Family History   Problem Relation Age of Onset     Depression Mother      Alcoholism Mother      Depression Father      Alcoholism Father      Breast Cancer Maternal Grandmother      Diabetes Maternal Grandmother         Review of Systems   The 10 point Review of Systems is negative other than noted in the HPI or here.       PHYSICAL EXAM     Temp: 97.7  F (36.5  C) Temp src: Temporal BP: 129/85 Pulse: 94   Resp: 16 SpO2: 98 % O2 Device: None (Room air)    Vital Signs with Ranges  Temp:  [97.7  F (36.5  C)] 97.7  F (36.5  C)  Pulse:  [94] 94  Resp:  [16] 16  BP: (129)/(85) 129/85  SpO2:  [98 %] 98 %  190 lbs 0 oz    Constitutional: Awake, alert, cooperative, no apparent distress.  Eyes: Conjunctiva  and pupils examined and normal.  HEENT: Moist mucous membranes, normal dentition.  Respiratory: Clear to auscultation bilaterally, no crackles or wheezing.  Cardiovascular: Regular rate and rhythm, normal S1 and S2, and no murmur noted, no carotid bruits.  No ankle edema.   GI: Soft, non-distended, non-tender, normal bowel sounds.  Does have horizontal abdominal opening (abdominal wound) in suprapubic area), which is about 10 cm x4 with moderate yellowish drainage on packing, but minimal surrounding erythema.   Lymph/Hematologic: No anterior cervical, supraclavicular or axillary adenopathy.  Skin: No rashes, no cyanosis.   Musculoskeletal: No joint swelling, erythema or tenderness.  Neurologic: Alert, Ox3, Cranial nerves 2-12 intact, no focal weakness or numbness  Psychiatric:  No obvious anxiety or depression.     Data   Data reviewed today:  I personally reviewed no images or EKG's today.  Recent Labs   Lab 08/19/20  1903   WBC 11.1*   HGB 14.4   MCV 85      *   POTASSIUM 3.8   CHLORIDE 100   CO2 25   BUN 9   CR 0.72   ANIONGAP 6   WILDA 9.3   GLC 94   ALBUMIN 3.8   PROTTOTAL 7.3   BILITOTAL 0.3   ALKPHOS 52   ALT 21   AST 14       Imaging:  Recent Results (from the past 24 hour(s))   CT Abdomen Pelvis w Contrast    Narrative    CT ABDOMEN AND PELVIS WITH CONTRAST 8/19/2020 9:36 PM    CLINICAL HISTORY: E coli wound infection, question fistula.    TECHNIQUE: CT scan of the abdomen and pelvis was performed following  injection of IV contrast. Multiplanar reformats were obtained. Dose  reduction techniques were used.    CONTRAST: 95 mL Isovue-370    COMPARISON: None.    FINDINGS:   LOWER CHEST: Normal.    HEPATOBILIARY: Normal.    PANCREAS: Normal.    SPLEEN: A few small hypodensities in the spleen are technically  indeterminant.    ADRENAL GLANDS: Normal.    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 No hydronephrosis or associated acute abnormality.    BOWEL: No acute  abnormality. Normal appendix.    PELVIC ORGANS: Normal.    ADDITIONAL FINDINGS: There is an open skin wound at the anterior low  pelvic level with a size of approximately 8.5 x 1.8 cm series 4 image  179. There appears to be some gauze material within the wound site.  There is surrounding moderate subcutaneous edema but no drainable  abscess can be seen by CT. There is a fat-containing periumbilical  hernia that appears to be mildly edematous measuring 3.5 cm series 3  image 52. No herniated bowel.    MUSCULOSKELETAL: Normal.      Impression    IMPRESSION:   1.  Prominent skin wound at the ventral low pelvis identified. There  is surrounding moderate subcutaneous edema but no abscess is seen.  2.  No other acute abnormality.  3.  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  4.  A few tiny hypodensities in the spleen are technically  indeterminant but are of questionable clinical significance.

## 2020-08-20 NOTE — UTILIZATION REVIEW
Admission Status; Secondary Review Determination       Under the authority of the Utilization Management Committee, the utilization review process indicated a secondary review on the above patient. The review outcome is based on review of the medical records, discussions with staff, and applying clinical experience noted on the date of the review.     (x) Inpatient Status Appropriate - This patient's medical care is consistent with medical management for inpatient care and reasonable inpatient medical practice.     RATIONALE FOR DETERMINATION   29 year old female who presented to the [facility] ER with open abdominal wound.  She had a  in 2020 and has been having ongoing wound healing concerns since. She has been on several cycles of antibiotics and has had a recent +cx for ESBL after her last I&D on . Cultured from abd wall drainage on 20 noted for E coli sensitive for Zosyn and meropenem. IV zosyn started on admission, ID consulted.Also general surgery consult for evaluation for need of further debridement vs consideration for wound vac. The expected length of stay at the time of admission was more than 2 nights because of the severity of illness, intensity of service provided, and risk for adverse outcome. Inpatient admission is appropriate.     This document was produced using voice recognition software       The information on this document is developed by the utilization review team in order for the business office to ensure compliance. This only denotes the appropriateness of proper admission status and does not reflect the quality of care rendered.   The definitions of Inpatient Status and Observation Status used in making the determination above are those provided in the CMS Coverage Manual, Chapter 1 and Chapter 6, section 70.4.   Sincerely,   TAM FORRESTER MD   System Medical Director   Utilization Management   [facility].

## 2020-08-20 NOTE — PHARMACY-ADMISSION MEDICATION HISTORY
Pharmacy Medication History  Admission medication history interview status for the 8/19/2020  admission is complete. See EPIC admission navigator for prior to admission medications     Medication history sources: Patient and Surescripts  Medication history source reliability: Good  Adherence assessment: Good    Significant changes made to the medication list:  Added ultram      Additional medication history information:   none    Medication reconciliation completed by provider prior to medication history? No    Time spent in this activity: 10 minutes      Prior to Admission medications    Medication Sig Last Dose Taking? Auth Provider   acetaminophen (TYLENOL) 500 MG tablet Take 1,000 mg by mouth every 6 hours as needed for mild pain 8/19/2020 at 1730 Yes Unknown, Entered By History   amoxicillin-clavulanate (AUGMENTIN) 875-125 MG tablet Take 1 tablet by mouth 2 times daily 8/19/2020 at x1 Yes Dandy Davis DO   clindamycin (CLEOCIN) 300 MG capsule Take 1 capsule (300 mg) by mouth 3 times daily for 7 days 8/19/2020 at x1 Yes Bina Mead MD   ibuprofen (ADVIL/MOTRIN) 200 MG tablet Take 400 mg by mouth every 4 hours as needed for mild pain 8/19/2020 at 1000 Yes Unknown, Entered By History   Prenatal Vit-Fe Fumarate-FA (PRENATAL VITAMIN PO) Take 1 tablet by mouth daily  8/19/2020 at am Yes Reported, Patient   sertraline (ZOLOFT) 50 MG tablet Take 1 tablet (50 mg) by mouth daily 8/19/2020 at am Yes Leslie Ge DO   sodium hypochlorite (QUARTER-STRENGTH DAKINS) external solution Apply topically daily Use this damp to dry when doing the dressing changes. 8/19/2020 at am Yes Juanpablo Dallas MD   traMADol (ULTRAM) 50 MG tablet Take 50 mg by mouth every 6 hours as needed for severe pain 8/19/2020 at 1600 Yes Unknown, Entered By History   nystatin (MYCOSTATIN) 134006 UNIT/GM external powder Apply topically 2 times daily Prevention of rash Unknown at Unknown time  Naomy Canseco PA-C   ondansetron  (ZOFRAN-ODT) 4 MG ODT tab 4 mg every 8 hours as needed  Unknown at Unknown time  Reported, Patient

## 2020-08-20 NOTE — PROGRESS NOTES
RECEIVING UNIT ED HANDOFF REVIEW    ED Nurse Handoff Report was reviewed by: Marisabel Avila RN on August 19, 2020 at 9:19 PM

## 2020-08-20 NOTE — PROVIDER NOTIFICATION
MD Notification    Notified Person: MD    Notified Person Name: Mayen    Notification Date/Time: 8/20/20    Notification Interaction: Text Page    Purpose of Notification: Pt most recent SBP 88/53 & 86/50. Please advise    Orders Received: Pending    Comments:

## 2020-08-20 NOTE — PROGRESS NOTES
[facility]  Patient is currently open to home care services with [facility]. The patient is currently receiving Skilled Nursing services. [facility]  and team have been notified of patient admission. [facility] liaison will continue to follow patient during stay. If appropriate provide orders to resume home care at time of discharge.

## 2020-08-20 NOTE — PROGRESS NOTES
"[facility]  WO Nurse Inpatient Wound Assessment with Negative Pressure Wound Therapy     Assessment   Initial Assessment of wound(s) on pt's: lower abdominal wound  Surgical wound due to caesarian section  Status: Initial assessment  Lower abdominal wounds due to Surgical Wound from Caesarian Section in 2020. Pt has had multiple infections.   Status: initial assessment    WOC assessed at bedside with Dr. Jean Baptiste and in agreement to apply negative pressure wound therapy Vac . Pt will need to continue with wound vac at home. Next dressing change will depend on discharge plan. Next vac change planned for Monday with a 3x/wk dressing change plan.    Treatment Plan  Lower abdomen wound: Negative Pressure Wound Therapy (NPWT) to be changed by WOC RN every Mon/Wed/Fri with medium black foam. Staff RN to assess pump settings set to -125 and dressing integrity every shift. Remove foam dressing and replace with BID normal saline moist gauze dressing if:  -a dressing failure which cannot be repaired within 2 hours  -patient is discharging to home without a home pump  -patient is discharging to a facility outside the local area  -if a dressing is a \"Silver Foam\", remove before Radiation Therapy or MRI    Nursing to notify the Provider(s) and re-consult the WOC Nurse if wound(s) deteriorate(s) or if necessary to reevaluate the plan.      Patient History    According to medical record: 29 year old female with a history of endometriosis, who underwent a  in 2020, which was complicated by abd wall infection, who presents for evaluation of positive culture from abd wall drainage for ESBL:     She underwent  section in [place] in 2020, and has had open wound until March with intermittent antibiotic usage. She has since had a complex course of events and has presented multiple times for drainage and ineffective wound healing. She states that she has had around 8 rounds of " antibiotics. She is no longer being seen by Dr. Grier, her OB, as he attempted to cauterize it with silver nitrate, which was unsuccessful, causing her to be moved to a general surgery patient. She underwent incision and drainage on 08/05, and has been packing the wound since then, and has been on Keflex and Augmentin.  Because of increased green drainage she when to [facility] in [place] on Sunday, 8/16, and culture has grown out E.Coli, ESBL, and only sensitive to Meropenem and Zosyn and Amikacin.       She states that she does not agree with her surgeons care plan noting that she disagrees with him not reffering her for wound care.     Objective Data  Current support surface: Standard , Atmos Air mattress  Current off-loading measures: Pillows  Containment of urine/stool: Continent  Current diet/nutrition: Orders Placed This Encounter      Advance Diet as Tolerated: Regular Diet Adult    Output: No intake/output data recorded.  Mahesh:   Sensory Perception: 4-->no impairment  Moisture: 4-->rarely moist  Activity: 4-->walks frequently  Mobility: 4-->no limitation  Nutrition: 3-->adequate  Friction and Shear: 3-->no apparent problem  Mahesh Score: 22  Labs:   Recent Labs   Lab Test 08/19/20  1903   ALBUMIN 3.8   HGB 14.4   WBC 11.1*       Physical Exam  Wound Location: Lower abdomen    8/20/20 lower abdomen with flash    Wound History: Pt with non healing wound since Jan 2020 has been packing with saline and gauze. Assessed at bedside with surgery and agrees for wound vac and discharge home within 1-2 days  Surgical date: Original Surgery Jan 2020  Date Negative Pressure Wound Therapy initiated: 8/20/20  Measurements: (length x width x depth in cm):1cm x 12.3cm x 4.2cm  Tunneling: N/A  Undermining: N/A  Wound Base: moist and granulation slight shiny film in base of wound  Palpation of the wound bed:  normal  Periwound Skin: intact, shaved prior to vac application  Color: normal and consistent with  surrounding tissue  Temperature  normal   Drainage: Amount: light  Color: serosanguinous   Odor: moderate   Pain:  minimal dull, stabbing and sharp    Was patient premedicated prior to dressing change? Yes    Medication(s) used:  See Mar    Number of foam pieces removed from a wound (excluding foam for bridge) : First vac application  Verified this matched the number of foam pieces applied last dressing change: YES and First Vac application  Number of foam pieces packed into wound (excluding foam for bridge) : 1 GranuFoam Black            Intervention:     Visual inspection of wound(s):  Wound was assessed.  Wound Care: was done  Orders:  written  Supplies:  At bedside  Discussed plan of care with: Surgery team, nursing and patient     Eldon Malloy RN CWOCN

## 2020-08-20 NOTE — PROVIDER NOTIFICATION
Brief update:    Paged re: hypotension.  Asymptomatic in relation to blood pressure, still with abdominal pain.    Patient with post op infection; currently on zosyn    No necrotizing fasciitis on CT in emergency department.  No change in wound appearance and no crepitus per nursing staff.    1L LR bolus; can give over 30 min if tolerated  Lactic acid now  Hold potential contributing meds (narcotics) until fluids can be administered and BP improves    Monitor wound closely for any change    Varghese Mayen MD  5:02 AM

## 2020-08-20 NOTE — CONSULTS
[facility]    Infectious Disease Consultation     Date of Admission:  2020  Date of Consult (When I saw the patient): 20    Assessment & Plan   Joanne Escalante is a 29 year old female who was admitted on 2020.     Impression:  1. 29 y.o female with endometriosis.   2. S/p c- section 2020.   3. Complicated course related with wound non healing, post surgical wound infection.   4. Multiple rounds of antibiotics: Keflex-Cipro-Keflex-Augmentin-Clindamycin  5. Most recent CT scan There is an open skin wound at the anterior lowpelvic level with a size of approximately 8.5 x 1.8 cm, no drainable abscess can be seen by CT    Recommendations:   1. Continue on zosyn   2. Follow up on the blood cultures.       Nancy Orta MD    Reason for Consult   Reason for consult: I was asked to evaluate this patient for post C section wound infection .    Primary Care Physician   Marcela France    Chief Complaint   Drainage from abdominal wound     History is obtained from the patient and medical records    History of Present Illness   Joanne Escalante is a 29 year old female who underwent  section in [place] in January and has had open sores until March with intermittent antibiotic usage. She has since had a complex course of events and has presented multiple times for drainage and ineffective wound healing. She states that she has had around 8 rounds of antibiotics. She underwent incision and drainage on  through [facility] of her  section wound, most recent cultures have ESBL E coli in it     Past Medical History   I have reviewed this patient's medical history and updated it with pertinent information if needed.   Past Medical History:   Diagnosis Date     Anxiety      Depressive disorder      Endometriosis      Periumbilical hernia        Past Surgical History   I have reviewed this patient's surgical history and updated it with pertinent information if  needed.  Past Surgical History:   Procedure Laterality Date     C ORAL SURGERY PROCEDURE      Post teeth      SECTION  2018, 2020    x 2     LAPAROSCOPY DIAGNOSTIC (GYN)  2015    Endometriosis       Prior to Admission Medications   Prior to Admission Medications   Prescriptions Last Dose Informant Patient Reported? Taking?   Prenatal Vit-Fe Fumarate-FA (PRENATAL VITAMIN PO) 2020 at am  Yes Yes   Sig: Take 1 tablet by mouth daily    acetaminophen (TYLENOL) 500 MG tablet 2020 at 1730  Yes Yes   Sig: Take 1,000 mg by mouth every 6 hours as needed for mild pain   amoxicillin-clavulanate (AUGMENTIN) 875-125 MG tablet 2020 at x1  No Yes   Sig: Take 1 tablet by mouth 2 times daily   clindamycin (CLEOCIN) 300 MG capsule 2020 at x1  No Yes   Sig: Take 1 capsule (300 mg) by mouth 3 times daily for 7 days   ibuprofen (ADVIL/MOTRIN) 200 MG tablet 2020 at 1000  Yes Yes   Sig: Take 400 mg by mouth every 4 hours as needed for mild pain   nystatin (MYCOSTATIN) 312178 UNIT/GM external powder Unknown at Unknown time  No No   Sig: Apply topically 2 times daily Prevention of rash   ondansetron (ZOFRAN-ODT) 4 MG ODT tab Unknown at Unknown time  Yes No   Si mg every 8 hours as needed    sertraline (ZOLOFT) 50 MG tablet 2020 at am  No Yes   Sig: Take 1 tablet (50 mg) by mouth daily   sodium hypochlorite (QUARTER-STRENGTH DAKINS) external solution 2020 at am  No Yes   Sig: Apply topically daily Use this damp to dry when doing the dressing changes.   traMADol (ULTRAM) 50 MG tablet 2020 at 1600  Yes Yes   Sig: Take 50 mg by mouth every 6 hours as needed for severe pain      Facility-Administered Medications: None     Allergies   Allergies   Allergen Reactions     Sulfa Drugs        Immunization History   Immunization History   Administered Date(s) Administered     FLU 6-35 months 10/17/2011     HPV Quadrivalent 2011, 2012, 2012     Influenza (IIV3) PF 2012      Influenza Vaccine IM > 6 months Valent IIV4 12/11/2014, 09/28/2017     Influenza Vaccine, 6+MO IM (QUADRIVALENT W/PRESERVATIVES) 10/08/2018     MMR 05/09/2017, 05/09/2017, 06/08/2017, 06/08/2017     Mantoux Tuberculin Skin Test 09/29/2009     TDAP Vaccine (Adacel) 09/06/2012, 01/15/2018, 12/16/2019     Td (Adult), Adsorbed 09/22/2003       Social History   I have reviewed this patient's social history and updated it with pertinent information if needed. Joanne Escalante  reports that she has been smoking cigarettes. She has been smoking about 0.50 packs per day. She has never used smokeless tobacco. She reports previous alcohol use. She reports that she does not use drugs.    Family History   I have reviewed this patient's family history and updated it with pertinent information if needed.   Family History   Problem Relation Age of Onset     Depression Mother      Alcoholism Mother      Depression Father      Alcoholism Father      Breast Cancer Maternal Grandmother      Diabetes Maternal Grandmother        Review of Systems   The 10 point Review of Systems is negative other than noted in the HPI or here.     Physical Exam   Temp: 98.3  F (36.8  C) Temp src: Oral BP: 105/62 Pulse: 64   Resp: 16 SpO2: 97 % O2 Device: None (Room air)    Vital Signs with Ranges  Temp:  [97.7  F (36.5  C)-98.3  F (36.8  C)] 98.3  F (36.8  C)  Pulse:  [64-94] 64  Resp:  [16] 16  BP: ()/(50-85) 105/62  SpO2:  [95 %-98 %] 97 %  190 lbs 0 oz  Body mass index is 35.32 kg/m .    GENERAL APPEARANCE:  alert and no distress  EYES: Eyes grossly normal to inspection, PERRL and conjunctivae and sclerae normal  HENT: ear canals and TM's normal and nose and mouth without ulcers or lesions  NECK: no adenopathy, no asymmetry, masses, or scars and thyroid normal to palpation  RESP: lungs clear to auscultation - no rales, rhonchi or wheezes  CV: regular rates and rhythm, normal S1 S2, no S3 or S4 and no murmur, click or rub  LYMPHATICS: normal  ant/post cervical and supraclavicular nodes  ABDOMEN: wound vac in place   MS: extremities normal- no gross deformities noted  SKIN: no suspicious lesions or rashes      Data   Lab Results   Component Value Date    WBC 11.1 (H) 08/19/2020    HGB 14.4 08/19/2020    HCT 40.6 08/19/2020     08/19/2020     (L) 08/19/2020    POTASSIUM 3.8 08/19/2020    CHLORIDE 100 08/19/2020    CO2 25 08/19/2020    BUN 9 08/19/2020    CR 0.72 08/19/2020    GLC 94 08/19/2020    AST 14 08/19/2020    ALT 21 08/19/2020    ALKPHOS 52 08/19/2020    BILITOTAL 0.3 08/19/2020     Recent Labs   Lab 08/19/20 2048 08/16/20  1715   CULT No growth after 8 hours  No growth after 8 hours Light growth  Escherichia coli ESBL  ESBL (extended beta lactamase) producing organisms require contact precautions.  *     Recent Labs   Lab Test 08/19/20  2048 08/16/20  1715 03/10/20  1038 02/16/20  1425 01/07/20  1130 11/21/19  1254 09/20/19  0930   CULT No growth after 8 hours  No growth after 8 hours Light growth  Escherichia coli ESBL  ESBL (extended beta lactamase) producing organisms require contact precautions.  * Moderate growth  Normal skin desiree    Moderate growth  Actinomyces species  Identification obtained by MALDI-TOF mass spectrometry research use only database. Test   characteristics determined and verified by the [facility] Diagnostic Laboratory   ([facility]) [place], [place].  Susceptibility testing not routinely done  * Moderate growth  Coagulase negative Staphylococcus  * Streptococcus agalactiae sero group B  isolated  * 50,000 to 100,000 colonies/mL  mixed urogenital desiree  Susceptibility testing not routinely done   >100,000 colonies/mL  mixed urogenital desiree  Susceptibility testing not routinely done         Amount of time performed on this consult: 45 minutes. This includes face to face assessment and care coordination with the primary team.

## 2020-08-20 NOTE — PLAN OF CARE
Summary:     DATE & TIME: -, Night shift   Cognitive Concerns/ Orientation : A&Ox4   BEHAVIOR & AGGRESSION TOOL COLOR: green  CIWA SCORE: NA   ABNL VS/O2: VSS on RA, ex hypotensive episode overnight given 1000mL/hr LR bolus  MOBILITY: Ind  PAIN MANAGMENT: c/o  incisional pain, given diluadid in ED, & percocet prn x1 & tylenol prn x1  DIET: Regular, denies nausea  BOWEL/BLADDER: Continent, diarrhea per pt; awaiting stool to collect cdiff sample  ABNL LAB/BG: Na 131, + ESBL  DRAIN/DEVICES: PIV infusing NS @ 100mL/hr, w/ intermittent zosyn  TELEMETRY RHYTHM: NA  SKIN: Abdominal  incision  TESTS/PROCEDURES: NA  D/C DAY/GOALS/PLACE: Plan for WOC & ID consult  OTHER IMPORTANT INFO: Pt lactating, dumping breast milk due to abx. Currently using ABD for incisional drainage until WOC orders. Hold narcotics until BP stabilized

## 2020-08-20 NOTE — PROVIDER NOTIFICATION
MD Notification    Notified Person: MD    Notified Person Name: Fabiola    Notification Date/Time: 8/20/20, 0007    Notification Interaction: Text page     Purpose of Notification: Na 131, any interventions? Additionally, no code status    Orders Received: NS 100mL/hr started. Full Code    Comments:

## 2020-08-20 NOTE — PLAN OF CARE
Summary:     DATE & TIME: 20 [phone]  Cognitive Concerns/ Orientation : A&Ox4   BEHAVIOR & AGGRESSION TOOL COLOR: green  CIWA SCORE: NA   ABNL VS/O2: VSS on RA  MOBILITY: Ind  PAIN MANAGMENT: c/o  incisional pain, ibuprofen given x1, oxy given x1, pt stated decrease in pain each time. One time dilaudid given for wound vac placement, patient stated relief.   DIET: Regular  BOWEL/BLADDER: Continent, no BM this shift.   ABNL LAB/BG: Na 131, + ESBL  DRAIN/DEVICES: R PIV infusing NS, intermittent zosyn.  TELEMETRY RHYTHM: NA  SKIN: Abdominal  incision  TESTS/PROCEDURES: wound vac placement completed today.  D/C DAY/GOALS/PLACE: 1-3 days pending progress and abx treatment plan  OTHER IMPORTANT INFO: Pt lactating, dumping breast milk due to abx. Wound vac in place.   MD/RN ROUNDING SIGNED OFF D/E SHIFT: Yes  COMMIT TO SIT DONE AND SIGNED OFF Yes

## 2020-08-20 NOTE — CONSULTS
General Surgery Consultation    Joanne Escalante MRN#: [id number]   Age: 29 year old YOB: 1990     The surgical service was consulted by Dr. Foster to evaluate and/or treat this patient for abdominal wound.    Date of Admission:          2020       Chief Complaint:     Chief Complaint   Patient presents with     Post-op Problem          History of Present Illness:   This patient is a 29 year old female who presented to the [facility] ER with open abdominal wound.  She had a  in 2020 and has been having ongoing wound healing concerns since. She has been on several cycles of antibiotics and has had a recent +cx for ESBL after her last I&D on .  She is a smoker.  She denies fever, chills, nausea, vomiting, abdominal pain, change in BM or urination.   The history is obtained from the patient and chart.     COVID result: negative 8/3         Past Medical History:     Past Medical History:   Diagnosis Date     Anxiety      Depressive disorder      Endometriosis      Periumbilical hernia    Fused kidney         Past Surgical History:     Past Surgical History:   Procedure Laterality Date     C ORAL SURGERY PROCEDURE      [place] teeth      SECTION  , 2020    x 2     LAPAROSCOPY DIAGNOSTIC (GYN)  2015    Endometriosis          Medications:     Prior to Admission medications    Medication Sig Start Date End Date Taking? Authorizing Provider   acetaminophen (TYLENOL) 500 MG tablet Take 1,000 mg by mouth every 6 hours as needed for mild pain   Yes Unknown, Entered By History   amoxicillin-clavulanate (AUGMENTIN) 875-125 MG tablet Take 1 tablet by mouth 2 times daily 20  Yes Dandy Davis DO   clindamycin (CLEOCIN) 300 MG capsule Take 1 capsule (300 mg) by mouth 3 times daily for 7 days 20 Yes Bina Mead MD   ibuprofen (ADVIL/MOTRIN) 200 MG tablet Take 400 mg by mouth every 4 hours as needed for mild pain   Yes Unknown, Entered By  History   Prenatal Vit-Fe Fumarate-FA (PRENATAL VITAMIN PO) Take 1 tablet by mouth daily    Yes Reported, Patient   sertraline (ZOLOFT) 50 MG tablet Take 1 tablet (50 mg) by mouth daily 5/7/20  Yes Leslie Ge,    sodium hypochlorite (QUARTER-STRENGTH DAKINS) external solution Apply topically daily Use this damp to dry when doing the dressing changes. 8/11/20  Yes Juanpablo Dallas MD   traMADol (ULTRAM) 50 MG tablet Take 50 mg by mouth every 6 hours as needed for severe pain   Yes Unknown, Entered By History   nystatin (MYCOSTATIN) 521970 UNIT/GM external powder Apply topically 2 times daily Prevention of rash 7/18/20   Naomy Canseco PA-C   ondansetron (ZOFRAN-ODT) 4 MG ODT tab 4 mg every 8 hours as needed  6/19/20   Reported, Patient          Current Medications:           nicotine  1 patch Transdermal Daily     nicotine   Transdermal Q8H     piperacillin-tazobactam  3.375 g Intravenous Q6H     prenatal multivitamin w/iron  1 tablet Oral Daily     sertraline  50 mg Oral Daily     sodium chloride (PF)  3 mL Intracatheter Q8H     acetaminophen, ibuprofen, lidocaine 4%, lidocaine (buffered or not buffered), magnesium hydroxide, melatonin, miconazole, naloxone, naloxone, ondansetron **OR** ondansetron, oxyCODONE, sodium chloride (PF), traMADol          Allergies:     Allergies   Allergen Reactions     Sulfa Drugs           Social History:     Social History     Tobacco Use     Smoking status: Current Every Day Smoker     Packs/day: 0.50     Types: Cigarettes     Smokeless tobacco: Never Used   Substance Use Topics     Alcohol use: Not Currently          Family History:     Family History   Problem Relation Age of Onset     Depression Mother      Alcoholism Mother      Depression Father      Alcoholism Father      Breast Cancer Maternal Grandmother      Diabetes Maternal Grandmother            Review of Systems:   The 12 point Review of Systems is negative other than noted in the HPI.          "Physical Exam:   Blood pressure 108/73, pulse 65, temperature 98.3  F (36.8  C), temperature source Oral, resp. rate 16, height 1.562 m (5' 1.5\"), weight 86.2 kg (190 lb), SpO2 97 %, currently breastfeeding.  No intake/output data recorded.  General - This is a well developed, well nourished female in no apparent distress.  HEENT - Normocephalic. Atraumatic. Moist mucous membranes. Pupils equal.  No scleral icterus.  Neck - Supple without masses or lymphadenopathy.  Lungs - No incaresed work in breathing.    Heart - Regular rate & rhythm.  Abdomen - Soft, nt, non-distended, no masses or organomegaly.  Wound at Pfannenstiel incision- large open, fairly clean, no significant erythema, no drainage; however WOC just in.   Extremities - Moves all extremities. No edema.  Neurologic - Nonfocal.  Skin - Warm and dry.          Data:   Labs:  Recent Labs   Lab Test 08/19/20  1903 07/29/20  1124 11/21/19  1402   WBC 11.1* 12.9*  --    HGB 14.4 14.7 11.3*   HCT 40.6 42.5  --     288  --      Recent Labs   Lab Test 08/19/20  1903 07/29/20  1124 04/21/20   POTASSIUM 3.8 4.0 3.8   CHLORIDE 100 107  --    CO2 25 26  --    BUN 9 12  --    CR 0.72 0.73 1.07*   Lactic acid 0.7  CT scan of the abdomen: There is an open skin wound at the anterior low pelvic level with a size of approximately 8.5 x 1.8 cm series 4 image 179. There appears to be some gauze material within the wound site. There is surrounding moderate subcutaneous edema but no drainable  abscess can be seen by CT. There is a fat-containing periumbilical hernia that appears to be mildly edematous measuring 3.5 cm series 3 mimage 52. No herniated bowel.    Fused kidney localizing to the left abdomen and pelvis level. No hydronephrosis or associated acute abnormality.         Assessment:   Open abdominal wound at Pfannenstiel incision          Plan:   - Wound vac at bedside per WOC RN, much appreciate assistance  - [facility] for vac changes  - [facility] up [facility] Dr. Kruger [facility]   - Discussed smoking cessation  - WBC 11.1, on Zosyn, ID consult pending  - Medical management per hospitalist, appreciate your help  - No plan for surgical intervention, umbilical hernia repair in OP when abdominal wound completely healed    Thank you very much for this consult.     I have discussed the history, physical, and plan with Dr. Nunez, who has independently interviewed and examined the patient and agrees with the plan as stated.     JOSH BanguraC  [facility]  [phone]

## 2020-08-20 NOTE — PROGRESS NOTES
[facility]  Medicine Progress Note - Hospitalist Service       Date of Admission:  2020  Assessment & Plan   Joanne Escalante is a 29 year old female with past medical hx of  complicated by infection, who presents with ongoing abd wound with culture positive for E.coli ESBL:      Abdominal wall cellulitis  Post op wound infection with ESBL  Related to complication from her 2020 . Has been on PO abx as outpatient including keflex and augmentin. Was evaluated by surgery and is s/p debridement .    -Cultured from abd wall drainage on 20 noted for E coli sensitive for Zosyn and meropenem.  ?true infection vs colonized  - IV zosyn started on admission, ID consulted.  - general surgery consult for evaluation for need of further debridement vs consideration for wound vac.   -Pain control- discussed with patient to minimize/avoid IV narcotic given episode of hypotension.       Smoker              -- encouraged smoking cessation              -- she would prefer patch       Breast feeding              -- discussed Zosyn goes in breast milk, and if wants to avoid her baby being exposed to it would suggest dumping breast milk at this time. Also getting narcotic and so recommended no breast feeding while on narcotic due to risk of sedation respiratory depression to the breast fed baby.     Diarrhea  Suspect antibiotic associated.  Reports up to 5 episodes but reports it has has improved.  If persistent leukocytosis, fever, abdominal cramping nausea vomiting, will check for C. Difficile.  -Continue IV hydration through today given her hypotensive episode last night.    Hyponatremia  Suspect due to dehydration from diarrhea.  On IV fluid.  Encourage p.o. intake.  -Follow BMP.     Diet: Advance Diet as Tolerated: Regular Diet Adult    DVT Prophylaxis: Pneumatic Compression Devices  Henry Catheter: not present  Code Status: Full Code      Disposition Plan   Expected discharge:  1-3 days, recommended to prior living arrangement once Final plan and antibiotic,  and based on further recommendation from surgery .  Entered: Chris Foster MD 08/20/2020, 9:29 AM     The patient's care was discussed with the Bedside Nurse, Patient and surgery Consultant.    Chris Foster MD  Hospitalist Service  [facility]    ______________________________________________________________________    Interval History   Chart reviewed, discussed with nursing staff, evaluated patient this morning.  -Noted hypotensive episode overnight, resolved with IV hydration. Denies dizziness, chest pain or shortness of breath.    Data reviewed today: I reviewed all medications, new labs and imaging results over the last 24 hours. I personally reviewed no images or EKG's today.    Physical Exam   Vital Signs: Temp: 98.3  F (36.8  C) Temp src: Oral BP: 105/62 Pulse: 64   Resp: 16 SpO2: 97 % O2 Device: None (Room air)    Weight: 190 lbs 0 oz    General: AAOx3, very pleasant, appears comfortable.  HEENT: PERRLA EOMI. Mucosa moist.   Lungs: Bilateral equal air entry. Clear to auscultation, normal work of breathing.   CVS: S1S2 regular, no tachycardia or murmur.   Abdomen: Soft, NT, ND. BS heard.  Open suprapubic wound at the prior incision site, with a gauze in place which is fully soaked with pus.  The wound is deep, fascia/fatty tissue noted. No bleeding.  MSK: No edema or deformities.  Neuro: AAOX3. CN 2-12 normal. Strength symmetrical.  Skin: No rash.       Data   Recent Labs   Lab 08/19/20  1903   WBC 11.1*   HGB 14.4   MCV 85      *   POTASSIUM 3.8   CHLORIDE 100   CO2 25   BUN 9   CR 0.72   ANIONGAP 6   WILDA 9.3   GLC 94   ALBUMIN 3.8   PROTTOTAL 7.3   BILITOTAL 0.3   ALKPHOS 52   ALT 21   AST 14     Recent Results (from the past 24 hour(s))   CT Abdomen Pelvis w Contrast    Narrative    CT ABDOMEN AND PELVIS WITH CONTRAST 8/19/2020 9:36 PM    CLINICAL HISTORY: E coli wound infection,  question fistula.    TECHNIQUE: CT scan of the abdomen and pelvis was performed following  injection of IV contrast. Multiplanar reformats were obtained. Dose  reduction techniques were used.    CONTRAST: 95 mL Isovue-370    COMPARISON: None.    FINDINGS:   LOWER CHEST: Normal.    HEPATOBILIARY: Normal.    PANCREAS: Normal.    SPLEEN: A few small hypodensities in the spleen are technically  indeterminant.    ADRENAL GLANDS: Normal.    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 No hydronephrosis or associated acute abnormality.    BOWEL: No acute abnormality. Normal appendix.    PELVIC ORGANS: Normal.    ADDITIONAL FINDINGS: There is an open skin wound at the anterior low  pelvic level with a size of approximately 8.5 x 1.8 cm series 4 image  179. There appears to be some gauze material within the wound site.  There is surrounding moderate subcutaneous edema but no drainable  abscess can be seen by CT. There is a fat-containing periumbilical  hernia that appears to be mildly edematous measuring 3.5 cm series 3  image 52. No herniated bowel.    MUSCULOSKELETAL: Normal.      Impression    IMPRESSION:   1.  Prominent skin wound at the ventral low pelvis identified. There  is surrounding moderate subcutaneous edema but no abscess is seen.  2.  No other acute abnormality.  3.  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  4.  A few tiny hypodensities in the spleen are technically  indeterminant but are of questionable clinical significance.    IVANIA MOISE MD     Medications     sodium chloride 100 mL/hr at 08/20/20 0046       nicotine  1 patch Transdermal Daily     nicotine   Transdermal Q8H     piperacillin-tazobactam  3.375 g Intravenous Q6H     prenatal multivitamin w/iron  1 tablet Oral Daily     sertraline  50 mg Oral Daily     sodium chloride (PF)  3 mL Intracatheter Q8H

## 2020-08-20 NOTE — ED PROVIDER NOTES
"History     Chief Complaint:  Abdominal Wound     HPI  Joanne Escalante is a 29 year old female with a history of endometriosis, anxiety, and depression who presents for evaluation of unresolved abdominal wound. 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    Per chart review the patient underwent  section in [place] in January and has had open sores until March with intermittent antibiotic usage. She has since had a complex course of events and has presented multiple times for drainage and ineffective wound healing. She states that she has had around 8 rounds of antibiotics. She is no longer being seen by Dr. Grier, her OB, as he attempted to cauterize it with silver nitrate, which was unsuccessful, causing her to be moved to a general surgery patient. She underwent incision and drainage on  through [facility] of her  section wound. She was not discharged with a wound vac. She states that she does not agree with her surgeon's care plan noting that she disagrees with him not reffering her for wound care.      The patient reports that she was found to incidentally have cellulitis of the abdomen last week and was started on Keflex then transitioned to Augmentin and clindamycin; her cellulitis cleared over the weekend. At the time of switching, she was found to have positive E. Coli in the wound, as she had green discharge as well as \"cottage cheese\" drainage. She now notes that the last few days she has been experiencing diarrhea, anorexia, nausea, and abdominal pain.      Allergies:  Sulfa Drugs      Medications:   Amoxicillin   Mycostatin   Zofran   Zoloft   Clindamycin   Augmentin     Medical History:   Anxiety   Depressive disorder   Endometriosis   Periumbilical hernia     Surgical History   [place] teeth surgery    section 2x     Family History:   Mother: Depression   Father: Depression    Social History:  Patient was not accompanied to the ED.   Smoking Status:  Smoker    Type: " "Cigarettes   Packs/Day: 0.25  Smokeless Tobacco: Never Used   Alcohol Use: Negative   Drug Use: Negative    Marcela France       Review of Systems   Constitutional: Positive for appetite change.   Gastrointestinal: Positive for diarrhea, nausea and vomiting.   Skin: Positive for wound.   All other systems reviewed and are negative.        Physical Exam     Patient Vitals for the past 24 hrs:   BP Temp Temp src Pulse Resp SpO2 Height Weight   08/19/20 1855 129/85 97.7  F (36.5  C) Temporal 94 16 98 % 1.562 m (5' 1.5\") 86.2 kg (190 lb)          Physical Exam  Constitutional: Alert, attentive, GCS 15  HENT:    Nose: Nose normal.    Mouth/Throat: Oropharynx is clear, mucous membranes are moist  Eyes: EOM are normal, anicteric, conjugate gaze  CV: regular rate and rhythm; no murmurs  Chest: Effort normal and breath sounds clear without wheezing or rales, symmetric bilaterally   GI:  No distension. No guarding or rebound.  Inferior midline lower abdominal open wound with wet dressings inside. Scant serosanguinous boarder on purulent drainage. Nontender.   MSK: No LE edema, no tenderness to palpation of BLE.  Neurological: Alert, attentive, moving all extremities equally.   Skin: Skin is warm and dry.     Emergency Department Course     Imaging:  Radiology results were communicated with the patient who voiced understanding of the findings.    CT Abdomen Pelvis w Contrast  1.  Prominent skin wound at the ventral low pelvis identified. There  is surrounding moderate subcutaneous edema but no abscess is seen.  2.  No other acute abnormality.  3.  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  4.  A few tiny hypodensities in the spleen are technically  indeterminant but are of questionable clinical significance.    IVANIA MOISE MD    Reading per radiology     Laboratory:  Laboratory findings were communicated with the patient who voiced understanding of the findings.    Lactic: " 1.0  HCG Qualitative: Negative        CBC: WBC 11.1, HGB (H)14.4,   CMP: Na 131 (L)  (Creatinine 0.72) o/w WNL     Interventions:    Zofran 4 mg IV    Dilaudid 0.5 mg IV    Zosyn 4.5 g IV     Emergency Department Course:   IV was inserted and blood was drawn for laboratory testing, results above.     Nursing notes and vitals reviewed.     I performed an exam of the patient as documented above.      The patient was sent for CT while in the emergency department, results above.       I consulted with Dr. Hicks of the hospitalist services. They are in agreement to accept the patient for admission. Findings and plan explained to the Patient who consents to admission. Discussed the patient with Dr. Hicks, who will admit the patient to a [facility] bed for further monitoring, evaluation, and treatment.    Impression & Plan     Medical Decision Making:  Joanne Escalante is a 29 year old female with unfortunate past medica history of  section 8 months prior complicated by multiple post op infections and poor wound healing. Now stats post debridement by general surgery 2 weeks ago who is currently on clindamycin and Augmentin for recurrence of an infection with wound culture growing ESBL and E coli done two days prior. She reports that her cellulitis has improved on the above regiment and was instructed to come tho the ED due to her wound cultures. I will initiated her on Zosyn and she has mild leukocytosis with scant drainage form her open surgical wound. I did elect to proceeded to a CT imagine to exam for possible fistula given the E. coli, as this certainly could be a skin contamination due to [facility] being a [facility]. CT imaging shows no evidence of abscess or fistula. Will admit to medicine for contaminated IV antibiotic with likely I&D and surgical consultation.     Covid-19  Joanne Escalante was evaluated during a global COVID-19 pandemic, which necessitated consideration that  the patient might be at risk for infection with the SARS-CoV-2 virus that causes COVID-19.   Applicable protocols for evaluation were followed during the patient's care.   COVID-19 was considered as part of the patient's evaluation. The plan for testing is:  a test was obtained during this visit.       Diagnosis:     ICD-10-CM    1. ESBL (extended spectrum beta-lactamase) producing bacteria infection  A49.9 Blood culture    Z16.12 Blood culture     HCG qualitative   2. Wound infection  T14.8XXA     L08.9         Disposition:  Admitted to Dr. Hicks.    Carlos Villalta MD  [employer]  11:46 PM 08/19/20     Scribe Disclosure:  I, Damien Castillo, am serving as a scribe at 8:00 PM on 8/19/2020 to document services personally performed by Carlos Villalta MD based on my observations and the provider's statements to me.          Carlos Villalta MD  08/19/20 0659

## 2020-08-21 ENCOUNTER — HOME INFUSION (PRE-WILLOW HOME INFUSION) (OUTPATIENT)
Dept: PHARMACY | Facility: CLINIC | Age: 30
End: 2020-08-21

## 2020-08-21 ENCOUNTER — TELEPHONE (OUTPATIENT)
Dept: WOUND CARE | Facility: CLINIC | Age: 30
End: 2020-08-21

## 2020-08-21 VITALS
HEART RATE: 85 BPM | WEIGHT: 190 LBS | HEIGHT: 62 IN | OXYGEN SATURATION: 97 % | DIASTOLIC BLOOD PRESSURE: 76 MMHG | SYSTOLIC BLOOD PRESSURE: 115 MMHG | RESPIRATION RATE: 17 BRPM | BODY MASS INDEX: 34.96 KG/M2 | TEMPERATURE: 98.3 F

## 2020-08-21 LAB
ALBUMIN SERPL-MCNC: 3.5 G/DL (ref 3.4–5)
ANION GAP SERPL CALCULATED.3IONS-SCNC: 6 MMOL/L (ref 3–14)
BUN SERPL-MCNC: 7 MG/DL (ref 7–30)
CALCIUM SERPL-MCNC: 8.9 MG/DL (ref 8.5–10.1)
CHLORIDE SERPL-SCNC: 109 MMOL/L (ref 94–109)
CO2 SERPL-SCNC: 25 MMOL/L (ref 20–32)
CREAT SERPL-MCNC: 0.72 MG/DL (ref 0.52–1.04)
ERYTHROCYTE [DISTWIDTH] IN BLOOD BY AUTOMATED COUNT: 12.3 % (ref 10–15)
GFR SERPL CREATININE-BSD FRML MDRD: >90 ML/MIN/{1.73_M2}
GLUCOSE SERPL-MCNC: 116 MG/DL (ref 70–99)
HCT VFR BLD AUTO: 40.4 % (ref 35–47)
HGB BLD-MCNC: 13.8 G/DL (ref 11.7–15.7)
MCH RBC QN AUTO: 29.4 PG (ref 26.5–33)
MCHC RBC AUTO-ENTMCNC: 34.2 G/DL (ref 31.5–36.5)
MCV RBC AUTO: 86 FL (ref 78–100)
PHOSPHATE SERPL-MCNC: 2.6 MG/DL (ref 2.5–4.5)
PLATELET # BLD AUTO: 369 10E9/L (ref 150–450)
POTASSIUM SERPL-SCNC: 3.7 MMOL/L (ref 3.4–5.3)
RBC # BLD AUTO: 4.69 10E12/L (ref 3.8–5.2)
SODIUM SERPL-SCNC: 140 MMOL/L (ref 133–144)
WBC # BLD AUTO: 9.8 10E9/L (ref 4–11)

## 2020-08-21 PROCEDURE — 25000128 H RX IP 250 OP 636: Performed by: INTERNAL MEDICINE

## 2020-08-21 PROCEDURE — 99238 HOSP IP/OBS DSCHRG MGMT 30/<: CPT | Performed by: HOSPITALIST

## 2020-08-21 PROCEDURE — 25000132 ZZH RX MED GY IP 250 OP 250 PS 637: Performed by: INTERNAL MEDICINE

## 2020-08-21 PROCEDURE — 85027 COMPLETE CBC AUTOMATED: CPT | Performed by: INTERNAL MEDICINE

## 2020-08-21 PROCEDURE — 80069 RENAL FUNCTION PANEL: CPT | Performed by: INTERNAL MEDICINE

## 2020-08-21 PROCEDURE — 36415 COLL VENOUS BLD VENIPUNCTURE: CPT | Performed by: INTERNAL MEDICINE

## 2020-08-21 PROCEDURE — 36569 INSJ PICC 5 YR+ W/O IMAGING: CPT

## 2020-08-21 PROCEDURE — 27211438 ZZ H KIT SHRLOCK 4FR POWER PICC SINGLE LUMEN

## 2020-08-21 PROCEDURE — 25000125 ZZHC RX 250: Performed by: INTERNAL MEDICINE

## 2020-08-21 PROCEDURE — 25800030 ZZH RX IP 258 OP 636: Performed by: INTERNAL MEDICINE

## 2020-08-21 RX ORDER — TRAMADOL HYDROCHLORIDE 50 MG/1
50 TABLET ORAL EVERY 6 HOURS PRN
Qty: 15 TABLET | Refills: 0 | Status: SHIPPED | OUTPATIENT
Start: 2020-08-21 | End: 2020-10-28

## 2020-08-21 RX ORDER — ERTAPENEM 1 G/1
1 INJECTION, POWDER, LYOPHILIZED, FOR SOLUTION INTRAMUSCULAR; INTRAVENOUS EVERY 24 HOURS
Status: DISCONTINUED | OUTPATIENT
Start: 2020-08-21 | End: 2020-08-21 | Stop reason: HOSPADM

## 2020-08-21 RX ADMIN — ERTAPENEM SODIUM 1 G: 1 INJECTION, POWDER, LYOPHILIZED, FOR SOLUTION INTRAMUSCULAR; INTRAVENOUS at 11:38

## 2020-08-21 RX ADMIN — LIDOCAINE HYDROCHLORIDE 1 ML: 10 INJECTION, SOLUTION INFILTRATION; PERINEURAL at 14:14

## 2020-08-21 RX ADMIN — PRENATAL VITAMINS-IRON FUMARATE 27 MG IRON-FOLIC ACID 0.8 MG TABLET 1 TABLET: at 09:02

## 2020-08-21 RX ADMIN — SERTRALINE HYDROCHLORIDE 50 MG: 50 TABLET ORAL at 09:02

## 2020-08-21 RX ADMIN — PIPERACILLIN SODIUM AND TAZOBACTAM SODIUM 3.38 G: 3; .375 INJECTION, POWDER, LYOPHILIZED, FOR SOLUTION INTRAVENOUS at 09:03

## 2020-08-21 RX ADMIN — PIPERACILLIN SODIUM AND TAZOBACTAM SODIUM 3.38 G: 3; .375 INJECTION, POWDER, LYOPHILIZED, FOR SOLUTION INTRAVENOUS at 04:05

## 2020-08-21 RX ADMIN — SODIUM CHLORIDE, PRESERVATIVE FREE: 5 INJECTION INTRAVENOUS at 02:45

## 2020-08-21 ASSESSMENT — ACTIVITIES OF DAILY LIVING (ADL)
ADLS_ACUITY_SCORE: 10

## 2020-08-21 NOTE — PLAN OF CARE
DATE & TIME: 8/20 2300-0730    Cognitive Concerns/ Orientation : A/Ox4  BEHAVIOR & AGGRESSION TOOL COLOR: green  CIWA SCORE: NA  ABNL VS/O2: VSS  MOBILITY: independent  PAIN MANAGMENT: PRN tylenol, declined throughout shift  DIET: regular  BOWEL/BLADDER: continent  ABNL LAB/BG: NA  DRAIN/DEVICES: wound vac at 125 mmHg, PIV infusing NS  TELEMETRY RHYTHM: NA  SKIN: intact  TESTS/PROCEDURES: NA  D/C DAY/GOALS/PLACE: pending progress  OTHER IMPORTANT INFO: Has lots of questions about home care

## 2020-08-21 NOTE — PROGRESS NOTES
[facility]    Received referral for potential home IV infusion.  Benefits verified. [employer] plan, ded $400 (met in full), then 85/15 coverage up to max OOP $2500 (met in full). Coverage for IV abx will be at 100%.    I met with Joanne at bedside to introduce home infusion services, review benefits and offer choice of providers. She would like to proceed with [facility] for IV abx needs. Once final IV plan established, I will coordinate bedside teach prior to discharge.     Thank you for the referral.    Cesia Hastings RN  [facility] Liaison  [phone] (Mon thru Fri 8am - 5pm)  [phone] Office

## 2020-08-21 NOTE — PROGRESS NOTES
"General Surgery Progress Note    Assessment and Plan:  29 year old female with open abdominal wound with wound vac.  - Continue wound vac, much appreciate WOC RN assistance  - [facility] for vac changes  - Follow up [facility]   - WBC 9.8, on Zosyn, ID following  - Medical management per hospitalist, appreciate your help  - No plan for surgical intervention, umbilical hernia repair in OP when abdominal wound completely healed, discussed incidental finding of fused kidney  - Could go home when medically able and ID recs are in place.      Interval Hx:   Feels significantly better    Exam:  Vitals: Blood pressure 115/76, pulse 85, temperature 98.3  F (36.8  C), temperature source Oral, resp. rate 17, height 1.562 m (5' 1.5\"), weight 86.2 kg (190 lb), SpO2 97 %, currently breastfeeding.  Temperature Temp  Av.2  F (36.8  C)  Min: 98  F (36.7  C)  Max: 98.3  F (36.8  C)   I/O last 3 completed shifts:  In: 960 [P.O.:960]  Out: 50 [Drains:50]    Wound(s): vac in place, no surrounding erythema.     Data:    Recent Labs   Lab Test 20  0919 20  1903 20  1124   WBC 9.8 11.1* 12.9*   HGB 13.8 14.4 14.7   HCT 40.4 40.6 42.5    406 288      Francy Jean Baptiste PA-C  [facility]  [phone]  "

## 2020-08-21 NOTE — PROCEDURES
[facility]    Single Lumen PICC Placement    Date/Time: 8/21/2020 2:15 PM  Performed by: Alison Cedeño RN  Authorized by: Nancy Orta MD   Indications: vascular access    UNIVERSAL PROTOCOL   Site Marked: Yes  Prior Images Obtained and Reviewed:  Yes  Required items: Required blood products, implants, devices and special equipment available    Patient identity confirmed:  Verbally with patient, hospital-assigned identification number and arm band  NA - No sedation, light sedation, or local anesthesia  Confirmation Checklist:  Patient's identity using two indicators, procedure was appropriate and matched the consent or emergent situation and correct equipment/implants were available  Time out: Immediately prior to the procedure a time out was called    Universal Protocol: the Joint Commission Universal Protocol was followed    Preparation: Patient was prepped and draped in usual sterile fashion           ANESTHESIA    Anesthesia: See MAR for details  Local Anesthetic:  Lidocaine 1% without epinephrine  Anesthetic Total (mL):  1      SEDATION    Patient Sedated: No        Preparation: skin prepped with 2% chlorhexidine  Skin prep agent: skin prep agent completely dried prior to procedure  Sterile barriers: maximum sterile barriers were used: cap, mask, sterile gown, sterile gloves, and large sterile sheet  Hand hygiene: hand hygiene performed prior to central venous catheter insertion  Type of line used: PICC  Catheter type: single lumen  Lumen type: valved  Catheter size: 4 Fr  Brand: Bard  Lot number: [id number]  Placement method: MST and ultrasound  Number of attempts: 1  Successful placement: yes  Orientation: right  Location: basilic vein  Arm circumference: adults 10 cm  Extremity circumference: 31  Visible catheter length: 7  Internal length: 38 cm  Total catheter length: 45  Dressing and securement: chlorhexidine patch applied, blood cleaned with CHG, blood removed, dressing applied,  occlusive dressing applied, sterile dressing applied and securement device  Post procedure assessment: blood return through all ports (confirmed with ECG)  PROCEDURE   Patient Tolerance:  Patient tolerated the procedure well with no immediate complications  Describe Procedure: Right upper arm picc line placed for long term IV antibiotics.  Ready to use

## 2020-08-21 NOTE — PLAN OF CARE
DATE & TIME: 2020; 6746-7512    Cognitive Concerns/ Orientation : A & O x 4   BEHAVIOR & AGGRESSION TOOL COLOR: Green   CIWA SCORE: N/A   ABNL VS/O2: VSS on RA   MOBILITY: independent   PAIN MANAGMENT: reports  incision pain 4-6/10; managed well with PRN tylenol and rest   DIET: Regular; tolerating well   BOWEL/BLADDER: BS active x 4; continent  ABNL LAB/BG: Sodium= 131; WBC= 11.1; (+) ESBL   DRAIN/DEVICES: PIV infusing NS @ 100 mL/hr; IV antibiotics   TELEMETRY RHYTHM: N/A  SKIN:  incision (Dressing CDI)  TESTS/PROCEDURES: N/A   D/C DAY/GOALS/PLACE: pending discharge    OTHER IMPORTANT INFO: wound vac in place;  ID and WOC following; Enteric precautions maintained; pt. Pumping breast milk and wasting (due to antibiotics); Nursing will continue to monitor and assess.     MD/RN ROUNDING SIGNED OFF D/E SHIFT: N/A  COMMIT TO SIT DONE AND SIGNED OFF COMPLETE

## 2020-08-21 NOTE — DISCHARGE SUMMARY
[facility]  Hospitalist Discharge Summary      Date of Admission:  2020  Date of Discharge:  2020  Discharging Provider: Chris Foster MD      Discharge Diagnoses     Abdominal wall cellulitis    Post op wound infection with ESBL    Tobacco use    Follow-ups Needed After Discharge   Follow-up Appointments     Follow-up and recommended labs and tests       Follow up with Dr. Kruger at the [facility] in 2 weeks. Call   [phone] to schedule an appointment.      Follow up with Dr. Nunez when your abdominal wound is healed and you would   like to proceed with your umbilical hernia repair.   We are located at   [address] [place] [address] [address], [place] [address] ( #[phone]).     .rvsf         Follow-up and recommended labs and tests       Follow up with primary care provider, Marcela France, within 7 days   to evaluate medication change and for hospital follow- up.               Unresulted Labs Ordered in the Past 30 Days of this Admission     Date and Time Order Name Status Description    2020 Blood culture Preliminary     2020 Blood culture Preliminary       These results will be followed up by ID/Hospitalist    Discharge Disposition   Discharged to home  Condition at discharge: Stable       Hospital Course     Joanne Escalante is a 29 year old female with past medical hx of  complicated by infection, who presented with ongoing abd wound with culture positive for E.coli ESBL:        Abdominal wall cellulitis  Post op wound infection with ESBL  Related to complication from her 2020 . Has been on diferent PO abx as outpatient including keflex, clinda and augmentin. Was evaluated by surgery and is s/p debridement .    -Cultured from abd wall drainage on 20 noted for E coli sensitive for Zosyn and meropenem.    - IV zosyn started on admission, ID consulted. Recommended 2 weeks for antibiotic, Ertapenem at  discharge. PICC line placed. She will follow up with wound care and ID as well outpatient and based on the wound, further duration of abx will be determined.   - General surgery consulted, no debridement was needed but wound vac was placed. Discharging with wound vac and home care for wound care.    - She was taking Tramadol PTA for pain and the prescription was renewed with 15 pills.        Smoker              -- encouraged smoking cessation              -- she would prefer patch       Breast feeding              -- discussed with patient since she was taking tramadol and strongly advised to not breast feed while on this medicine. She will pump and dump.      Diarrhea  Suspect antibiotic associated.  better.       Hyponatremia  Suspect due to dehydration from diarrhea. Resolved.       Consultations This Hospital Stay   WOUND OSTOMY CONTINENCE NURSE  IP CONSULT  INFECTIOUS DISEASES IP CONSULT  SURGERY GENERAL IP CONSULT  VASCULAR ACCESS ADULT IP CONSULT  CARE COORDINATOR IP CONSULT    Code Status   Full Code    Time Spent on this Encounter   IChris MD, personally saw the patient today and spent less than or equal to 30 minutes discharging this patient.       Chris Foster MD  [facility]  ______________________________________________________________________    Physical Exam   Vital Signs: Temp: 98.3  F (36.8  C) Temp src: Oral BP: 115/76 Pulse: 85   Resp: 17 SpO2: 97 % O2 Device: None (Room air)    Weight: 190 lbs 0 oz    General: AAOx3, appears comfortable.  HEENT: PERRLA EOMI. Mucosa moist.   Lungs: Bilateral equal air entry. Clear to auscultation, normal work of breathing.   CVS: S1S2 regular, no tachycardia or murmur.   Abdomen: Lower abd wall wound with wound vac. Abd is Soft, NT, ND. BS heard.  MSK: No edema or deformities.  Neuro: AAOX3. CN 2-12 normal. Strength symmetrical.  Skin: No rash.          Primary Care Physician   Marcela France    Discharge Orders      Home  care nursing referral      Follow-up and recommended labs and tests     Follow up with Dr. Kruger at the [facility] in 2 weeks. Call [phone] to schedule an appointment.      Follow up with Dr. Nunez when your abdominal wound is healed and you would like to proceed with your umbilical hernia repair.   We are located at [address] [place] [address] ( #[phone]).     .rvsf     Reason for your hospital stay    Post op infection (abdominal wound) with ESBL     Follow-up and recommended labs and tests     Follow up with primary care provider, Marcela France, within 7 days to evaluate medication change and for hospital follow- up.     Activity    Your activity upon discharge: activity as tolerated.     Full Code     Diet    Follow this diet upon discharge: Orders Placed This Encounter      Advance Diet as Tolerated: Regular Diet Adult       Significant Results and Procedures   Most Recent 3 CBC's:  Recent Labs   Lab Test 08/21/20  0919 08/19/20 1903 07/29/20  1124   WBC 9.8 11.1* 12.9*   HGB 13.8 14.4 14.7   MCV 86 85 87    406 288     Most Recent 3 BMP's:  Recent Labs   Lab Test 08/21/20  0919 08/19/20 1903 07/29/20  1124    131* 139   POTASSIUM 3.7 3.8 4.0   CHLORIDE 109 100 107   CO2 25 25 26   BUN 7 9 12   CR 0.72 0.72 0.73   ANIONGAP 6 6 6   WILDA 8.9 9.3 9.1   * 94 91     Most Recent 2 LFT's:  Recent Labs   Lab Test 08/19/20 1903 04/21/20 01/07/20  1159   AST 14 14* 11   ALT 21 22 8   ALKPHOS 52  --  106   BILITOTAL 0.3  --  0.3     Most Recent 3 INR's:No lab results found.  Most Recent 6 Bacteria Isolates From Any Culture (See EPIC Reports for Culture Details):  Recent Labs   Lab Test 08/19/20  2048 08/16/20  1715 03/10/20  1038 02/16/20  1425 01/07/20  1130 11/21/19  1254   CULT No growth after 2 days  No growth after 2 days Light growth  Escherichia coli ESBL  ESBL (extended beta lactamase) producing organisms require contact precautions.  *  Moderate growth  Normal skin desiree    Moderate growth  Actinomyces species  Identification obtained by MALDI-TOF mass spectrometry research use only database. Test   characteristics determined and verified by the [facility] Diagnostic [facility]   ([facility]) [place], [place].  Susceptibility testing not routinely done  * Moderate growth  Coagulase negative Staphylococcus  * Streptococcus agalactiae sero group B  isolated  * 50,000 to 100,000 colonies/mL  mixed urogenital desiree  Susceptibility testing not routinely done       Most Recent TSH and T4:  Recent Labs   Lab Test 07/19/18   TSH 1.62     Most Recent 6 glucoses:  Recent Labs   Lab Test 08/21/20  0919 08/19/20  1903 07/29/20  1124 04/21/20 03/10/20 08/09/19   * 94 91 97 96 86     Most Recent Urinalysis:No lab results found.,   Results for orders placed or performed during the hospital encounter of 08/19/20   CT Abdomen Pelvis w Contrast    Narrative    CT ABDOMEN AND PELVIS WITH CONTRAST 8/19/2020 9:36 PM    CLINICAL HISTORY: E coli wound infection, question fistula.    TECHNIQUE: CT scan of the abdomen and pelvis was performed following  injection of IV contrast. Multiplanar reformats were obtained. Dose  reduction techniques were used.    CONTRAST: 95 mL Isovue-370    COMPARISON: None.    FINDINGS:   LOWER CHEST: Normal.    HEPATOBILIARY: Normal.    PANCREAS: Normal.    SPLEEN: A few small hypodensities in the spleen are technically  indeterminant.    ADRENAL GLANDS: Normal.    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 No hydronephrosis or associated acute abnormality.    BOWEL: No acute abnormality. Normal appendix.    PELVIC ORGANS: Normal.    ADDITIONAL FINDINGS: There is an open skin wound at the anterior low  pelvic level with a size of approximately 8.5 x 1.8 cm series 4 image  179. There appears to be some gauze material within the wound site.  There is surrounding moderate subcutaneous edema but no  drainable  abscess can be seen by CT. There is a fat-containing periumbilical  hernia that appears to be mildly edematous measuring 3.5 cm series 3  image 52. No herniated bowel.    MUSCULOSKELETAL: Normal.      Impression    IMPRESSION:   1.  Prominent skin wound at the ventral low pelvis identified. There  is surrounding moderate subcutaneous edema but no abscess is seen.  2.  No other acute abnormality.  3.  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  4.  A few tiny hypodensities in the spleen are technically  indeterminant but are of questionable clinical significance.    IVANIA MOISE MD       Discharge Medications   Current Discharge Medication List      START taking these medications    Details   ertapenem (INVANZ) 1 GM injection Inject 1 g into the vein every 24 hours for 14 days CBC with differential, creatinine, SGOT weekly while on this medication to be faxed to Dr. James office.  Qty: 140 mL, Refills: 0    Associated Diagnoses: ESBL (extended spectrum beta-lactamase) producing bacteria infection; Wound infection; Abdominal wall cellulitis         CONTINUE these medications which have CHANGED    Details   traMADol (ULTRAM) 50 MG tablet Take 1 tablet (50 mg) by mouth every 6 hours as needed for severe pain  Qty: 15 tablet, Refills: 0    Associated Diagnoses: Abdominal wall pain         CONTINUE these medications which have NOT CHANGED    Details   acetaminophen (TYLENOL) 500 MG tablet Take 1,000 mg by mouth every 6 hours as needed for mild pain      ibuprofen (ADVIL/MOTRIN) 200 MG tablet Take 400 mg by mouth every 4 hours as needed for mild pain      Prenatal Vit-Fe Fumarate-FA (PRENATAL VITAMIN PO) Take 1 tablet by mouth daily       sertraline (ZOLOFT) 50 MG tablet Take 1 tablet (50 mg) by mouth daily  Qty: 90 tablet, Refills: 1    Associated Diagnoses: Anxiety and depression      sodium hypochlorite (QUARTER-STRENGTH DAKINS) external solution Apply topically  daily Use this damp to dry when doing the dressing changes.  Qty: 1 Bottle, Refills: 1    Associated Diagnoses: Wound infection      nystatin (MYCOSTATIN) 096483 UNIT/GM external powder Apply topically 2 times daily Prevention of rash  Qty: 1 Bottle, Refills: 2    Associated Diagnoses: Cellulitis of abdominal wall      ondansetron (ZOFRAN-ODT) 4 MG ODT tab 4 mg every 8 hours as needed          STOP taking these medications       amoxicillin-clavulanate (AUGMENTIN) 875-125 MG tablet Comments:   Reason for Stopping:         clindamycin (CLEOCIN) 300 MG capsule Comments:   Reason for Stopping:             Allergies   Allergies   Allergen Reactions     Sulfa Drugs

## 2020-08-21 NOTE — PROGRESS NOTES
[facility]    Infectious Disease Progress Note    Date of Service (when I saw the patient): 08/21/2020     Assessment & Plan   Joanne Escalante is a 29 year old female who was admitted on 8/19/2020.     Impression:  1. 29 y.o female with endometriosis.   2. S/p c- section January 2020.   3. Complicated course related with wound non healing, post surgical wound infection.   4. Multiple rounds of antibiotics: Keflex-Cipro-Keflex-Augmentin-Clindamycin  5. Most recent CT scan There is an open skin wound at the anterior lowpelvic level with a size of approximately 8.5 x 1.8 cm, no drainable abscess can be seen by CT     Recommendations:   1. Switch to ertapenem, though excreted in breast milk in small quantities it is deemed compatible with breast feeding.      2. Anticipate PICC.  3. Will do 2 weeks of IV antibiotics followed by more as directed by wound healing and cellulitis resolving.   4. Will need follow up with me in 2 weeks.       Nancy Orta MD    Interval History   Afebrile blood cultures are negative so far.       Physical Exam   Temp: 98.3  F (36.8  C) Temp src: Oral BP: 115/76 Pulse: 85   Resp: 17 SpO2: 97 % O2 Device: None (Room air)    Vitals:    08/19/20 1855   Weight: 86.2 kg (190 lb)     Vital Signs with Ranges  Temp:  [98  F (36.7  C)-98.3  F (36.8  C)] 98.3  F (36.8  C)  Pulse:  [62-85] 85  Resp:  [16-18] 17  BP: (107-118)/(71-79) 115/76  SpO2:  [97 %] 97 %    Constitutional: Awake, alert, cooperative, no apparent distress  Lungs: Clear to auscultation bilaterally, no crackles or wheezing  Cardiovascular: Regular rate and rhythm, normal S1 and S2, and no murmur noted  Abdomen: Normal bowel sounds, soft, non-distended, non-tender  Skin: No rashes, no cyanosis, no edema  Other:    Medications       ertapenem (INVanz) IV  1 g Intravenous Q24H     nicotine  1 patch Transdermal Daily     nicotine   Transdermal Q8H     prenatal multivitamin w/iron  1 tablet Oral Daily     sertraline  50 mg  Oral Daily     sodium chloride (PF)  3 mL Intracatheter Q8H       Data   All microbiology laboratory data reviewed.  Recent Labs   Lab Test 08/21/20  0919 08/19/20  1903 07/29/20  1124   WBC 9.8 11.1* 12.9*   HGB 13.8 14.4 14.7   HCT 40.4 40.6 42.5   MCV 86 85 87    406 288     Recent Labs   Lab Test 08/21/20  0919 08/19/20  1903 07/29/20  1124   CR 0.72 0.72 0.73     No lab results found.  Recent Labs   Lab Test 08/19/20  2048 08/16/20  1715 03/10/20  1038 02/16/20  1425 01/07/20  1130 11/21/19  1254 09/20/19  0930   CULT No growth after 2 days  No growth after 2 days Light growth  Escherichia coli ESBL  ESBL (extended beta lactamase) producing organisms require contact precautions.  * Moderate growth  Normal skin desiree    Moderate growth  Actinomyces species  Identification obtained by MALDI-TOF mass spectrometry research use only database. Test   characteristics determined and verified by the Infectious Diseases Diagnostic Laboratory   ([facility]) [place], [place].  Susceptibility testing not routinely done  * Moderate growth  Coagulase negative Staphylococcus  * Streptococcus agalactiae sero group B  isolated  * 50,000 to 100,000 colonies/mL  mixed urogenital desiree  Susceptibility testing not routinely done   >100,000 colonies/mL  mixed urogenital desiree  Susceptibility testing not routinely done         Attestation:  Total time on the floor involved in the patient's care: 35 minutes. Total time spent in counseling/care coordination: >50%

## 2020-08-21 NOTE — PLAN OF CARE
Discharge    Patient discharged to home with home care  Care plan note: Patient alert/orient X4, up independently in room/hallway. Isolation for ESBL/Enteric. Lungs clear on RA. PICC line placed today for home IV infusion.  Wound vac C/D/I, home vac here for her to discharge with.  Vss, denies pain. Leaving today.    Listed belongings gathered and returned to patient. Yes  Care Plan and Patient education resolved: Yes  Prescriptions if needed, hard copies sent with patient  NA  Home and hospital acquired medications returned to patient: NA  Medication Bin checked and emptied on discharge Yes  Follow up appointment made for patient: No, patient going home with HC.

## 2020-08-21 NOTE — TELEPHONE ENCOUNTER
Post op c section and vac placement. Has home care ordered. Scheduled a visit with Liliana Mercado PA-C.  She will let her know.

## 2020-08-21 NOTE — CONSULTS
Care Transition Initial Assessment - RN  Met with: Patient.  DATA   Principal Problem:    Abdominal wall cellulitis  Active Problems:    Hx of  section 2020    ESBL (extended spectrum beta-lactamase) producing bacteria infection       Cognitive Status: alert and oriented.        Contact information and PCP information verified: Yes  Lives With: sibling(s), child(miller), dependent, significant other(brother, sister, children, boyfriend)                     Insurance concerns: No Insurance issues identified  ASSESSMENT  Patient currently receives the following services:   Home Care        Identified issues/concerns regarding health management: Patient anxious to be discharged today,stating she has a little one at home. Wound Vac information faxed to [facility] awaiting approval from insurance company. Discussed home IV antibiotic options. Patient has 100% coverage .Patient stating she had [facility] services prior to admission and will continue at discharge. Information given to [facility].    PLAN  Financial costs for the patient include TBD   Patient given options and choices for discharge Yes  Patient/family is agreeable to the plan?  Yes:   Patient anticipates discharging to  Home .        Patient anticipates needs for home equipment: No  Transportation/person available to transport on day of discharge  is  family and have they been notified  Plan/Disposition: Home   Appointments:  With DR Kruger       Care  (CTS) will continue to follow as needed.

## 2020-08-24 ENCOUNTER — TELEPHONE (OUTPATIENT)
Dept: WOUND CARE | Facility: CLINIC | Age: 30
End: 2020-08-24

## 2020-08-24 NOTE — TELEPHONE ENCOUNTER
[facility] Wound    Who is the name of the provider?:  [place]      What is the location you see this provider at?: [place]    Reason for call:  Needs follow up appt for wound infection.    Can we leave a detailed message on this number?  YES

## 2020-08-24 NOTE — PROGRESS NOTES
This is a recent snapshot of the patient's [facility] medical record.  For current drug dose and complete information and questions, call [phone] or In Basket pool, [facility] ([id number])  CSN Number:  [id number]

## 2020-08-24 NOTE — TELEPHONE ENCOUNTER
New patient with  since 2020, Dr. Dallas did debridement 2020 and wound VAC was started 2020. Homecare is doing wound vac changes. Photos already in media tab of patient's chart.     Patient scheduled to see Niecy 2020 @ 11:30am.

## 2020-08-25 ENCOUNTER — MEDICAL CORRESPONDENCE (OUTPATIENT)
Dept: HEALTH INFORMATION MANAGEMENT | Facility: CLINIC | Age: 30
End: 2020-08-25

## 2020-08-25 ENCOUNTER — OFFICE VISIT (OUTPATIENT)
Dept: SURGERY | Facility: CLINIC | Age: 30
End: 2020-08-25
Payer: COMMERCIAL

## 2020-08-25 VITALS
WEIGHT: 190 LBS | HEART RATE: 127 BPM | RESPIRATION RATE: 16 BRPM | OXYGEN SATURATION: 97 % | BODY MASS INDEX: 34.96 KG/M2 | DIASTOLIC BLOOD PRESSURE: 78 MMHG | HEIGHT: 62 IN | SYSTOLIC BLOOD PRESSURE: 130 MMHG

## 2020-08-25 DIAGNOSIS — L03.311 CELLULITIS OF ABDOMINAL WALL: Primary | ICD-10-CM

## 2020-08-25 DIAGNOSIS — Z53.9 DIAGNOSIS NOT YET DEFINED: Primary | ICD-10-CM

## 2020-08-25 LAB
AST SERPL W P-5'-P-CCNC: 19 U/L (ref 0–45)
BACTERIA SPEC CULT: NO GROWTH
BACTERIA SPEC CULT: NO GROWTH
BASOPHILS # BLD AUTO: 0 10E9/L (ref 0–0.2)
BASOPHILS NFR BLD AUTO: 0.3 %
BUN SERPL-MCNC: 11 MG/DL (ref 7–30)
CREAT SERPL-MCNC: 0.75 MG/DL (ref 0.52–1.04)
CRP SERPL-MCNC: 4 MG/L (ref 0–8)
DIFFERENTIAL METHOD BLD: ABNORMAL
EOSINOPHIL # BLD AUTO: 0.2 10E9/L (ref 0–0.7)
EOSINOPHIL NFR BLD AUTO: 1.4 %
ERYTHROCYTE [DISTWIDTH] IN BLOOD BY AUTOMATED COUNT: 12.4 % (ref 10–15)
ERYTHROCYTE [SEDIMENTATION RATE] IN BLOOD BY WESTERGREN METHOD: 9 MM/H (ref 0–20)
GFR SERPL CREATININE-BSD FRML MDRD: >90 ML/MIN/{1.73_M2}
HCT VFR BLD AUTO: 41.6 % (ref 35–47)
HGB BLD-MCNC: 14.6 G/DL (ref 11.7–15.7)
IMM GRANULOCYTES # BLD: 0 10E9/L (ref 0–0.4)
IMM GRANULOCYTES NFR BLD: 0.3 %
LYMPHOCYTES # BLD AUTO: 2.2 10E9/L (ref 0.8–5.3)
LYMPHOCYTES NFR BLD AUTO: 16 %
MCH RBC QN AUTO: 30.5 PG (ref 26.5–33)
MCHC RBC AUTO-ENTMCNC: 35.1 G/DL (ref 31.5–36.5)
MCV RBC AUTO: 87 FL (ref 78–100)
MONOCYTES # BLD AUTO: 0.7 10E9/L (ref 0–1.3)
MONOCYTES NFR BLD AUTO: 4.7 %
NEUTROPHILS # BLD AUTO: 10.8 10E9/L (ref 1.6–8.3)
NEUTROPHILS NFR BLD AUTO: 77.3 %
NRBC # BLD AUTO: 0 10*3/UL
NRBC BLD AUTO-RTO: 0 /100
PLATELET # BLD AUTO: 409 10E9/L (ref 150–450)
RBC # BLD AUTO: 4.79 10E12/L (ref 3.8–5.2)
SPECIMEN SOURCE: NORMAL
SPECIMEN SOURCE: NORMAL
WBC # BLD AUTO: 14 10E9/L (ref 4–11)

## 2020-08-25 PROCEDURE — 85025 COMPLETE CBC W/AUTO DIFF WBC: CPT | Performed by: INTERNAL MEDICINE

## 2020-08-25 PROCEDURE — 84450 TRANSFERASE (AST) (SGOT): CPT | Performed by: INTERNAL MEDICINE

## 2020-08-25 PROCEDURE — 85652 RBC SED RATE AUTOMATED: CPT | Performed by: INTERNAL MEDICINE

## 2020-08-25 PROCEDURE — 84520 ASSAY OF UREA NITROGEN: CPT | Performed by: INTERNAL MEDICINE

## 2020-08-25 PROCEDURE — 99214 OFFICE O/P EST MOD 30 MIN: CPT | Performed by: SURGERY

## 2020-08-25 PROCEDURE — G0180 MD CERTIFICATION HHA PATIENT: HCPCS | Performed by: SURGERY

## 2020-08-25 PROCEDURE — 82565 ASSAY OF CREATININE: CPT | Performed by: INTERNAL MEDICINE

## 2020-08-25 PROCEDURE — 86140 C-REACTIVE PROTEIN: CPT | Performed by: INTERNAL MEDICINE

## 2020-08-25 ASSESSMENT — MIFFLIN-ST. JEOR: SCORE: 1537.14

## 2020-08-25 NOTE — PROGRESS NOTES
[facility]  Surgery Consultation    HPI:Patient is a 29 year old female who is here for consultation requested by Marcela France [phone] for evaluation of prolonged infection of  scar.  The patient underwent a  in 2020 and has had ongoing wound healing issues.  She was on several cycles of antibiotics and ultimately underwent an I&D on .  She was found to have ESBL.  She had some issues after surgery and was admitted to [facility].  She was placed on IV antibiotics and a wound VAC was placed.  The patient is here for VAC change and second opinion regarding her management.  She states that the drainage is mostly serosanguineous but was yellow at one point.  She states the cellulitis has resolved.  She has tenderness around the wound VAC but no significant pain.  No issues with oral intake or bowel movements.  No other complaints today.  Patient denies fevers, chills, nausea, vomiting, SOB, chest pain, abdominal pain.    PMH:   has a past medical history of Anxiety, Depressive disorder, Endometriosis, and Periumbilical hernia.  PSH:    has a past surgical history that includes  section (, ); Laparoscopy diagnostic (gyn) (2015); and ORAL SURGERY PROCEDURE.  Social History:    reports that she has been smoking cigarettes. She has been smoking about 0.50 packs per day. She has never used smokeless tobacco. She reports previous alcohol use. She reports that she does not use drugs.  Family History:   family history includes Alcoholism in her father and mother; Breast Cancer in her maternal grandmother; Depression in her father and mother; Diabetes in her maternal grandmother.  Medications/Allergies: Home medications and allergies reviewed.    ROS:  The 10 point Review of Systems is negative other than noted in the HPI.    Physical Exam:  /78 (BP Location: Left arm, Patient Position: Sitting, Cuff Size: Adult Regular)   Pulse 127   Resp  "16   Ht 1.562 m (5' 1.5\")   Wt 86.2 kg (190 lb)   SpO2 97%   BMI 35.32 kg/m    General: Generally appears well.  Eyes- Sclera Clear- No icterus  Respiratory- Chest rise equal Bilateral  Abdomen-wound VAC removed.  Wound is deep of approximately 4 cm down to fascia.  It measures approximately 8 cm long.  The wound appears completely clean with pink/red granulation tissue throughout.  There is no drainage noted.  A new wound VAC was applied.    Impression and Plan:  Patient is a 29 year old female with prolonged  scar infection    PLAN:   The patient presents for second opinion regarding prolonged  scar infection.  She is currently on appropriate antibiotics for ESBL and has a wound VAC in place.  Her wound has shrank from the time of initial VAC placement approximally 1 week ago.  There are no signs of ongoing infection at this time.  The wound is completely pink and I would anticipate the wound VAC would assist in complete healing without other issues.  She currently has a wound VAC change scheduled for Saturday.  I advised the patient to continue recommendations per the wound care nurse.  She is welcome to come back to our clinic at any time.      Thank you very much for this consult.    Luis Santos M.D.  [facility]  [phone]    Please route or send letter to:  Primary Care Provider (PCP) and Referring Provider  "

## 2020-08-25 NOTE — LETTER
[facility]    [address], [address]  [place], [place] [address]  Phone [phone]  Fax [phone] [address], [address]  [place] [address]  Phone [phone]  Fax [phone]    [email or web address]     2020    Re: Joanne Escalante  : 1990      [facility]  Surgery Consultation     HPI:Patient is a 29 year old female who is here for consultation requested by Marcela France [phone] for evaluation of prolonged infection of  scar.  The patient underwent a  in 2020 and has had ongoing wound healing issues.  She was on several cycles of antibiotics and ultimately underwent an I&D on .  She was found to have ESBL.  She had some issues after surgery and was admitted to [facility].  She was placed on IV antibiotics and a wound VAC was placed.  The patient is here for VAC change and second opinion regarding her management.  She states that the drainage is mostly serosanguineous but was yellow at one point.  She states the cellulitis has resolved.  She has tenderness around the wound VAC but no significant pain.  No issues with oral intake or bowel movements.  No other complaints today.  Patient denies fevers, chills, nausea, vomiting, SOB, chest pain, abdominal pain.     PMH:   has a past medical history of Anxiety, Depressive disorder, Endometriosis, and Periumbilical hernia.  PSH:    has a past surgical history that includes  section (, ); Laparoscopy diagnostic (gyn) (2015); and ORAL SURGERY PROCEDURE.  Social History:    reports that she has been smoking cigarettes. She has been smoking about 0.50 packs per day. She has never used smokeless tobacco. She reports previous alcohol use. She reports that she does not use drugs.  Family History:   family history includes Alcoholism in her father and mother; Breast Cancer in her  "maternal grandmother; Depression in her father and mother; Diabetes in her maternal grandmother.  Medications/Allergies: Home medications and allergies reviewed.     ROS:  The 10 point Review of Systems is negative other than noted in the HPI.     Physical Exam:  /78 (BP Location: Left arm, Patient Position: Sitting, Cuff Size: Adult Regular)   Pulse 127   Resp 16   Ht 1.562 m (5' 1.5\")   Wt 86.2 kg (190 lb)   SpO2 97%   BMI 35.32 kg/m    General: Generally appears well.  Eyes- Sclera Clear- No icterus  Respiratory- Chest rise equal Bilateral  Abdomen-wound VAC removed.  Wound is deep of approximately 4 cm down to fascia.  It measures approximately 8 cm long.  The wound appears completely clean with pink/red granulation tissue throughout.  There is no drainage noted.  A new wound VAC was applied.     Impression and Plan:  Patient is a 29 year old female with prolonged  scar infection     PLAN:   The patient presents for second opinion regarding prolonged  scar infection.  She is currently on appropriate antibiotics for ESBL and has a wound VAC in place.  Her wound has shrank from the time of initial VAC placement approximally 1 week ago.  There are no signs of ongoing infection at this time.  The wound is completely pink and I would anticipate the wound VAC would assist in complete healing without other issues.  She currently has a wound VAC change scheduled for Saturday.  I advised the patient to continue recommendations per the wound care nurse.  She is welcome to come back to our clinic at any time.        Thank you very much for this consult.     Luis Santos M.D.  [facility]  [phone]  "

## 2020-08-26 ENCOUNTER — HOME INFUSION (PRE-WILLOW HOME INFUSION) (OUTPATIENT)
Dept: PHARMACY | Facility: CLINIC | Age: 30
End: 2020-08-26

## 2020-08-27 ENCOUNTER — HOME INFUSION (PRE-WILLOW HOME INFUSION) (OUTPATIENT)
Dept: PHARMACY | Facility: CLINIC | Age: 30
End: 2020-08-27

## 2020-08-27 ENCOUNTER — TELEPHONE (OUTPATIENT)
Dept: OBGYN | Facility: OTHER | Age: 30
End: 2020-08-27

## 2020-08-27 DIAGNOSIS — T14.8XXA WOUND INFECTION: Primary | ICD-10-CM

## 2020-08-27 DIAGNOSIS — L08.9 WOUND INFECTION: Primary | ICD-10-CM

## 2020-08-27 LAB
BASOPHILS # BLD AUTO: 0 10E9/L (ref 0–0.2)
BASOPHILS NFR BLD AUTO: 0.3 %
DIFFERENTIAL METHOD BLD: ABNORMAL
EOSINOPHIL # BLD AUTO: 0.3 10E9/L (ref 0–0.7)
EOSINOPHIL NFR BLD AUTO: 2 %
ERYTHROCYTE [DISTWIDTH] IN BLOOD BY AUTOMATED COUNT: 12.4 % (ref 10–15)
HCT VFR BLD AUTO: 42 % (ref 35–47)
HGB BLD-MCNC: 14.4 G/DL (ref 11.7–15.7)
IMM GRANULOCYTES # BLD: 0 10E9/L (ref 0–0.4)
IMM GRANULOCYTES NFR BLD: 0.2 %
LYMPHOCYTES # BLD AUTO: 2.7 10E9/L (ref 0.8–5.3)
LYMPHOCYTES NFR BLD AUTO: 20.9 %
MCH RBC QN AUTO: 30.1 PG (ref 26.5–33)
MCHC RBC AUTO-ENTMCNC: 34.3 G/DL (ref 31.5–36.5)
MCV RBC AUTO: 88 FL (ref 78–100)
MONOCYTES # BLD AUTO: 0.6 10E9/L (ref 0–1.3)
MONOCYTES NFR BLD AUTO: 4.9 %
NEUTROPHILS # BLD AUTO: 9.3 10E9/L (ref 1.6–8.3)
NEUTROPHILS NFR BLD AUTO: 71.7 %
NRBC # BLD AUTO: 0 10*3/UL
NRBC BLD AUTO-RTO: 0 /100
PLATELET # BLD AUTO: 346 10E9/L (ref 150–450)
RBC # BLD AUTO: 4.79 10E12/L (ref 3.8–5.2)
WBC # BLD AUTO: 13 10E9/L (ref 4–11)

## 2020-08-27 PROCEDURE — 85025 COMPLETE CBC W/AUTO DIFF WBC: CPT | Performed by: INTERNAL MEDICINE

## 2020-08-27 NOTE — PROGRESS NOTES
This is a recent snapshot of the patient's [facility] medical record.  For current drug dose and complete information and questions, call [phone] or In Basket pool, fv home infusion ([id number])  CSN Number:  [id number]

## 2020-08-27 NOTE — TELEPHONE ENCOUNTER
RN called and spoke with Silva Vargas and gave her verbal order for the SW per . She was also informed that Dr. France is not her PCP.     DANIEL Lowe  [phone]  [email or web address]    RN verbalized understanding and agreed to plan.     Joanne Liriano RN on 8/27/2020 at 11:04 AM

## 2020-08-27 NOTE — TELEPHONE ENCOUNTER
[facility] utilizes an encounter to take the place of a direct phone call to your office. Please take a moment to review the below request. Please reply or route message to author of this encounter.  Message will act as a verbal OK of orders requested below. Thank you.    Requesting a SW assessment order as soon as possible to assist patient with her insurance needs.  Patient reports her insurance will term on 8/31/2020 and she is not able to afford her COBRA insurance.  She currently has a wound vac which we will attempt to teach family how to manage.  She also has a PICC line for IV antibiotics for an unknown duration at this time.  Client is feeling overwhelmed with upcoming loss of insurance, inability to pay COBRA or privately pay for home care services.  HC SW needed to assist with finding insurance or alternative pay sources for insurance needs.  DANIEL Lowe  [phone]  [email or web address]

## 2020-08-27 NOTE — TELEPHONE ENCOUNTER
Please give a verbal order to home care for home care SW.  A care coordination referral is not needed.  This message sent to provider    MAT Norris, MSNAZANIN   [facility]  Care Coordination  [facility]  [phone]  8/27/2020 10:20 AM

## 2020-08-28 ENCOUNTER — TELEPHONE (OUTPATIENT)
Dept: OBGYN | Facility: OTHER | Age: 30
End: 2020-08-28

## 2020-08-28 NOTE — TELEPHONE ENCOUNTER
Reason for Call:  Form, our goal is to have forms completed with 72 hours, however, some forms may require a visit or additional information.    Type of letter, form or note:  medical    Who is the form from?: Home care    Where did the form come from: form was faxed in    What clinic location was the form placed at?: [facility] - [phone]    Where the form was placed: TEAM B Box/Folder    What number is listed as a contact on the form?: [phone]       Additional comments: PLEASE SIGN AND FAX BACK TO: [phone]    Call taken on 8/28/2020 at 10:22 AM by Kamilla Allred

## 2020-08-28 NOTE — PROGRESS NOTES
This is a recent snapshot of the patient's [facility] medical record.  For current drug dose and complete information and questions, call [phone] or In Basket pool, fv home infusion ([id number])  CSN Number:  [id number]

## 2020-08-30 ENCOUNTER — HOSPITAL ENCOUNTER (EMERGENCY)
Facility: CLINIC | Age: 30
Discharge: HOME | End: 2020-08-30
Attending: EMERGENCY MEDICINE | Admitting: EMERGENCY MEDICINE
Payer: COMMERCIAL

## 2020-08-30 VITALS
TEMPERATURE: 98.1 F | HEIGHT: 62 IN | RESPIRATION RATE: 16 BRPM | OXYGEN SATURATION: 99 % | SYSTOLIC BLOOD PRESSURE: 133 MMHG | BODY MASS INDEX: 34.96 KG/M2 | DIASTOLIC BLOOD PRESSURE: 90 MMHG | WEIGHT: 190 LBS

## 2020-08-30 DIAGNOSIS — S31.109A OPEN WOUND OF ABDOMINAL WALL, INITIAL ENCOUNTER: ICD-10-CM

## 2020-08-30 LAB
ANION GAP SERPL CALCULATED.3IONS-SCNC: 4 MMOL/L (ref 3–14)
BASOPHILS # BLD AUTO: 0.1 10E9/L (ref 0–0.2)
BASOPHILS NFR BLD AUTO: 0.5 %
BUN SERPL-MCNC: 13 MG/DL (ref 7–30)
CALCIUM SERPL-MCNC: 8.7 MG/DL (ref 8.5–10.1)
CHLORIDE SERPL-SCNC: 109 MMOL/L (ref 94–109)
CO2 SERPL-SCNC: 26 MMOL/L (ref 20–32)
CREAT SERPL-MCNC: 0.76 MG/DL (ref 0.52–1.04)
DIFFERENTIAL METHOD BLD: NORMAL
EOSINOPHIL # BLD AUTO: 0.3 10E9/L (ref 0–0.7)
EOSINOPHIL NFR BLD AUTO: 3.1 %
ERYTHROCYTE [DISTWIDTH] IN BLOOD BY AUTOMATED COUNT: 12.3 % (ref 10–15)
GFR SERPL CREATININE-BSD FRML MDRD: >90 ML/MIN/{1.73_M2}
GLUCOSE SERPL-MCNC: 94 MG/DL (ref 70–99)
HCT VFR BLD AUTO: 37.8 % (ref 35–47)
HGB BLD-MCNC: 13.2 G/DL (ref 11.7–15.7)
IMM GRANULOCYTES # BLD: 0 10E9/L (ref 0–0.4)
IMM GRANULOCYTES NFR BLD: 0.2 %
LYMPHOCYTES # BLD AUTO: 4.6 10E9/L (ref 0.8–5.3)
LYMPHOCYTES NFR BLD AUTO: 45.2 %
MCH RBC QN AUTO: 30.1 PG (ref 26.5–33)
MCHC RBC AUTO-ENTMCNC: 34.9 G/DL (ref 31.5–36.5)
MCV RBC AUTO: 86 FL (ref 78–100)
MONOCYTES # BLD AUTO: 0.7 10E9/L (ref 0–1.3)
MONOCYTES NFR BLD AUTO: 6.3 %
NEUTROPHILS # BLD AUTO: 4.6 10E9/L (ref 1.6–8.3)
NEUTROPHILS NFR BLD AUTO: 44.7 %
NRBC # BLD AUTO: 0 10*3/UL
NRBC BLD AUTO-RTO: 0 /100
PLATELET # BLD AUTO: 316 10E9/L (ref 150–450)
POTASSIUM SERPL-SCNC: 3.8 MMOL/L (ref 3.4–5.3)
RBC # BLD AUTO: 4.38 10E12/L (ref 3.8–5.2)
SODIUM SERPL-SCNC: 139 MMOL/L (ref 133–144)
WBC # BLD AUTO: 10.3 10E9/L (ref 4–11)

## 2020-08-30 PROCEDURE — 85025 COMPLETE CBC W/AUTO DIFF WBC: CPT | Performed by: EMERGENCY MEDICINE

## 2020-08-30 PROCEDURE — 80048 BASIC METABOLIC PNL TOTAL CA: CPT | Performed by: EMERGENCY MEDICINE

## 2020-08-30 PROCEDURE — 25800030 ZZH RX IP 258 OP 636: Performed by: EMERGENCY MEDICINE

## 2020-08-30 PROCEDURE — 96360 HYDRATION IV INFUSION INIT: CPT

## 2020-08-30 PROCEDURE — 99284 EMERGENCY DEPT VISIT MOD MDM: CPT | Mod: 25

## 2020-08-30 RX ORDER — SODIUM CHLORIDE 9 MG/ML
INJECTION, SOLUTION INTRAVENOUS CONTINUOUS
Status: DISCONTINUED | OUTPATIENT
Start: 2020-08-30 | End: 2020-08-31 | Stop reason: HOSPADM

## 2020-08-30 RX ADMIN — SODIUM CHLORIDE 1000 ML: 9 INJECTION, SOLUTION INTRAVENOUS at 22:37

## 2020-08-30 ASSESSMENT — MIFFLIN-ST. JEOR: SCORE: 1537.14

## 2020-08-30 NOTE — ED AVS SNAPSHOT
Emergency Department  [address] [place] [address]  Phone:  [phone]  Fax:  [phone]                                    Joanne Escalante   MRN: [id number]    Department:   Emergency Department   Date of Visit:  8/30/2020           After Visit Summary Signature Page    I have received my discharge instructions, and my questions have been answered. I have discussed any challenges I see with this plan with the nurse or doctor.    ..........................................................................................................................................  Patient/Patient Representative Signature      ..........................................................................................................................................  Patient Representative Print Name and Relationship to Patient    ..................................................               ................................................  Date                                   Time    ..........................................................................................................................................  Reviewed by Signature/Title    ...................................................              ..............................................  Date                                               Time          22EPIC Rev 08/18

## 2020-08-31 ENCOUNTER — MEDICAL CORRESPONDENCE (OUTPATIENT)
Dept: HEALTH INFORMATION MANAGEMENT | Facility: CLINIC | Age: 30
End: 2020-08-31

## 2020-08-31 ENCOUNTER — HOME INFUSION (PRE-WILLOW HOME INFUSION) (OUTPATIENT)
Dept: PHARMACY | Facility: CLINIC | Age: 30
End: 2020-08-31

## 2020-08-31 LAB
AST SERPL W P-5'-P-CCNC: 14 U/L (ref 0–45)
BASOPHILS # BLD AUTO: 0 10E9/L (ref 0–0.2)
BASOPHILS NFR BLD AUTO: 0.2 %
BUN SERPL-MCNC: 10 MG/DL (ref 7–30)
CREAT SERPL-MCNC: 0.71 MG/DL (ref 0.52–1.04)
CRP SERPL-MCNC: 8.5 MG/L (ref 0–8)
DIFFERENTIAL METHOD BLD: ABNORMAL
EOSINOPHIL # BLD AUTO: 0.4 10E9/L (ref 0–0.7)
EOSINOPHIL NFR BLD AUTO: 2.7 %
ERYTHROCYTE [DISTWIDTH] IN BLOOD BY AUTOMATED COUNT: 12.4 % (ref 10–15)
ERYTHROCYTE [SEDIMENTATION RATE] IN BLOOD BY WESTERGREN METHOD: 7 MM/H (ref 0–20)
GFR SERPL CREATININE-BSD FRML MDRD: >90 ML/MIN/{1.73_M2}
HCT VFR BLD AUTO: 42.1 % (ref 35–47)
HGB BLD-MCNC: 14.5 G/DL (ref 11.7–15.7)
IMM GRANULOCYTES # BLD: 0 10E9/L (ref 0–0.4)
IMM GRANULOCYTES NFR BLD: 0.2 %
LYMPHOCYTES # BLD AUTO: 2.3 10E9/L (ref 0.8–5.3)
LYMPHOCYTES NFR BLD AUTO: 17.7 %
MCH RBC QN AUTO: 30.3 PG (ref 26.5–33)
MCHC RBC AUTO-ENTMCNC: 34.4 G/DL (ref 31.5–36.5)
MCV RBC AUTO: 88 FL (ref 78–100)
MONOCYTES # BLD AUTO: 0.7 10E9/L (ref 0–1.3)
MONOCYTES NFR BLD AUTO: 5.2 %
NEUTROPHILS # BLD AUTO: 9.8 10E9/L (ref 1.6–8.3)
NEUTROPHILS NFR BLD AUTO: 74 %
NRBC # BLD AUTO: 0 10*3/UL
NRBC BLD AUTO-RTO: 0 /100
PLATELET # BLD AUTO: 358 10E9/L (ref 150–450)
RBC # BLD AUTO: 4.78 10E12/L (ref 3.8–5.2)
WBC # BLD AUTO: 13.2 10E9/L (ref 4–11)

## 2020-08-31 PROCEDURE — 82565 ASSAY OF CREATININE: CPT | Performed by: INTERNAL MEDICINE

## 2020-08-31 PROCEDURE — 85025 COMPLETE CBC W/AUTO DIFF WBC: CPT | Performed by: INTERNAL MEDICINE

## 2020-08-31 PROCEDURE — 86140 C-REACTIVE PROTEIN: CPT | Performed by: INTERNAL MEDICINE

## 2020-08-31 PROCEDURE — 84450 TRANSFERASE (AST) (SGOT): CPT | Performed by: INTERNAL MEDICINE

## 2020-08-31 PROCEDURE — 84520 ASSAY OF UREA NITROGEN: CPT | Performed by: INTERNAL MEDICINE

## 2020-08-31 PROCEDURE — 85652 RBC SED RATE AUTOMATED: CPT | Performed by: INTERNAL MEDICINE

## 2020-08-31 NOTE — DISCHARGE INSTRUCTIONS
Fortunately your abdominal wound does not have any obvious signs of new infection.  Continue your IV antibiotics and frequent wound checks through clinic.

## 2020-08-31 NOTE — ED PROVIDER NOTES
History     Chief Complaint:  Wound Check     HPI history supplemented by electronic chart review.  Joanne Escalante is a 29 year old female who presents for evaluation of her abdominal wound.    Per chart review the patient underwent  section in [place] in January.  Since then she has struggled with postoperative wound infection. She states that she has had around 8 rounds of antibiotics, with most recent hospitalization on . She has been having slight blood drainage for the last week, and otherwise felt good until yesterday. She contacted wound care clinic after pain and yellow/tan draining since yesterday and they informed her she could take the wound vac off this morning. When she explored the wound she found a slight yellow appearance in the area. She states that she is most concerned with the new onset of pain, and she is taking occasional Tramadol for it.  She is also on Invanz through PICC, having had cultures drawn and infectious disease consultation which recommended this therapy. She has been getting the wound vac changed 3x weekly by at home care which will be replaced tomorrow. She states that she has not had any fevers or emesis. She denies any vaginal discharge, bleeding, or dysuria.    Allergies:  Sulfa Drugs      Medications:   Ertapenem   Mycostatin   Zofran   Zoloft      Medical History:   Anxiety   Depressive disorder   Endometriosis   Periumbilical hernia      Surgical History   [place] teeth surgery    section 2x      Family History:   Mother: Depression   Father: Depression     Social History:  Patient was not accompanied to the ED.   Smoking Status:  Smoker               Type: Cigarettes              Packs/Day: 0.25  Smokeless Tobacco: Never Used   Alcohol Use: Negative   Drug Use: Negative    Marcela France    Review of Systems   ROS: 10 point ROS neg other than the symptoms noted above in the HPI.     Physical Exam     Patient Vitals for the past 24 hrs:   BP  "Temp Temp src Resp SpO2 Height Weight   08/30/20 2206 (!) 133/90 98.1  F (36.7  C) Oral 16 99 % 1.562 m (5' 1.5\") 86.2 kg (190 lb)      Physical Exam  General: Nontoxic appearing woman sitting upright in room 14  HENT: mucous membranes moist   CV: rate as above  Resp: normal effort, speaks in full phrases, no stridor, no cough observed  GI: Abdomen soft, very minimal tenderness to the lower abdomen at the site of an open wound approximately 3 cm deep and 8 cm across, with pink granulation tissue present throughout.  No focal induration or fluctuance.  No obvious drainage.  No surrounding erythema to the skin.  No peritonitis.  MSK: no bony tenderness, no CVAT  Skin: appropriately warm and dry, no erythema  Neuro: alert, clear speech, oriented   Psych: cooperative, anxious, pleasant    Emergency Department Course     Laboratory:  Laboratory findings were communicated with the patient who voiced understanding of the findings.    CBC: WBC 10.3, HGB 13.2,   BMP: AWNL (Creatinine 0.76)     Interventions:   2340 NS, 1 L, IV      Emergency Department Course:    2237 Nursing notes and vitals reviewed.    2245 I performed an exam of the patient as documented above.     2236 IV was inserted and blood was drawn for laboratory testing, results above.    I performed electronic chart review in EPIC.    2304 Findings and plan explained to the Patient. Patient discharged home with instructions regarding supportive care, medications, and reasons to return. The importance of close follow-up was reviewed.     Impression & Plan     Medical Decision Making:  I certainly considered interval development of cellulitis, recurrent abscess, peritonitis, or other more immediately threatening process.  However, her abdominal exam is benign, and her wound actually looks quite good, with evidence of granulation tissue and no visible signs of infection.  Blood had been drawn by the nurse prior to my evaluation of the patient, and returns " showing that her recent leukocytosis has resolved.  She continues to receive parenteral ertapenem based on recent culture results and infectious disease guidance.  We discussed the fact that I cannot definitively rule out a deeper infection or other process without CT imaging, along with corresponding radiation risks, and the patient wished to defer this, which I felt was reasonable and safe given other available information.  She is established with a local wound healing [place] and I advised very close follow-up there, she would require ongoing wound VAC and close follow-up of this complicated issue.  She states she had no additional concerns.  I do not think she requires hospitalization or immediate surgical consultation, though she is welcome back for acute worsening at any hour.    Diagnosis:     ICD-10-CM    1. Open wound of abdominal wall, initial encounter  S31.109A         Disposition:  Discharged to home.      This note was completed in part using Dragon voice recognition software. Although reviewed after completion, some word and grammatical errors may occur.     Scribe Disclosure:  I, Damien Castillo, am serving as a scribe at 10:45 PM on 8/30/2020 to document services personally performed by Sebas Babb * MD based on my observations and the provider's statements to me.     [place] Sebas Pelaez MD  08/31/20 [id number]

## 2020-09-01 ENCOUNTER — APPOINTMENT (OUTPATIENT)
Dept: LAB | Facility: CLINIC | Age: 30
End: 2020-09-01
Attending: INTERNAL MEDICINE
Payer: MEDICAID

## 2020-09-01 ENCOUNTER — HOME INFUSION (PRE-WILLOW HOME INFUSION) (OUTPATIENT)
Dept: PHARMACY | Facility: CLINIC | Age: 30
End: 2020-09-01

## 2020-09-01 NOTE — PROGRESS NOTES
This is a recent snapshot of the patient's [facility] medical record.  For current drug dose and complete information and questions, call [phone] or In Basket pool, fv home infusion ([id number])  CSN Number:  [id number]

## 2020-09-02 ENCOUNTER — HOME INFUSION (PRE-WILLOW HOME INFUSION) (OUTPATIENT)
Dept: PHARMACY | Facility: CLINIC | Age: 30
End: 2020-09-02

## 2020-09-02 NOTE — PROGRESS NOTES
This is a recent snapshot of the patient's [facility] medical record.  For current drug dose and complete information and questions, call [phone] or In Basket pool, fv home infusion ([id number])  CSN Number:  [id number]

## 2020-09-03 NOTE — PROGRESS NOTES
This is a recent snapshot of the patient's [facility] medical record.  For current drug dose and complete information and questions, call [phone] or In Basket pool, fv home infusion ([id number])  CSN Number:  [id number]

## 2020-09-04 ENCOUNTER — HOME INFUSION (PRE-WILLOW HOME INFUSION) (OUTPATIENT)
Dept: PHARMACY | Facility: CLINIC | Age: 30
End: 2020-09-04

## 2020-09-04 ENCOUNTER — NURSE TRIAGE (OUTPATIENT)
Dept: NURSING | Facility: CLINIC | Age: 30
End: 2020-09-04

## 2020-09-05 NOTE — TELEPHONE ENCOUNTER
Clinic Action Needed: GABY from Home Care nurse    FNA Triage Call  Presenting Problem:    Call received from DANIEL Glaser with [facility].    She requests a message be sent to update Dr. France to the following:    Joanne contacted the Home Care after hours triage line and notified them that she was going in to [facility].    Joanne reported that was experiencing burning and pain to her entire right arm, (with PICC line). She also reported that she was able to see the outline of the PICC line in her chest and she's never been able to see that before.      Eam Feldman RN  [facility]

## 2020-09-08 ENCOUNTER — MEDICAL CORRESPONDENCE (OUTPATIENT)
Dept: HEALTH INFORMATION MANAGEMENT | Facility: CLINIC | Age: 30
End: 2020-09-08

## 2020-09-08 LAB
AST SERPL W P-5'-P-CCNC: 18 U/L (ref 0–45)
BASOPHILS # BLD AUTO: 0.1 10E9/L (ref 0–0.2)
BASOPHILS NFR BLD AUTO: 0.5 %
BUN SERPL-MCNC: 15 MG/DL (ref 7–30)
CREAT SERPL-MCNC: 0.71 MG/DL (ref 0.52–1.04)
CRP SERPL-MCNC: 10.1 MG/L (ref 0–8)
DIFFERENTIAL METHOD BLD: NORMAL
EOSINOPHIL # BLD AUTO: 0.3 10E9/L (ref 0–0.7)
EOSINOPHIL NFR BLD AUTO: 2.5 %
ERYTHROCYTE [DISTWIDTH] IN BLOOD BY AUTOMATED COUNT: 12.5 % (ref 10–15)
ERYTHROCYTE [SEDIMENTATION RATE] IN BLOOD BY WESTERGREN METHOD: 8 MM/H (ref 0–20)
GFR SERPL CREATININE-BSD FRML MDRD: >90 ML/MIN/{1.73_M2}
HCT VFR BLD AUTO: 41.3 % (ref 35–47)
HGB BLD-MCNC: 14.1 G/DL (ref 11.7–15.7)
IMM GRANULOCYTES # BLD: 0 10E9/L (ref 0–0.4)
IMM GRANULOCYTES NFR BLD: 0.3 %
LYMPHOCYTES # BLD AUTO: 2.7 10E9/L (ref 0.8–5.3)
LYMPHOCYTES NFR BLD AUTO: 27.1 %
MCH RBC QN AUTO: 30.4 PG (ref 26.5–33)
MCHC RBC AUTO-ENTMCNC: 34.1 G/DL (ref 31.5–36.5)
MCV RBC AUTO: 89 FL (ref 78–100)
MONOCYTES # BLD AUTO: 0.4 10E9/L (ref 0–1.3)
MONOCYTES NFR BLD AUTO: 4.3 %
NEUTROPHILS # BLD AUTO: 6.5 10E9/L (ref 1.6–8.3)
NEUTROPHILS NFR BLD AUTO: 65.3 %
NRBC # BLD AUTO: 0 10*3/UL
NRBC BLD AUTO-RTO: 0 /100
PLATELET # BLD AUTO: 291 10E9/L (ref 150–450)
RBC # BLD AUTO: 4.64 10E12/L (ref 3.8–5.2)
WBC # BLD AUTO: 9.9 10E9/L (ref 4–11)

## 2020-09-08 PROCEDURE — 85652 RBC SED RATE AUTOMATED: CPT | Performed by: INTERNAL MEDICINE

## 2020-09-08 PROCEDURE — 86140 C-REACTIVE PROTEIN: CPT | Performed by: INTERNAL MEDICINE

## 2020-09-08 PROCEDURE — 85025 COMPLETE CBC W/AUTO DIFF WBC: CPT | Performed by: INTERNAL MEDICINE

## 2020-09-08 PROCEDURE — 82565 ASSAY OF CREATININE: CPT | Performed by: INTERNAL MEDICINE

## 2020-09-08 PROCEDURE — 84520 ASSAY OF UREA NITROGEN: CPT | Performed by: INTERNAL MEDICINE

## 2020-09-08 PROCEDURE — 84450 TRANSFERASE (AST) (SGOT): CPT | Performed by: INTERNAL MEDICINE

## 2020-09-08 NOTE — PROGRESS NOTES
Rest. Tylenol for pain. Continue antibiotic as previously instructed.   Vicks infant rub. Humidifier in room at night.   Follow up with peds       Acute Otitis Media with Infection ()  The middle ear is the space behind the eardrum. It is connected to the nasal passage by a short narrow tube called the eustachian tube. This tube allows normal fluids to drain out of the middle ear. It also helps equalize pressure in the ear. The tubes are shorter and more horizontal in newborns, so they are more likely to become blocked. As a result of a blockage, fluid and pressure build up in the middle ear. If bacteria or fungi grow in the fluid, an ear infection results. This is called acute otitis media.  A  with an ear infection will have an earache. Signs of an earache in a  may include fussiness, increased crying, and touching or pulling on the ear. Your child may have a respiratory infection first, before the ear infection.  If a  has an earache, a full examination and workup may be done. Any upper respiratory infection or other problem that may have affected the ear will be treated at the same time as the ear infection.  Home care  Follow these guidelines when caring for your child at home:   · The health care provider may prescribe pain medication, such as acetaminophen. Your baby will also be given medication to fight the infection. Your doctor may prescribe medication as an oral liquid or as eardrops. Follow the doctor’s instructions for using these medications.  · To reduce pain, have your baby rest upright against your chest. An upright position helps reduce pressure in the middle ear.  · Keep the ear dry. If the ear gets wet, gently dry it with a soft towel or cloth. Never insert your finger or another object into the ear.  · Avoid smoking near your child. Smoking has been shown to increase the incidence of ear infections in children.  To apply eardrops:  1. If the eardrop medication is  This is a recent snapshot of the patient's [facility] medical record.  For current drug dose and complete information and questions, call [phone] or In Basket pool, fv home infusion ([id number])  CSN Number:  [id number]     refrigerated, put the bottle in warm water before using. Cold drops in the ear are uncomfortable.  2. Lay your baby down on a flat surface. Gently hold the head to one side to expose the affected ear.  3. Remove any drainage from the ear with a clean tissue or cotton swab. Clean only the outer ear. Do not insert the cotton swab into the ear canal.  4. Straighten the ear canal by pulling the earlobe down and back.  5. Keep the dropper ½ inch above the ear canal to avoid contamination. Apply the drops against the side of the ear canal.  6. Keep your baby lying down for 1 minute. This gives time for the medication to enter the ear canal. If your baby does not have pain, gently massage the outer ear near the opening.  7. Wipe away excess medication from the outer ear with a clean cotton ball.  Follow-up care  Follow up with your child's health care provider, or as advised.  When to seek medical advice  Call your child's health care provider right away if:  · Your child is 3 months old or younger and has a fever of 100.4°F (38°C) or higher. Your child may need to see a health care provider.  · Your child is of any age and has fevers higher than 104°F (40°C) that come back again and again  Also call your child's provider right away if any of these occur:  · New symptoms, especially swelling around the ear or weakness of face muscles  · Infection seems to be getting worse, not better  © 6597-2204 [employer]. [address] [place] [address], [place] [address]. All rights reserved. This information is not intended as a substitute for professional medical care. Always follow your healthcare professional's instructions.

## 2020-09-11 ENCOUNTER — TELEPHONE (OUTPATIENT)
Dept: WOUND CARE | Facility: CLINIC | Age: 30
End: 2020-09-11

## 2020-09-11 NOTE — TELEPHONE ENCOUNTER
Saw patient today for WOC evaluation. It is noted that patient is seeing you on 20.  Patient has wound vac to lower abd d/t  wound. Patient states that she has quit using the wound vac d/t it is irritating her skin and she is now doing wet to dry dressing changes with vashe daily.      Wound measured today 1.5 cm x 5.7 cm x 2.0 cm.    Please advise for the following:  Wound care to abdomen  1. Cleanse with normal saline or wound cleanser, pat dry  2. Apply skin prep barrier/barrier cream to periwound as needed  3. Lightly pack slightly damp (with vashe) 4x4 gauze to wound bed, this is to be changed daily.  4. Apply ABD dressing, secure with tape.  5. Family to change on non nursing days.    You may route your orders/recommendations back through [facility],    Thank you,    MILADIS NicoleN, RN, CWOCN   20  1:55 PM   [facility]  [phone]   [email or web address]

## 2020-09-16 ENCOUNTER — HOSPITAL ENCOUNTER (OUTPATIENT)
Dept: WOUND CARE | Facility: CLINIC | Age: 30
Discharge: HOME | End: 2020-09-16
Attending: PHYSICIAN ASSISTANT | Admitting: PHYSICIAN ASSISTANT
Payer: MEDICAID

## 2020-09-16 VITALS
RESPIRATION RATE: 16 BRPM | HEART RATE: 84 BPM | SYSTOLIC BLOOD PRESSURE: 121 MMHG | DIASTOLIC BLOOD PRESSURE: 74 MMHG | TEMPERATURE: 97.7 F

## 2020-09-16 DIAGNOSIS — T14.8XXA WOUND INFECTION: Primary | ICD-10-CM

## 2020-09-16 DIAGNOSIS — L08.9 WOUND INFECTION: Primary | ICD-10-CM

## 2020-09-16 DIAGNOSIS — Z98.891 STATUS POST REPEAT LOW TRANSVERSE CESAREAN SECTION: ICD-10-CM

## 2020-09-16 PROBLEM — F43.23 ADJUSTMENT DISORDER WITH MIXED ANXIETY AND DEPRESSED MOOD: Status: ACTIVE | Noted: 2020-09-16

## 2020-09-16 PROBLEM — Z34.81 PRENATAL CARE, SUBSEQUENT PREGNANCY IN FIRST TRIMESTER: Status: ACTIVE | Noted: 2019-07-02

## 2020-09-16 PROBLEM — F33.2 SEVERE EPISODE OF RECURRENT MAJOR DEPRESSIVE DISORDER, WITHOUT PSYCHOTIC FEATURES (H): Status: ACTIVE | Noted: 2018-07-19

## 2020-09-16 PROBLEM — F17.210 CIGARETTE SMOKER: Status: ACTIVE | Noted: 2020-09-16

## 2020-09-16 PROBLEM — F41.9 ANXIETY DISORDER: Status: ACTIVE | Noted: 2018-07-19

## 2020-09-16 PROCEDURE — G0463 HOSPITAL OUTPT CLINIC VISIT: HCPCS | Mod: 25

## 2020-09-16 PROCEDURE — 97602 WOUND(S) CARE NON-SELECTIVE: CPT

## 2020-09-16 PROCEDURE — 99203 OFFICE O/P NEW LOW 30 MIN: CPT | Performed by: PHYSICIAN ASSISTANT

## 2020-09-16 NOTE — PROGRESS NOTES
Patient arrived for wound care visit. Certified Wound Care Nurse time spent evaluating patient record, completed a full evaluation and documented wound(s) & robles-wound skin; provided recommendation based on treatment plan. Applied dressing, reviewed discharge instructions, patient education, and discussed plan of care with appropriate medical team staff members and patient and/or family members.

## 2020-09-16 NOTE — DISCHARGE INSTRUCTIONS
[facility]  [address] [facility] [address] [facility] [address]    Call us at [phone] if you have any questions about your wounds, have redness or swelling around your wound, have a fever of 101 or greater or if you have any other problems or concerns. We answer the phone Monday through Friday 8 am to 4 pm, please leave a message as we check the voicemail frequently throughout the day.     Joanne Escalante      1990    A diet high in protein is important for wound healing, we recommend getting 90 grams of protein per day. Taking protein shakes or bars are a good way to get extra protein in your diet.  Other good sources of protein:  Pork 26g per 3 oz  Whey protein powder - 24g per scoop (on average)  Greek yogurt - 23g per 8oz   Chicken or Turkey - 23g per 3oz  Fish - 20-25g per 3oz  Beef - 18-23g per 3oz  Navy beans - 20g per cup  Cottage cheese - 14g per 1/2 cup   Lentils - 13g per 1/4 cup  Beef jerky 13g per 1oz  2% milk - 8g per cup  Peanut butter - 8g per 2 tablespoons  Eggs - 6g per egg  Mixed nuts - 6g per 2oz     Wound care recommendations to abdomen:  After cleansing with saline or wound cleanser, apply small amount of VASHE on gauze, lay into wound bed, let sit for 10 minutes, remove gauze (do not rinse) then apply dressing.  Cut and apply Fibracol to cover the wound bed.  Cover with Mepilex dressing  Change every other day.     Liliana Mercado PA-C. September 16, 2020    Wound Clinc follow up as scheduled

## 2020-09-18 ENCOUNTER — TELEPHONE (OUTPATIENT)
Dept: WOUND CARE | Facility: CLINIC | Age: 30
End: 2020-09-18

## 2020-09-18 NOTE — TELEPHONE ENCOUNTER
Patient is wondering if she has been discharged from home care.  The nurse is calling to set up further appointments.

## 2020-09-22 NOTE — PROGRESS NOTES
[facility]    ASSESSMENT:   1. Full-thickness dehisced surgical wound of abdomen with fat-layer exposed  2. S/p I&D of abscess   3. S/p   4. History of gestational diabetes    PLAN/DISCUSSION:   1. Wound care plan: Vashe soak, FibraCol packing, Mepilex border change every other day  2. Can discontinue wound VAC   3. Nutrition: Cesario 2pkts/day, vit D 5kIU/day, high protein diet    HISTORY OF PRESENT ILLNESS:   Joanne Escalante is a 29 year old female who presents today for an abdominal wound. Underwent  20 with healthy baby boy. Unfortunately developed susbequent infection and abscess that eventually required OR debridement. Underwent I&D 20. Was hospitalized 20 for infection and grew out ESBL on culture. PICC line was placed and she completed a course of directed antibiotics.     20: Has had VAC off for several days as it was irritating her skin. Has been doing Vashe damp gauze. Wound bed looks excellent.     REVIEW OF SYSTEMS:  CONSTITUTIONAL: Denies fevers or acute illness  ENDOCRINE: gestational diabetes has resolved with delivery    PAST MEDICAL HISTORY:   Patient Active Problem List   Diagnosis     Umbilical hernia without obstruction and without gangrene     Abdominal wall cellulitis     Hx of  section 2020     ESBL (extended spectrum beta-lactamase) producing bacteria infection     Adjustment disorder with mixed anxiety and depressed mood     Anxiety disorder     Cigarette smoker     Endometriosis determined by laparoscopy     Prenatal care, subsequent pregnancy in first trimester     Severe episode of recurrent major depressive disorder, without psychotic features (H)     Wound infection     Status post repeat low transverse  section       SOCIAL HISTORY: works at [employer]  TOBACCO STATUS:  reports that she has been smoking cigarettes. She has been smoking about 0.50 packs per day. She has never used smokeless tobacco.    MEDICATIONS:    Current Outpatient Medications   Medication     acetaminophen (TYLENOL) 500 MG tablet     ibuprofen (ADVIL/MOTRIN) 200 MG tablet     nystatin (MYCOSTATIN) 596307 UNIT/GM external powder     ondansetron (ZOFRAN-ODT) 4 MG ODT tab     Prenatal Vit-Fe Fumarate-FA (PRENATAL VITAMIN PO)     sertraline (ZOLOFT) 50 MG tablet     sodium hypochlorite (QUARTER-STRENGTH DAKINS) external solution     traMADol (ULTRAM) 50 MG tablet     No current facility-administered medications for this encounter.      VITALS: /74 (BP Location: Right arm)   Pulse 84   Temp 97.7  F (36.5  C) (Temporal)   Resp 16      PHYSICAL EXAM:  GENERAL: Patient is alert and oriented and in no acute distress  INTEGUMENTARY:   See wound care flowsheet for detailed exam and wound measurements.       FOLLOW-UP: 2-3 weeks    KI GIBSON PA-C

## 2020-09-30 ENCOUNTER — MEDICAL CORRESPONDENCE (OUTPATIENT)
Dept: HEALTH INFORMATION MANAGEMENT | Facility: CLINIC | Age: 30
End: 2020-09-30

## 2020-09-30 ENCOUNTER — HOSPITAL ENCOUNTER (OUTPATIENT)
Dept: WOUND CARE | Facility: CLINIC | Age: 30
End: 2020-09-30
Attending: PHYSICIAN ASSISTANT
Payer: MEDICAID

## 2020-09-30 DIAGNOSIS — L03.311 ABDOMINAL WALL CELLULITIS: ICD-10-CM

## 2020-09-30 DIAGNOSIS — S31.109D OPEN WOUND OF ABDOMINAL WALL, SUBSEQUENT ENCOUNTER: Primary | ICD-10-CM

## 2020-09-30 PROCEDURE — 99441 ZZC PHYSICIAN TELEPHONE EVALUATION 5-10 MIN: CPT | Performed by: PHYSICIAN ASSISTANT

## 2020-09-30 NOTE — PROGRESS NOTES
Patient evaluated during telephone visit. Certified Wound Care Nurse time spent evaluating patient record, completed a full evaluation and documented wound(s) & robles-wound skin; provided recommendation based on treatment plan. Reviewed discharge instructions, patient education, and discussed plan of care with appropriate medical team staff members and patient and/or family members.

## 2020-09-30 NOTE — DISCHARGE INSTRUCTIONS
[facility]  [address] [facility] [address], [facility] [address]    Call us at [phone] if you have any questions about your wounds, have redness or  swelling around your wound, have a fever of 101 or greater or if you have any other  problems or concerns. We answer the phone Monday through Friday 8 am to 4 pm,  please leave a message as we check the voicemail frequently throughout the day.    Joanne Escalante 1990    A diet high in protein is important for wound healing, we recommend getting 90 grams  of protein per day. Taking protein shakes or bars are a good way to get extra protein in  your diet. Other good sources of protein:  Pork 26g per 3 oz  Whey protein powder - 24g per scoop (on average)  Greek yogurt - 23g per 8oz  Chicken or Turkey - 23g per 3oz  Fish - 20-25g per 3oz  Beef - 18-23g per 3oz  Navy beans - 20g per cup  Cottage cheese - 14g per 1/2 cup  Lentils - 13g per 1/4 cup  Beef jerky 13g per 1oz  2% milk - 8g per cup  Peanut butter - 8g per 2 tablespoons  Eggs - 6g per egg  Mixed nuts - 6g per 2oz     Wound care recommendations to abdomen:  After cleansing with saline or wound cleanser, apply small amount of VASHE on gauze,  lay into wound bed, let sit for 10 minutes, remove gauze (do not rinse) then apply  dressing.  Cut and apply Fibracol to cover the wound bed.  Cover with Mepilex dressing  Change every other day    Liliana Mercado PA-C. September 30, 2020    Wound Clinc follow up as scheduled

## 2020-09-30 NOTE — PROGRESS NOTES
[facility]    ASSESSMENT:   1. Full-thickness dehisced surgical wound of abdomen with fat-layer exposed  2. S/p I&D of abscess   3. S/p   4. History of gestational diabetes    PLAN/DISCUSSION:   1. Wound care plan: Vashe soak, FibraCol packing, Mepilex border change every other day  2. Can discontinue wound VAC   3. Nutrition: Cesario 2pkts/day, vit D 5kIU/day, high protein diet    HISTORY OF PRESENT ILLNESS:   Joanne Escalante is a 29 year old female who presents today for an abdominal wound. Underwent  20 with healthy baby boy. Unfortunately developed susbequent infection and abscess that eventually required OR debridement. Underwent I&D 20. Was hospitalized 20 for infection and grew out ESBL on culture. PICC line was placed and she completed a course of directed antibiotics.     20: Has had VAC off for several days as it was irritating her skin. Has been doing Vashe damp gauze. Wound bed looks excellent.   20: Switched to fibracol dressings last visit. Wound healing in, much smaller. No signs of infection.     REVIEW OF SYSTEMS:  CONSTITUTIONAL: Denies fevers or acute illness  ENDOCRINE: gestational diabetes has resolved with delivery    PAST MEDICAL HISTORY:   Patient Active Problem List   Diagnosis     Umbilical hernia without obstruction and without gangrene     Abdominal wall cellulitis     Hx of  section 2020     ESBL (extended spectrum beta-lactamase) producing bacteria infection     Adjustment disorder with mixed anxiety and depressed mood     Anxiety disorder     Cigarette smoker     Endometriosis determined by laparoscopy     Prenatal care, subsequent pregnancy in first trimester     Severe episode of recurrent major depressive disorder, without psychotic features (H)     Wound infection     Status post repeat low transverse  section     SOCIAL HISTORY: works at [employer]  TOBACCO STATUS:  reports that she has been smoking  "cigarettes. She has been smoking about 0.50 packs per day. She has never used smokeless tobacco.    MEDICATIONS:   Current Outpatient Medications   Medication     acetaminophen (TYLENOL) 500 MG tablet     ibuprofen (ADVIL/MOTRIN) 200 MG tablet     nystatin (MYCOSTATIN) 496204 UNIT/GM external powder     ondansetron (ZOFRAN-ODT) 4 MG ODT tab     Prenatal Vit-Fe Fumarate-FA (PRENATAL VITAMIN PO)     sertraline (ZOLOFT) 50 MG tablet     sodium hypochlorite (QUARTER-STRENGTH DAKINS) external solution     traMADol (ULTRAM) 50 MG tablet     No current facility-administered medications for this encounter.      PHYSICAL EXAM:  GENERAL: Patient is alert and oriented and in no acute distress  INTEGUMENTARY:   Wound (used by OP [facility] only) 09/16/20 1136 lower abdomen surgical (Active)   Length (cm) 0.5 09/30/20 1250   Width (cm) 1 09/30/20 1250   Depth (cm) 0.8 09/30/20 1250   Wound (cm^2) 0.5 cm^2 09/30/20 1250   Wound Volume (cm^3) 0.4 cm^3 09/30/20 1250   Wound healing % 75 09/30/20 1250   Dressing Appearance moist drainage 09/30/20 1250   Drainage Characteristics/Odor serosanguineous 09/30/20 1250   Drainage Amount copious 09/30/20 1250   Thickness/Stage full thickness 09/30/20 1250   Base red/granulating 09/30/20 1250   Periwound intact 09/30/20 1250   Periwound Temperature warm 09/30/20 1250   Care, Wound non-select wound debridement performed 09/30/20 1250          FOLLOW-UP: 2-3 weeks    The patient has been notified of following:     \"This telephone visit will be conducted via a call between you and your physician/provider. We have found that certain health care needs can be provided without the need for a physical exam.  This service lets us provide the care you need with a short phone conversation.  If a prescription is necessary we can send it directly to your pharmacy.  If lab work is needed we can place an order for that and you can then stop by our lab to have the test done at a later time.    If during the course " "of the call the physician/provider feels a telephone visit is not appropriate, you will not be charged for this service.\"     Patient has given verbal consent for Telephone visit?  Yes  DURATION OF CALL: 5-10 mins    KI GIBSON PA-C    "

## 2020-10-05 ENCOUNTER — TELEPHONE (OUTPATIENT)
Dept: WOUND CARE | Facility: CLINIC | Age: 30
End: 2020-10-05

## 2020-10-05 NOTE — TELEPHONE ENCOUNTER
Patient reporting red bumpy rash around wound, slight itch, she reports she is concerned for yeast infection, at this point I would agree. Topically treat area with miconazole BID and cover with gauze. She reports the wound is almost healed. She will do this for 3-5 days and call back if not better.Patient reports understanding.

## 2020-10-09 ENCOUNTER — OFFICE VISIT (OUTPATIENT)
Dept: URGENT CARE | Facility: URGENT CARE | Age: 30
End: 2020-10-09
Payer: MEDICAID

## 2020-10-09 VITALS
HEART RATE: 101 BPM | DIASTOLIC BLOOD PRESSURE: 87 MMHG | OXYGEN SATURATION: 96 % | WEIGHT: 192.8 LBS | SYSTOLIC BLOOD PRESSURE: 130 MMHG | TEMPERATURE: 98.6 F | RESPIRATION RATE: 24 BRPM | BODY MASS INDEX: 35.84 KG/M2

## 2020-10-09 DIAGNOSIS — N94.6 DYSMENORRHEA: Primary | ICD-10-CM

## 2020-10-09 DIAGNOSIS — N92.0 MENORRHAGIA WITH REGULAR CYCLE: ICD-10-CM

## 2020-10-09 DIAGNOSIS — D72.829 LEUKOCYTOSIS, UNSPECIFIED TYPE: ICD-10-CM

## 2020-10-09 LAB
BASOPHILS # BLD AUTO: 0.1 10E9/L (ref 0–0.2)
BASOPHILS NFR BLD AUTO: 0.4 %
DIFFERENTIAL METHOD BLD: ABNORMAL
EOSINOPHIL # BLD AUTO: 0.2 10E9/L (ref 0–0.7)
EOSINOPHIL NFR BLD AUTO: 1.4 %
ERYTHROCYTE [DISTWIDTH] IN BLOOD BY AUTOMATED COUNT: 12.4 % (ref 10–15)
HCT VFR BLD AUTO: 39.5 % (ref 35–47)
HGB BLD-MCNC: 13.5 G/DL (ref 11.7–15.7)
LYMPHOCYTES # BLD AUTO: 3 10E9/L (ref 0.8–5.3)
LYMPHOCYTES NFR BLD AUTO: 20.1 %
MCH RBC QN AUTO: 29.6 PG (ref 26.5–33)
MCHC RBC AUTO-ENTMCNC: 34.2 G/DL (ref 31.5–36.5)
MCV RBC AUTO: 87 FL (ref 78–100)
MONOCYTES # BLD AUTO: 0.8 10E9/L (ref 0–1.3)
MONOCYTES NFR BLD AUTO: 5.6 %
NEUTROPHILS # BLD AUTO: 10.7 10E9/L (ref 1.6–8.3)
NEUTROPHILS NFR BLD AUTO: 72.5 %
PLATELET # BLD AUTO: 308 10E9/L (ref 150–450)
RBC # BLD AUTO: 4.56 10E12/L (ref 3.8–5.2)
WBC # BLD AUTO: 14.9 10E9/L (ref 4–11)

## 2020-10-09 PROCEDURE — 99213 OFFICE O/P EST LOW 20 MIN: CPT | Performed by: PHYSICIAN ASSISTANT

## 2020-10-09 PROCEDURE — 85025 COMPLETE CBC W/AUTO DIFF WBC: CPT | Performed by: PHYSICIAN ASSISTANT

## 2020-10-09 RX ORDER — IBUPROFEN 800 MG/1
800 TABLET, FILM COATED ORAL EVERY 8 HOURS PRN
Qty: 30 TABLET | Refills: 0 | Status: SHIPPED | OUTPATIENT
Start: 2020-10-09 | End: 2020-10-09

## 2020-10-09 NOTE — PATIENT INSTRUCTIONS
Patient Education     Painful Menstrual Periods (Dysmenorrhea)    Dysmenorrhea is the term used to describe painful menstrual periods.  The uterus is a muscle. Normally, chemicals called prostaglandins cause the uterus to contract during your period. The contractions push out the build-up of tissue that occurs each month inside the uterus. If the contraction is very strong, it can cause pain. The pain may feel like cramping in the lower abdomen, lower back, or thighs. In severe cases, you may have other symptoms as well. These can include nausea, vomiting, loose stools, sweating, or dizziness.  There are 2 types of dysmenorrhea:  Primary dysmenorrhea refers to common menstrual cramps. It may begin 1 or 2 years after you first get your period. It may get better or go away as you get older or when you have a baby. The cramps are most often felt just before, or on the first day of your period. They may last 1 to 3 days. Treatment is with medicines and comfort measures as described below (see the  Home care  section).  Secondary dysmenorrhea may start later in life. It describes menstrual pain that occurs due to an underlying health problem. The pain may last longer than common menstrual cramps. It may also worsen over time. Some problems that can lead to secondary dysmenorrhea include:     Pelvic inflammatory disease (PID). Infection that involves the female reproductive organs, such as the uterus and fallopian tubes    Fibroids. Benign growths within the wall of the uterus (not cancer)    Endometriosis. Tissue that normally only lines the uterus also grows outside of it (because the abnormal tissue also swells and bleeds each month, it can cause pain)  Once the cause of secondary dysmenorrhea is found, it can be treated. Your healthcare provider will discuss options with you as needed. Your care may also include some of the treatments described below (see the  Home care  section).  Home care  Medicines  Certain  medicines can help relieve or prevent menstrual pain and cramping. These can include:    Nonsteroidal anti-inflammatory drugs (NSAIDs), such as ibuprofen    Prescription pain medicine, if needed    Hormone therapy (this includes most methods of hormonal birth control such as pills, shots, or a hormone-releasing IUD)  General care  To help relieve pain and cramping, try these tips:    Rest as needed.    Apply a heating pad to the lower belly or back as directed. A warm bath or massage to these areas may also help.    Exercise regularly. Many women find that being more active each week helps reduce pain and cramping.    Ask your healthcare provider for advice about other treatments you can try to help control pain and cramping.  Follow-up care  Follow up with your healthcare provider, or as advised.  When to seek medical advice  Call your healthcare provider right away if any of these occur:    Fever of 100.4 F (38 C) or higher, or as directed by your provider    Pain or cramping worsens or doesn t improve with medicine    Pain or cramping lasts longer than usual or occurs between periods    Unusual vaginal discharge between periods    Bleeding becomes heavy (soaking more than 1 pad or tampon every hour for 3 hours)    Passage of pink or gray tissue from the vagina  Date Last Reviewed: 10/1/2017    8679-3739 [employer]. [address] [place] [address], [place] [address]. All rights reserved. This information is not intended as a substitute for professional medical care. Always follow your healthcare professional's instructions.           Patient Education     Heavy Menstrual Bleeding    Heavy menstrual bleeding means that your periods are heavier or longer than usual. You may soak through a pad or tampon every 1 to 3 hours on the heaviest days of your period. You may also pass large, dark clots. And your periods may last longer than 7 days.  If you have heavy periods often, this can cause a problem called  anemia. With anemia, your red blood cell count is too low. Red blood cells are needed because they help carry oxygen throughout your body. Severe anemia may cause you to look pale and feel weak or tired. You might also become short of breath easily.  There are many possible causes of heavy menstrual bleeding. Hormonal imbalance is the most common cause. Having benign growths in your uterus, such as fibroids or polyps, is another cause. Taking certain medicines or having certain health problems or bleeding disorders are also causes.  To treat heavy menstrual bleeding, your healthcare provider may prescribe medicines first. If these don t help, you may need further testing and treatments.  Home care  Medicines  If you re prescribed medicines, be sure to take them as directed.    To help control heavy bleeding, any of the following may be used:  ? Hormone therapy (this includes all methods of hormonal birth control such as pills, shots, cream, ring, patch, or hormone-releasing IUD)  ? Nonsteroidal anti-inflammatory drugs (NSAIDs), such as ibuprofen  ? Antifibrinolytic medicines, such as transexamic acid    To help treat anemia, iron supplements may be prescribed.            General care    Get plenty of rest if you tire easily. Avoid heavy exertion.    To help relieve pain or cramping, try using a heating pad on the lower belly or back. A warm bath may also help.  Follow-up care  Follow up with your healthcare provider, or as advised.  When to seek medical advice  Call your healthcare provider right away if any of these occur:    Heavier bleeding (soaking 1 pad or tampon every hour for 3 hours)    Heavy bleeding that lasts longer than 1 week    Fever of 100.4 F (38 C) or higher, or as directed by your provider    Pain or cramping that gets worse instead of better    Signs of anemia such as pale skin, extreme fatigue or weakness, or shortness of breath    Dizziness or fainting  Date Last Reviewed: 10/1/2017    [id number]  [employer]. [address] [place] [address], [place] [address]. All rights reserved. This information is not intended as a substitute for professional medical care. Always follow your healthcare professional's instructions.

## 2020-10-09 NOTE — PROGRESS NOTES
SUBJECTIVE:   Joanne Escalante is a 29 year old female presenting with a chief complaint of   Chief Complaint   Patient presents with     Vaginal Bleeding     severe menstrual cramps-had a 10 months old baby, this is her first period-postpartum      Dizziness       She is an established patient of [facility].  Patient presents with heavy vaginal bleeding and cramping.  Been bleeding x 3 days.  Bleeding mildly worsening.  No dysuria.  +breast feeding.  No fevers.  Patient delivered 10 months ago.  She nurses at night only.      Two hours to soak a large pad.  Same for last 2 days.      Treatment:  Tylenol and 800 mg q 6 and 1000 mg q 6.      Review of Systems   Genitourinary: Positive for vaginal bleeding.   All other systems reviewed and are negative.      Past Medical History:   Diagnosis Date     Anxiety      Depressive disorder      Endometriosis      Periumbilical hernia      Family History   Problem Relation Age of Onset     Depression Mother      Alcoholism Mother      Depression Father      Alcoholism Father      Breast Cancer Maternal Grandmother      Diabetes Maternal Grandmother      Current Outpatient Medications   Medication Sig Dispense Refill     acetaminophen (TYLENOL) 500 MG tablet Take 1,000 mg by mouth every 6 hours as needed for mild pain       ibuprofen (ADVIL/MOTRIN) 200 MG tablet Take 400 mg by mouth every 4 hours as needed for mild pain       nystatin (MYCOSTATIN) 196276 UNIT/GM external powder Apply topically 2 times daily Prevention of rash 1 Bottle 2     ondansetron (ZOFRAN-ODT) 4 MG ODT tab 4 mg every 8 hours as needed        Prenatal Vit-Fe Fumarate-FA (PRENATAL VITAMIN PO) Take 1 tablet by mouth daily        sertraline (ZOLOFT) 50 MG tablet Take 1 tablet (50 mg) by mouth daily 90 tablet 1     sodium hypochlorite (QUARTER-STRENGTH DAKINS) external solution Apply topically daily Use this damp to dry when doing the dressing changes. (Patient not taking: Reported on 10/9/2020) 1 Bottle 1      traMADol (ULTRAM) 50 MG tablet Take 1 tablet (50 mg) by mouth every 6 hours as needed for severe pain (Patient not taking: Reported on 10/9/2020) 15 tablet 0     Social History     Tobacco Use     Smoking status: Current Every Day Smoker     Packs/day: 0.50     Types: Cigarettes     Smokeless tobacco: Never Used   Substance Use Topics     Alcohol use: Not Currently       OBJECTIVE  /87 (BP Location: Left arm, Patient Position: Sitting, Cuff Size: Adult Regular)   Pulse 101   Temp 98.6  F (37  C) (Tympanic)   Resp 24   Wt 87.5 kg (192 lb 12.8 oz)   SpO2 96%   Breastfeeding Yes   BMI 35.84 kg/m      Physical Exam    Labs:  Results for orders placed or performed in visit on 10/09/20 (from the past 24 hour(s))   CBC with platelets and differential   Result Value Ref Range    WBC 14.9 (H) 4.0 - 11.0 10e9/L    RBC Count 4.56 3.8 - 5.2 10e12/L    Hemoglobin 13.5 11.7 - 15.7 g/dL    Hematocrit 39.5 35.0 - 47.0 %    MCV 87 78 - 100 fl    MCH 29.6 26.5 - 33.0 pg    MCHC 34.2 31.5 - 36.5 g/dL    RDW 12.4 10.0 - 15.0 %    Platelet Count 308 150 - 450 10e9/L    % Neutrophils 72.5 %    % Lymphocytes 20.1 %    % Monocytes 5.6 %    % Eosinophils 1.4 %    % Basophils 0.4 %    Absolute Neutrophil 10.7 (H) 1.6 - 8.3 10e9/L    Absolute Lymphocytes 3.0 0.8 - 5.3 10e9/L    Absolute Monocytes 0.8 0.0 - 1.3 10e9/L    Absolute Eosinophils 0.2 0.0 - 0.7 10e9/L    Absolute Basophils 0.1 0.0 - 0.2 10e9/L    Diff Method Automated Method        X-Ray was not done.    ASSESSMENT:      ICD-10-CM    1. Dysmenorrhea  N94.6 CBC with platelets and differential     DISCONTINUED: ibuprofen (ADVIL/MOTRIN) 800 MG tablet   2. Menorrhagia with regular cycle  N92.0    3. Leukocytosis, unspecified type  D72.829         Medical Decision Making:    Differential Diagnosis:      Serious Comorbid Conditions:  Adult:  as abobve    PLAN:  Symptomatic care.  Discussed following up with ob/gyne.  Discussed reasons to seek immediate medical attention.   Discussed WBC.    Followup:    If not improving or if condition worsens, follow up with your Primary Care Provider, If not improving or if conditions worsens over the next 12-24 hours, go to the Emergency Department    Patient Instructions     Patient Education     Painful Menstrual Periods (Dysmenorrhea)    Dysmenorrhea is the term used to describe painful menstrual periods.  The uterus is a muscle. Normally, chemicals called prostaglandins cause the uterus to contract during your period. The contractions push out the build-up of tissue that occurs each month inside the uterus. If the contraction is very strong, it can cause pain. The pain may feel like cramping in the lower abdomen, lower back, or thighs. In severe cases, you may have other symptoms as well. These can include nausea, vomiting, loose stools, sweating, or dizziness.  There are 2 types of dysmenorrhea:  Primary dysmenorrhea refers to common menstrual cramps. It may begin 1 or 2 years after you first get your period. It may get better or go away as you get older or when you have a baby. The cramps are most often felt just before, or on the first day of your period. They may last 1 to 3 days. Treatment is with medicines and comfort measures as described below (see the  Home care  section).  Secondary dysmenorrhea may start later in life. It describes menstrual pain that occurs due to an underlying health problem. The pain may last longer than common menstrual cramps. It may also worsen over time. Some problems that can lead to secondary dysmenorrhea include:     Pelvic inflammatory disease (PID). Infection that involves the female reproductive organs, such as the uterus and fallopian tubes    Fibroids. Benign growths within the wall of the uterus (not cancer)    Endometriosis. Tissue that normally only lines the uterus also grows outside of it (because the abnormal tissue also swells and bleeds each month, it can cause pain)  Once the cause of  secondary dysmenorrhea is found, it can be treated. Your healthcare provider will discuss options with you as needed. Your care may also include some of the treatments described below (see the  Home care  section).  Home care  Medicines  Certain medicines can help relieve or prevent menstrual pain and cramping. These can include:    Nonsteroidal anti-inflammatory drugs (NSAIDs), such as ibuprofen    Prescription pain medicine, if needed    Hormone therapy (this includes most methods of hormonal birth control such as pills, shots, or a hormone-releasing IUD)  General care  To help relieve pain and cramping, try these tips:    Rest as needed.    Apply a heating pad to the lower belly or back as directed. A warm bath or massage to these areas may also help.    Exercise regularly. Many women find that being more active each week helps reduce pain and cramping.    Ask your healthcare provider for advice about other treatments you can try to help control pain and cramping.  Follow-up care  Follow up with your healthcare provider, or as advised.  When to seek medical advice  Call your healthcare provider right away if any of these occur:    Fever of 100.4 F (38 C) or higher, or as directed by your provider    Pain or cramping worsens or doesn t improve with medicine    Pain or cramping lasts longer than usual or occurs between periods    Unusual vaginal discharge between periods    Bleeding becomes heavy (soaking more than 1 pad or tampon every hour for 3 hours)    Passage of pink or gray tissue from the vagina  Date Last Reviewed: 10/1/2017    [id number] [employer]. [address] [place] [address], [place] [id number]. All rights reserved. This information is not intended as a substitute for professional medical care. Always follow your healthcare professional's instructions.           Patient Education     Heavy Menstrual Bleeding    Heavy menstrual bleeding means that your periods are heavier or longer than  usual. You may soak through a pad or tampon every 1 to 3 hours on the heaviest days of your period. You may also pass large, dark clots. And your periods may last longer than 7 days.  If you have heavy periods often, this can cause a problem called anemia. With anemia, your red blood cell count is too low. Red blood cells are needed because they help carry oxygen throughout your body. Severe anemia may cause you to look pale and feel weak or tired. You might also become short of breath easily.  There are many possible causes of heavy menstrual bleeding. Hormonal imbalance is the most common cause. Having benign growths in your uterus, such as fibroids or polyps, is another cause. Taking certain medicines or having certain health problems or bleeding disorders are also causes.  To treat heavy menstrual bleeding, your healthcare provider may prescribe medicines first. If these don t help, you may need further testing and treatments.  Home care  Medicines  If you re prescribed medicines, be sure to take them as directed.    To help control heavy bleeding, any of the following may be used:  ? Hormone therapy (this includes all methods of hormonal birth control such as pills, shots, cream, ring, patch, or hormone-releasing IUD)  ? Nonsteroidal anti-inflammatory drugs (NSAIDs), such as ibuprofen  ? Antifibrinolytic medicines, such as transexamic acid    To help treat anemia, iron supplements may be prescribed.            General care    Get plenty of rest if you tire easily. Avoid heavy exertion.    To help relieve pain or cramping, try using a heating pad on the lower belly or back. A warm bath may also help.  Follow-up care  Follow up with your healthcare provider, or as advised.  When to seek medical advice  Call your healthcare provider right away if any of these occur:    Heavier bleeding (soaking 1 pad or tampon every hour for 3 hours)    Heavy bleeding that lasts longer than 1 week    Fever of 100.4 F (38 C) or  higher, or as directed by your provider    Pain or cramping that gets worse instead of better    Signs of anemia such as pale skin, extreme fatigue or weakness, or shortness of breath    Dizziness or fainting  Date Last Reviewed: 10/1/2017    [id number] [employer]. [address] [place] [address]. All rights reserved. This information is not intended as a substitute for professional medical care. Always follow your healthcare professional's instructions.

## 2020-10-11 ENCOUNTER — MYC MEDICAL ADVICE (OUTPATIENT)
Dept: OBGYN | Facility: CLINIC | Age: 30
End: 2020-10-11

## 2020-10-12 NOTE — TELEPHONE ENCOUNTER
Pt last seen on 2020 for  wound complication, pt had  on 2020.    Pt having increased cramping and heavy bleeding, changing large pad every 1-2 hours, period started on 10/7/2020, bleeding has since decreased slightly. Pt had dizziness on Friday.    Pt asking if she can have telephone visit to discuss symptoms/managment or if she needs to be seen in clinic.    RN routing to provider to advise.    Isabel Buitrago RN on 10/12/2020 at 8:45 AM

## 2020-10-20 ENCOUNTER — HOSPITAL ENCOUNTER (OUTPATIENT)
Dept: WOUND CARE | Facility: CLINIC | Age: 30
End: 2020-10-20
Attending: PHYSICIAN ASSISTANT
Payer: MEDICAID

## 2020-10-20 NOTE — DISCHARGE INSTRUCTIONS
[facility]  [address], [facility] [address]  Appointment Phone [phone]     Joanne Escalante      1990    Your wound is healed!! Dressings are no longer required.      Liliana Mercado PA-C. October 20, 2020    Call us at [phone] if you have any questions about your wounds, have redness or swelling around your wound, have a fever of 101 or greater or if you have any other problems or concerns. We answer the phone Monday through Friday 8 am to 4 pm, please leave a message as we check the voicemail frequently throughout the day.     Follow up as needed

## 2020-10-28 ENCOUNTER — OFFICE VISIT (OUTPATIENT)
Dept: FAMILY MEDICINE | Facility: CLINIC | Age: 30
End: 2020-10-28
Payer: MEDICAID

## 2020-10-28 VITALS
BODY MASS INDEX: 36.06 KG/M2 | OXYGEN SATURATION: 97 % | HEART RATE: 92 BPM | SYSTOLIC BLOOD PRESSURE: 126 MMHG | DIASTOLIC BLOOD PRESSURE: 82 MMHG | WEIGHT: 194 LBS | RESPIRATION RATE: 12 BRPM | TEMPERATURE: 97 F

## 2020-10-28 DIAGNOSIS — F33.2 SEVERE EPISODE OF RECURRENT MAJOR DEPRESSIVE DISORDER, WITHOUT PSYCHOTIC FEATURES (H): Primary | ICD-10-CM

## 2020-10-28 DIAGNOSIS — F41.9 ANXIETY: ICD-10-CM

## 2020-10-28 PROCEDURE — 99214 OFFICE O/P EST MOD 30 MIN: CPT | Performed by: NURSE PRACTITIONER

## 2020-10-28 RX ORDER — SERTRALINE HYDROCHLORIDE 100 MG/1
100 TABLET, FILM COATED ORAL DAILY
Qty: 90 TABLET | Refills: 0 | Status: SHIPPED | OUTPATIENT
Start: 2020-10-28 | End: 2021-01-06

## 2020-10-28 ASSESSMENT — ANXIETY QUESTIONNAIRES
IF YOU CHECKED OFF ANY PROBLEMS ON THIS QUESTIONNAIRE, HOW DIFFICULT HAVE THESE PROBLEMS MADE IT FOR YOU TO DO YOUR WORK, TAKE CARE OF THINGS AT HOME, OR GET ALONG WITH OTHER PEOPLE: NOT DIFFICULT AT ALL
6. BECOMING EASILY ANNOYED OR IRRITABLE: NEARLY EVERY DAY
2. NOT BEING ABLE TO STOP OR CONTROL WORRYING: MORE THAN HALF THE DAYS
3. WORRYING TOO MUCH ABOUT DIFFERENT THINGS: MORE THAN HALF THE DAYS
7. FEELING AFRAID AS IF SOMETHING AWFUL MIGHT HAPPEN: SEVERAL DAYS
1. FEELING NERVOUS, ANXIOUS, OR ON EDGE: MORE THAN HALF THE DAYS
5. BEING SO RESTLESS THAT IT IS HARD TO SIT STILL: MORE THAN HALF THE DAYS
GAD7 TOTAL SCORE: 14

## 2020-10-28 ASSESSMENT — PATIENT HEALTH QUESTIONNAIRE - PHQ9
SUM OF ALL RESPONSES TO PHQ QUESTIONS 1-9: 17
5. POOR APPETITE OR OVEREATING: MORE THAN HALF THE DAYS

## 2020-10-28 NOTE — PATIENT INSTRUCTIONS
Will increase Zoloft to 100 mg daily- it looks like your insurance may require a pre-authorization, so there may be a little delay in getting it.   Would have you follow-up in 3 months, sooner if concerns.

## 2020-10-28 NOTE — PROGRESS NOTES
Subjective     Joanne Escalante is a 29 year old female who presents to clinic today for the following health issues:    HPI            Patient is new to me.  She is here today to discuss depression and anxiety.  She has two children, both boys, ages 3 yo and 10 months.   Reports hx of anxiety and depression prior to ever having children.  Was on Prozac and Wellbutrin at one point and discontinued prior to her first pregnancy.  After first pregnancy, she reports she had really bad anxiety and depression.  She reports she had almost manic symptoms, making impulsive decisions like quitting her job.  She states she did see psychiatry and therapy.  Denies being diagnosed with bipolar disorder, states she was told it was postpartum depression.  She was started on Lexapro and Wellbutrin, states this worked really well for her.  She was not breastfeeding.   With second pregnancy she was put on Zoloft 50 mg, Lexapro and Wellbutrin d/c'd.  Baby is now 10 months old.  She is still breastfeeding but trying to wean.  Reports that Zoloft is not effective. Still struggling with anxiety and depression.  No SI or HI.  Denies hx of self-injurious behavior or hospitalizations for mental health.  Is currently not interested in therapy.       Review of Systems   Constitutional, HEENT, cardiovascular, pulmonary, gi and gu systems are negative, except as otherwise noted.      Objective    /82   Pulse 92   Temp 97  F (36.1  C) (Tympanic)   Resp 12   Wt 88 kg (194 lb)   SpO2 97%   BMI 36.06 kg/m    Body mass index is 36.06 kg/m .  Physical Exam   GENERAL: healthy, alert and no distress  EYES: Eyes grossly normal to inspection, PERRL and conjunctivae and sclerae normal  HENT: ear canals and TM's normal, nose and mouth without ulcers or lesions  NECK: no adenopathy, no asymmetry, masses, or scars and thyroid normal to palpation  RESP: lungs clear to auscultation - no rales, rhonchi or wheezes  CV: regular rate and rhythm, normal S1  S2, no S3 or S4, no murmur, click or rub, no peripheral edema and peripheral pulses strong  MS: no gross musculoskeletal defects noted, no edema  SKIN: no suspicious lesions or rashes  NEURO: Normal strength and tone, mentation intact and speech normal  PSYCH: mentation appears normal, affect normal/bright    Labs reviewed in Epic        Assessment & Plan     Joanne was seen today for depression and health maintenance.    Diagnoses and all orders for this visit:    Severe episode of recurrent major depressive disorder, without psychotic features (H)  -     sertraline (ZOLOFT) 100 MG tablet; Take 1 tablet (100 mg) by mouth daily    Anxiety  -     sertraline (ZOLOFT) 100 MG tablet; Take 1 tablet (100 mg) by mouth daily      Increase Zoloft to 100 mg.  Follow-up with me in 3 months, sooner if concerns.  If still ineffective, could consider increasing Zoloft further or adding in Wellbutrin if she is no longer breastfeeding.         See Patient Instructions  Patient Instructions   Will increase Zoloft to 100 mg daily- it looks like your insurance may require a pre-authorization, so there may be a little delay in getting it.   Would have you follow-up in 3 months, sooner if concerns.        Return in about 3 months (around 1/28/2021) for Follow-up.    Cesia Benavidez, CNP  [facility]

## 2020-10-29 ASSESSMENT — ANXIETY QUESTIONNAIRES: GAD7 TOTAL SCORE: 14

## 2020-11-11 ENCOUNTER — NURSE TRIAGE (OUTPATIENT)
Dept: NURSING | Facility: CLINIC | Age: 30
End: 2020-11-11

## 2020-11-11 NOTE — TELEPHONE ENCOUNTER
Heavy menses. Using one pad every two hours with large clots. She has felt weak. Her mom told her she looks pale. She will go to the ER for evaluation.  Marisa Gutierrez RN  [facility]      Additional Information    Negative: SEVERE vaginal bleeding (e.g., continuous red blood from vagina, or large blood clots) and very weak (can't stand)     Overnight pad every 2 hours    Negative: Passed out (i.e., fainted, collapsed and was not responding)    Negative: Difficult to awaken or acting confused (e.g., disoriented, slurred speech)    Negative: Shock suspected (e.g., cold/pale/clammy skin, too weak to stand, low BP, rapid pulse)    Negative: Sounds like a life-threatening emergency to the triager    Negative: Pregnant > 20 weeks (5 months or more)    Negative: Pregnant < 20 weeks (less than 5 months)    Negative: Postpartum (from 0 to 6 weeks after delivery)    Negative: Vaginal discharge is the main symptom and bleeding is slight    Negative: SEVERE abdominal pain (e.g., excruciating)     Pain at 6    Negative: SEVERE dizziness (e.g., unable to stand, requires support to walk, feels like passing out now)    Negative: SEVERE vaginal bleeding (i.e., soaking 2 pads or tampons per hour and present 2 or more hours; 1 menstrual cup every 2 hours)    Negative: MODERATE vaginal bleeding (i.e., soaking pad or tampon per hour and present > 6 hours; 1 menstrual cup every 6 hours)    Pale skin (pallor) of new onset or worsening    Protocols used: VAGINAL BLEEDING - [id number]

## 2020-12-13 ENCOUNTER — HEALTH MAINTENANCE LETTER (OUTPATIENT)
Age: 30
End: 2020-12-13

## 2020-12-19 NOTE — TELEPHONE ENCOUNTER
"Pt was seen on 5/22/2020 for a wound check.  Per OV plan:  Plan - She would like a letter for work to excuse her from her job on May 20-22 due to her ED visit.  F/u care and instructions were reviewed with the patient and she will return prn.    Per letter that was written for pt on 5/22: \"should have assistance with lifting until further notice since her current solo weight limit is 20 lbs\".    "
to go home

## 2020-12-23 ENCOUNTER — NURSE TRIAGE (OUTPATIENT)
Dept: NURSING | Facility: CLINIC | Age: 30
End: 2020-12-23

## 2020-12-24 NOTE — TELEPHONE ENCOUNTER
"\"I think I have bronchitis. I don't think it's Covid.\" Joanne states that she has had bronchitis before and her current symptoms feel that way. Patient currently experiencing a cough, \"burning in my chest\" (was also outside today), and coughing spells which are waking her up at night. Patient hasn't done much for symptoms (just taking Dayquil/ Nyquil) and staying hydrated. Patient positive for cough per Covid screening questions.     Triage guidelines recommend to see a provider within 24 hours. Patient would like to be seen in person and will go to [facility] tomorrow. RN advised to call back with any changes, worsening of symptoms, and questions or concerns. Patient verbalized understanding of and agreement with plan and had no further questions.     Additional Information    Negative: Severe difficulty breathing (e.g., struggling for each breath, speaks in single words)    Negative: Bluish (or gray) lips or face now    Negative: [1] Difficulty breathing AND [2] exposure to flames, smoke, or fumes    Negative: [1] Stridor AND [2] difficulty breathing    Negative: Sounds like a life-threatening emergency to the triager    Negative: Chest pain  (Exception: MILD central chest pain, present only when coughing)    Negative: Difficulty breathing    Negative: Patient sounds very sick or weak to the triager    Negative: [1] Coughed up blood AND [2] > 1 tablespoon (15 ml) (Exception: blood-tinged sputum)    Negative: Fever > 103 F (39.4 C)    Negative: [1] Fever > 101 F (38.3 C) AND [2] age > 60    Negative: [1] Fever > 100.0 F (37.8 C) AND [2] bedridden (e.g., nursing home patient, CVA, chronic illness, recovering from surgery)    Negative: [1] Fever > 100.0 F (37.8 C) AND [2] diabetes mellitus or weak immune system (e.g., HIV positive, cancer chemo, splenectomy, organ transplant, chronic steroids)    Negative: Wheezing is present    [1] Continuous (nonstop) coughing interferes with work or school AND [2] no improvement using " cough treatment per Care Advice    Protocols used: COUGH - ACUTE [id number]    Danisha Evans RN  [facility]

## 2020-12-30 NOTE — PROGRESS NOTES
"Subjective     Joanne Escalante is a 30 year old female who presents to clinic today for the following health issues:    HPI         Depression and Anxiety Follow-Up    How are you doing with your depression since your last visit? { :579495::\"No change\"}    How are you doing with your anxiety since your last visit?  { :716036::\"No change\"}    Are you having other symptoms that might be associated with depression or anxiety? { :961722}    Have you had a significant life event? { :513941}     Do you have any concerns with your use of alcohol or other drugs? { :473617}    Social History     Tobacco Use     Smoking status: Current Every Day Smoker     Packs/day: 0.50     Types: Cigarettes     Smokeless tobacco: Never Used   Substance Use Topics     Alcohol use: Not Currently     Drug use: Never     PHQ 4/8/2020 5/5/2020 10/28/2020   PHQ-9 Total Score 10 15 17   Q9: Thoughts of better off dead/self-harm past 2 weeks Not at all Not at all Not at all     GENARO-7 SCORE 9/20/2019 5/5/2020 10/28/2020   Total Score - 10 (moderate anxiety) -   Total Score 20 10 14     {Last PHQ9 or GAD7 Responses (Optional):923959}    Suicide Assessment Five-step Evaluation and Treatment (SAFE-T)          {additonal problems for provider to add (Optional):287464}    Review of Systems   {ROS COMP (Optional):838314}      Objective    There were no vitals taken for this visit.  There is no height or weight on file to calculate BMI.  Physical Exam   {Exam List (Optional):619080}    {Diagnostic Test Results (Optional):955466}        {PROVIDER CHARTING PREFERENCE:021097}    "

## 2021-01-06 ENCOUNTER — OFFICE VISIT (OUTPATIENT)
Dept: FAMILY MEDICINE | Facility: CLINIC | Age: 31
End: 2021-01-06
Payer: COMMERCIAL

## 2021-01-06 VITALS
WEIGHT: 196 LBS | BODY MASS INDEX: 36.07 KG/M2 | SYSTOLIC BLOOD PRESSURE: 120 MMHG | TEMPERATURE: 99.1 F | OXYGEN SATURATION: 99 % | DIASTOLIC BLOOD PRESSURE: 81 MMHG | HEIGHT: 62 IN | HEART RATE: 90 BPM

## 2021-01-06 DIAGNOSIS — Z23 NEED FOR PROPHYLACTIC VACCINATION AND INOCULATION AGAINST INFLUENZA: ICD-10-CM

## 2021-01-06 DIAGNOSIS — F33.2 SEVERE EPISODE OF RECURRENT MAJOR DEPRESSIVE DISORDER, WITHOUT PSYCHOTIC FEATURES (H): Primary | ICD-10-CM

## 2021-01-06 DIAGNOSIS — Z13.29 SCREENING FOR THYROID DISORDER: ICD-10-CM

## 2021-01-06 LAB — TSH SERPL DL<=0.005 MIU/L-ACNC: 1.56 MU/L (ref 0.4–4)

## 2021-01-06 PROCEDURE — 90686 IIV4 VACC NO PRSV 0.5 ML IM: CPT | Performed by: NURSE PRACTITIONER

## 2021-01-06 PROCEDURE — 84443 ASSAY THYROID STIM HORMONE: CPT | Performed by: NURSE PRACTITIONER

## 2021-01-06 PROCEDURE — 90471 IMMUNIZATION ADMIN: CPT | Performed by: NURSE PRACTITIONER

## 2021-01-06 PROCEDURE — 99214 OFFICE O/P EST MOD 30 MIN: CPT | Mod: 25 | Performed by: NURSE PRACTITIONER

## 2021-01-06 PROCEDURE — 36415 COLL VENOUS BLD VENIPUNCTURE: CPT | Performed by: NURSE PRACTITIONER

## 2021-01-06 RX ORDER — ESCITALOPRAM OXALATE 10 MG/1
10 TABLET ORAL DAILY
Qty: 90 TABLET | Refills: 0 | Status: SHIPPED | OUTPATIENT
Start: 2021-01-06 | End: 2021-04-23

## 2021-01-06 RX ORDER — BUPROPION HYDROCHLORIDE 150 MG/1
150 TABLET ORAL EVERY MORNING
Qty: 90 TABLET | Refills: 0 | Status: SHIPPED | OUTPATIENT
Start: 2021-01-06 | End: 2021-04-23

## 2021-01-06 ASSESSMENT — MIFFLIN-ST. JEOR: SCORE: 1559.36

## 2021-01-06 ASSESSMENT — ANXIETY QUESTIONNAIRES
1. FEELING NERVOUS, ANXIOUS, OR ON EDGE: SEVERAL DAYS
7. FEELING AFRAID AS IF SOMETHING AWFUL MIGHT HAPPEN: NOT AT ALL
GAD7 TOTAL SCORE: 7
6. BECOMING EASILY ANNOYED OR IRRITABLE: MORE THAN HALF THE DAYS
5. BEING SO RESTLESS THAT IT IS HARD TO SIT STILL: SEVERAL DAYS
4. TROUBLE RELAXING: SEVERAL DAYS
GAD7 TOTAL SCORE: 7
GAD7 TOTAL SCORE: 7
2. NOT BEING ABLE TO STOP OR CONTROL WORRYING: SEVERAL DAYS
7. FEELING AFRAID AS IF SOMETHING AWFUL MIGHT HAPPEN: NOT AT ALL
3. WORRYING TOO MUCH ABOUT DIFFERENT THINGS: SEVERAL DAYS

## 2021-01-06 ASSESSMENT — PATIENT HEALTH QUESTIONNAIRE - PHQ9
SUM OF ALL RESPONSES TO PHQ QUESTIONS 1-9: 14
10. IF YOU CHECKED OFF ANY PROBLEMS, HOW DIFFICULT HAVE THESE PROBLEMS MADE IT FOR YOU TO DO YOUR WORK, TAKE CARE OF THINGS AT HOME, OR GET ALONG WITH OTHER PEOPLE: VERY DIFFICULT
SUM OF ALL RESPONSES TO PHQ QUESTIONS 1-9: 14

## 2021-01-06 NOTE — PROGRESS NOTES
"  Assessment & Plan     Severe episode of recurrent major depressive disorder, without psychotic features (H)  Chronic, unstable.   At last visit we increased Zoloft dose due to uncontrolled depression- was breastfeeding at the time.  Zoloft ineffective.  No longer breastfeeding.  Would like to go back on Lexapro and Wellbutrin- this worked well for her in the past.   Stop Zoloft.   Start Lexapro 10 mg.   She will wait a few weeks and if tolerating the Lexapro okay, will start the Wellbutrin  mg at that time.   Return in 3 months, sooner if concerns.    She declined therapy.   She would like to have thyroid level check to r/o other cause of mood.    - escitalopram (LEXAPRO) 10 MG tablet; Take 1 tablet (10 mg) by mouth daily  - buPROPion (WELLBUTRIN XL) 150 MG 24 hr tablet; Take 1 tablet (150 mg) by mouth every morning  - TSH with free T4 reflex    Screening for thyroid disorder  - TSH with free T4 reflex    Need for prophylactic vaccination and inoculation against influenza  - INFLUENZA VACCINE IM > 6 MONTHS VALENT IIV4 [23378]       Tobacco Cessation:   reports that she has been smoking cigarettes. She has been smoking about 0.50 packs per day. She has never used smokeless tobacco.      BMI:   Estimated body mass index is 36.43 kg/m  as calculated from the following:    Height as of this encounter: 1.562 m (5' 1.5\").    Weight as of this encounter: 88.9 kg (196 lb).         See Patient Instructions  Patient Instructions   Stop Zoloft (sertraline).  Start lexapro 10 mg once daily.  Wait a few weeks and if well tolerated, you can start the Wellbutrin  mg daily.  Follow-up in 3 months, sooner if concerns.  Call me if you decide you want to do therapy.           Return in about 3 months (around 4/6/2021) for If failure to improve.    Cesia Benavidez, [facility]     Joanne Escalante is a 30 year old who presents to clinic today for the following health issues     HPI "     At last visit we increased Zoloft dose due to uncontrolled depression- was breastfeeding at the time.  Zoloft ineffective.  No longer breastfeeding.  Would like to go back on Lexapro and Wellbutrin- this worked well for her in the past.   She has declined therapy, but will think about it.   She would like to have thyroid level check to r/o other cause of mood.    Went back to work the first time yesterday since being out on maternity leave.  Felt good to get out of the house.      No SI or HI.       Depression and Anxiety Follow-Up    How are you doing with your depression since your last visit? It changes from day to day    How are you doing with your anxiety since your last visit?  It changes from day to day    Are you having other symptoms that might be associated with depression or anxiety? No    Have you had a significant life event? No     Do you have any concerns with your use of alcohol or other drugs? No    Social History     Tobacco Use     Smoking status: Current Every Day Smoker     Packs/day: 0.50     Types: Cigarettes     Smokeless tobacco: Never Used   Substance Use Topics     Alcohol use: Not Currently     Drug use: Never     PHQ 5/5/2020 10/28/2020 1/6/2021   PHQ-9 Total Score 15 17 14   Q9: Thoughts of better off dead/self-harm past 2 weeks Not at all Not at all Not at all     GENARO-7 SCORE 5/5/2020 10/28/2020 1/6/2021   Total Score 10 (moderate anxiety) - 7 (mild anxiety)   Total Score 10 14 7     Last PHQ-9 1/6/2021   1.  Little interest or pleasure in doing things 2   2.  Feeling down, depressed, or hopeless 2   3.  Trouble falling or staying asleep, or sleeping too much 2   4.  Feeling tired or having little energy 2   5.  Poor appetite or overeating 2   6.  Feeling bad about yourself 2   7.  Trouble concentrating 1   8.  Moving slowly or restless 1   Q9: Thoughts of better off dead/self-harm past 2 weeks 0   PHQ-9 Total Score 14   Difficulty at work, home, or with people -     GENARO-7   "1/6/2021   1. Feeling nervous, anxious, or on edge 1   2. Not being able to stop or control worrying 1   3. Worrying too much about different things 1   4. Trouble relaxing 1   5. Being so restless that it is hard to sit still 1   6. Becoming easily annoyed or irritable 2   7. Feeling afraid, as if something awful might happen 0   GENARO-7 Total Score 7   If you checked any problems, how difficult have they made it for you to do your work, take care of things at home, or get along with other people? -       Review of Systems   Constitutional, HEENT, cardiovascular, pulmonary, gi and gu systems are negative, except as otherwise noted.      Objective    /81   Pulse 90   Temp 99.1  F (37.3  C) (Oral)   Ht 1.562 m (5' 1.5\")   Wt 88.9 kg (196 lb)   SpO2 99%   BMI 36.43 kg/m    Body mass index is 36.43 kg/m .  Physical Exam   GENERAL: healthy, alert and no distress  EYES: Eyes grossly normal to inspection, PERRL and conjunctivae and sclerae normal  HENT: ear canals and TM's normal, nose and mouth without ulcers or lesions  NECK: no adenopathy, no asymmetry, masses, or scars and thyroid normal to palpation  RESP: lungs clear to auscultation - no rales, rhonchi or wheezes  CV: regular rate and rhythm, normal S1 S2, no S3 or S4, no murmur, click or rub, no peripheral edema and peripheral pulses strong  MS: no gross musculoskeletal defects noted, no edema  SKIN: no suspicious lesions or rashes  NEURO: Normal strength and tone, mentation intact and speech normal  PSYCH: mentation appears normal, affect normal/bright          Answers for HPI/ROS submitted by the patient on 1/6/2021   If you checked off any problems, how difficult have these problems made it for you to do your work, take care of things at home, or get along with other people?: Very difficult  PHQ9 TOTAL SCORE: 14  GENARO 7 TOTAL SCORE: 7    "

## 2021-01-06 NOTE — PATIENT INSTRUCTIONS
Stop Zoloft (sertraline).  Start lexapro 10 mg once daily.  Wait a few weeks and if well tolerated, you can start the Wellbutrin  mg daily.  Follow-up in 3 months, sooner if concerns.  Call me if you decide you want to do therapy.

## 2021-01-07 ASSESSMENT — ANXIETY QUESTIONNAIRES: GAD7 TOTAL SCORE: 7

## 2021-02-25 ENCOUNTER — OFFICE VISIT (OUTPATIENT)
Dept: FAMILY MEDICINE | Facility: CLINIC | Age: 31
End: 2021-02-25
Payer: COMMERCIAL

## 2021-02-25 VITALS
HEART RATE: 105 BPM | BODY MASS INDEX: 36.99 KG/M2 | SYSTOLIC BLOOD PRESSURE: 119 MMHG | WEIGHT: 199 LBS | OXYGEN SATURATION: 97 % | DIASTOLIC BLOOD PRESSURE: 82 MMHG | TEMPERATURE: 97.6 F

## 2021-02-25 DIAGNOSIS — N75.0 INFECTED CYST OF BARTHOLIN'S GLAND DUCT: Primary | ICD-10-CM

## 2021-02-25 PROCEDURE — 99213 OFFICE O/P EST LOW 20 MIN: CPT | Performed by: FAMILY MEDICINE

## 2021-02-25 RX ORDER — DOXYCYCLINE 100 MG/1
100 CAPSULE ORAL 2 TIMES DAILY
Qty: 14 CAPSULE | Refills: 0 | Status: SHIPPED | OUTPATIENT
Start: 2021-02-25 | End: 2021-03-04

## 2021-02-25 NOTE — PROGRESS NOTES
HPI:    I am seeing Joanne Escalante for the 1st time. she is a 30 year old female here to discuss:    Skin abscess - symptoms present since around 2/23/21. Located on right vaginal area. There is swelling, redness, pain. Denies fevers or chills. Has h/o gestational diabetes.   Evaluation and treatment:    I think she has an infected bartholin's cyst.   I placed her on Augmentin and Doxy - should cover MRSA, e coli, anaerobes.   I offered her definitive treatment by placing a word catheter in the next few days or refer to GYN - she chose the latter and I placed the referral.    Exam:    /82   Pulse 105   Temp 97.6  F (36.4  C) (Tympanic)   Wt 90.3 kg (199 lb)   SpO2 97%   BMI 36.99 kg/m      Gen: Healthy appearing female in no acute distress    The following exams were done in the presence of Eleni Canseco CMA.    : normal female external genitalia. There is a typical cystic lump subcutaneous just proximal to the labia minora and extending into the labia minora. It is about 2 cm and tender. No redness. No fluctuance.      Assessment and Plan - Decision Making    1. Infected cyst of Bartholin's gland duct    Per HPI    - amoxicillin-clavulanate (AUGMENTIN) 875-125 MG tablet; Take 1 tablet by mouth 2 times daily for 7 days  Dispense: 14 tablet; Refill: 0  - doxycycline hyclate (VIBRAMYCIN) 100 MG capsule; Take 1 capsule (100 mg) by mouth 2 times daily for 7 days  Dispense: 14 capsule; Refill: 0  - OB/GYN REFERRAL      Written instructions given as follows:    Patient Instructions   1. Take the antibiotics as prescribed - may cause diarrhea.    2. Let me see you in 1-2 weeks for follow-up.

## 2021-02-25 NOTE — PATIENT INSTRUCTIONS
1. Take the antibiotics as prescribed - may cause diarrhea.    2. Let me see you in 1-2 weeks for follow-up.

## 2021-04-17 ENCOUNTER — HEALTH MAINTENANCE LETTER (OUTPATIENT)
Age: 31
End: 2021-04-17

## 2021-04-23 ENCOUNTER — E-VISIT (OUTPATIENT)
Dept: URGENT CARE | Facility: URGENT CARE | Age: 31
End: 2021-04-23
Payer: COMMERCIAL

## 2021-04-23 DIAGNOSIS — Z20.822 SUSPECTED COVID-19 VIRUS INFECTION: ICD-10-CM

## 2021-04-23 DIAGNOSIS — J02.9 SORE THROAT: ICD-10-CM

## 2021-04-23 PROCEDURE — 99421 OL DIG E/M SVC 5-10 MIN: CPT | Performed by: PHYSICIAN ASSISTANT

## 2021-04-23 NOTE — PATIENT INSTRUCTIONS
Dear Joanne Escalante,    Your symptoms show that you may have coronavirus (COVID-19). This illness can cause fever, cough and trouble breathing. Many people get a mild case and get better on their own. Some people can get very sick.    Because you also reported sore throat I would like to also test you for Strep Throat to determine if we need to treat you for that as well.    What should I do?  We would like to test you for Covid-19 virus and Strep Throat. I have placed orders for these tests.     For all employees or close contacts (except [employer] and [employer] - see below), go to your [employer] home page and scroll down to the section that says  You have an appointment that needs to be scheduled  and click the large green button that says  Schedule Now  and follow the steps to find the next available opening.  It is important that when you are asked what the reason for your appointment is that you mention you need BOTH Covid and Strep tests.  tests.     If you are unable to complete these steps or if you cannot find any available times, please call [phone] to schedule employee testing.     [employer] employees or close contacts, please call [phone].   [employer] ([employer]) employees or close contacts call [phone].    Return to work/school/ guidance:  Please let your workplace manager and staffing office know when your quarantine ends     We can t give you an exact date as it depends on the above. You can calculate this on your own or work with your manager/staffing office to calculate this. (For example if you were exposed on 10/4, you would have to quarantine for 14 full days. That would be through 10/18. You could return on 10/19.)      If you receive a positive COVID-19 test result, follow the guidance of the those who are giving you the results. Usually the return to work is 10 (or in some cases 20 days from symptom onset.) If you work at [employer], you must also be cleared by Employee  Occupational Health and Safety to return to work.        If you receive a negative COVID-19 test result and did not have a high risk exposure to someone with a known positive COVID-19 test, you can return to work once you're free of fever for 24 hours without fever-reducing medication and your symptoms are improving or resolved.      If you receive a negative COVID-19 test and If you had a high risk exposure to someone who has tested positive for COVID-19 then you can return to work 14 days after your last contact with the positive individual    Note: If you have ongoing exposure to the covid positive person, this quarantine period may be more than 14 days. (For example, if you are continued to be exposed to your child who tested positive and cannot isolate from them, then the quarantine of 7-14 days can't start until your child is no longer contagious. This is typically 10 days from onset of the child's symptoms. So the total duration may be 17-24 days in this case.)    Sign up for [employer].   We know it's scary to hear that you might have COVID-19. We want to track your symptoms to make sure you're okay over the next 2 weeks. Please look for an email from [employer]--this is a free, online program that we'll use to keep in touch. To sign up, follow the link in the email you will receive. Learn more at [email or web address]    How can I take care of myself?    Get lots of rest. Drink extra fluids (unless a doctor has told you not to)    Take Tylenol (acetaminophen) or ibuprofen for fever or pain. If you have liver or kidney problems, ask your family doctor if it's okay to take Tylenol o ibuprofen    If you have other health problems (like cancer, heart failure, an organ transplant or severe kidney disease): Call your specialty clinic if you don't feel better in the next 2 days.    Know when to call 911. Emergency warning signs include:  o Trouble breathing or shortness of breath  o Pain or  pressure in the chest that doesn't go away  o Feeling confused like you haven't felt before, or not being able to wake up  o Bluish-colored lips or face    Where can I get more information?  [facility] - About COVID-19:   [email or web address]    CDC - What to Do If You're Sick:   www.cdc.gov/coronavirus/2019-ncov/about/steps-when-sick.html

## 2021-04-24 DIAGNOSIS — J02.9 SORE THROAT: ICD-10-CM

## 2021-04-24 DIAGNOSIS — Z20.822 SUSPECTED COVID-19 VIRUS INFECTION: ICD-10-CM

## 2021-04-24 LAB
DEPRECATED S PYO AG THROAT QL EIA: NEGATIVE
SPECIMEN SOURCE: NORMAL
SPECIMEN SOURCE: NORMAL
STREP GROUP A PCR: NOT DETECTED

## 2021-04-24 PROCEDURE — 87651 STREP A DNA AMP PROBE: CPT | Performed by: PHYSICIAN ASSISTANT

## 2021-04-24 PROCEDURE — U0003 INFECTIOUS AGENT DETECTION BY NUCLEIC ACID (DNA OR RNA); SEVERE ACUTE RESPIRATORY SYNDROME CORONAVIRUS 2 (SARS-COV-2) (CORONAVIRUS DISEASE [COVID-19]), AMPLIFIED PROBE TECHNIQUE, MAKING USE OF HIGH THROUGHPUT TECHNOLOGIES AS DESCRIBED BY CMS-2020-01-R: HCPCS | Performed by: PHYSICIAN ASSISTANT

## 2021-04-24 PROCEDURE — U0005 INFEC AGEN DETEC AMPLI PROBE: HCPCS | Performed by: PHYSICIAN ASSISTANT

## 2021-04-24 PROCEDURE — 99N1174 PR STATISTIC STREP A RAPID: Performed by: PHYSICIAN ASSISTANT

## 2021-04-25 LAB
LABORATORY COMMENT REPORT: NORMAL
SARS-COV-2 RNA RESP QL NAA+PROBE: NEGATIVE
SARS-COV-2 RNA RESP QL NAA+PROBE: NORMAL
SPECIMEN SOURCE: NORMAL
SPECIMEN SOURCE: NORMAL

## 2021-05-14 ENCOUNTER — OFFICE VISIT (OUTPATIENT)
Dept: FAMILY MEDICINE | Facility: CLINIC | Age: 31
End: 2021-05-14
Payer: COMMERCIAL

## 2021-05-14 ENCOUNTER — NURSE TRIAGE (OUTPATIENT)
Dept: NURSING | Facility: CLINIC | Age: 31
End: 2021-05-14

## 2021-05-14 VITALS
BODY MASS INDEX: 35.51 KG/M2 | OXYGEN SATURATION: 100 % | WEIGHT: 193 LBS | HEART RATE: 78 BPM | TEMPERATURE: 97.9 F | SYSTOLIC BLOOD PRESSURE: 121 MMHG | HEIGHT: 62 IN | DIASTOLIC BLOOD PRESSURE: 86 MMHG

## 2021-05-14 DIAGNOSIS — N61.0 INFECTION OF LEFT BREAST: Primary | ICD-10-CM

## 2021-05-14 PROCEDURE — 99213 OFFICE O/P EST LOW 20 MIN: CPT | Performed by: NURSE PRACTITIONER

## 2021-05-14 RX ORDER — LAMOTRIGINE 25 MG/1
2 TABLET ORAL AT BEDTIME
COMMUNITY
Start: 2021-04-15 | End: 2021-11-04

## 2021-05-14 RX ORDER — DOXYCYCLINE 100 MG/1
100 CAPSULE ORAL 2 TIMES DAILY
Qty: 14 CAPSULE | Refills: 0 | Status: SHIPPED | OUTPATIENT
Start: 2021-05-14 | End: 2021-05-21

## 2021-05-14 ASSESSMENT — MIFFLIN-ST. JEOR: SCORE: 1545.75

## 2021-05-14 NOTE — TELEPHONE ENCOUNTER
FNA triage call :   Presenting problem :  Pt called. Noted today Left breast underneath area about pencil erase size with white  discharge - painful 2/10 on painscale and no fever . Currently : 1&0 , eating and activity are fine .   Guideline used : breast sx A [place].   Disposition and recommendations : COVID 19 Nurse Triage Plan/Patient Instructions    Please be aware that novel coronavirus (COVID-19) may be circulating in the community. If you develop symptoms such as fever, cough, or SOB or if you have concerns about the presence of another infection including coronavirus (COVID-19), please contact your health care provider or visit [email or web address].     Disposition/Instructions/ Sent to  for in person  appt today .     In-Person Visit with provider recommended. Reference Visit Selection Guide.    Thank you for taking steps to prevent the spread of this virus.  o Limit your contact with others.  o Wear a simple mask to cover your cough.  o Wash your hands well and often.    Caller verbalizes understanding and denies further questions and will call back if further symptoms to triage or questions  . Selam Angel RN  - [facility]     Reason for Disposition    Breast looks infected (spreading redness, feels hot or painful to touch) and no fever    Additional Information    Negative: SEVERE breast pain and fever > 103 F (39.4 C)    Negative: Chest pain     No chest pain    Negative: Breastfeeding questions about baby    Negative: Breastfeeding questions about mother (breast symptoms or feeling sick)    Negative: Breastfeeding questions about mother's medicines and diet    Negative: Postpartum breast pain and swelling, not breastfeeding    Negative: Small spot, skin growth or mole    Negative: Patient sounds very sick or weak to the triager    Negative: Breast looks infected (spreading redness, feels hot or painful to touch) and fever    Protocols used: BREAST SYMPTOMS-A-OH

## 2021-05-14 NOTE — PROGRESS NOTES
"    Assessment & Plan     Infection of left breast  Open, no need for I&D.   Discussed starting doxycycline.  She is allergic to sulfa.  Discussed cleansing daily, applying a small amount of bacitracin and covering with gauze to avoid rubbing on skin or bra.   Return if worsening or not improving.   - doxycycline hyclate (VIBRAMYCIN) 100 MG capsule; Take 1 capsule (100 mg) by mouth 2 times daily for 7 days     Tobacco Cessation:   reports that she has been smoking cigarettes. She has been smoking about 0.50 packs per day. She has never used smokeless tobacco.      BMI:   Estimated body mass index is 35.88 kg/m  as calculated from the following:    Height as of this encounter: 1.562 m (5' 1.5\").    Weight as of this encounter: 87.5 kg (193 lb).   Weight management plan: Not discussed    See Patient Instructions  Patient Instructions   Start doxycycline 1 pill twice daily for 7 days   Clean with soap and water daily.   Apply a small amount of bacitracin and cover with gauze (to protect from rubbing on skin or bra)- at least until scabbed over.     Return if worsening or not improving.           Return in about 1 week (around 2021) for If failure to improve.    Cesia Benavidez, [facility]   Joanne is a 30 year old who presents for the following health issues     HPI     Today patient noticed pain under left breast.  Looked and saw open draining wound.  Denies any knowledge of wound prior to today.  Denies any fever, chills or malaise.  Hx of complicated abdominal wound after  last year requiring extended IV abx through PICC line.  She declines hx of MRSA.  She is allergic to sulfa drugs.       Review of Systems   Constitutional, HEENT, cardiovascular, pulmonary, gi and gu systems are negative, except as otherwise noted.      Objective    /86   Pulse 78   Temp 97.9  F (36.6  C)   Ht 1.562 m (5' 1.5\")   Wt 87.5 kg (193 lb)   SpO2 100%   BMI 35.88 kg/m  "   Body mass index is 35.88 kg/m .  Physical Exam   GENERAL: healthy, alert and no distress  RESP: breathing unlabored.   MS: no gross musculoskeletal defects noted, no edema  SKIN: left breast, just above breast fold, there is an open shallow wound approximately 1.7 cm in diameter, no drainage.  There is an area of surrounding erythema approximately 4 cm in diameter- no underlying fluctuance or induration.  Area is tender to the touch.   NEURO: Normal strength and tone, mentation intact and speech normal

## 2021-05-14 NOTE — PATIENT INSTRUCTIONS
Start doxycycline 1 pill twice daily for 7 days   Clean with soap and water daily.   Apply a small amount of bacitracin and cover with gauze (to protect from rubbing on skin or bra)- at least until scabbed over.     Return if worsening or not improving.

## 2021-06-22 ENCOUNTER — TRANSFERRED RECORDS (OUTPATIENT)
Dept: HEALTH INFORMATION MANAGEMENT | Facility: CLINIC | Age: 31
End: 2021-06-22

## 2021-06-23 ENCOUNTER — NURSE TRIAGE (OUTPATIENT)
Dept: NURSING | Facility: CLINIC | Age: 31
End: 2021-06-23

## 2021-06-23 NOTE — TELEPHONE ENCOUNTER
Triage call:   Yesterday was seen in the ER for a hernia- pain- needing surgery  Reports she has an incarcerated piece of fat in her hernia   Reports she is nauseated and in a lot of pain  Reports she was given pain medication in the ER - none to be discharged home with- taking OTC tylenol and no relief  Pain is still on the left side of her abdomen and wraps around her her buttocks  Patient reports severe pain today- doesn't want to move due to pain  Reports she is nauseated but has not vomited   Unable to find a position of comfort    Advised that patient needs to be seen again in the ER today for revaluation of her pain and make sure that the hernia has not progressed. Patient verbalizes understanding.     Yanelis Amin RN BSN 6/23/2021 1:05 PM    COVID 19 Nurse Triage Plan/Patient Instructions    Please be aware that novel coronavirus (COVID-19) may be circulating in the community. If you develop symptoms such as fever, cough, or SOB or if you have concerns about the presence of another infection including coronavirus (COVID-19), please contact your health care provider or visit [email or web address].     Disposition/Instructions    ED Visit recommended. Follow protocol based instructions.     Bring Your Own Device:  Please also bring your smart device(s) (smart phones, tablets, laptops) and their charging cables for your personal use and to communicate with your care team during your visit.    Thank you for taking steps to prevent the spread of this virus.  o Limit your contact with others.  o Wear a simple mask to cover your cough.  o Wash your hands well and often.    Resources    [facility]: About COVID-19: [email or web address]    CDC: What to Do If You're Sick: www.cdc.gov/coronavirus/2019-ncov/about/steps-when-sick.html    CDC: Ending Home Isolation: www.cdc.gov/coronavirus/2019-ncov/hcp/disposition-in-home-patients.html     CDC: Caring for Someone:  www.cdc.gov/coronavirus/2019-ncov/if-you-are-sick/care-for-someone.html     [facility]: Interim Guidance for Hospital Discharge to Home: www.health.[facility].mn.us/diseases/coronavirus/hcp/hospdischarge.pdf    [facility] clinical trials (COVID-19 research studies): clinicalaffairs.[facility]/umn-clinical-trials     Below are the COVID-19 hotlines at the [place] Department of Health ([facility]). Interpreters are available.   o For health questions: Call 387-864-9670 or 1-584.852.3623 (7 a.m. to 7 p.m.)  o For questions about schools and childcare: Call 074-036-0285 or 1-329.507.4643 (7 a.m. to 7 p.m.)                           Reason for Disposition    SEVERE abdominal pain (e.g., excruciating)    Additional Information    Negative: Passed out (i.e., fainted, collapsed and was not responding)    Negative: Shock suspected (e.g., cold/pale/clammy skin, too weak to stand, low BP, rapid pulse)    Negative: Sounds like a life-threatening emergency to the triager    Negative: Chest pain    Negative: Pain is mainly in upper abdomen (if needed ask: 'is it mainly above the belly button?')    Negative: Abdominal pain and pregnant > 20 weeks    Negative: Abdominal pain and pregnant < 20 weeks    Protocols used: ABDOMINAL PAIN - FEMALE-A-[place]

## 2021-06-30 ENCOUNTER — OFFICE VISIT (OUTPATIENT)
Dept: SURGERY | Facility: CLINIC | Age: 31
End: 2021-06-30
Payer: COMMERCIAL

## 2021-06-30 VITALS
SYSTOLIC BLOOD PRESSURE: 122 MMHG | DIASTOLIC BLOOD PRESSURE: 78 MMHG | HEIGHT: 62 IN | BODY MASS INDEX: 34.96 KG/M2 | OXYGEN SATURATION: 98 % | WEIGHT: 190 LBS | HEART RATE: 87 BPM

## 2021-06-30 DIAGNOSIS — K42.9 UMBILICAL HERNIA WITHOUT OBSTRUCTION AND WITHOUT GANGRENE: Primary | ICD-10-CM

## 2021-06-30 DIAGNOSIS — Z11.59 ENCOUNTER FOR SCREENING FOR OTHER VIRAL DISEASES: ICD-10-CM

## 2021-06-30 PROCEDURE — 99214 OFFICE O/P EST MOD 30 MIN: CPT | Performed by: SURGERY

## 2021-06-30 RX ORDER — HYDROXYZINE HYDROCHLORIDE 25 MG/1
TABLET, FILM COATED ORAL
COMMUNITY
Start: 2021-06-08 | End: 2022-07-28

## 2021-06-30 ASSESSMENT — MIFFLIN-ST. JEOR: SCORE: 1532.14

## 2021-06-30 NOTE — PROGRESS NOTES
Assessment:    Joanne Escalante is a 30 year old female is seen in consultation for a hernia, at the request of [facility].    Primary, reducible umbilical hernia.    Plan:    We have discussed observation, reduction techniques and importance, incarceration and strangulation signs, symptoms and importance as well as need to seek emergency treatment.      We have discussed open umbilical hernia repair with mesh in detail, including benefits, alternatives, complications, incision, scar, mesh, infection, anesthesia, bleeding, blood transfusion, DVT, PE, hernia recurrence, lifting and activity limits after surgery.  All questions have been answered to the best of my ability.    She has been given literature to review.   We will schedule surgery at the patient's convenience.    Recommended time off work postop:  Not Applicable wks  Recommended time off lifting 20 lb:   2 wks      HPI:  Joanne Escalante is a 30 year old female who presents for evaluation of a lump in the robles-umbilical region.      Duration:  18 months   Pain/quality:  Yes / burning  Inciting event:  Her second pregnancy  Exacerbating factors:  Pulling, kids climbing on her    Nausea/vomitting/bloating:  No    Previous surgery in this location:  Yes - laparoscopy     Previous herniorrhaphy:  No     Cough: No  Diabetes: No  Current Smoker: Yes  Trauma: no  Heavy lifting > 20 lb: Yes - her children   She does not work outside her home    Past Medical History:   has a past medical history of Anxiety, Depressive disorder, Endometriosis, and Periumbilical hernia.    Past Surgical History:  Past Surgical History:   Procedure Laterality Date     C ORAL SURGERY PROCEDURE      Jber teeth      SECTION  , 2020    x 2     LAPAROSCOPY DIAGNOSTIC (GYN)  2015    Endometriosis        Social History:  Social History     Socioeconomic History     Marital status: Single     Spouse name: partner- Jacob     Number of children: 2     Years of  education: Not on file     Highest education level: Not on file   Occupational History     Occupation: nursing asst     Employer: [employer]   Social Needs     Financial resource strain: Not on file     Food insecurity     Worry: Not on file     Inability: Not on file     Transportation needs     Medical: Not on file     Non-medical: Not on file   Tobacco Use     Smoking status: Current Every Day Smoker     Packs/day: 0.50     Types: Cigarettes     Smokeless tobacco: Never Used   Substance and Sexual Activity     Alcohol use: Not Currently     Drug use: Never     Sexual activity: Not Currently     Partners: Male     Birth control/protection: Condom   Lifestyle     Physical activity     Days per week: Not on file     Minutes per session: Not on file     Stress: Not on file   Relationships     Social connections     Talks on phone: Not on file     Gets together: Not on file     Attends Christianity service: Not on file     Active member of club or organization: Not on file     Attends meetings of clubs or organizations: Not on file     Relationship status: Not on file     Intimate partner violence     Fear of current or ex partner: Not on file     Emotionally abused: Not on file     Physically abused: Not on file     Forced sexual activity: Not on file   Other Topics Concern     Parent/sibling w/ CABG, MI or angioplasty before 65F 55M? Not Asked   Social History Narrative    Lives with her boyfriend, they have 2 children, and lives in a house.  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  (last updated 8/19/2020)         Family History:  Family History   Problem Relation Age of Onset     Depression Mother      Alcoholism Mother      Depression Father      Alcoholism Father      Breast Cancer Maternal Grandmother      Diabetes Maternal Grandmother      Family history reviewed and not pertinent.    ROS:  The 10 point review of systems is negative other than noted in the  "HPI and above.    PE:    Vitals: /78 (BP Location: Left arm, Patient Position: Sitting, Cuff Size: Adult Regular)   Pulse 87   Ht 1.562 m (5' 1.5\")   Wt 86.2 kg (190 lb)   SpO2 98%   BMI 35.32 kg/m    BMI= Body mass index is 35.32 kg/m .  General - Well developed, well nourished female in no apparent distress  HEENT:  Head normocephalic and atraumatic, pupils equal and round, conjunctivae clear, no scleral icterus, mucous membranes moist, external ears and nose normal  Neck: Supple without thyromegaly or masses  Lymphatic: No cervical, or supraclavicular lymphadenopathy  Lungs: no labored respirations  Heart: Regular rate   Abdomen:  abdomen is soft without significant tenderness, masses, organomegaly or guarding   Hernia:  umbilical, 3 cm, reducible, moderately tender  Extremities: Warm without edema. Good pulses  Musculoskeletal:  Normal station and gait  Neurologic: alert, speech is clear, moves all extremities with good strength  Psychiatric: Mood and affect appropriate  Skin: Without lesions or rashes, or juandice    This note was created using voice recognition software. Undetected word substitutions or other errors may have occurred.     Time spent with the patient with greater that 50% of the time in discussion was 15 minutes.     Ankit Nunez MD    Please route or send letter to:  Cesia Benavidez CNP    "

## 2021-07-02 ENCOUNTER — TELEPHONE (OUTPATIENT)
Dept: SURGERY | Facility: CLINIC | Age: 31
End: 2021-07-02

## 2021-07-02 NOTE — TELEPHONE ENCOUNTER
Type of surgery: open umbilical hernia repair  Location of surgery: [facility]  Date and time of surgery: 7/8/21 10:50am  Surgeon: Dr Nunez  Pre-Op Appt Date: pt to schedule  Post-Op Appt Date: pt to schedule   Packet sent out: Yes  Pre-cert/Authorization completed:  No  Date: 6/30/21

## 2021-07-05 ENCOUNTER — OFFICE VISIT (OUTPATIENT)
Dept: FAMILY MEDICINE | Facility: CLINIC | Age: 31
End: 2021-07-05
Payer: COMMERCIAL

## 2021-07-05 VITALS
RESPIRATION RATE: 14 BRPM | HEART RATE: 100 BPM | OXYGEN SATURATION: 99 % | WEIGHT: 196.8 LBS | DIASTOLIC BLOOD PRESSURE: 82 MMHG | BODY MASS INDEX: 36.58 KG/M2 | SYSTOLIC BLOOD PRESSURE: 115 MMHG | TEMPERATURE: 98.7 F

## 2021-07-05 DIAGNOSIS — Z01.818 PREOP GENERAL PHYSICAL EXAM: Primary | ICD-10-CM

## 2021-07-05 DIAGNOSIS — Z11.59 ENCOUNTER FOR SCREENING FOR OTHER VIRAL DISEASES: ICD-10-CM

## 2021-07-05 DIAGNOSIS — K42.9 UMBILICAL HERNIA WITHOUT OBSTRUCTION AND WITHOUT GANGRENE: ICD-10-CM

## 2021-07-05 LAB
SARS-COV-2 RNA RESP QL NAA+PROBE: NORMAL
SPECIMEN SOURCE: NORMAL

## 2021-07-05 PROCEDURE — 99214 OFFICE O/P EST MOD 30 MIN: CPT | Performed by: PHYSICIAN ASSISTANT

## 2021-07-05 PROCEDURE — U0003 INFECTIOUS AGENT DETECTION BY NUCLEIC ACID (DNA OR RNA); SEVERE ACUTE RESPIRATORY SYNDROME CORONAVIRUS 2 (SARS-COV-2) (CORONAVIRUS DISEASE [COVID-19]), AMPLIFIED PROBE TECHNIQUE, MAKING USE OF HIGH THROUGHPUT TECHNOLOGIES AS DESCRIBED BY CMS-2020-01-R: HCPCS | Performed by: SURGERY

## 2021-07-05 PROCEDURE — U0005 INFEC AGEN DETEC AMPLI PROBE: HCPCS | Performed by: SURGERY

## 2021-07-05 NOTE — PATIENT INSTRUCTIONS
Jillian Rubio,    Thank you for allowing [facility] to manage your care.    We are now scheduling all people age 12 and older for COVID-19 vaccines (patients age 12-17 can only  the Pfizer vaccine).     To schedule your appointment, please log in to [employer] using this link to see when and where we have openings.     If you have technical difficulty using [employer], call [phone] for assistance.      More information is on our website: [email or web address].     If you have any questions or concerns, please feel free to call us at [phone]    Sincerely,    Arjun Medrano PA-C    Did you know?      You can schedule a video visit for follow-up appointments as well as future appointments for certain conditions.  Please see the below link.     [email or web address]    If you have not already done so,  I encourage you to sign up for [employer] ([email or web address]).  This will allow you to review your results, securely communicate with a provider, and schedule virtual visits as well.      Preparing for Your Surgery  Getting started  A nurse will call you to review your health history and instructions. They will give you an arrival time based on your scheduled surgery time.  Please be ready to share the following:    Your doctor's clinic name and phone number    Your medical, surgical and anesthesia history    A list of allergies and sensitivities    A list of medicines, including herbal treatments and over-the-counter drugs    Whether the patient has a legal guardian (ask how to send us the papers in advance)  If you have a child who's having surgery, please ask for a copy of Preparing for Your Child's Surgery.    Preparing for surgery    Within 30 days of surgery: Have a pre-op exam (sometimes called an H&P, or History and Physical). This can be done at a clinic or pre-operative center.  ? If you're having a , you may not  need this exam. Talk to your care team    At your pre-op exam, talk to your care team about all medicines you take. If you need to stop any medicines before surgery, ask when to start taking them again.  ? We do this for your safety. Many medicines can make you bleed too much during surgery. Some change how well surgery (anesthesia) drugs work.    Call your insurance company to let them know you're having surgery. (If you don't have insurance, call [phone].)    Call your clinic if there's any change in your health. This includes signs of a cold or flu (sore throat, runny nose, cough, rash, fever). It also includes a scrape or scratch near the surgery site.    If you have questions on the day of surgery, call your hospital or surgery center.  Eating and drinking guidelines  For your safety: Unless your surgeon tells you otherwise, follow the guidelines below.    Eat and drink as usual until 8 hours before surgery. After that, no food or milk.    Drink clear liquids until 2 hours before surgery. These are liquids you can see through, like water, Gatorade and Propel Water. You may also have black coffee and tea (no cream or milk).    Nothing by mouth within 2 hours of surgery. This includes gum, candy and breath mints.    If you drink, stop drinking alcohol the night before surgery.    If your care team tells you to take medicine on the morning of surgery, it's okay to take it with a sip of water.  Preventing infection    Shower or bathe the night before and morning of your surgery. Follow the instructions your clinic gave you. (If no instructions, use regular soap.)    Don't shave or clip hair near your surgery site. We'll remove the hair if needed.    Don't smoke or vape the morning of surgery. You may chew nicotine gum up to 2 hours before surgery. A nicotine patch is okay.  ? Note: Some surgeries require you to completely quit smoking and nicotine. Check with your surgeon.    Your care team will make every  effort to keep you safe from infection. We will:  ? Clean our hands often with soap and water (or an alcohol-based hand rub).  ? Clean the skin at your surgery site with a special soap that kills germs.  ? Give you a special gown to keep you warm. (Cold raises the risk of infection.)  ? Wear special hair covers, masks, gowns and gloves during surgery.  ? Give antibiotic medicine, if prescribed. Not all surgeries need antibiotics.  What to bring on the day of surgery    Photo ID and insurance card    Copy of your health care directive, if you have one    Glasses and hearing aides (bring cases)  ? You can't wear contacts during surgery    Inhaler and eye drops, if you use them (tell us about these when you arrive)    CPAP machine or breathing device, if you use them    A few personal items, if spending the night    If you have . . .  ? A pacemaker or ICD (cardiac defibrillator): Bring the ID card.  ? An implanted stimulator: Bring the remote control.  ? A legal guardian: Bring a copy of the certified (court-stamped) guardianship papers.  Please remove any jewelry, including body piercings. Leave jewelry and other valuables at home.  If you're going home the day of surgery  Important: If you don't follow the rules below, we must cancel your surgery.     Arrange for someone to drive you home after surgery. You may not drive, take a taxi or take public transportation by yourself (unless you'll have local anesthesia only).    Arrange for a responsible adult to stay with you overnight. If you don't, we may keep you in the hospital overnight, and you may need to pay the costs yourself.  Questions?   If you have any questions for your care team, list them here:  _________________________________________________________________________________________________________________________________________________________________________________________________________________________________________________________________________________________________________________________  For informational purposes only. Not to replace the advice of your health care provider. Copyright   2003, 2019 [facility]. All rights reserved. Clinically reviewed by Barb Wiggins MD. [employer] [id number] - REV 4/20.

## 2021-07-05 NOTE — H&P (VIEW-ONLY)
[facility] [place]  [address]  [place] [address]  Phone: [phone]  Primary Provider: Cely Ignacio  Pre-op Performing Provider: OLEGARIO JIMENEZ    PREOPERATIVE EVALUATION:  Today's date: 7/5/2021    Joanne Escalante is a 30 year old female who presents for a preoperative evaluation.    Surgical Information:  Surgery/Procedure: OPEN UMBILICAL HERNIA REPAIR  Surgery Location: [facility]  Surgeon: JAMIL Nunez  Surgery Date: 7/8/21  Time of Surgery: 10:50 am  Where patient plans to recover: At home with family  Fax number for surgical facility: Note does not need to be faxed, will be available electronically in Epic.    Type of Anesthesia Anticipated: General    Assessment & Plan     The proposed surgical procedure is considered INTERMEDIATE risk.    Problem List Items Addressed This Visit        Other    Umbilical hernia without obstruction and without gangrene      Other Visit Diagnoses     Preop general physical exam    -  Primary          Risks and Recommendations:  The patient has the following additional risks and recommendations for perioperative complications:   - No identified additional risk factors other than previously addressed    Medication Instructions:  Patient is to take all scheduled medications on the day of surgery    RECOMMENDATION:  APPROVAL GIVEN to proceed with proposed procedure, without further diagnostic evaluation.    Subjective     HPI related to upcoming procedure: 2 years ago developed a periumbilical hernia that worsened two weeks ago when she was pulling her kids in a wagon.    Preop Questions 7/5/2021   1. Have you ever had a heart attack or stroke? No   2. Have you ever had surgery on your heart or blood vessels, such as a stent placement, a coronary artery bypass, or surgery on an artery in your head, neck, heart, or legs? No   3. Do you have chest pain with activity? No   4. Do you have a history of  heart failure?  No   5. Do you currently have a cold, bronchitis or symptoms of other infection? No   6. Do you have a cough, shortness of breath, or wheezing? No   7. Do you or anyone in your family have previous history of blood clots? No   8. Do you or does anyone in your family have a serious bleeding problem such as prolonged bleeding following surgeries or cuts? No   9. Have you ever had problems with anemia or been told to take iron pills? No   10. Have you had any abnormal blood loss such as black, tarry or bloody stools, or abnormal vaginal bleeding? No   11. Have you ever had a blood transfusion? No   12. Are you willing to have a blood transfusion if it is medically needed before, during, or after your surgery? Yes   13. Have you or any of your relatives ever had problems with anesthesia? No   14. Do you have sleep apnea, excessive snoring or daytime drowsiness? No   15. Do you have any artifical heart valves or other implanted medical devices like a pacemaker, defibrillator, or continuous glucose monitor? No   16. Do you have artificial joints? No   17. Are you allergic to latex? No   18. Is there any chance that you may be pregnant? No       Health Care Directive:  Patient does not have a Health Care Directive or Living Will: Discussed advance care planning with patient; however, patient declined at this time.    Preoperative Review of :   reviewed - no record of controlled substances prescribed.    Status of Chronic Conditions:  DEPRESSION - Patient has a long history of Depression of moderate severity requiring medication for control with recent symptoms being stable..Current symptoms of depression include depressed mood.     SLEEP PROBLEM - Patient has a longstanding history of snoring.. Patient has tried OTC medications with limited success.       Review of Systems  CONSTITUTIONAL: NEGATIVE for fever, chills, change in weight  INTEGUMENTARY/SKIN: NEGATIVE for worrisome rashes, moles or lesions  EYES: NEGATIVE  for vision changes or irritation  ENT/MOUTH: NEGATIVE for ear, mouth and throat problems  RESP: NEGATIVE for significant cough or SOB  BREAST: NEGATIVE for masses, tenderness or discharge  CV: NEGATIVE for chest pain, palpitations or peripheral edema  GI: NEGATIVE for nausea, abdominal pain, heartburn, or change in bowel habits  : NEGATIVE for frequency, dysuria, or hematuria  MUSCULOSKELETAL: NEGATIVE for significant arthralgias or myalgia  NEURO: NEGATIVE for weakness, dizziness or paresthesias  ENDOCRINE: NEGATIVE for temperature intolerance, skin/hair changes  HEME: NEGATIVE for bleeding problems  PSYCHIATRIC: NEGATIVE for changes in mood or affect    Patient Active Problem List    Diagnosis Date Noted     Adjustment disorder with mixed anxiety and depressed mood 2020     Priority: Medium     Cigarette smoker 2020     Priority: Medium     Abdominal wall cellulitis 2020     Priority: Medium     Hx of  section 2020     Priority: Medium     ESBL (extended spectrum beta-lactamase) producing bacteria infection 2020     Priority: Medium     Wound infection 2020     Priority: Medium     Umbilical hernia without obstruction and without gangrene 2020     Priority: Medium     Added automatically from request for surgery [id number]       Status post repeat low transverse  section 2020     Priority: Medium     Prenatal care, subsequent pregnancy in first trimester 2019     Priority: Medium     +depression and anxiety, off meds.  Prepregnancy BMI: 37.06  Early GCT: indicated  ASA: not indicated  Dated by: LMP confirmed by 10 week US  Would accept blood products: yes  Previous deliveries reviewed by MD:  PAULETTE for FHTs-bradycardia   screening: declined  Screening US:     Rh pos  GCT:   Hgb:  TDap:  GBS:    Hx of C/S x 1  MFMU score: 46.3%  Plans: RC/S  TOLAC consent: given 2019 scanned______  Placental location:       Anxiety disorder  2018     Priority: Medium     Severe episode of recurrent major depressive disorder, without psychotic features (H) 2018     Priority: Medium     Endometriosis determined by laparoscopy 12/10/2015     Priority: Medium      Past Medical History:   Diagnosis Date     Anxiety      Depressive disorder      Endometriosis      Periumbilical hernia      Past Surgical History:   Procedure Laterality Date     C ORAL SURGERY PROCEDURE      [place] teeth      SECTION  2018, 2020    x 2     LAPAROSCOPY DIAGNOSTIC (GYN)  2015    Endometriosis     Current Outpatient Medications   Medication Sig Dispense Refill     acetaminophen (TYLENOL) 500 MG tablet Take 1,000 mg by mouth every 6 hours as needed for mild pain       hydrOXYzine (ATARAX) 25 MG tablet TAKE 1 TO 2 TABLETS BY MOUTH THREE TIMES DAILY AS NEEDED       ibuprofen (ADVIL/MOTRIN) 200 MG tablet Take 400 mg by mouth every 4 hours as needed for mild pain       lamoTRIgine (LAMICTAL) 25 MG tablet Take 2 tablets by mouth At Bedtime         Allergies   Allergen Reactions     Sulfa Drugs         Social History     Tobacco Use     Smoking status: Current Every Day Smoker     Packs/day: 0.50     Types: Cigarettes     Smokeless tobacco: Never Used   Substance Use Topics     Alcohol use: Not Currently     Family History   Problem Relation Age of Onset     Depression Mother      Alcoholism Mother      Depression Father      Alcoholism Father      Breast Cancer Maternal Grandmother      Diabetes Maternal Grandmother      History   Drug Use Unknown         Objective     /82   Pulse 100   Temp 98.7  F (37.1  C) (Tympanic)   Resp 14   Wt 89.3 kg (196 lb 12.8 oz)   LMP 2021 (Exact Date)   SpO2 99%   BMI 36.58 kg/m      Physical Exam    GENERAL APPEARANCE: healthy, alert and no distress     EYES: EOMI, PERRL     HENT: nose and mouth without ulcers or lesions     NECK: no adenopathy, no asymmetry, masses, or scars and thyroid normal to palpation      RESP: lungs clear to auscultation - no rales, rhonchi or wheezes     CV: regular rates and rhythm, normal S1 S2, no S3 or S4 and no murmur, click or rub     ABDOMEN:  Reducible umbilical hernia. soft, nontender, no HSM or masses and bowel sounds normal     MS: extremities normal- no gross deformities noted, no evidence of inflammation in joints, FROM in all extremities.     SKIN: no suspicious lesions or rashes     NEURO: Normal strength and tone, sensory exam grossly normal, mentation intact and speech normal     PSYCH: mentation appears normal. and affect normal/bright     LYMPHATICS: No cervical adenopathy    Recent Labs   Lab Test 10/09/20  1804 09/08/20  1415 08/31/20  0925 08/30/20  2236 08/21/20  0919 08/21/20  0919   HGB 13.5 14.1 14.5 13.2   < > 13.8    291 358 316   < > 369   NA  --   --   --  139  --  140   POTASSIUM  --   --   --  3.8  --  3.7   CR  --  0.71 0.71 0.76   < > 0.72    < > = values in this interval not displayed.        Diagnostics:  No labs were ordered during this visit.   No EKG required, no history of coronary heart disease, significant arrhythmia, peripheral arterial disease or other structural heart disease.    Revised Cardiac Risk Index (RCRI):  The patient has the following serious cardiovascular risks for perioperative complications:   - No serious cardiac risks = 0 points     RCRI Interpretation: 0 points: Class I (very low risk - 0.4% complication rate)    Signed Electronically by: SUMAN Orr  Copy of this evaluation report is provided to requesting physician.

## 2021-07-05 NOTE — PROGRESS NOTES
[facility] [place]  [address]  [place] [address]  Phone: [phone]  Primary Provider: Cely Ignacio  Pre-op Performing Provider: OLEGARIO JIMENEZ    PREOPERATIVE EVALUATION:  Today's date: 7/5/2021    Joanne Escalante is a 30 year old female who presents for a preoperative evaluation.    Surgical Information:  Surgery/Procedure: OPEN UMBILICAL HERNIA REPAIR  Surgery Location: [facility]  Surgeon: JAMIL Nunez  Surgery Date: 7/8/21  Time of Surgery: 10:50 am  Where patient plans to recover: At home with family  Fax number for surgical facility: Note does not need to be faxed, will be available electronically in Epic.    Type of Anesthesia Anticipated: General    Assessment & Plan     The proposed surgical procedure is considered INTERMEDIATE risk.    Problem List Items Addressed This Visit        Other    Umbilical hernia without obstruction and without gangrene      Other Visit Diagnoses     Preop general physical exam    -  Primary          Risks and Recommendations:  The patient has the following additional risks and recommendations for perioperative complications:   - No identified additional risk factors other than previously addressed    Medication Instructions:  Patient is to take all scheduled medications on the day of surgery    RECOMMENDATION:  APPROVAL GIVEN to proceed with proposed procedure, without further diagnostic evaluation.    Subjective     HPI related to upcoming procedure: 2 years ago developed a periumbilical hernia that worsened two weeks ago when she was pulling her kids in a wagon.    Preop Questions 7/5/2021   1. Have you ever had a heart attack or stroke? No   2. Have you ever had surgery on your heart or blood vessels, such as a stent placement, a coronary artery bypass, or surgery on an artery in your head, neck, heart, or legs? No   3. Do you have chest pain with activity? No   4. Do you have a history of  heart failure?  No   5. Do you currently have a cold, bronchitis or symptoms of other infection? No   6. Do you have a cough, shortness of breath, or wheezing? No   7. Do you or anyone in your family have previous history of blood clots? No   8. Do you or does anyone in your family have a serious bleeding problem such as prolonged bleeding following surgeries or cuts? No   9. Have you ever had problems with anemia or been told to take iron pills? No   10. Have you had any abnormal blood loss such as black, tarry or bloody stools, or abnormal vaginal bleeding? No   11. Have you ever had a blood transfusion? No   12. Are you willing to have a blood transfusion if it is medically needed before, during, or after your surgery? Yes   13. Have you or any of your relatives ever had problems with anesthesia? No   14. Do you have sleep apnea, excessive snoring or daytime drowsiness? No   15. Do you have any artifical heart valves or other implanted medical devices like a pacemaker, defibrillator, or continuous glucose monitor? No   16. Do you have artificial joints? No   17. Are you allergic to latex? No   18. Is there any chance that you may be pregnant? No       Health Care Directive:  Patient does not have a Health Care Directive or Living Will: Discussed advance care planning with patient; however, patient declined at this time.    Preoperative Review of :   reviewed - no record of controlled substances prescribed.    Status of Chronic Conditions:  DEPRESSION - Patient has a long history of Depression of moderate severity requiring medication for control with recent symptoms being stable..Current symptoms of depression include depressed mood.     SLEEP PROBLEM - Patient has a longstanding history of snoring.. Patient has tried OTC medications with limited success.       Review of Systems  CONSTITUTIONAL: NEGATIVE for fever, chills, change in weight  INTEGUMENTARY/SKIN: NEGATIVE for worrisome rashes, moles or lesions  EYES: NEGATIVE  for vision changes or irritation  ENT/MOUTH: NEGATIVE for ear, mouth and throat problems  RESP: NEGATIVE for significant cough or SOB  BREAST: NEGATIVE for masses, tenderness or discharge  CV: NEGATIVE for chest pain, palpitations or peripheral edema  GI: NEGATIVE for nausea, abdominal pain, heartburn, or change in bowel habits  : NEGATIVE for frequency, dysuria, or hematuria  MUSCULOSKELETAL: NEGATIVE for significant arthralgias or myalgia  NEURO: NEGATIVE for weakness, dizziness or paresthesias  ENDOCRINE: NEGATIVE for temperature intolerance, skin/hair changes  HEME: NEGATIVE for bleeding problems  PSYCHIATRIC: NEGATIVE for changes in mood or affect    Patient Active Problem List    Diagnosis Date Noted     Adjustment disorder with mixed anxiety and depressed mood 2020     Priority: Medium     Cigarette smoker 2020     Priority: Medium     Abdominal wall cellulitis 2020     Priority: Medium     Hx of  section 2020     Priority: Medium     ESBL (extended spectrum beta-lactamase) producing bacteria infection 2020     Priority: Medium     Wound infection 2020     Priority: Medium     Umbilical hernia without obstruction and without gangrene 2020     Priority: Medium     Added automatically from request for surgery [id number]       Status post repeat low transverse  section 2020     Priority: Medium     Prenatal care, subsequent pregnancy in first trimester 2019     Priority: Medium     +depression and anxiety, off meds.  Prepregnancy BMI: 37.06  Early GCT: indicated  ASA: not indicated  Dated by: LMP confirmed by 10 week US  Would accept blood products: yes  Previous deliveries reviewed by MD:  PAULETTE for FHTs-bradycardia   screening: declined  Screening US:     Rh pos  GCT:   Hgb:  TDap:  GBS:    Hx of C/S x 1  MFMU score: 46.3%  Plans: RC/S  TOLAC consent: given 2019 scanned______  Placental location:       Anxiety disorder  2018     Priority: Medium     Severe episode of recurrent major depressive disorder, without psychotic features (H) 2018     Priority: Medium     Endometriosis determined by laparoscopy 12/10/2015     Priority: Medium      Past Medical History:   Diagnosis Date     Anxiety      Depressive disorder      Endometriosis      Periumbilical hernia      Past Surgical History:   Procedure Laterality Date     C ORAL SURGERY PROCEDURE      [place] teeth      SECTION  2018, 2020    x 2     LAPAROSCOPY DIAGNOSTIC (GYN)  2015    Endometriosis     Current Outpatient Medications   Medication Sig Dispense Refill     acetaminophen (TYLENOL) 500 MG tablet Take 1,000 mg by mouth every 6 hours as needed for mild pain       hydrOXYzine (ATARAX) 25 MG tablet TAKE 1 TO 2 TABLETS BY MOUTH THREE TIMES DAILY AS NEEDED       ibuprofen (ADVIL/MOTRIN) 200 MG tablet Take 400 mg by mouth every 4 hours as needed for mild pain       lamoTRIgine (LAMICTAL) 25 MG tablet Take 2 tablets by mouth At Bedtime         Allergies   Allergen Reactions     Sulfa Drugs         Social History     Tobacco Use     Smoking status: Current Every Day Smoker     Packs/day: 0.50     Types: Cigarettes     Smokeless tobacco: Never Used   Substance Use Topics     Alcohol use: Not Currently     Family History   Problem Relation Age of Onset     Depression Mother      Alcoholism Mother      Depression Father      Alcoholism Father      Breast Cancer Maternal Grandmother      Diabetes Maternal Grandmother      History   Drug Use Unknown         Objective     /82   Pulse 100   Temp 98.7  F (37.1  C) (Tympanic)   Resp 14   Wt 89.3 kg (196 lb 12.8 oz)   LMP 2021 (Exact Date)   SpO2 99%   BMI 36.58 kg/m      Physical Exam    GENERAL APPEARANCE: healthy, alert and no distress     EYES: EOMI, PERRL     HENT: nose and mouth without ulcers or lesions     NECK: no adenopathy, no asymmetry, masses, or scars and thyroid normal to palpation      RESP: lungs clear to auscultation - no rales, rhonchi or wheezes     CV: regular rates and rhythm, normal S1 S2, no S3 or S4 and no murmur, click or rub     ABDOMEN:  Reducible umbilical hernia. soft, nontender, no HSM or masses and bowel sounds normal     MS: extremities normal- no gross deformities noted, no evidence of inflammation in joints, FROM in all extremities.     SKIN: no suspicious lesions or rashes     NEURO: Normal strength and tone, sensory exam grossly normal, mentation intact and speech normal     PSYCH: mentation appears normal. and affect normal/bright     LYMPHATICS: No cervical adenopathy    Recent Labs   Lab Test 10/09/20  1804 09/08/20  1415 08/31/20  0925 08/30/20  2236 08/21/20  0919 08/21/20  0919   HGB 13.5 14.1 14.5 13.2   < > 13.8    291 358 316   < > 369   NA  --   --   --  139  --  140   POTASSIUM  --   --   --  3.8  --  3.7   CR  --  0.71 0.71 0.76   < > 0.72    < > = values in this interval not displayed.        Diagnostics:  No labs were ordered during this visit.   No EKG required, no history of coronary heart disease, significant arrhythmia, peripheral arterial disease or other structural heart disease.    Revised Cardiac Risk Index (RCRI):  The patient has the following serious cardiovascular risks for perioperative complications:   - No serious cardiac risks = 0 points     RCRI Interpretation: 0 points: Class I (very low risk - 0.4% complication rate)    Signed Electronically by: SUMAN Orr  Copy of this evaluation report is provided to requesting physician.

## 2021-07-06 LAB
LABORATORY COMMENT REPORT: NORMAL
SARS-COV-2 RNA RESP QL NAA+PROBE: NEGATIVE
SPECIMEN SOURCE: NORMAL

## 2021-07-07 ENCOUNTER — ANESTHESIA EVENT (OUTPATIENT)
Dept: SURGERY | Facility: CLINIC | Age: 31
End: 2021-07-07
Payer: COMMERCIAL

## 2021-07-08 ENCOUNTER — APPOINTMENT (OUTPATIENT)
Dept: SURGERY | Facility: PHYSICIAN GROUP | Age: 31
End: 2021-07-08
Payer: COMMERCIAL

## 2021-07-08 ENCOUNTER — ANESTHESIA (OUTPATIENT)
Dept: SURGERY | Facility: CLINIC | Age: 31
End: 2021-07-08
Payer: COMMERCIAL

## 2021-07-08 ENCOUNTER — HOSPITAL ENCOUNTER (OUTPATIENT)
Facility: CLINIC | Age: 31
Discharge: HOME | End: 2021-07-08
Attending: SURGERY | Admitting: SURGERY
Payer: COMMERCIAL

## 2021-07-08 VITALS
RESPIRATION RATE: 16 BRPM | WEIGHT: 192.6 LBS | TEMPERATURE: 97.9 F | OXYGEN SATURATION: 97 % | HEART RATE: 80 BPM | DIASTOLIC BLOOD PRESSURE: 67 MMHG | HEIGHT: 61 IN | BODY MASS INDEX: 36.36 KG/M2 | SYSTOLIC BLOOD PRESSURE: 109 MMHG

## 2021-07-08 DIAGNOSIS — G89.18 POSTOPERATIVE PAIN: Primary | ICD-10-CM

## 2021-07-08 DIAGNOSIS — K42.9 UMBILICAL HERNIA WITHOUT OBSTRUCTION AND WITHOUT GANGRENE: ICD-10-CM

## 2021-07-08 LAB — HCG UR QL: NEGATIVE

## 2021-07-08 PROCEDURE — 81025 URINE PREGNANCY TEST: CPT | Performed by: ANESTHESIOLOGY

## 2021-07-08 PROCEDURE — 250N000011 HC RX IP 250 OP 636: Performed by: NURSE ANESTHETIST, CERTIFIED REGISTERED

## 2021-07-08 PROCEDURE — C1781 MESH (IMPLANTABLE): HCPCS | Performed by: SURGERY

## 2021-07-08 PROCEDURE — 49585 PR REPAIR UMBILICAL HERN,5+Y/O,REDUC: CPT | Performed by: SURGERY

## 2021-07-08 PROCEDURE — 250N000013 HC RX MED GY IP 250 OP 250 PS 637: Performed by: PHYSICIAN ASSISTANT

## 2021-07-08 PROCEDURE — 250N000009 HC RX 250: Performed by: SURGERY

## 2021-07-08 PROCEDURE — 250N000009 HC RX 250: Performed by: NURSE ANESTHETIST, CERTIFIED REGISTERED

## 2021-07-08 PROCEDURE — 272N000001 HC OR GENERAL SUPPLY STERILE: Performed by: SURGERY

## 2021-07-08 PROCEDURE — 710N000009 HC RECOVERY PHASE 1, LEVEL 1, PER MIN: Performed by: SURGERY

## 2021-07-08 PROCEDURE — 999N000141 HC STATISTIC PRE-PROCEDURE NURSING ASSESSMENT: Performed by: SURGERY

## 2021-07-08 PROCEDURE — 370N000017 HC ANESTHESIA TECHNICAL FEE, PER MIN: Performed by: SURGERY

## 2021-07-08 PROCEDURE — 250N000011 HC RX IP 250 OP 636: Performed by: SURGERY

## 2021-07-08 PROCEDURE — 360N000075 HC SURGERY LEVEL 2, PER MIN: Performed by: SURGERY

## 2021-07-08 PROCEDURE — 710N000012 HC RECOVERY PHASE 2, PER MINUTE: Performed by: SURGERY

## 2021-07-08 PROCEDURE — 258N000003 HC RX IP 258 OP 636: Performed by: NURSE ANESTHETIST, CERTIFIED REGISTERED

## 2021-07-08 DEVICE — COMPOSITE VENTRAL PATCH;POLYESTER & POLYGLYCOLIC-LACTIC ACID PATCH WITH ABSORBABLE COLLAGEN FILM
Type: IMPLANTABLE DEVICE | Site: UMBILICAL | Status: FUNCTIONAL
Brand: PARIETEX

## 2021-07-08 RX ORDER — OXYCODONE HYDROCHLORIDE 5 MG/1
5 TABLET ORAL
Status: COMPLETED | OUTPATIENT
Start: 2021-07-08 | End: 2021-07-08

## 2021-07-08 RX ORDER — FENTANYL CITRATE 0.05 MG/ML
25-50 INJECTION, SOLUTION INTRAMUSCULAR; INTRAVENOUS
Status: DISCONTINUED | OUTPATIENT
Start: 2021-07-08 | End: 2021-07-08 | Stop reason: HOSPADM

## 2021-07-08 RX ORDER — PROPOFOL 10 MG/ML
INJECTION, EMULSION INTRAVENOUS PRN
Status: DISCONTINUED | OUTPATIENT
Start: 2021-07-08 | End: 2021-07-08

## 2021-07-08 RX ORDER — ONDANSETRON 4 MG/1
4 TABLET, ORALLY DISINTEGRATING ORAL EVERY 30 MIN PRN
Status: DISCONTINUED | OUTPATIENT
Start: 2021-07-08 | End: 2021-07-08 | Stop reason: HOSPADM

## 2021-07-08 RX ORDER — HYDROMORPHONE HYDROCHLORIDE 1 MG/ML
.3-.5 INJECTION, SOLUTION INTRAMUSCULAR; INTRAVENOUS; SUBCUTANEOUS EVERY 10 MIN PRN
Status: DISCONTINUED | OUTPATIENT
Start: 2021-07-08 | End: 2021-07-08 | Stop reason: HOSPADM

## 2021-07-08 RX ORDER — OXYCODONE HYDROCHLORIDE 5 MG/1
5 TABLET ORAL EVERY 4 HOURS PRN
Qty: 12 TABLET | Refills: 0 | Status: SHIPPED | OUTPATIENT
Start: 2021-07-08 | End: 2021-11-04

## 2021-07-08 RX ORDER — NALOXONE HYDROCHLORIDE 0.4 MG/ML
0.4 INJECTION, SOLUTION INTRAMUSCULAR; INTRAVENOUS; SUBCUTANEOUS
Status: DISCONTINUED | OUTPATIENT
Start: 2021-07-08 | End: 2021-07-08 | Stop reason: HOSPADM

## 2021-07-08 RX ORDER — MEPERIDINE HYDROCHLORIDE 25 MG/ML
12.5 INJECTION INTRAMUSCULAR; INTRAVENOUS; SUBCUTANEOUS
Status: DISCONTINUED | OUTPATIENT
Start: 2021-07-08 | End: 2021-07-08 | Stop reason: HOSPADM

## 2021-07-08 RX ORDER — MAGNESIUM HYDROXIDE 1200 MG/15ML
LIQUID ORAL PRN
Status: DISCONTINUED | OUTPATIENT
Start: 2021-07-08 | End: 2021-07-08 | Stop reason: HOSPADM

## 2021-07-08 RX ORDER — KETOROLAC TROMETHAMINE 30 MG/ML
INJECTION, SOLUTION INTRAMUSCULAR; INTRAVENOUS PRN
Status: DISCONTINUED | OUTPATIENT
Start: 2021-07-08 | End: 2021-07-08

## 2021-07-08 RX ORDER — NALOXONE HYDROCHLORIDE 0.4 MG/ML
0.2 INJECTION, SOLUTION INTRAMUSCULAR; INTRAVENOUS; SUBCUTANEOUS
Status: DISCONTINUED | OUTPATIENT
Start: 2021-07-08 | End: 2021-07-08 | Stop reason: HOSPADM

## 2021-07-08 RX ORDER — EPHEDRINE SULFATE 50 MG/ML
INJECTION, SOLUTION INTRAMUSCULAR; INTRAVENOUS; SUBCUTANEOUS PRN
Status: DISCONTINUED | OUTPATIENT
Start: 2021-07-08 | End: 2021-07-08

## 2021-07-08 RX ORDER — SODIUM CHLORIDE, SODIUM LACTATE, POTASSIUM CHLORIDE, CALCIUM CHLORIDE 600; 310; 30; 20 MG/100ML; MG/100ML; MG/100ML; MG/100ML
INJECTION, SOLUTION INTRAVENOUS CONTINUOUS
Status: DISCONTINUED | OUTPATIENT
Start: 2021-07-08 | End: 2021-07-08 | Stop reason: HOSPADM

## 2021-07-08 RX ORDER — SODIUM CHLORIDE, SODIUM LACTATE, POTASSIUM CHLORIDE, CALCIUM CHLORIDE 600; 310; 30; 20 MG/100ML; MG/100ML; MG/100ML; MG/100ML
INJECTION, SOLUTION INTRAVENOUS CONTINUOUS PRN
Status: DISCONTINUED | OUTPATIENT
Start: 2021-07-08 | End: 2021-07-08

## 2021-07-08 RX ORDER — CEFAZOLIN SODIUM 2 G/100ML
2 INJECTION, SOLUTION INTRAVENOUS SEE ADMIN INSTRUCTIONS
Status: DISCONTINUED | OUTPATIENT
Start: 2021-07-08 | End: 2021-07-08 | Stop reason: HOSPADM

## 2021-07-08 RX ORDER — ONDANSETRON 2 MG/ML
INJECTION INTRAMUSCULAR; INTRAVENOUS PRN
Status: DISCONTINUED | OUTPATIENT
Start: 2021-07-08 | End: 2021-07-08

## 2021-07-08 RX ORDER — AMOXICILLIN 250 MG
1-2 CAPSULE ORAL 2 TIMES DAILY
Qty: 30 TABLET | Refills: 0 | Status: SHIPPED | OUTPATIENT
Start: 2021-07-08 | End: 2022-02-17

## 2021-07-08 RX ORDER — LIDOCAINE HYDROCHLORIDE 20 MG/ML
INJECTION, SOLUTION INFILTRATION; PERINEURAL PRN
Status: DISCONTINUED | OUTPATIENT
Start: 2021-07-08 | End: 2021-07-08

## 2021-07-08 RX ORDER — FENTANYL CITRATE 50 UG/ML
INJECTION, SOLUTION INTRAMUSCULAR; INTRAVENOUS PRN
Status: DISCONTINUED | OUTPATIENT
Start: 2021-07-08 | End: 2021-07-08

## 2021-07-08 RX ORDER — PROPOFOL 10 MG/ML
INJECTION, EMULSION INTRAVENOUS CONTINUOUS PRN
Status: DISCONTINUED | OUTPATIENT
Start: 2021-07-08 | End: 2021-07-08

## 2021-07-08 RX ORDER — ACETAMINOPHEN 325 MG/1
650 TABLET ORAL
Status: DISCONTINUED | OUTPATIENT
Start: 2021-07-08 | End: 2021-07-08 | Stop reason: HOSPADM

## 2021-07-08 RX ORDER — CEFAZOLIN SODIUM 2 G/100ML
2 INJECTION, SOLUTION INTRAVENOUS
Status: COMPLETED | OUTPATIENT
Start: 2021-07-08 | End: 2021-07-08

## 2021-07-08 RX ORDER — ONDANSETRON 2 MG/ML
4 INJECTION INTRAMUSCULAR; INTRAVENOUS EVERY 30 MIN PRN
Status: DISCONTINUED | OUTPATIENT
Start: 2021-07-08 | End: 2021-07-08 | Stop reason: HOSPADM

## 2021-07-08 RX ORDER — DEXAMETHASONE SODIUM PHOSPHATE 4 MG/ML
INJECTION, SOLUTION INTRA-ARTICULAR; INTRALESIONAL; INTRAMUSCULAR; INTRAVENOUS; SOFT TISSUE PRN
Status: DISCONTINUED | OUTPATIENT
Start: 2021-07-08 | End: 2021-07-08

## 2021-07-08 RX ADMIN — DEXAMETHASONE SODIUM PHOSPHATE 4 MG: 4 INJECTION, SOLUTION INTRA-ARTICULAR; INTRALESIONAL; INTRAMUSCULAR; INTRAVENOUS; SOFT TISSUE at 11:37

## 2021-07-08 RX ADMIN — DEXMEDETOMIDINE HYDROCHLORIDE 4 MCG: 100 INJECTION, SOLUTION INTRAVENOUS at 11:55

## 2021-07-08 RX ADMIN — HYDROMORPHONE HYDROCHLORIDE 0.5 MG: 1 INJECTION, SOLUTION INTRAMUSCULAR; INTRAVENOUS; SUBCUTANEOUS at 12:26

## 2021-07-08 RX ADMIN — DEXMEDETOMIDINE HYDROCHLORIDE 4 MCG: 100 INJECTION, SOLUTION INTRAVENOUS at 12:09

## 2021-07-08 RX ADMIN — PROPOFOL 40 MG: 10 INJECTION, EMULSION INTRAVENOUS at 11:42

## 2021-07-08 RX ADMIN — SODIUM CHLORIDE, POTASSIUM CHLORIDE, SODIUM LACTATE AND CALCIUM CHLORIDE: 600; 310; 30; 20 INJECTION, SOLUTION INTRAVENOUS at 11:20

## 2021-07-08 RX ADMIN — MIDAZOLAM 2 MG: 1 INJECTION INTRAMUSCULAR; INTRAVENOUS at 11:20

## 2021-07-08 RX ADMIN — KETOROLAC TROMETHAMINE 15 MG: 30 INJECTION, SOLUTION INTRAMUSCULAR at 12:12

## 2021-07-08 RX ADMIN — LIDOCAINE HYDROCHLORIDE 80 MG: 20 INJECTION, SOLUTION INFILTRATION; PERINEURAL at 11:30

## 2021-07-08 RX ADMIN — CEFAZOLIN SODIUM 2 G: 2 INJECTION, SOLUTION INTRAVENOUS at 11:26

## 2021-07-08 RX ADMIN — PROPOFOL 200 MG: 10 INJECTION, EMULSION INTRAVENOUS at 11:30

## 2021-07-08 RX ADMIN — ONDANSETRON 4 MG: 2 INJECTION INTRAMUSCULAR; INTRAVENOUS at 12:09

## 2021-07-08 RX ADMIN — OXYCODONE HYDROCHLORIDE 5 MG: 5 TABLET ORAL at 13:55

## 2021-07-08 RX ADMIN — FENTANYL CITRATE 50 MCG: 50 INJECTION, SOLUTION INTRAMUSCULAR; INTRAVENOUS at 11:35

## 2021-07-08 RX ADMIN — DEXMEDETOMIDINE HYDROCHLORIDE 4 MCG: 100 INJECTION, SOLUTION INTRAVENOUS at 12:05

## 2021-07-08 RX ADMIN — DEXMEDETOMIDINE HYDROCHLORIDE 8 MCG: 100 INJECTION, SOLUTION INTRAVENOUS at 11:43

## 2021-07-08 RX ADMIN — PROPOFOL 150 MCG/KG/MIN: 10 INJECTION, EMULSION INTRAVENOUS at 11:30

## 2021-07-08 RX ADMIN — FENTANYL CITRATE 50 MCG: 50 INJECTION, SOLUTION INTRAMUSCULAR; INTRAVENOUS at 11:30

## 2021-07-08 RX ADMIN — Medication 5 MG: at 12:19

## 2021-07-08 ASSESSMENT — MIFFLIN-ST. JEOR: SCORE: 1536.01

## 2021-07-08 ASSESSMENT — ENCOUNTER SYMPTOMS: SEIZURES: 0

## 2021-07-08 ASSESSMENT — LIFESTYLE VARIABLES: TOBACCO_USE: 1

## 2021-07-08 NOTE — DISCHARGE INSTRUCTIONS
[facility]  Discharge Instructions: Post-Operative Open Umbilical Hernia Repair    ACTIVITY    Take frequent, short walks and increase your activity gradually.      Avoid strenuous physical activity or heavy lifting greater than 15lbs. for 3-4 weeks.  You may climb stairs.    You may drive without restrictions when you are not using any prescription pain medication and feel comfortable in a car.    You may return to work/school when you are comfortable without any prescription pain medication.    WOUND CARE    You may remove your bandage and shower 48 hours after the surgery.  Pat your incision dry and leave it open to air.  Re-apply dressing (Band-Aids or gauze/tape) as needed for comfort or drainage.    You may have steri-strips (looks like white tape) on your incision.  You may peel off the steri-strips 2 weeks after your surgery if they have not peeled off on their own.     Do not soak your incision in a tub or pool for 2 weeks.     Do not apply any lotions, creams, or ointments to your incision.    A ridge under your incision is normal and will gradually resolve.    DIET    Start with liquids, then gradually resume your regular diet as tolerated.     Drink plenty of fluids to stay hydrated.    PAIN    Expect some tenderness and discomfort at the incision site(s).  Use the prescribed pain medication at your discretion.  Expect gradual resolution of your pain over several days.    You may take ibuprofen with food (unless you have been told not to) or acetaminophen/Tylenol instead of or in addition to your prescribed pain medication.    Do not drink alcohol or drive while you are taking pain medications.    You may apply ice to your incisions in 20 minute intervals as needed for the next 48 hours.  After that time, consider switching to heat if you prefer.    EXPECTATIONS    Pain medications can cause constipation.  Limit use when possible.  Take over the counter stool  softener/stimulant, such as Colace or Senna, 1-2 times a day with plenty of water.  You may take a mild over the counter laxative, such as Miralax or a suppository, as needed.  You may take 1 oz. (2 tablespoons) Milk of Magnesia the evening following surgery to encourage bowel movement.    You may discontinue these medications once you are having regular bowel movements and/or are no longer taking your narcotic pain medication.      FOLLOW UP    Our office will contact you in approximately 2 weeks to check on your progress and answer any questions you may have.  If you are doing well, you will not need to return for a follow up appointment.  If any concerns are identified over the phone, we will help you make an appointment to see a provider.     If you have not received a phone call, have any questions or concerns, or would like to be seen, please call us at [phone] and ask to speak with our nurse.  We are located at [address] [place] [address].    CALL OUR OFFICE -[phone] IF YOU HAVE:     Chills or fever above 101 F.    Increased redness, warmth, or drainage at your incisions.    Significant bleeding.    Pain not relieved by your pain medication or rest.    Increasing pain after the first 48 hours.    Any other concerns or questions.         Same Day Surgery Discharge Instructions for  Sedation and General Anesthesia       It's not unusual to feel dizzy, light-headed or faint for up to 24 hours after surgery or while taking pain medication.  If you have these symptoms: sit for a few minutes before standing and have someone assist you when you get up to walk or use the bathroom.      You should rest and relax for the next 24 hours. We recommend you make arrangements to have an adult stay with you for at least 24 hours after your discharge.  Avoid hazardous and strenuous activity.      DO NOT DRIVE any vehicle or operate mechanical equipment for 24 hours following the end of your  surgery.  Even though you may feel normal, your reactions may be affected by the medication you have received.      Do not drink alcoholic beverages for 24 hours following surgery.       Slowly progress to your regular diet as you feel able. It's not unusual to feel nauseated and/or vomit after receiving anesthesia.  If you develop these symptoms, drink clear liquids (apple juice, ginger ale, broth, 7-up, etc. ) until you feel better.  If your nausea and vomiting persists for 24 hours, please notify your surgeon.        All narcotic pain medications, along with inactivity and anesthesia, can cause constipation. Drinking plenty of liquids and increasing fiber intake will help.      For any questions of a medical nature, call your surgeon.      Do not make important decisions for 24 hours.      If you had general anesthesia, you may have a sore throat for a couple of days related to the breathing tube used during surgery.  You may use Cepacol lozenges to help with this discomfort.  If it worsens or if you develop a fever, contact your surgeon.       If you feel your pain is not well managed with the pain medications prescribed by your surgeon, please contact your surgeon's office to let them know so they can address your concerns.       CoVid 19 Information    We want to give you information regarding Covid. Please consult your primary care provider with any questions you might have.     Patient who have symptoms (cough, fever, or shortness of breath), need to isolate for 7 days from when symptoms started OR 72 hours after fever resolves (without fever reducing medications) AND improvement of respiratory symptoms (whichever is longer).      Isolate yourself at home (in own room/own bathroom if possible)    Do Not allow any visitors    Do Not go to work or school    Do Not go to Denominational,  centers, shopping, or other public places.    Do Not shake hands.    Avoid close and intimate contact with others (hugging,  kissing).    Follow CDC recommendations for household cleaning of frequently touched services.     After the initial 7 days, continue to isolate yourself from household members as much as possible. To continue decrease the risk of community spread and exposure, you and any members of your household should limit activities in public for 14 days after starting home isolation.     You can reference the following CDC link for helpful home isolation/care tips:  https://www.cdc.gov/coronavirus/2019-ncov/downloads/10Things.pdf    Protect Others:    Cover Your Mouth and Nose with a mask, disposable tissue or wash cloth to avoid spreading germs to others.    Wash your hands and face frequently with soap and water    Call Your Primary Doctor If: Breathing difficulty develops or you become worse.    For more information about COVID19 and options for caring for yourself at home, please visit the CDC website at https://www.cdc.gov/coronavirus/2019-ncov/about/steps-when-sick.html  For more options for care at [facility], please visit our website at [email or web address]                  **If you have concerns or questions about your procedure,    please contact Dr. Nunez at  [phone]**

## 2021-07-08 NOTE — OP NOTE
General Surgery Operative Note    PREOPERATIVE DIAGNOSIS:  Umbilical hernia without obstruction and without gangrene [K42.9]    POSTOPERATIVE DIAGNOSIS:  Same    PROCEDURE:   Procedure(s):  OPEN UMBILICAL HERNIA REPAIR WITH MESH    ANESTHESIA:  General.      SURGEON:  Ankit Nunez MD    ASSISTANT:  Barry Putnam MD. The physician  was medically necessary for their expertise in  suctioning, and retraction    INDICATIONS:  The patient has an umbilical hernia that is bothersome. The risks and options were reviewed. The patient appeared to understand and wished to proceed.    PROCEDURE:  The patient was taken to the operating suite.  The operative area was prepped and draped in a sterile fashion.  Surgeon initiated timeout was acknowledged.   An inferior robles-umbilical incision was made after infiltrating with local anesthetic. The hernia was dissected away from the skin and subcutaneous tissues. The hernia contents were reduced. A retro-fascial plane was developed in all directions for several centimeters under direct vision. A 4.6cm piece of bilayer mesh was selected. 2-0 PDS sutures were placed at the 12, 3, 6, and 9 O'clock positions going through fascia, mesh, and back up through fascia. The mesh was folded and inserted into the retrofascial position. The sutures were tightened and the mesh was checked to make certain it deployed correctly. The sutures were then tied. The fascia was close primarily with interrupted 0 Prolene sutures, burying the knots. Sponge count was done and was reported as correct. The umbilicus was re-inverted with interrupted 3-0 vicryl. Further anesthetic was infiltrated and the skin closed with 4-0 vicryl and steri-strips.    The skin edges were reapproximated with 4-0 Vicryl and Steri-Strips.  The patient was  awakened and taken to the PACU in stable condition.  At the conclusion of the case, all counts were correct.        INTRAOPERATIVE FINDINGS:  Umbilical hernia, reducible    Ankit  Job Nunez MD

## 2021-07-08 NOTE — ANESTHESIA POSTPROCEDURE EVALUATION
Patient: Joanne Escalante    Procedure(s):  OPEN UMBILICAL HERNIA REPAIR WITH MESH    Diagnosis:Umbilical hernia without obstruction and without gangrene [K42.9]  Diagnosis Additional Information: No value filed.    Anesthesia Type:  General    Note:     Postop Pain Control: Uneventful            Sign Out: Well controlled pain   PONV: No   Neuro/Psych: Uneventful            Sign Out: Acceptable/Baseline neuro status   Airway/Respiratory: Uneventful            Sign Out: Acceptable/Baseline resp. status   CV/Hemodynamics: Uneventful            Sign Out: Acceptable CV status; No obvious hypovolemia; No obvious fluid overload   Other NRE: NONE   DID A NON-ROUTINE EVENT OCCUR? No           Last vitals:  Vitals:    07/08/21 1330 07/08/21 1345 07/08/21 1400   BP: 95/58 104/69 109/67   Pulse: 70 82 80   Resp: 16 16 16   Temp:  36.6  C (97.9  F)    SpO2: 99% 96% 97%       Last vitals prior to Anesthesia Care Transfer:  CRNA VITALS  7/8/2021 1203 - 7/8/2021 1303      7/8/2021             Pulse:  91    Ht Rate:  87    SpO2:  93 %    Resp Rate (observed):  (!) 2    Resp Rate (set):  10          Electronically Signed By: Vineet Alvarez MD  July 8, 2021  2:27 PM

## 2021-07-08 NOTE — OR NURSING
Dr. Alvarez notified of hypotension 83/51, HR 67. Patient asymptomatic. Order for bolus LR 500mL.

## 2021-07-08 NOTE — ANESTHESIA PREPROCEDURE EVALUATION
Anesthesia Pre-Procedure Evaluation    Patient: Joanne Escalante   MRN: [id number] : 1990        Preoperative Diagnosis: Umbilical hernia without obstruction and without gangrene [K42.9]   Procedure : Procedure(s):  OPEN UMBILICAL HERNIA REPAIR     Past Medical History:   Diagnosis Date     Anxiety      Depressive disorder      Endometriosis      Periumbilical hernia       Past Surgical History:   Procedure Laterality Date     C ORAL SURGERY PROCEDURE      [place] teeth      SECTION  2018, 2020    x 2     LAPAROSCOPY DIAGNOSTIC (GYN)  2015    Endometriosis     WOUND DEBRIDEMENT N/A 2020    Abdominal       Allergies   Allergen Reactions     Sulfa Drugs       Social History     Tobacco Use     Smoking status: Current Every Day Smoker     Packs/day: 0.50     Types: Cigarettes     Smokeless tobacco: Never Used   Substance Use Topics     Alcohol use: Not Currently      Wt Readings from Last 1 Encounters:   21 87.4 kg (192 lb 9.6 oz)        Anesthesia Evaluation   Pt has had prior anesthetic.     No history of anesthetic complications       ROS/MED HX  ENT/Pulmonary:     (+) tobacco use, Past use,  (-) sleep apnea   Neurologic:    (-) no seizures and no CVA   Cardiovascular:    (-) hypertension   METS/Exercise Tolerance:     Hematologic:       Musculoskeletal:       GI/Hepatic:    (-) GERD and liver disease   Renal/Genitourinary:    (-) renal disease   Endo:     (+) Obesity,  (-) Type II DM and thyroid disease   Psychiatric/Substance Use:     (+) psychiatric history anxiety and depression     Infectious Disease: Comment: ESBL      Malignancy:       Other:            Physical Exam    Airway        Mallampati: II   TM distance: > 3 FB   Neck ROM: full   Mouth opening: > 3 cm    Respiratory Devices and Support         Dental  no notable dental history         Cardiovascular   cardiovascular exam normal          Pulmonary   pulmonary exam normal                OUTSIDE LABS:  CBC:   Lab Results    Component Value Date    WBC 14.9 (H) 10/09/2020    WBC 9.9 09/08/2020    HGB 13.5 10/09/2020    HGB 14.1 09/08/2020    HCT 39.5 10/09/2020    HCT 41.3 09/08/2020     10/09/2020     09/08/2020     BMP:   Lab Results   Component Value Date     08/30/2020     08/21/2020    POTASSIUM 3.8 08/30/2020    POTASSIUM 3.7 08/21/2020    CHLORIDE 109 08/30/2020    CHLORIDE 109 08/21/2020    CO2 26 08/30/2020    CO2 25 08/21/2020    BUN 15 09/08/2020    BUN 10 08/31/2020    CR 0.71 09/08/2020    CR 0.71 08/31/2020    GLC 94 08/30/2020     (H) 08/21/2020     COAGS: No results found for: PTT, INR, FIBR  POC:   Lab Results   Component Value Date    HCG Negative 07/08/2021    HCGS Negative 08/19/2020     HEPATIC:   Lab Results   Component Value Date    ALBUMIN 3.5 08/21/2020    PROTTOTAL 7.3 08/19/2020    ALT 21 08/19/2020    AST 18 09/08/2020    ALKPHOS 52 08/19/2020    BILITOTAL 0.3 08/19/2020     OTHER:   Lab Results   Component Value Date    LACT 0.7 08/20/2020    WILDA 8.7 08/30/2020    PHOS 2.6 08/21/2020    TSH 1.56 01/06/2021    CRP 10.1 (H) 09/08/2020    SED 8 09/08/2020       Anesthesia Plan    ASA Status:  2   NPO Status:  NPO Appropriate    Anesthesia Type: General.     - Airway: ETT   Induction: Intravenous.   Maintenance: Balanced.        Consents    Anesthesia Plan(s) and associated risks, benefits, and realistic alternatives discussed. Questions answered and patient/representative(s) expressed understanding.     - Discussed with:  Patient         Postoperative Care    Pain management: Multi-modal analgesia.   PONV prophylaxis: Ondansetron (or other 5HT-3), Dexamethasone or Solumedrol, Background Propofol Infusion     Comments:                Vineet Alvarez MD

## 2021-07-08 NOTE — ANESTHESIA CARE TRANSFER NOTE
Patient: Joanne Escalante    Procedure(s):  OPEN UMBILICAL HERNIA REPAIR WITH MESH    Diagnosis: Umbilical hernia without obstruction and without gangrene [K42.9]  Diagnosis Additional Information: No value filed.    Anesthesia Type:   General     Note:    Oropharynx: oropharynx clear of all foreign objects  Level of Consciousness: awake  Oxygen Supplementation: nasal cannula  Level of Supplemental Oxygen (L/min / FiO2): 2  Independent Airway: airway patency satisfactory and stable  Dentition: dentition unchanged  Vital Signs Stable: post-procedure vital signs reviewed and stable  Report to RN Given: handoff report given  Patient transferred to: PACU    Handoff Report: Identifed the Patient, Identified the Reponsible Provider, Reviewed the pertinent medical history, Discussed the surgical course, Reviewed Intra-OP anesthesia mangement and issues during anesthesia, Set expectations for post-procedure period and Allowed opportunity for questions and acknowledgement of understanding      Vitals: (Last set prior to Anesthesia Care Transfer)  CRNA VITALS  7/8/2021 1203 - 7/8/2021 1239      7/8/2021             Pulse:  91    Ht Rate:  87    SpO2:  97 %    Resp Rate (observed):  18    Resp Rate (set):  10        Electronically Signed By: ISH Daniels CRNA  July 8, 2021  12:39 PM

## 2021-07-08 NOTE — BRIEF OP NOTE
[facility]    Brief Operative Note    Pre-operative diagnosis: Umbilical hernia without obstruction and without gangrene [K42.9]  Post-operative diagnosis Same as pre-operative diagnosis    Procedure: Procedure(s):  OPEN UMBILICAL HERNIA REPAIR WITH MESH  Surgeon: Surgeon(s) and Role:     * Ankit Nunez MD - Primary     * Barry Putnam MD - Resident - Assisting  Anesthesia: General   Estimated blood loss: 1 mL  Drains: None  Specimens: * No specimens in log *  Findings:   Reducible umbilical hernia containing fat, no bowel present within hernia. Hernia sac excised and repaired using mesh.   Complications: None.  Implants:   Implant Name Type Inv. Item Serial No.  Lot No. LRB No. Used Action   MESH COMPOSITE W/COLLAGEN 4CM OPEN VENTRAL PARIETEX PCO4VP - [id number] Mesh MESH COMPOSITE W/COLLAGEN 4CM OPEN VENTRAL PARIETEX PCO4VP  [facility] [id number] N/A 1 Implanted       Barry Putnam MD   7/8/2021 at 12:30 PM  General Surgery - PGY4  [phone]

## 2021-07-08 NOTE — ANESTHESIA PROCEDURE NOTES
Airway       Patient location during procedure: OR  Staff -        CRNA: Amy Trujillo APRN CRNA       Performed By: CRNA  Consent for Airway        Urgency: elective  Indications and Patient Condition       Indications for airway management: robles-procedural       Induction type:intravenous       Mask difficulty assessment: 1 - vent by mask    Final Airway Details       Final airway type: supraglottic airway    Supraglottic Airway Details        Type: LMA       Brand: LMA Unique       LMA size: 4    Post intubation assessment        Placement verified by: capnometry, equal breath sounds and chest rise        Number of attempts at approach: 1       Number of other approaches attempted: 0       Secured with: pink tape       Ease of procedure: easy       Dentition: Unchanged    Additional Comments       Soft bite block in place

## 2021-07-09 ENCOUNTER — TELEPHONE (OUTPATIENT)
Dept: SURGERY | Facility: CLINIC | Age: 31
End: 2021-07-09

## 2021-07-09 NOTE — TELEPHONE ENCOUNTER
S/p OPEN UMBILICAL HERNIA REPAIR WITH MESH  Date; 7/8/21  Surgeon: Dr. Nunez    Patient reports that she continues with incisional pain despite taking pain meds.  She is taking 1 tab oxycodone every 4 hours along with two ES Tylenol every 6 hours and Ibuprofen 2 tabs (400 mg) every 8 hours.  Surgical dressing in place.  No visible bruising or drainage from incision.  No fever.    She is using cold pack at surgical site intermittently.    We discussed that she can increase ibuprofen to 600mg every 6 hours - taken with food to minimize GI side effects. She has two small children - doing no lifting.  We discussed limiting activity, bending, reaching and twisting.      She is taking senna and will use Miralax if needed.    Encouraged her to call if further questions or concerns.

## 2021-07-09 NOTE — TELEPHONE ENCOUNTER
Name of caller: Patient    Reason for Call:  Symptoms  Patient is in a lot of pain and pain meds do not seem to be helping. She is also taking tylenol.    Surgeon:  Dr. Nunez     Recent Surgery:  Yes.    If yes, when & what type:  7/8/21      OPEN UMBILICAL HERNIA REPAIR WITH MESH    Best phone number to reach pt at is: [phone]    Ok to leave a message with medical info? Yes.

## 2021-07-11 ENCOUNTER — TELEPHONE (OUTPATIENT)
Dept: SURGERY | Facility: CLINIC | Age: 31
End: 2021-07-11

## 2021-07-11 ENCOUNTER — NURSE TRIAGE (OUTPATIENT)
Dept: NURSING | Facility: CLINIC | Age: 31
End: 2021-07-11

## 2021-07-11 DIAGNOSIS — G89.18 POSTOPERATIVE PAIN: Primary | ICD-10-CM

## 2021-07-11 RX ORDER — CYCLOBENZAPRINE HCL 5 MG
5 TABLET ORAL 3 TIMES DAILY PRN
Qty: 15 TABLET | Refills: 0 | Status: SHIPPED | OUTPATIENT
Start: 2021-07-11 | End: 2021-11-04

## 2021-07-11 NOTE — TELEPHONE ENCOUNTER
Hernia repair Surgery on Thursday, July 8th, hernia repair. Taking Tylenol and ibuprofen and in a lot of pain. Using ice packs, doing everything that was recommended.  [facility] [place] [facility]:  Dr Ankit Nunez: [phone].   12:25 p.m. the page  put out a page to the On Call surgeon to talk with the patient about her options at this time since she's done everything they've told her to do.  Marisa Gutierrez RN  [facility]    Reason for Disposition    [1] Follow-up call to recent contact AND [2] information only call, no triage required    Additional Information    Negative: [1] Caller is not with the adult (patient) AND [2] reporting urgent symptoms    Negative: Lab result questions    Negative: Medication questions    Negative: Caller can't be reached by phone    Negative: Caller has already spoken to PCP or another triager    Negative: RN needs further essential information from caller in order to complete triage    Negative: Requesting regular office appointment    Negative: [1] Caller requesting NON-URGENT health information AND [2] PCP's office is the best resource    Negative: Health Information question, no triage required and triager able to answer question    Negative: General information question, no triage required and triager able to answer question    Negative: Question about upcoming scheduled test, no triage required and triager able to answer question    Negative: [1] Caller is not with the adult (patient) AND [2] probable NON-URGENT symptoms    Protocols used: INFORMATION ONLY CALL - NO TRIAGE[facility]

## 2021-07-11 NOTE — TELEPHONE ENCOUNTER
Surgery:    30-year-old female status post open repair of umbilical hernia with placement of mesh on 7/8/2021.  Patient reports she is continuing to have significant pain at her surgical site.  She is out of her narcotic pain medication.  She states that she has been taking 800 mg of ibuprofen every 6 hours and 1000 mg of Tylenol every 6 hours.  I have encouraged her to keep her doses the same but not take the doses any more than every 8 hours.  She has also been applying ice to her surgical site.  I did offer the patient a prescription for Flexeril to help with muscular pain.  She believes this would be helpful.  The prescription was sent to the pharmacy of her choice.  I informed the patient to contact our clinic tomorrow to arrange for in person evaluation if her symptoms are not improving with the Flexeril.  She is in agreement with this plan.    Sallie Monte MD

## 2021-07-22 ENCOUNTER — TELEPHONE (OUTPATIENT)
Dept: SURGERY | Facility: CLINIC | Age: 31
End: 2021-07-22

## 2021-07-22 NOTE — TELEPHONE ENCOUNTER
[facility]  Post op call note - Open Umbilical Hernia Repair    Joanne Escalante was called for an update regarding her recovery.  She underwent an open umbilical hernia repair by Dr. Nunez on 7/8/21.  Today she tells me she is doing well and denies any complaints.  She currently does not need any pain medications, had some issues with post-op pain closer to time of surgery but now is not taking any pain medication.  She is eating a normal diet and her bowels are regular.   The patient states she is slowly resuming normal activity.  She states her wounds are healing well and the steri strips are off.  She denies any erythema or drainage at her wounds.  The patient denies fever/chills, n/v/d, abdominal pain, changes in urination or BM, or wound concerns.     She was advised to advance her activity as tolerated with no heavy lifting > 20 lbs or strenuous exercise x 1-2 weeks.  The patient states all of her questions were answered and she understands our discussion.  She agrees to follow up as needed and to call our office with any concerns.    Barry Putnam MD   Please route or send letter to:  Primary Care Provider (PCP)

## 2021-08-13 ENCOUNTER — TELEPHONE (OUTPATIENT)
Dept: SURGERY | Facility: CLINIC | Age: 31
End: 2021-08-13

## 2021-08-13 ENCOUNTER — NURSE TRIAGE (OUTPATIENT)
Dept: NURSING | Facility: CLINIC | Age: 31
End: 2021-08-13

## 2021-08-13 NOTE — TELEPHONE ENCOUNTER
Had hernia surgery one month ago (7/8//2021) and now has a stitch poking out   -coming out the right side of scar     Tried removing it herself  When pulling on it it creates a big bump under it and pain    No signs of infection   No fever   No pain when not messing with it  No bleeding   No drainage   No chills   No weakness    Triaged to a disposition of Call Surgeon's office in the morning. Patient is agreeable. Advised to not continue to pull on the stitch. Patient is agreeable.     Reason for Disposition    [1] Caller has NON-URGENT question AND [2] triager unable to answer question    Additional Information    Negative: [1] Major abdominal surgical incision AND [2] wound gaping open AND [3] visible internal organs    Negative: Sounds like a life-threatening emergency to the triager    Negative: Severe pain in the incision    Negative: [1] Incision gaping open AND [2] < 48 hours since wound re-opened    Negative: [1] Incision gaping open AND [2] length of opening > 2 inches (5 cm)    Negative: Patient sounds very sick or weak to the triager    Negative: Sounds like a serious complication to the triager    Negative: Fever > 100.4 F (38.0 C)    Negative: [1] Incision looks infected (spreading redness, pain) AND [2] fever > 99.5 F (37.5 C)    Negative: [1] Incision looks infected (spreading redness, pain) AND [2] large red area (> 2 in. or 5 cm)    Negative: [1] Incision looks infected (spreading redness, pain) AND [2] face wound    Negative: [1] Red streak runs from the incision AND [2] longer than 1 inch (2.5 cm)    Negative: [1] Pus or bad-smelling fluid draining from incision AND [2] no fever    Negative: [1] Post-op pain AND [2] not controlled with pain medications    Negative: Dressing soaked with blood or body fluid (e.g., drainage)    Negative: [1] Raised bruise and [2] size > 2 inches (5 cm) and expanding    Negative: [1] Caller has URGENT question AND [2] triager unable to answer question    Negative:  [1] INCREASING pain in incision AND [2] > 2 days (48 hours) since surgery    Negative: [1] Small red area or streak AND [2] no fever    Negative: [1] Clear or blood-tinged fluid draining from wound AND [2] no fever    Negative: [1] Incision gaping open AND [2] > 48 hours since wound re-opened AND [3] length of opening > 1/2 inch (12 mm)    Negative: [1] Incision on face gaping open after skin glue AND [2] > 48 hours since wound re-opened AND [3] length of opening > 1/4 inch (6 mm)    Negative: Suture or staple removal is overdue    Negative: [1] Suture or staple came out early AND [2] caller wants wound checked    Protocols used: POST-OP INCISION SYMPTOMS AND EOSSCOOST-P-NN    Niecy Law RN on 8/13/2021 at 12:35 AM

## 2021-09-26 ENCOUNTER — HEALTH MAINTENANCE LETTER (OUTPATIENT)
Age: 31
End: 2021-09-26

## 2021-10-28 ENCOUNTER — NURSE TRIAGE (OUTPATIENT)
Dept: NURSING | Facility: CLINIC | Age: 31
End: 2021-10-28

## 2021-10-28 NOTE — PROGRESS NOTES
SUBJECTIVE:   CC: Joanne Escalante is an 30 year old woman who presents for preventive health visit.     Patient has been advised of split billing requirements and indicates understanding: Yes       New patient to me:      Pt is fasting for labs.    Bipolar 2 and ptsd-gets care at [facility].  On lamictal and abilify and atarax prn. remeron at bedtime. Prn.  Has noticed some weight gain.  Has discussed that with them.   ,Denies suicidal or homicidal thoughts.  Patient instructed to go to the emergency room or call 911 if these occur.      PAP is due per pt.      Concerns:  - Per pt had hernia surgery a few months ago, still have some cramping in the abdominal area per pt.  Not daily. When it happens it is really bad.   Surgeon was dr. Nunez. This is in RUQ, hernia was umbillical.   Stays hydrated.   Not worse after certain foods.     Periods have worsened. Getting twice a month recently.   Mom with fibroids. Patient has had ablation. H/o endometriosis.         Healthy Habits:     Getting at least 3 servings of Calcium per day:  Yes    Bi-annual eye exam:  NO    Dental care twice a year:  NO    Sleep apnea or symptoms of sleep apnea:  Daytime drowsiness    Diet:  Regular (no restrictions)    Frequency of exercise:  None    Taking medications regularly:  Yes    Medication side effects:  Other    PHQ-2 Total Score: 4    Additional concerns today:  Yes       Smokes. Not wanting to quit right now. Is aware of risks.   Quit when she was pregnant. Will be meeting with therapist soon.               Today's PHQ-2 Score:   PHQ-2 ( 1999 Pf[employer]) 11/4/2021   Q1: Little interest or pleasure in doing things 2   Q2: Feeling down, depressed or hopeless 2   PHQ-2 Score 4   Q1: Little interest or pleasure in doing things More than half the days   Q2: Feeling down, depressed or hopeless More than half the days   PHQ-2 Score 4       Abuse: Current or Past (Physical, Sexual or Emotional) - No  Do you feel safe in your  MDS fish panel came back negative.  To d/w Shannon in path to order NGS panel.       Results:                    Negative MDS FISH panel.       1116 spoke with path, awaiting call back.    1116 writer called pt's daughter karyn and left detailed message relaying we are awaiting additional testing to bone marrow bx to be able to provide appropriate dx to insurance to get the procrit approved.  Writer will call Karyn with update by end of the week.     Writer param/tamika CORNELL and pathology dept.  D/t MDS FISH panel coming back negative, we would like to proceed with NGS panel (myeloid malignancy mutation panel).  This would need to be done with fresh peripheral blood sample and sent to [facility].  Unfortunately we cannot send pt's bone marrow periphin block d/t the fixative it's in.    Writer param/tamika CORNELL, pt just had labs today, and option is to wait for the copper level to come back, if pt has deficiency in copper anemia we may get the procrit approved.  To call daughter and see if they are okay with this.  We are unsure of what the cost would be, but when we did the  it looked covered by insurance with $0 cost to pt.    Writer called Karyn, she understands that we can wait to do NGS panel until after copper level is back next week, or proceend with NGS panel now.  Writer explained , that that isn't a guarantee  Of coverage.  She would like to bring pt in now for test since it is a 12-14 day turn around.      Writer will call Karyn with copper level next week.     Reminder placed to watch for results.    environment? Yes        Social History     Tobacco Use     Smoking status: Current Every Day Smoker     Packs/day: 0.50     Types: Cigarettes     Smokeless tobacco: Never Used   Substance Use Topics     Alcohol use: Not Currently     If you drink alcohol do you typically have >3 drinks per day or >7 drinks per week? No    Alcohol Use 2021   Prescreen: >3 drinks/day or >7 drinks/week? Not Applicable   No flowsheet data found.    Reviewed orders with patient.  Reviewed health maintenance and updated orders accordingly - Yes      Breast Cancer Screening:  Any new diagnosis of family breast, ovarian, or bowel cancer? No    FHS-7:   Breast CA Risk Assessment (FHS-7) 2021   Did any of your first-degree relatives have breast or ovarian cancer? Unknown   Did any of your relatives have bilateral breast cancer? No   Did any man in your family have breast cancer? No   Did any woman in your family have breast and ovarian cancer? Yes   Did any woman in your family have breast cancer before age 50 y? Unknown   Do you have 2 or more relatives with breast and/or ovarian cancer? No   Do you have 2 or more relatives with breast and/or bowel cancer? Unknown     Grandma with breast cancer.       click delete button to remove this line now  Patient under 40 years of age: Routine Mammogram Screening not recommended.   Pertinent mammograms are reviewed under the imaging tab.    History of abnormal Pap smear: NO - age 30- 65 PAP every 3 years recommended     Reviewed and updated as needed this visit by clinical staff  Tobacco  Allergies  Meds   Med Hx  Surg Hx  Fam Hx  Soc Hx        Reviewed and updated as needed this visit by Provider                Past Medical History:   Diagnosis Date     Anxiety      Depressive disorder      Endometriosis      Periumbilical hernia       Past Surgical History:   Procedure Laterality Date     C ORAL SURGERY PROCEDURE      Sutton teeth      SECTION  2018, 2020    x 2      HERNIORRHAPHY UMBILICAL N/A 2021    Procedure: OPEN UMBILICAL HERNIA REPAIR WITH MESH;  Surgeon: Ankit Nunez MD;  Location: [facility] OR     LAPAROSCOPY DIAGNOSTIC (GYN)  2015    Endometriosis     OTHER SURGICAL HISTORY      ablation     WOUND DEBRIDEMENT N/A 2020    Abdominal      OB History    Para Term  AB Living   2 2 2 0 0 2   SAB TAB Ectopic Multiple Live Births   0 0 0 0 2      # Outcome Date GA Lbr Derrick/2nd Weight Sex Delivery Anes PTL Lv   2 Term 20 37w4d  2.75 kg (6 lb 1 oz) M CS-LTranv Spinal  EDILBERTO      Birth Comments: wd infection, depression      Complications: Gestational diabetes      Name: Hiram   1 Term 18    M CS-LTranv   EDILBERTO      Complications: Postpartum depression      Name: Jamel       Review of Systems   Constitutional: Negative for chills and fever.   HENT: Negative for congestion, ear pain, hearing loss and sore throat.    Eyes: Negative for pain and visual disturbance.   Respiratory: Negative for cough and shortness of breath.    Cardiovascular: Negative for chest pain, palpitations and peripheral edema.   Gastrointestinal: Positive for abdominal pain. Negative for constipation, diarrhea, heartburn, hematochezia and nausea.   Breasts:  Negative for tenderness, breast mass and discharge.   Genitourinary: Positive for pelvic pain and vaginal bleeding. Negative for dysuria, frequency, genital sores, hematuria, urgency and vaginal discharge.   Musculoskeletal: Negative for arthralgias, joint swelling and myalgias.   Skin: Negative for rash.   Neurological: Negative for dizziness, weakness, headaches and paresthesias.   Psychiatric/Behavioral: Positive for mood changes. The patient is not nervous/anxious.           OBJECTIVE:   /86   Pulse 95   Temp 97.5  F (36.4  C) (Tympanic)   Resp 14   Wt 91.2 kg (201 lb)   LMP 10/27/2021 (Approximate)   SpO2 99%   Breastfeeding No   BMI 37.98 kg/m    Physical Exam  GENERAL: alert, no distress and  obese  EYES: Eyes grossly normal to inspection, PERRL and conjunctivae and sclerae normal  HENT: ear canals and TM's normal, nose and mouth without ulcers or lesions  NECK: no adenopathy, no asymmetry, masses, or scars and thyroid normal to palpation  RESP: lungs clear to auscultation - no rales, rhonchi or wheezes  BREAST: normal without masses, tenderness or nipple discharge and no palpable axillary masses or adenopathy  CV: regular rate and rhythm, normal S1 S2, no S3 or S4, no murmur, click or rub, no peripheral edema and peripheral pulses strong  ABDOMEN: soft, TENDER RUQ specifically, no hepatosplenomegaly, no masses and bowel sounds normal   (female): normal female external genitalia, normal urethral meatus, vaginal mucosa pink, moist, well rugated, and normal cervix without masses or discharge  MS: no gross musculoskeletal defects noted, no edema  SKIN: no suspicious lesions or rashes  NEURO: Normal strength and tone, mentation intact and speech normal  PSYCH: mentation appears normal, affect normal/bright    Diagnostic Test Results:  Labs reviewed in Epic    ASSESSMENT/PLAN:   (Z00.00) Routine general medical examination at a health care facility  (primary encounter diagnosis)  Comment:   Plan:     (N92.1) Menorrhagia with irregular cycle  Comment:   Plan: Ob/Gyn Referral, CBC with platelets and         differential        Would like to consult about hysterectomy, has had ablation already    (R10.11) RUQ abdominal pain  Comment:   Gallbladder, gerd, referred pain, IBD, IB'S, ulcer all in differential  Be seen again if symptoms worsen or change  No fevers or red flags  Will get US to look at gallbladder and have see surgeon for follow up prn    Plan: Comprehensive metabolic panel (BMP + Alb, Alk         Phos, ALT, AST, Total. Bili, TP), US Abdomen         Limited        \    (Z13.220) Screening, lipid  Comment:   Plan: Lipid panel reflex to direct LDL Fasting            (Z13.1) Screening for diabetes  "mellitus  Comment:   Plan: CANCELED: Basic metabolic panel  (Ca, Cl, CO2,         Creat, Gluc, K, Na, BUN)            (Z12.4) Screening for malignant neoplasm of cervix  Comment:   Plan: Pap screen reflex to HPV if ASCUS - recommend         age 25 - 29          Billin additional min (not related to preventative care) spent on patient today including chart review, history, exam, and explaining treatment plan and follow-up.       Patient has been advised of split billing requirements and indicates understanding: Yes  COUNSELING:  Reviewed preventive health counseling, as reflected in patient instructions       Regular exercise       Healthy diet/nutrition       Vision screening       Hearing screening    Estimated body mass index is 36.39 kg/m  as calculated from the following:    Height as of 21: 1.549 m (5' 1\").    Weight as of 21: 87.4 kg (192 lb 9.6 oz).    Weight management plan: Discussed healthy diet and exercise guidelines    She reports that she has been smoking cigarettes. She has been smoking about 0.50 packs per day. She has never used smokeless tobacco.  Tobacco Cessation Action Plan:   Information offered: Patient not interested at this time    Patient Instructions   Call imaging to schedule ultrasound of gallbladder  [phone]         Preventive Health Recommendations  Female Ages 26 - 39  Yearly exam:   See your health care provider every year in order to    Review health changes.     Discuss preventive care.      Review your medicines if you your doctor has prescribed any.    Until age 30: Get a Pap test every three years (more often if you have had an abnormal result).    After age 30: Talk to your doctor about whether you should have a Pap test every 3 years or have a Pap test with HPV screening every 5 years.   You do not need a Pap test if your uterus was removed (hysterectomy) and you have not had cancer.  You should be tested each year for STDs (sexually transmitted diseases), if " you're at risk.   Talk to your provider about how often to have your cholesterol checked.  If you are at risk for diabetes, you should have a diabetes test (fasting glucose).  Shots: Get a flu shot each year. Get a tetanus shot every 10 years.   Nutrition:     Eat at least 5 servings of fruits and vegetables each day.    Eat whole-grain bread, whole-wheat pasta and brown rice instead of white grains and rice.    Get adequate Calcium and Vitamin D.     Lifestyle    Exercise at least 150 minutes a week (30 minutes a day, 5 days of the week). This will help you control your weight and prevent disease.    Limit alcohol to one drink per day.    No smoking.     Wear sunscreen to prevent skin cancer.    See your dentist every six months for an exam and cleaning.        Counseling Resources:  ATP IV Guidelines  Pooled Cohorts Equation Calculator  Breast Cancer Risk Calculator  BRCA-Related Cancer Risk Assessment: FHS-7 Tool  FRAX Risk Assessment  ICSI Preventive Guidelines  Dietary Guidelines for Americans, 2010  USDA's MyPlate  ASA Prophylaxis  Lung CA Screening    Bina Turcios PA-C  [facility] [place]  Answers for HPI/ROS submitted by the patient on 11/4/2021  If you checked off any problems, how difficult have these problems made it for you to do your work, take care of things at home, or get along with other people?: Extremely difficult  PHQ9 TOTAL SCORE: 20

## 2021-10-28 NOTE — PATIENT INSTRUCTIONS
Call imaging to schedule ultrasound of gallbladder  [phone]         Preventive Health Recommendations  Female Ages 26 - 39  Yearly exam:   See your health care provider every year in order to    Review health changes.     Discuss preventive care.      Review your medicines if you your doctor has prescribed any.    Until age 30: Get a Pap test every three years (more often if you have had an abnormal result).    After age 30: Talk to your doctor about whether you should have a Pap test every 3 years or have a Pap test with HPV screening every 5 years.   You do not need a Pap test if your uterus was removed (hysterectomy) and you have not had cancer.  You should be tested each year for STDs (sexually transmitted diseases), if you're at risk.   Talk to your provider about how often to have your cholesterol checked.  If you are at risk for diabetes, you should have a diabetes test (fasting glucose).  Shots: Get a flu shot each year. Get a tetanus shot every 10 years.   Nutrition:     Eat at least 5 servings of fruits and vegetables each day.    Eat whole-grain bread, whole-wheat pasta and brown rice instead of white grains and rice.    Get adequate Calcium and Vitamin D.     Lifestyle    Exercise at least 150 minutes a week (30 minutes a day, 5 days of the week). This will help you control your weight and prevent disease.    Limit alcohol to one drink per day.    No smoking.     Wear sunscreen to prevent skin cancer.    See your dentist every six months for an exam and cleaning.

## 2021-10-29 NOTE — TELEPHONE ENCOUNTER
Triage Call:    Is on Abilitfy 5mg. Psychiatrist sent over a script for 10mg to split.  She accidentally took the full 10mg 1-1.5 hours ago.  She is reporting that her pupils are a little big and she is dizzy.    Pt was advised of protocol recommendation/disposition of speak with poison control.     Maty Gottlieb RN on 10/28/2021 at 9:43 PM        COVID 19 Nurse Triage Plan/Patient Instructions    Please be aware that novel coronavirus (COVID-19) may be circulating in the community. If you develop symptoms such as fever, cough, or SOB or if you have concerns about the presence of another infection including coronavirus (COVID-19), please contact your health care provider or visit [email or web address].     Disposition/Instructions    Speak with poison control    Thank you for taking steps to prevent the spread of this virus.  o Limit your contact with others.  o Wear a simple mask to cover your cough.  o Wash your hands well and often.    Resources    [facility]: About COVID-19: [email or web address]    CDC: What to Do If You're Sick: www.cdc.gov/coronavirus/2019-ncov/about/steps-when-sick.html    CDC: Ending Home Isolation: www.cdc.gov/coronavirus/2019-ncov/hcp/disposition-in-home-patients.html     CDC: Caring for Someone: www.cdc.gov/coronavirus/2019-ncov/if-you-are-sick/care-for-someone.html     [facility]: Interim Guidance for Hospital Discharge to Home: www.health.[facility].mn.us/diseases/coronavirus/hcp/hospdischarge.pdf    [facility] clinical trials (COVID-19 research studies): clinicalaffairs.[facility]-clinical-trials     Below are the COVID-19 hotlines at the [facility] ([facility]). Interpreters are available.   o For health questions: Call 798-420-2095 or 1-456.991.9324 (7 a.m. to 7 p.m.)  o For questions about schools and childcare: Call 931-908-9747 or 1-193.883.6455 (7 a.m. to 7 p.m.)       Reason for Disposition    [1] DOUBLE DOSE (an extra dose or lesser amount) of  over-the-counter (OTC) drug AND [2] any symptoms (e.g., dizziness, nausea, pain, sleepiness)    Additional Information    Negative: Drug overdose and triager unable to answer question    Negative: Caller requesting information unrelated to medicine    Negative: Caller requesting a prescription for Strep throat and has a positive culture result    Negative: Rash while taking a medication or within 3 days of stopping it    Negative: Immunization reaction suspected    Negative: [1] Asthma and [2] having symptoms of asthma (cough, wheezing, etc.)    Negative: [1] Influenza symptoms AND [2] anti-viral med prescription request, such as Tamiflu    Negative: [1] Symptom of illness (e.g., headache, abdominal pain, earache, vomiting) AND [2] more than mild    Negative: MORE THAN A DOUBLE DOSE of a prescription or over-the-counter (OTC) drug    Protocols used: MEDICATION QUESTION CALL-A-

## 2021-11-04 ENCOUNTER — OFFICE VISIT (OUTPATIENT)
Dept: FAMILY MEDICINE | Facility: CLINIC | Age: 31
End: 2021-11-04
Payer: COMMERCIAL

## 2021-11-04 VITALS
SYSTOLIC BLOOD PRESSURE: 124 MMHG | RESPIRATION RATE: 14 BRPM | WEIGHT: 201 LBS | TEMPERATURE: 97.5 F | OXYGEN SATURATION: 99 % | HEART RATE: 95 BPM | DIASTOLIC BLOOD PRESSURE: 86 MMHG | BODY MASS INDEX: 37.98 KG/M2

## 2021-11-04 DIAGNOSIS — Z13.220 SCREENING, LIPID: ICD-10-CM

## 2021-11-04 DIAGNOSIS — N92.1 MENORRHAGIA WITH IRREGULAR CYCLE: ICD-10-CM

## 2021-11-04 DIAGNOSIS — R10.11 RUQ ABDOMINAL PAIN: ICD-10-CM

## 2021-11-04 DIAGNOSIS — Z00.00 ROUTINE GENERAL MEDICAL EXAMINATION AT A HEALTH CARE FACILITY: Primary | ICD-10-CM

## 2021-11-04 DIAGNOSIS — Z12.4 SCREENING FOR MALIGNANT NEOPLASM OF CERVIX: ICD-10-CM

## 2021-11-04 DIAGNOSIS — Z13.1 SCREENING FOR DIABETES MELLITUS: ICD-10-CM

## 2021-11-04 LAB
ALBUMIN SERPL-MCNC: 4.1 G/DL (ref 3.4–5)
ALP SERPL-CCNC: 44 U/L (ref 40–150)
ALT SERPL W P-5'-P-CCNC: 28 U/L (ref 0–50)
ANION GAP SERPL CALCULATED.3IONS-SCNC: 7 MMOL/L (ref 3–14)
AST SERPL W P-5'-P-CCNC: 15 U/L (ref 0–45)
BASOPHILS # BLD AUTO: 0.1 10E3/UL (ref 0–0.2)
BASOPHILS NFR BLD AUTO: 0 %
BILIRUB SERPL-MCNC: 0.4 MG/DL (ref 0.2–1.3)
BUN SERPL-MCNC: 9 MG/DL (ref 7–30)
CALCIUM SERPL-MCNC: 9.6 MG/DL (ref 8.5–10.1)
CHLORIDE BLD-SCNC: 104 MMOL/L (ref 94–109)
CHOLEST SERPL-MCNC: 213 MG/DL
CO2 SERPL-SCNC: 25 MMOL/L (ref 20–32)
CREAT SERPL-MCNC: 0.72 MG/DL (ref 0.52–1.04)
EOSINOPHIL # BLD AUTO: 0.1 10E3/UL (ref 0–0.7)
EOSINOPHIL NFR BLD AUTO: 1 %
ERYTHROCYTE [DISTWIDTH] IN BLOOD BY AUTOMATED COUNT: 12.5 % (ref 10–15)
FASTING STATUS PATIENT QL REPORTED: YES
GFR SERPL CREATININE-BSD FRML MDRD: >90 ML/MIN/1.73M2
GLUCOSE BLD-MCNC: 90 MG/DL (ref 70–99)
HCT VFR BLD AUTO: 44.8 % (ref 35–47)
HDLC SERPL-MCNC: 51 MG/DL
HGB BLD-MCNC: 15.6 G/DL (ref 11.7–15.7)
LDLC SERPL CALC-MCNC: 139 MG/DL
LYMPHOCYTES # BLD AUTO: 3.2 10E3/UL (ref 0.8–5.3)
LYMPHOCYTES NFR BLD AUTO: 26 %
MCH RBC QN AUTO: 29.9 PG (ref 26.5–33)
MCHC RBC AUTO-ENTMCNC: 34.8 G/DL (ref 31.5–36.5)
MCV RBC AUTO: 86 FL (ref 78–100)
MONOCYTES # BLD AUTO: 0.6 10E3/UL (ref 0–1.3)
MONOCYTES NFR BLD AUTO: 5 %
NEUTROPHILS # BLD AUTO: 8.3 10E3/UL (ref 1.6–8.3)
NEUTROPHILS NFR BLD AUTO: 68 %
NONHDLC SERPL-MCNC: 162 MG/DL
PLATELET # BLD AUTO: 319 10E3/UL (ref 150–450)
POTASSIUM BLD-SCNC: 3.6 MMOL/L (ref 3.4–5.3)
PROT SERPL-MCNC: 7.4 G/DL (ref 6.8–8.8)
RBC # BLD AUTO: 5.22 10E6/UL (ref 3.8–5.2)
SODIUM SERPL-SCNC: 136 MMOL/L (ref 133–144)
TRIGL SERPL-MCNC: 115 MG/DL
WBC # BLD AUTO: 12.3 10E3/UL (ref 4–11)

## 2021-11-04 PROCEDURE — 99214 OFFICE O/P EST MOD 30 MIN: CPT | Mod: 25 | Performed by: PHYSICIAN ASSISTANT

## 2021-11-04 PROCEDURE — G0145 SCR C/V CYTO,THINLAYER,RESCR: HCPCS | Performed by: PHYSICIAN ASSISTANT

## 2021-11-04 PROCEDURE — 36415 COLL VENOUS BLD VENIPUNCTURE: CPT | Performed by: PHYSICIAN ASSISTANT

## 2021-11-04 PROCEDURE — 85025 COMPLETE CBC W/AUTO DIFF WBC: CPT | Performed by: PHYSICIAN ASSISTANT

## 2021-11-04 PROCEDURE — 99395 PREV VISIT EST AGE 18-39: CPT | Performed by: PHYSICIAN ASSISTANT

## 2021-11-04 PROCEDURE — 80053 COMPREHEN METABOLIC PANEL: CPT | Performed by: PHYSICIAN ASSISTANT

## 2021-11-04 PROCEDURE — 80061 LIPID PANEL: CPT | Performed by: PHYSICIAN ASSISTANT

## 2021-11-04 RX ORDER — LAMOTRIGINE 200 MG/1
200 TABLET ORAL DAILY
Qty: 30 TABLET | Refills: 0 | COMMUNITY
Start: 2021-11-04

## 2021-11-04 RX ORDER — ARIPIPRAZOLE 5 MG/1
5 TABLET ORAL DAILY
Qty: 30 TABLET | Refills: 0 | COMMUNITY
Start: 2021-11-04 | End: 2022-05-26

## 2021-11-04 RX ORDER — MIRTAZAPINE 15 MG/1
15 TABLET, ORALLY DISINTEGRATING ORAL AT BEDTIME
COMMUNITY
Start: 2021-11-04 | End: 2022-07-28

## 2021-11-04 ASSESSMENT — ENCOUNTER SYMPTOMS
DIZZINESS: 0
NAUSEA: 0
SHORTNESS OF BREATH: 0
SORE THROAT: 0
NERVOUS/ANXIOUS: 0
JOINT SWELLING: 0
ABDOMINAL PAIN: 1
PALPITATIONS: 0
BREAST MASS: 0
HEADACHES: 0
CONSTIPATION: 0
HEMATOCHEZIA: 0
FEVER: 0
CHILLS: 0
EYE PAIN: 0
MYALGIAS: 0
ARTHRALGIAS: 0
HEMATURIA: 0
HEARTBURN: 0
FREQUENCY: 0
COUGH: 0
PARESTHESIAS: 0
DYSURIA: 0
DIARRHEA: 0
WEAKNESS: 0

## 2021-11-04 ASSESSMENT — PATIENT HEALTH QUESTIONNAIRE - PHQ9
SUM OF ALL RESPONSES TO PHQ QUESTIONS 1-9: 20
SUM OF ALL RESPONSES TO PHQ QUESTIONS 1-9: 20
10. IF YOU CHECKED OFF ANY PROBLEMS, HOW DIFFICULT HAVE THESE PROBLEMS MADE IT FOR YOU TO DO YOUR WORK, TAKE CARE OF THINGS AT HOME, OR GET ALONG WITH OTHER PEOPLE: EXTREMELY DIFFICULT

## 2021-11-05 NOTE — RESULT ENCOUNTER NOTE
Darshan Rubio,       Your recent test results are attached, if you have any questions or concerns please feel free to contact me via e-mail or call [phone].  Sodium and potassium normal. Blood sugar (glucose) normal.  Creatinine and GFR normal, which means kidney function is normal.     Cholesterol elevated but stable.   White blood cell count slightly elevated but lower than previous. I will follow up with ultrasound.        It was a pleasure to see you at your recent office visit.      Sincerely,  Bina Turcios PA-C

## 2021-11-09 LAB
BKR LAB AP GYN ADEQUACY: NORMAL
BKR LAB AP GYN INTERPRETATION: NORMAL
BKR LAB AP HPV REFLEX: NORMAL
BKR LAB AP LMP: NORMAL
BKR LAB AP PREVIOUS ABNORMAL: NORMAL
PATH REPORT.COMMENTS IMP SPEC: NORMAL
PATH REPORT.COMMENTS IMP SPEC: NORMAL
PATH REPORT.RELEVANT HX SPEC: NORMAL

## 2021-11-18 ENCOUNTER — ANCILLARY PROCEDURE (OUTPATIENT)
Dept: ULTRASOUND IMAGING | Facility: CLINIC | Age: 31
End: 2021-11-18
Attending: PHYSICIAN ASSISTANT
Payer: COMMERCIAL

## 2021-11-18 DIAGNOSIS — R10.11 RUQ ABDOMINAL PAIN: ICD-10-CM

## 2021-11-18 PROCEDURE — 76705 ECHO EXAM OF ABDOMEN: CPT | Performed by: RADIOLOGY

## 2021-11-19 ENCOUNTER — TELEPHONE (OUTPATIENT)
Dept: FAMILY MEDICINE | Facility: CLINIC | Age: 31
End: 2021-11-19
Payer: COMMERCIAL

## 2021-11-19 DIAGNOSIS — K76.0 FATTY LIVER: ICD-10-CM

## 2021-11-19 DIAGNOSIS — R10.11 RUQ ABDOMINAL PAIN: ICD-10-CM

## 2021-11-19 DIAGNOSIS — Q63.1 HORSESHOE KIDNEY: Primary | ICD-10-CM

## 2021-11-19 NOTE — TELEPHONE ENCOUNTER
Darshan Curran,  The patient called and would like to talk to you about her recent US results please. She has some concerns. She wants you to know she also reached out to her physiatrist and let them know the results because apparently one of the medications she is taking can cause the results found.  Thank you,  Emy Moeller

## 2021-11-19 NOTE — RESULT ENCOUNTER NOTE
PLEASE CALL PATIENT she did already call about results so this is in response to that:  Dear Joanne,      It was a pleasure to see you at your recent office visit.  Your test results are listed below.       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  Since it is on the side of your pain, I have referred you to urology for further evaluation and to see if they think this could be contributing to pain.     2-liver shows concern for fatty infiltration therefore I have referred you to gastroenterology/hepatology for this.  MOST likely this is caused by your obesity but could be from other things.     3-tiny gallbladder polyp but no evidence of gallbladder disease.       Please see the specialists that I have referred you to as next steps.       If you have any questions or concerns, please call the clinic at [phone].    Sincerely,  Bina Turcios PA-C

## 2021-11-19 NOTE — TELEPHONE ENCOUNTER
PLEASE CALL PATIENT she did already call about results so this is in response to that:   Dear Joanne,       It was a pleasure to see you at your recent office visit.  Your test results are listed below.       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  Since it is on the side of your pain, I have referred you to urology for further evaluation and to see if they think this could be contributing to pain.     2-liver shows concern for fatty infiltration therefore I have referred you to gastroenterology/hepatology for this.  MOST likely this is caused by your obesity but could be from other things.     3-tiny gallbladder polyp but no evidence of gallbladder disease.       Please see the specialists that I have referred you to as next steps.       If you have any questions or concerns, please call the clinic at [phone].     Sincerely,   Bina Turcios PA-C         Patient informed of result note  Patient would like info of GI referral sent to her via [employer]    Patient states her kidneys are on her left side, her pain is on her right side.  Do you still want her to follow up with a urologist?      To provider to advise    Francine REDDINGN, RN

## 2021-11-19 NOTE — TELEPHONE ENCOUNTER
NOTE TO RN AND TC  BELOW:    TO RN:    US says Right kidney:     See below:    RIGHT KIDNEY: No hydronephrosis. Probable horseshoe kidney morphology    So yes I would.          TO TC:      Please send gastroenterology and urology referral info through [employer] to patient they are requesting this.     Bina Turcios PA-C

## 2021-11-22 ENCOUNTER — TRANSFERRED RECORDS (OUTPATIENT)
Dept: HEALTH INFORMATION MANAGEMENT | Facility: CLINIC | Age: 31
End: 2021-11-22
Payer: COMMERCIAL

## 2021-11-22 ENCOUNTER — NURSE TRIAGE (OUTPATIENT)
Dept: NURSING | Facility: CLINIC | Age: 31
End: 2021-11-22
Payer: COMMERCIAL

## 2021-11-22 NOTE — TELEPHONE ENCOUNTER
Patient calls with concerns regarding abdominal pain that she has had for a few weeks. She recently had an ultrasound that was concerning for fatty infiltration of her liver. She is scheduled to see a specialist in December. Patient reports that pain has been worse today. Pain is constant and rated at a 6-7, patient has also vomited twice today. She denies any blood in the vomit. Abdomen is more swollen than normal. Patient has been having diarrhea for the past 3 weeks.    Go to office now per protocol. Patient is advised on urgent care visit at this time. She verbalizes understanding and denies further questions or concerns.    Carlene Sesay RN  [facility] Nurse [facility]visors      Reason for Disposition    Vomiting and abdomen looks much more swollen than usual    Additional Information    Negative: Passed out (i.e., fainted, collapsed and was not responding)    Negative: Shock suspected (e.g., cold/pale/clammy skin, too weak to stand, low BP, rapid pulse)    Negative: Sounds like a life-threatening emergency to the triager    Negative: Chest pain    Negative: Pain is mainly in upper abdomen (if needed ask: 'is it mainly above the belly button?')    Negative: Abdominal pain and pregnant > 20 weeks    Negative: Abdominal pain and pregnant < 20 weeks    Negative: SEVERE abdominal pain (e.g., excruciating)    Negative: Vomiting red blood or black (coffee ground) material    Negative: Bloody, black, or tarry bowel movements (Exception: chronic-unchanged black-grey bowel movements and is taking iron pills or Pepto-bismol)    Negative: Vomiting bile (green color)    Negative: Patient sounds very sick or weak to the triager    Negative: Constant abdominal pain lasting > 2 hours    Protocols used: ABDOMINAL PAIN - FEMALE-A-OH  COVID 19 Nurse Triage Plan/Patient Instructions    Please be aware that novel coronavirus (COVID-19) may be circulating in the community. If you develop symptoms such as fever, cough, or SOB or  if you have concerns about the presence of another infection including coronavirus (COVID-19), please contact your health care provider or visit [email or web address].     Disposition/Instructions    In-Person Visit with provider recommended. Reference Visit Selection Guide.    Thank you for taking steps to prevent the spread of this virus.  o Limit your contact with others.  o Wear a simple mask to cover your cough.  o Wash your hands well and often.    Resources    [facility]: About COVID-19: [email or web address]/[email or web address]/    CDC: What to Do If You're Sick: www.cdc.gov/coronavirus/2019-ncov/about/steps-when-sick.html    CDC: Ending Home Isolation: www.cdc.gov/coronavirus/2019-ncov/hcp/disposition-in-home-patients.html     CDC: Caring for Someone: www.cdc.gov/coronavirus/2019-ncov/if-you-are-sick/care-for-someone.html     [facility]: Interim Guidance for Hospital Discharge to Home: www.[facility].[place]/diseases/coronavirus/hcp/hospdischarge.pdf    [facility] clinical trials (COVID-19 research studies): clinicalaffairs.[facility]-clinical-trials     Below are the COVID-19 hotlines at the [place] Department of Health ([facility]). Interpreters are available.   o For health questions: Call 392-065-0146 or 1-973.527.6869 (7 a.m. to 7 p.m.)  o For questions about schools and childcare: Call 088-457-0164 or 1-958.864.9781 (7 a.m. to 7 p.m.)

## 2021-11-26 ENCOUNTER — E-VISIT (OUTPATIENT)
Dept: URGENT CARE | Facility: URGENT CARE | Age: 31
End: 2021-11-26
Payer: COMMERCIAL

## 2021-11-26 ENCOUNTER — NURSE TRIAGE (OUTPATIENT)
Dept: NURSING | Facility: CLINIC | Age: 31
End: 2021-11-26
Payer: COMMERCIAL

## 2021-11-26 DIAGNOSIS — R50.9 FEVER IN ADULT: Primary | ICD-10-CM

## 2021-11-26 PROCEDURE — 99207 PR NON-BILLABLE SERV PER CHARTING: CPT | Performed by: PHYSICIAN ASSISTANT

## 2021-11-26 NOTE — TELEPHONE ENCOUNTER
"Pt calls to report onset of covid-like symptoms today:  - fever w/chills  - body aches  - cough  - vomiting x 2 (with cough spells only)  - nasal congestion  - fatigue  - headache    Temps have ranged 99.1 to 100.6 (via temporal scanner).  Intermittent chills.  Has taken Dayquil with \"a little\" relief.    Pt agrees to seek covid testing.  No open appt slots for virtual provider visits per checking with .  Pt therefore agrees to submit an E-Visit to obtain provider order for testing.  Discussed options for seeking testing per protocols:  COVID-19 testing at [facility] is by appointment only. You'll need to schedule a time to get tested. If you have symptoms (signs) of COVID, please log in to [employer] to complete an e-visit (virtual visit). This is the first step to getting tested.    If you don't have COVID symptoms and want to get tested, you should also log in to [employer] for an e-visit. This includes people who:    have had close contact with a COVID-positive person    want to be tested before or after travel    have taken part in high-risk activities    have a school testing mandate, or     were told to get tested by their care team or the health department.     A [facility] e-visit is the fastest way for you to be seen by our care team. Please choose  Next available provider  to complete an e-visit. When you choose this option, the average response time is less than one hour.  After the e-visit, you'll be able to self-schedule your test at one of our testing locations. To learn more about our testing locations or for other details, please visit our COVID-19 Resource Hub.    [employer] is also the fastest way to get your test results. You'll get your results in [employer] within 3 days. If you don't use [employer], you'll get your results in the mail in 7 to 10 days. If your test is positive and you don't view your result in [employer] within 1 business day, you'll get a phone call with your result. A positive " result means that you have COVID-19.    If you have an upcoming procedure at [facility], you'll need to be tested for COVID. The test needs to happen 2 to 4 days before your procedure. If you have an upcoming procedure, we will contact you to schedule a COVID test.    If you don't have a [facility] account, please call [phone] to set up a virtual visit. You can also find community testing sites in [place] at [place]id[place]/get-tested/testing-locations. If you live in [place], please visit www.[facility].[place].gov/covid-19/community-testing.htm.    (Also discussed self-care measures and isolation precautions per protocols. Pt verbalizes understanding.)    [facility] Nurse Advisor     Reason for Disposition    COVID-19 Home Isolation, questions about    COVID-19 Testing, questions about    Additional Information    Negative: SEVERE difficulty breathing (e.g., struggling for each breath, speaks in single words)    Negative: Difficult to awaken or acting confused (e.g., disoriented, slurred speech)    Negative: Bluish (or gray) lips or face now    Negative: Shock suspected (e.g., cold/pale/clammy skin, too weak to stand, low BP, rapid pulse)    Negative: Sounds like a life-threatening emergency to the triager    Negative: [1] COVID-19 exposure AND [2] no symptoms    Negative: COVID-19 vaccine reaction suspected (e.g., fever, headache, muscle aches) occurring 1 to 3 days after getting vaccine    Negative: COVID-19 vaccine, questions about    Negative: [1] Lives with someone known to have influenza (flu test positive) AND [2] flu-like symptoms (e.g., cough, runny nose, sore throat, SOB; with or without fever)    Negative: [1] Adult with possible COVID-19 symptoms AND [2] triager concerned about severity of symptoms or other causes    Negative: COVID-19 and breastfeeding, questions about    Negative: SEVERE or constant chest pain or pressure (Exception: mild central chest pain, present only when  coughing)    Negative: MODERATE difficulty breathing (e.g., speaks in phrases, SOB even at rest, pulse 100-120)    Negative: [1] Headache AND [2] stiff neck (can't touch chin to chest)    Negative: MILD difficulty breathing (e.g., minimal/no SOB at rest, SOB with walking, pulse <100)    Negative: Chest pain or pressure    Negative: Patient sounds very sick or weak to the triager    Negative: Fever > 103 F (39.4 C)    Negative: [1] Fever > 101 F (38.3 C) AND [2] age > 60 years    Negative: [1] Fever > 100.0 F (37.8 C) AND [2] bedridden (e.g., nursing home patient, CVA, chronic illness, recovering from surgery)    Negative: HIGH RISK for severe COVID complications (e.g., age > 64 years, obesity with BMI > 25, pregnant, chronic lung disease or other chronic medical condition)  (Exception: Already seen by PCP and no new or worsening symptoms.)    Negative: [1] HIGH RISK patient AND [2] influenza is widespread in the community AND [3] ONE OR MORE respiratory symptoms: cough, sore throat, runny or stuffy nose    Negative: [1] HIGH RISK patient AND [2] influenza exposure within the last 7 days AND [3] ONE OR MORE respiratory symptoms: cough, sore throat, runny or stuffy nose    Negative: [1] COVID-19 infection suspected by caller or triager AND [2] mild symptoms (cough, fever, or others) AND [3] negative COVID-19 rapid test    Negative: Fever present > 3 days (72 hours)    Negative: [1] Fever returns after gone for over 24 hours AND [2] symptoms worse or not improved    Negative: [1] Continuous (nonstop) coughing interferes with work or school AND [2] no improvement using cough treatment per protocol    Negative: Cough present > 3 weeks    Negative: [1] COVID-19 diagnosed by positive lab test AND [2] NO symptoms (e.g., cough, fever, others)    Negative: [1] COVID-19 diagnosed by positive lab test AND [2] mild symptoms (e.g., cough, fever, others) AND [3] no complications or SOB    Negative: [1] COVID-19 diagnosed by doctor  (or NP/PA) AND [2] mild symptoms (e.g., cough, fever, others) AND [3] no complications or SOB    Negative: [1] COVID-19 diagnosed AND [2] has mild nausea, vomiting or diarrhea    [facility] Specific Disposition - [facility] Specific Patient Instructions  COVID 19 Nurse Triage Plan/Patient Instructions    Please be aware that novel coronavirus (COVID-19) may be circulating in the community. If you develop symptoms such as fever, cough, or SOB or if you have concerns about the presence of another infection including coronavirus (COVID-19), please contact your health care provider or visit [email or web address]      Virtual Visit with provider recommended. Reference Visit Selection Guide.    Thank you for taking steps to prevent the spread of this virus.  Limit your contact with others.  Wear a simple mask to cover your cough.  Wash your hands well and often.    Resources  [facility]: About COVID-19: [email or web address]  CDC: What to Do If You're Sick: www.cdc.gov/coronavirus/2019-ncov/about/steps-when-sick.html  CDC: Ending Home Isolation: www.cdc.gov/coronavirus/2019-ncov/hcp/disposition-in-home-patients.html   CDC: Caring for Someone: www.cdc.gov/coronavirus/2019-ncov/if-you-are-sick/care-for-someone.html   [facility]: Interim Guidance for Hospital Discharge to Home: www.health.[facility].[place]/diseases/coronavirus/hcp/hospdischarge.pdf  [facility] clinical trials (COVID-19 research studies): clinicalaffairs.[facility]-clinical-trials   Below are the COVID-19 hotlines at the [place] Department of Health ([facility]). Interpreters are available.   For health questions: Call 331-466-7376 or 1-806.737.7231 (7 a.m. to 7 p.m.)  For questions about schools and childcare: Call 755-494-9442 or 1-120.186.2340 (7 a.m. to 7 p.m.)    Protocols used: CORONAVIRUS (COVID-19) DIAGNOSED OR [id number] 8.25.2021

## 2021-11-26 NOTE — PATIENT INSTRUCTIONS
Dear Joanne Escalante,    We are sorry you are not feeling well. Based on the responses you provided, it is recommended that you be seen in-person in urgent care so we can better evaluate your symptoms. Please click here to find the nearest urgent care location to you.   You will not be charged for this Visit. Thank you for trusting us with your care.    Marisabel Jeffrey PA-C

## 2021-11-30 ENCOUNTER — E-VISIT (OUTPATIENT)
Dept: INTERNAL MEDICINE | Facility: CLINIC | Age: 31
End: 2021-11-30
Payer: COMMERCIAL

## 2021-11-30 DIAGNOSIS — J02.9 SORE THROAT: ICD-10-CM

## 2021-11-30 DIAGNOSIS — Z20.822 SUSPECTED COVID-19 VIRUS INFECTION: ICD-10-CM

## 2021-11-30 PROCEDURE — 99421 OL DIG E/M SVC 5-10 MIN: CPT | Performed by: PHYSICIAN ASSISTANT

## 2021-11-30 NOTE — PATIENT INSTRUCTIONS
Dear Joanne Escalante,    Your symptoms show that you may have coronavirus (COVID-19). This illness can cause fever, cough and trouble breathing. Many people get a mild case and get better on their own. Some people can get very sick.    Because you also reported sore throat I would like to also test you for Strep Throat to determine if we need to treat you for that as well.    What should I do?  We would like to test you for Covid-19 virus and Strep Throat. I have placed orders for these tests.   To schedule: go to your [employer] home page and scroll down to the section that says  You have an appointment that needs to be scheduled  and click the large green button that says  Schedule Now  and follow the steps to find the next available openings. It is important that when you are asked what the reason for your appointment is that you mention you need BOTH Covid and Strep tests.    If you are unable to complete these [employer] scheduling steps, please call [phone] to schedule your testing.     Return to work/school/ guidance:   Please let your workplace manager and staffing office know when your quarantine ends     We can t give you an exact date as it depends on the above. You can calculate this on your own or work with your manager/staffing office to calculate this. (For example if you were exposed on 10/4, you would have to quarantine for 14 full days. That would be through 10/18. You could return on 10/19.)      If you receive a positive COVID-19 test result, follow the guidance of the those who are giving you the results. Usually the return to work is 10 (or in some cases 20 days from symptom onset.) If you work at [employer], you must also be cleared by Employee Occupational Health and Safety to return to work.        If you receive a negative COVID-19 test result and did not have a high risk exposure to someone with a known positive COVID-19 test, you can return to work once you're free of fever  for 24 hours without fever-reducing medication and your symptoms are improving or resolved.      If you receive a negative COVID-19 test and If you had a high risk exposure to someone who has tested positive for COVID-19 then you can return to work 14 days after your last contact with the positive individual    Note: If you have ongoing exposure to the covid positive person, this quarantine period may be more than 14 days. (For example, if you are continued to be exposed to your child who tested positive and cannot isolate from them, then the quarantine of 7-14 days can't start until your child is no longer contagious. This is typically 10 days from onset of the child's symptoms. So the total duration may be 17-24 days in this case.)    Sign up for [employer].   We know it's scary to hear that you might have COVID-19. We want to track your symptoms to make sure you're okay over the next 2 weeks. Please look for an email from [employer]--this is a free, online program that we'll use to keep in touch. To sign up, follow the link in the email you will receive. Learn more at [email or web address]    How can I take care of myself?    Get lots of rest. Drink extra fluids (unless a doctor has told you not to)    Take Tylenol (acetaminophen) or ibuprofen for fever or pain. If you have liver or kidney problems, ask your family doctor if it's okay to take Tylenol o ibuprofen    If you have other health problems (like cancer, heart failure, an organ transplant or severe kidney disease): Call your specialty clinic if you don't feel better in the next 2 days.    Know when to call 911. Emergency warning signs include:  o Trouble breathing or shortness of breath  o Pain or pressure in the chest that doesn't go away  o Feeling confused like you haven't felt before, or not being able to wake up  o Bluish-colored lips or face    Where can I get more information?  [facility] - About COVID-19:    [email or web address]    CDC - What to Do If You're Sick:   www.cdc.gov/coronavirus/2019-ncov/about/steps-when-sick.html    November 30, 2021  RE:  Joanne Escalante                                                                                                                  [address]  [place] [address]      To whom it may concern:    I evaluated Joanne Escalante on November 30, 2021. Joanne Escalante should be excused from work/school.     They should let their workplace manager and staffing office know when their quarantine ends.    We can not give an exact date as it depends on the information below. They can calculate this on their own or work with their manager/staffing office to calculate this. (For example if they were exposed on 10/04, they would have to quarantine for 14 full days. That would be through 10/18. They could return on 10/19.)    Quarantine Guidelines:      If patient receives a positive COVID-19 test result, they should follow the guidance of those who are giving the results. Usually the return to work is 10 (or in some cases 20 days from symptom onset.) If they work at [facility] [employer], they must be cleared by Employee Occupational Health and Safety to return to work.        If patient receives a negative COVID-19 test result and did not have a high risk exposure to someone with a known positive COVID-19 test, they can return to work once they're free of fever for 24 hours without fever-reducing medication and their symptoms are improving or resolved.      If patient receives a negative COVID-19 test and if they had a high risk exposure to someone who has tested positive for COVID-19 then they can return to work 14 days after their last contact with the positive individual    Note: If there is ongoing exposure to the covid positive person, this quarantine period may be longer than 14 days. (For example, if they are continually exposed to their child, who tested positive  and cannot isolate from them, then the quarantine of 7-14 days can't start until their child is no longer contagious. This is typically 10 days from onset to the child's symptoms. So the total duration may be 17-24 days in this case.)     Sincerely,  Varghese Le PA-C

## 2021-12-01 ENCOUNTER — CARE COORDINATION (OUTPATIENT)
Dept: UROLOGY | Facility: CLINIC | Age: 31
End: 2021-12-01

## 2021-12-01 ENCOUNTER — TELEPHONE (OUTPATIENT)
Dept: FAMILY MEDICINE | Facility: CLINIC | Age: 31
End: 2021-12-01

## 2021-12-01 NOTE — TELEPHONE ENCOUNTER
"Patient recently had a referral placed for a horseshoe kidney. Someone from our urology dept (so she thinks it was) called her and stated she needed a \"surgery\" referral as they do not deal with horseshoe kidneys. She is very confused and would like to talk to someone so she can move forward with her care.  Emy Moeller   "

## 2021-12-02 ENCOUNTER — E-VISIT (OUTPATIENT)
Dept: URGENT CARE | Facility: CLINIC | Age: 31
End: 2021-12-02
Payer: COMMERCIAL

## 2021-12-02 ENCOUNTER — NURSE TRIAGE (OUTPATIENT)
Dept: NURSING | Facility: CLINIC | Age: 31
End: 2021-12-02
Payer: COMMERCIAL

## 2021-12-02 DIAGNOSIS — R05.9 COUGH: ICD-10-CM

## 2021-12-02 DIAGNOSIS — R11.0 NAUSEA: ICD-10-CM

## 2021-12-02 DIAGNOSIS — U07.1 INFECTION DUE TO 2019 NOVEL CORONAVIRUS: ICD-10-CM

## 2021-12-02 PROCEDURE — 99421 OL DIG E/M SVC 5-10 MIN: CPT | Performed by: PHYSICIAN ASSISTANT

## 2021-12-02 RX ORDER — ONDANSETRON 4 MG/1
4 TABLET, ORALLY DISINTEGRATING ORAL EVERY 8 HOURS PRN
Qty: 20 TABLET | Refills: 0 | Status: SHIPPED | OUTPATIENT
Start: 2021-12-02 | End: 2022-11-24

## 2021-12-02 RX ORDER — BENZONATATE 100 MG/1
100 CAPSULE ORAL 3 TIMES DAILY PRN
Qty: 30 CAPSULE | Refills: 0 | Status: SHIPPED | OUTPATIENT
Start: 2021-12-02 | End: 2022-02-17

## 2021-12-02 RX ORDER — FLUTICASONE PROPIONATE 50 MCG
1 SPRAY, SUSPENSION (ML) NASAL DAILY
Qty: 16 G | Refills: 0 | Status: SHIPPED | OUTPATIENT
Start: 2021-12-02 | End: 2022-02-17

## 2021-12-02 RX ORDER — CODEINE PHOSPHATE AND GUAIFENESIN 10; 100 MG/5ML; MG/5ML
1-2 SOLUTION ORAL EVERY 4 HOURS PRN
Qty: 120 ML | Refills: 0 | Status: SHIPPED | OUTPATIENT
Start: 2021-12-02 | End: 2022-02-17

## 2021-12-02 NOTE — TELEPHONE ENCOUNTER
Hello, unfortunately I don't see the note where they called. I do see an appointment with urology scheduled still is that correct?  I am wondering if there was a miscommunication there.     Bina Turcios PA-C

## 2021-12-02 NOTE — TELEPHONE ENCOUNTER
Spoke with patient.  Urology did contact her last pm and she is scheduled for a urology consult this month.  She is wondering what the general surgery appointment is needed for.  I informed her that CANDELARIA Turcios PA-C has no idea why you were told need a surgery consult or by whom.  She did not place an order for one.  Patient does for sure have a urology consult scheduled.  She states not sure who or why she was to see a surgeon.  Will keep the pended urology appointment.  Emiyl La RN

## 2021-12-02 NOTE — TELEPHONE ENCOUNTER
Triage Call: Diagnosed with covid yesterday. Productive cough - clear or yellow sputum, nauseated, mild shortness of breath, loss of smell, constant all over chest pain, dizziness - needing a little support to walk some. Symptoms for about 5 days. According to the protocol, patient should be seen in the ED. Care advice given. Patient did verbalizes understanding and agreed with plan of care, however, nurse unsure if patient will go into ED as patient stated earlier in the call that she did not want to go into the ED.     COVID 19 Nurse Triage Plan/Patient Instructions    Please be aware that novel coronavirus (COVID-19) may be circulating in the community. If you develop symptoms such as fever, cough, or SOB or if you have concerns about the presence of another infection including coronavirus (COVID-19), please contact your health care provider or visit [email or web address].     Disposition/Instructions    ED Visit recommended. Follow protocol based instructions.     Bring Your Own Device:  Please also bring your smart device(s) (smart phones, tablets, laptops) and their charging cables for your personal use and to communicate with your care team during your visit.    Thank you for taking steps to prevent the spread of this virus.  o Limit your contact with others.  o Wear a simple mask to cover your cough.  o Wash your hands well and often.    Resources    [facility]: About COVID-19: [email or web address]    CDC: What to Do If You're Sick: www.cdc.gov/coronavirus/2019-ncov/about/steps-when-sick.html    CDC: Ending Home Isolation: www.cdc.gov/coronavirus/2019-ncov/hcp/disposition-in-home-patients.html     CDC: Caring for Someone: www.cdc.gov/coronavirus/2019-ncov/if-you-are-sick/care-for-someone.html     [facility]: Interim Guidance for Hospital Discharge to Home: www.health.[facility].[place]/diseases/coronavirus/hcp/hospdischarge.pdf    [facility] clinical trials (COVID-19 research  studies): clinicalaffairs.[facility]-clinical-trials     Below are the COVID-19 hotlines at the [place] Department of Health ([facility]). Interpreters are available.   o For health questions: Call 928-049-0576 or 1-803.170.7479 (7 a.m. to 7 p.m.)  o For questions about schools and childcare: Call 951-147-9850 or 1-274.977.9352 (7 a.m. to 7 p.m.)     Deepthi Bolivar RN Nursing Advisor 12/2/2021 4:32 PM     Reason for Disposition    SEVERE or constant chest pain or pressure (Exception: mild central chest pain, present only when coughing)    Additional Information    Negative: SEVERE difficulty breathing (e.g., struggling for each breath, speaks in single words)    Negative: Difficult to awaken or acting confused (e.g., disoriented, slurred speech)    Negative: Bluish (or gray) lips or face now    Negative: Shock suspected (e.g., cold/pale/clammy skin, too weak to stand, low BP, rapid pulse)    Negative: Sounds like a life-threatening emergency to the triager    Negative: [1] COVID-19 exposure AND [2] no symptoms    Negative: COVID-19 vaccine reaction suspected (e.g., fever, headache, muscle aches) occurring 1 to 3 days after getting vaccine    Negative: COVID-19 vaccine, questions about    Negative: [1] Lives with someone known to have influenza (flu test positive) AND [2] flu-like symptoms (e.g., cough, runny nose, sore throat, SOB; with or without fever)    Negative: [1] Adult with possible COVID-19 symptoms AND [2] triager concerned about severity of symptoms or other causes    Negative: COVID-19 and breastfeeding, questions about    Protocols used: CORONAVIRUS (COVID-19) DIAGNOSED OR [id number] 8.25.2021

## 2021-12-03 NOTE — PATIENT INSTRUCTIONS
Dear Joanne Escalante,    I am sorry you are having such a rough time. I will prescribe an inhaler a nasal spray, something for cough and something to help with nausea.   You could go through the [place] department of health to seek monoclonal antibody treatment to try to stop viral replication and keep you from getting worse: COVID-19 [facility] Monoclonal Antibody Program Information  Monoclonal antibody infusion via the [place] Resource Allocation Platform (MNRAP) system      https://z.[facility]/mnrap - Providers, patients or someone helping may complete this screening tool. [facility] offers this lottery process to refer patients for COVID-19 monoclonal antibody therapeutic REGEN-COV (casirivimab and imdevimab) including post-exposure prophylaxis. Referrals are provisional, and final clinical judgment remains with the infusion site to determine eligibility and scheduling.     You can go to [email or web address] to look at other home therapies and other resources about treating covid as well.     Sign up for [employer].   We know it's scary to hear that you might have COVID-19. We want to track your symptoms to make sure you're okay over the next 2 weeks. Please look for an email from [employer]anda[employer]--this is a free, online program that we'll use to keep in touch. To sign up, follow the link in the email you will receive. Learn more at [email or web address]    How can I take care of myself?    Get lots of rest. Drink extra fluids (unless a doctor has told you not to)    Take Tylenol (acetaminophen) or ibuprofen for fever or pain. If you have liver or kidney problems, ask your family doctor if it's okay to take Tylenol o ibuprofen    If you have other health problems (like cancer, heart failure, an organ transplant or severe kidney disease): Call your specialty clinic if you don't feel better in the next 2 days.    Know when to call 911. Emergency warning signs include:  o Trouble breathing or shortness of breath  o Pain  or pressure in the chest that doesn't go away  o Feeling confused like you haven't felt before, or not being able to wake up  o Bluish-colored lips or face    Where can I get more information?  [facility] - About COVID-19:   [email or web address]    CDC - What to Do If You're Sick:   www.cdc.gov/coronavirus/2019-ncov/about/steps-when-sick.html    December 2, 2021  RE:  Jonane Escalante                                                                                                                  [address]  [place] [address]      To whom it may concern:    I evaluated Joanne Escalante on December 2, 2021. Joanne Escalante should be excused from work/school.     They should let their workplace manager and staffing office know when their quarantine ends.    We can not give an exact date as it depends on the information below. They can calculate this on their own or work with their manager/staffing office to calculate this. (For example if they were exposed on 10/04, they would have to quarantine for 14 full days. That would be through 10/18. They could return on 10/19.)    Quarantine Guidelines:      If patient receives a positive COVID-19 test result, they should follow the guidance of those who are giving the results. Usually the return to work is 10 (or in some cases 20 days from symptom onset.) If they work at [employer] [facility] [employer], they must be cleared by Employee Occupational Health and Safety to return to work.        If patient receives a negative COVID-19 test result and did not have a high risk exposure to someone with a known positive COVID-19 test, they can return to work once they're free of fever for 24 hours without fever-reducing medication and their symptoms are improving or resolved.      If patient receives a negative COVID-19 test and if they had a high risk exposure to someone who has tested positive for COVID-19 then they can return to work 14 days after their last contact  with the positive individual    Note: If there is ongoing exposure to the covid positive person, this quarantine period may be longer than 14 days. (For example, if they are continually exposed to their child, who tested positive and cannot isolate from them, then the quarantine of 7-14 days can't start until their child is no longer contagious. This is typically 10 days from onset to the child's symptoms. So the total duration may be 17-24 days in this case.)     Sincerely,  Leighann Dixon PA-C

## 2021-12-07 DIAGNOSIS — Z79.899 ENCOUNTER FOR LONG-TERM (CURRENT) USE OF MEDICATIONS: Primary | ICD-10-CM

## 2021-12-27 ENCOUNTER — LAB (OUTPATIENT)
Dept: LAB | Facility: CLINIC | Age: 31
End: 2021-12-27
Payer: COMMERCIAL

## 2021-12-27 DIAGNOSIS — Z79.899 ENCOUNTER FOR LONG-TERM (CURRENT) USE OF MEDICATIONS: ICD-10-CM

## 2021-12-27 LAB
ALBUMIN SERPL-MCNC: 3.8 G/DL (ref 3.4–5)
ALP SERPL-CCNC: 43 U/L (ref 40–150)
ALT SERPL W P-5'-P-CCNC: 28 U/L (ref 0–50)
ANION GAP SERPL CALCULATED.3IONS-SCNC: 2 MMOL/L (ref 3–14)
AST SERPL W P-5'-P-CCNC: 11 U/L (ref 0–45)
BILIRUB SERPL-MCNC: 0.3 MG/DL (ref 0.2–1.3)
BUN SERPL-MCNC: 15 MG/DL (ref 7–30)
CALCIUM SERPL-MCNC: 8.9 MG/DL (ref 8.5–10.1)
CHLORIDE BLD-SCNC: 109 MMOL/L (ref 94–109)
CHOLEST SERPL-MCNC: 212 MG/DL
CO2 SERPL-SCNC: 28 MMOL/L (ref 20–32)
CREAT SERPL-MCNC: 0.73 MG/DL (ref 0.52–1.04)
FASTING STATUS PATIENT QL REPORTED: YES
GFR SERPL CREATININE-BSD FRML MDRD: >90 ML/MIN/1.73M2
GLUCOSE BLD-MCNC: 127 MG/DL (ref 70–99)
HBA1C MFR BLD: 5.8 % (ref 0–5.6)
HDLC SERPL-MCNC: 43 MG/DL
LDLC SERPL CALC-MCNC: 116 MG/DL
NONHDLC SERPL-MCNC: 169 MG/DL
POTASSIUM BLD-SCNC: 3.7 MMOL/L (ref 3.4–5.3)
PROT SERPL-MCNC: 6.7 G/DL (ref 6.8–8.8)
SODIUM SERPL-SCNC: 139 MMOL/L (ref 133–144)
TRIGL SERPL-MCNC: 265 MG/DL

## 2021-12-27 PROCEDURE — 80053 COMPREHEN METABOLIC PANEL: CPT

## 2021-12-27 PROCEDURE — 36415 COLL VENOUS BLD VENIPUNCTURE: CPT

## 2021-12-27 PROCEDURE — 83036 HEMOGLOBIN GLYCOSYLATED A1C: CPT

## 2021-12-27 PROCEDURE — 80061 LIPID PANEL: CPT

## 2022-01-11 ENCOUNTER — E-VISIT (OUTPATIENT)
Dept: URGENT CARE | Facility: CLINIC | Age: 32
End: 2022-01-11

## 2022-01-11 DIAGNOSIS — R09.81 CONGESTION OF PARANASAL SINUS: Primary | ICD-10-CM

## 2022-01-11 PROCEDURE — 99207 PR NO CHARGE LOS: CPT | Performed by: EMERGENCY MEDICINE

## 2022-01-11 NOTE — PATIENT INSTRUCTIONS
Dear Joanne Escalante,    Best that you be seen and evaluated since you have been this ill. Please come in today or tomorrow to urgent care.We are sorry you are not feeling well. Based on the responses you provided, it is recommended that you be seen in-person in urgent care so we can better evaluate your symptoms. Please click here to find the nearest urgent care location to you.   You will not be charged for this Visit. Thank you for trusting us with your care.    Ti Hong MD

## 2022-01-14 ENCOUNTER — E-VISIT (OUTPATIENT)
Dept: FAMILY MEDICINE | Facility: CLINIC | Age: 32
End: 2022-01-14
Payer: COMMERCIAL

## 2022-01-14 DIAGNOSIS — M54.40 LOW BACK PAIN WITH SCIATICA, SCIATICA LATERALITY UNSPECIFIED, UNSPECIFIED BACK PAIN LATERALITY, UNSPECIFIED CHRONICITY: Primary | ICD-10-CM

## 2022-01-14 PROCEDURE — 99207 PR NO CHARGE LOS: CPT | Performed by: PHYSICIAN ASSISTANT

## 2022-01-14 RX ORDER — CYCLOBENZAPRINE HCL 10 MG
10 TABLET ORAL 3 TIMES DAILY PRN
Qty: 60 TABLET | Refills: 1 | Status: SHIPPED | OUTPATIENT
Start: 2022-01-14 | End: 2022-12-05

## 2022-02-05 ENCOUNTER — NURSE TRIAGE (OUTPATIENT)
Dept: NURSING | Facility: CLINIC | Age: 32
End: 2022-02-05
Payer: COMMERCIAL

## 2022-02-05 NOTE — TELEPHONE ENCOUNTER
Pt calling to report severe headache pain, severe in one eye.  Pt believes she has a GI bug, has been vomiting and having diarrhea.  She is not able to keep fluids down.  She is unsteady when up and walking.      Triage disposition:  ED now.  Patient verbalized understanding and had no further questions.      COVID 19 Nurse Triage Plan/Patient Instructions    Please be aware that novel coronavirus (COVID-19) may be circulating in the community. If you develop symptoms such as fever, cough, or SOB or if you have concerns about the presence of another infection including coronavirus (COVID-19), please contact your health care provider or visit [email or web address].     Disposition/Instructions    ED Visit recommended. Follow protocol based instructions.     Bring Your Own Device:  Please also bring your smart device(s) (smart phones, tablets, laptops) and their charging cables for your personal use and to communicate with your care team during your visit.    Thank you for taking steps to prevent the spread of this virus.  o Limit your contact with others.  o Wear a simple mask to cover your cough.  o Wash your hands well and often.    Resources    [facility]: About COVID-19: [email or web address]    CDC: What to Do If You're Sick: www.cdc.gov/coronavirus/2019-ncov/about/steps-when-sick.html    CDC: Ending Home Isolation: www.cdc.gov/coronavirus/2019-ncov/hcp/disposition-in-home-patients.html     CDC: Caring for Someone: www.cdc.gov/coronavirus/2019-ncov/if-you-are-sick/care-for-someone.html     [facility]: Interim Guidance for Hospital Discharge to Home: www.health.[facility].[place]/diseases/coronavirus/hcp/hospdischarge.pdf    [facility] clinical trials (COVID-19 research studies): clinicalaffairs.[facility]/umn-clinical-trials     Below are the COVID-19 hotlines at the [place] Department of Health ([facility]). Interpreters are available.   o For health questions: Call 399-692-0770 or  1-253.499.8768 (7 a.m. to 7 p.m.)  o For questions about schools and childcare: Call 280-273-6340 or 1-968.481.5134 (7 a.m. to 7 p.m.)               Elizabeth Iglesias RN  [facility] Nurse Advisor        Reason for Disposition    Severe pain in one eye    Additional Information    Negative: Difficult to awaken or acting confused (e.g., disoriented, slurred speech)    Negative: [1] Weakness of the face, arm or leg on one side of the body AND [2] new onset    Negative: [1] Numbness of the face, arm or leg on one side of the body AND [2] new onset    Negative: [1] Loss of speech or garbled speech AND [2] new onset    Negative: Passed out (i.e., lost consciousness, collapsed and was not responding)    Negative: Sounds like a life-threatening emergency to the triager    Negative: Unable to walk, or can only walk with assistance (e.g., requires support)    Negative: Stiff neck (can't touch chin to chest)    Protocols used: HEADACHE-A-AH

## 2022-02-14 ENCOUNTER — TRANSFERRED RECORDS (OUTPATIENT)
Dept: HEALTH INFORMATION MANAGEMENT | Facility: CLINIC | Age: 32
End: 2022-02-14
Payer: COMMERCIAL

## 2022-02-14 LAB
ALT SERPL-CCNC: 22 IU/L (ref 0–32)
ALT SERPL-CCNC: 22 IU/L (ref 0–32)
AST SERPL-CCNC: 17 IU/L (ref 0–40)
AST SERPL-CCNC: 17 IU/L (ref 0–40)
CREATININE (EXTERNAL): 0.71 MG/DL (ref 0.57–1)
CREATININE (EXTERNAL): 0.71 MG/DL (ref 0.57–1)
GFR ESTIMATED (EXTERNAL): 115 ML/MIN/1.73
GFR ESTIMATED (EXTERNAL): 115 ML/MIN/1.73
GFR ESTIMATED (IF AFRICAN AMERICAN) (EXTERNAL): 132 ML/MIN/1.73
GFR ESTIMATED (IF AFRICAN AMERICAN) (EXTERNAL): 132 ML/MIN/1.73
GLUCOSE (EXTERNAL): 84 MG/DL (ref 65–99)
GLUCOSE (EXTERNAL): 84 MG/DL (ref 65–99)
HEP C HIM: NORMAL
HEP C HIM: NORMAL
POTASSIUM (EXTERNAL): 4.2 MMOL/L (ref 3.5–5.2)
POTASSIUM (EXTERNAL): 4.2 MMOL/L (ref 3.5–5.2)
TSH SERPL-ACNC: 1.02 UIU/ML (ref 0.45–4.5)
TSH SERPL-ACNC: 1.02 UIU/ML (ref 0.45–4.5)

## 2022-02-17 ENCOUNTER — VIRTUAL VISIT (OUTPATIENT)
Dept: UROLOGY | Facility: CLINIC | Age: 32
End: 2022-02-17
Attending: PHYSICIAN ASSISTANT
Payer: COMMERCIAL

## 2022-02-17 DIAGNOSIS — Q63.1 HORSESHOE KIDNEY: ICD-10-CM

## 2022-02-17 DIAGNOSIS — R10.11 RUQ ABDOMINAL PAIN: ICD-10-CM

## 2022-02-17 PROBLEM — Z04.89 ENCOUNTER FOR EXAMINATION AND OBSERVATION FOR OTHER SPECIFIED REASONS: Status: RESOLVED | Noted: 2019-10-01 | Resolved: 2019-10-25

## 2022-02-17 PROCEDURE — 99243 OFF/OP CNSLTJ NEW/EST LOW 30: CPT | Mod: 95 | Performed by: UROLOGY

## 2022-02-17 NOTE — PATIENT INSTRUCTIONS
Patient Education   Resources to Help You Quit Smoking  If you have quit smoking or are thinking about quitting, congratulations! It can be hard to quit smoking, but the benefits are well worth it. To help you quit and stay smoke-free, there are many resources that can help.  Your health plan  If you have health insurance, call them for more details about their phone coaching programs.    [facility] [place] [facility]:  4-086-594-BLUE (1-718.641.1587)    [facility]:  4-295-691-QUIT (1-932.581.4151)    [facility]:  4-484-981-BLUE (1-401.132.2344)    [facility]:  [phone]    [facility]:  1-889.452.6183    [facility] members:  1-404.283.2742    [facility]:   [phone]    PreferredCount includes the [facility]:  1-342.709.9922    [facility]:  1-869.365.7015  Other resources  American Cancer Society: 1-137.958.9657  The American Cancer Society can help you find local resources to quit smoking.  QUITPLAN: 8-441-863-PLAN (1-721.843.9465)  Offers a telephone helpline, gum, patches and lozenges. These services are free for the uninsured and those without coverage. The online program is free to everyone at www.quitplan.com.  American Lung Association: 4-397-LUNG-USA (1-469.576.3490)  Provides a lung helpline as well as an online program, self-help book and group clinic support for quitting smoking. www.lung.org/stop-smoking  National Cancer [place]:  5-828-425-QUIT (1-603.237.1160)  Offers a telephone hotline, online text chat and a website with tools, information and support for smokers who want to quit. www.smokefree.gov  Medication Therapy Management (MTM):  [phone] 860.328.1338 (toll free)  [email or web address]  This is a clinic program to help you quit smoking. It offers one-on-one sessions with a pharmacist. Call or email to find out if your insurance covers MTM and to schedule an appointment at all locations.  For informational purposes only.  Not to replace the advice of your health care provider. Copyright   2013 [place] [employer]. All rights reserved. Clinically reviewed by Freya Kee MD, [facility] Lung Cancer Screening Program. [employer] [id number] - Rev 06/19.

## 2022-02-17 NOTE — PROGRESS NOTES
Joanne is a 31 year old who is being evaluated via a billable video visit.      How would you like to obtain your AVS? MyChart  If the video visit is dropped, the invitation should be resent by: Text to cell phone:    Will anyone else be joining your video visit? No      Video Start Time: 805    Assessment & Plan   Problem List Items Addressed This Visit     RUQ abdominal pain      Other Visit Diagnoses     Horseshoe kidney               Review of the result(s) of each unique test - CT scan  30 minutes spent on the date of the encounter doing chart review, history and exam, documentation and further activities per the note       See Patient Instructions    No follow-ups on file.    Mohinder Hewitt MD  [facility] LOLITA    Colton Rubio is a 31 year old who presents for the following health issues horseshoe kidney    HPI     Patient is a pleasant 31-year-old female who was requested be seen by Bina Turcios for a consultation with regard to patient's horseshoe kidney.  Patient was being evaluated for abdominal pain and was found to have horseshoe kidney.  She denies any history of kidney stones or kidney problems in the past.         Review of SystemsA   Constitutional, HEENT, cardiovascular, pulmonary, gi and gu systems are negative, except as otherwise noted.      Objective           Vitals:  No vitals were obtained today due to virtual visit.    Physical Exam   GENERAL: Healthy, alert and no distress  EYES: Eyes grossly normal to inspection.  No discharge or erythema, or obvious scleral/conjunctival abnormalities.  RESP: No audible wheeze, cough, or visible cyanosis.  No visible retractions or increased work of breathing.    SKIN: Visible skin clear. No significant rash, abnormal pigmentation or lesions.  NEURO: Cranial nerves grossly intact.  Mentation and speech appropriate for age.  PSYCH: Mentation appears normal, affect normal/bright, judgement and insight intact, normal speech and  appearance well-groomed.    31-year-old  female with horseshoe kidney.  Anatomical findings discussed with patient.  Patient was reassured.  Follow-up as needed.            Video-Visit Details    Type of service:  Video Visit    Video End Time:815    Originating Location (pt. Location): Home    Distant Location (provider location):  [facility]     Platform used for Video Visit: SeeOn

## 2022-02-25 ENCOUNTER — TRANSFERRED RECORDS (OUTPATIENT)
Dept: HEALTH INFORMATION MANAGEMENT | Facility: CLINIC | Age: 32
End: 2022-02-25
Payer: COMMERCIAL

## 2022-02-25 LAB
ALT SERPL-CCNC: 21 IU/L (ref 0–32)
AST SERPL-CCNC: 19 IU/L (ref 0–40)

## 2022-05-01 ENCOUNTER — NURSE TRIAGE (OUTPATIENT)
Dept: NURSING | Facility: CLINIC | Age: 32
End: 2022-05-01
Payer: COMMERCIAL

## 2022-05-01 NOTE — TELEPHONE ENCOUNTER
"Triage call:     Patient calling with abnormal menstrual bleeding.   She reports a history of endometriosis   \"worst period I've ever had\"   Bleeding started yesterday, heavy bleeding started this morning.  She reports clots that are the size of a golf ball every time she sits down to urinate.   Severe cramps   Changing pad every hour   Nauseous and slightly pale  Taking ibuprofen and muscle relaxers without improvement.   Per protocol, patient should be seen within 4 hours (or PCP triage). Bleeding is between moderate and severe criteria. Page to on call provider, Trisha Pop who advises patient be seen in urgent care now.     Outbound call to patient to relay this information. She verbalizes understanding and agreement with this plan.    Edilma Elizondo RN   05/01/22 3:51 PM  [facility] Nurse Advisor    Reason for Disposition    MODERATE vaginal bleeding (i.e., soaking 1 pad or tampon per hour and present > 6 hours; 1 menstrual cup every 6 hours)    Additional Information    Negative: Shock suspected (e.g., cold/pale/clammy skin, too weak to stand, low BP, rapid pulse)    Negative: Difficult to awaken or acting confused (e.g., disoriented, slurred speech)    Negative: Passed out (i.e., lost consciousness, collapsed and was not responding)    Negative: Sounds like a life-threatening emergency to the triager    Negative: SEVERE abdominal pain    Negative: SEVERE dizziness (e.g., unable to stand, requires support to walk, feels like passing out now)    Negative: SEVERE vaginal bleeding (e.g., soaking 2 pads or tampons per hour and present 2 or more hours; 1 menstrual cup every 2 hours)    Negative: Patient sounds very sick or weak to the triager    Protocols used: VAGINAL BLEEDING - [id number]  COVID 19 Nurse Triage Plan/Patient Instructions    Please be aware that novel coronavirus (COVID-19) may be circulating in the community. If you develop symptoms such as fever, cough, or SOB or if you have concerns " about the presence of another infection including coronavirus (COVID-19), please contact your health care provider or visit [email or web address].     Disposition/Instructions    In-Person Visit with provider recommended. Reference Visit Selection Guide.    Thank you for taking steps to prevent the spread of this virus.  o Limit your contact with others.  o Wear a simple mask to cover your cough.  o Wash your hands well and often.    Resources    [facility]: About COVID-19: www.[employer].[email or web address]/covid19/    CDC: What to Do If You're Sick: www.cdc.gov/coronavirus/2019-ncov/about/steps-when-sick.html    CDC: Ending Home Isolation: www.cdc.gov/coronavirus/2019-ncov/hcp/disposition-in-home-patients.html     CDC: Caring for Someone: www.cdc.gov/coronavirus/2019-ncov/if-you-are-sick/care-for-someone.html     [facility]: Interim Guidance for Hospital Discharge to Home: www.[facility].[place]/diseases/coronavirus/hcp/hospdischarge.pdf    [facility] clinical trials (COVID-19 research studies): clinicalaffairs.[employer].[facility]/[employer]-clinical-trials     Below are the COVID-19 hotlines at the [place] Department of Health ([facility]). Interpreters are available.   o For health questions: Call 205-146-6218 or 1-906.927.5320 (7 a.m. to 7 p.m.)  o For questions about schools and childcare: Call 599-509-6069 or 1-754.948.9132 (7 a.m. to 7 p.m.)

## 2022-05-25 ENCOUNTER — NURSE TRIAGE (OUTPATIENT)
Dept: NURSING | Facility: CLINIC | Age: 32
End: 2022-05-25
Payer: COMMERCIAL

## 2022-05-25 NOTE — TELEPHONE ENCOUNTER
"Pt calling to report:    Having a bipolar manic episode  States not wanting to hurt herself or anyone else  Last week: Has felt really \"horny\", angry, really happy, can't get it under control, hyperactive, not sleeping  Dad passed away in August, has felt like has not had good control, had memorial service on Friday, was triggering  Works at night until 2AM, then up again at 7AM  Does not have a therapist any more, due to therapist not working at facility any longer  Started working again about a month ago, has not worked in 2 years  Hx: depression, anxiety, endometriosis  States will be starting her period    Per protocol, To ED, another person to drive is recommended. Care advice and when to call back given, verbalized understanding.     Peggy Hannah RN  [place] Nurse Advisor  05/25/22  6:40 PM          Reason for Disposition    [1] Bipolar disorder (manic depression) AND [2] unable to do any of normal activities (e.g., self care, school, work; in comparison to baseline).    Additional Information    Negative: Patient attempted suicide    Negative: Patient is threatening suicide now    Negative: Violent behavior, or threatening to physically hurt or kill someone    Negative: [1] Patient is very confused (disoriented, slurred speech) AND [2] no other adult (e.g., friend or family member) available    Negative: [1] Difficult to awaken or acting very confused (disoriented, slurred speech) AND [2] new onset    Negative: Drug overdose suspected    Negative: Sounds like a life-threatening emergency to the triager    Protocols used: BIPOLAR DISORDER (MANIC DEPRESSION)-A-AH      "

## 2022-05-26 ENCOUNTER — HOSPITAL ENCOUNTER (EMERGENCY)
Facility: CLINIC | Age: 32
Discharge: HOME | End: 2022-05-26
Attending: EMERGENCY MEDICINE | Admitting: EMERGENCY MEDICINE
Payer: COMMERCIAL

## 2022-05-26 ENCOUNTER — TELEPHONE (OUTPATIENT)
Dept: FAMILY MEDICINE | Facility: CLINIC | Age: 32
End: 2022-05-26
Payer: COMMERCIAL

## 2022-05-26 VITALS
OXYGEN SATURATION: 98 % | SYSTOLIC BLOOD PRESSURE: 117 MMHG | HEART RATE: 82 BPM | HEIGHT: 61 IN | DIASTOLIC BLOOD PRESSURE: 76 MMHG | BODY MASS INDEX: 37.08 KG/M2 | RESPIRATION RATE: 16 BRPM | WEIGHT: 196.4 LBS | TEMPERATURE: 98.3 F

## 2022-05-26 DIAGNOSIS — F31.9 BIPOLAR AFFECTIVE DISORDER, REMISSION STATUS UNSPECIFIED (H): Primary | ICD-10-CM

## 2022-05-26 DIAGNOSIS — F32.A DEPRESSION, UNSPECIFIED DEPRESSION TYPE: ICD-10-CM

## 2022-05-26 LAB
AMPHETAMINES UR QL SCN: ABNORMAL
BARBITURATES UR QL: ABNORMAL
BENZODIAZ UR QL: ABNORMAL
CANNABINOIDS UR QL SCN: ABNORMAL
COCAINE UR QL: ABNORMAL
OPIATES UR QL SCN: ABNORMAL
PCP UR QL SCN: ABNORMAL
SARS-COV-2 RNA RESP QL NAA+PROBE: NEGATIVE

## 2022-05-26 PROCEDURE — 90791 PSYCH DIAGNOSTIC EVALUATION: CPT

## 2022-05-26 PROCEDURE — 250N000013 HC RX MED GY IP 250 OP 250 PS 637: Performed by: PSYCHIATRY & NEUROLOGY

## 2022-05-26 PROCEDURE — C9803 HOPD COVID-19 SPEC COLLECT: HCPCS

## 2022-05-26 PROCEDURE — 80307 DRUG TEST PRSMV CHEM ANLYZR: CPT | Performed by: EMERGENCY MEDICINE

## 2022-05-26 PROCEDURE — 99285 EMERGENCY DEPT VISIT HI MDM: CPT | Mod: 25

## 2022-05-26 PROCEDURE — U0003 INFECTIOUS AGENT DETECTION BY NUCLEIC ACID (DNA OR RNA); SEVERE ACUTE RESPIRATORY SYNDROME CORONAVIRUS 2 (SARS-COV-2) (CORONAVIRUS DISEASE [COVID-19]), AMPLIFIED PROBE TECHNIQUE, MAKING USE OF HIGH THROUGHPUT TECHNOLOGIES AS DESCRIBED BY CMS-2020-01-R: HCPCS | Performed by: EMERGENCY MEDICINE

## 2022-05-26 PROCEDURE — 99204 OFFICE O/P NEW MOD 45 MIN: CPT | Performed by: STUDENT IN AN ORGANIZED HEALTH CARE EDUCATION/TRAINING PROGRAM

## 2022-05-26 RX ORDER — QUETIAPINE FUMARATE 50 MG/1
50-100 TABLET, FILM COATED ORAL AT BEDTIME
Qty: 14 TABLET | Refills: 0 | Status: SHIPPED | OUTPATIENT
Start: 2022-05-26 | End: 2023-09-17

## 2022-05-26 RX ORDER — HYDROXYZINE HYDROCHLORIDE 50 MG/1
50 TABLET, FILM COATED ORAL 3 TIMES DAILY PRN
Status: DISCONTINUED | OUTPATIENT
Start: 2022-05-26 | End: 2022-05-26 | Stop reason: HOSPADM

## 2022-05-26 RX ADMIN — HYDROXYZINE HYDROCHLORIDE 50 MG: 50 TABLET, FILM COATED ORAL at 15:36

## 2022-05-26 RX ADMIN — NICOTINE POLACRILEX 4 MG: 2 GUM, CHEWING ORAL at 14:53

## 2022-05-26 ASSESSMENT — ENCOUNTER SYMPTOMS
SHORTNESS OF BREATH: 0
HYPERACTIVE: 1
DYSPHORIC MOOD: 1

## 2022-05-26 NOTE — ED PROVIDER NOTES
History     Chief Complaint:  Psychiatric Evaluation       HPI   Joanne Escalante is a 31 year old female who presents with concerns about mental health.  She has been depressed and anxious and rapidly cycling through her feelings throughout the days.  She does not feel like her symptoms are managed well on her current medications so she came here to the emergency department.  She denies any thoughts of self-harm or harm to others but would like to be evaluated by her mental health team.  She denies any fevers or chills.  She does not have any chest pain or trouble breathing or other concerns.    ROS:  Review of Systems   Respiratory: Negative for shortness of breath.    Cardiovascular: Negative for chest pain.   Psychiatric/Behavioral: Positive for dysphoric mood. Negative for suicidal ideas. The patient is hyperactive.    All other systems reviewed and are negative.      Allergies:  Sulfa Drugs     Medications:    acetaminophen (TYLENOL) 500 MG tablet  ARIPiprazole (ABILIFY) 5 MG tablet  cyclobenzaprine (FLEXERIL) 10 MG tablet  hydrOXYzine (ATARAX) 25 MG tablet  ibuprofen (ADVIL/MOTRIN) 200 MG tablet  lamoTRIgine (LAMICTAL) 200 MG tablet  mirtazapine (REMERON SOL-TAB) 15 MG ODT  ondansetron (ZOFRAN-ODT) 4 MG ODT tab        Past Medical History:    Past Medical History:   Diagnosis Date     Anxiety      Depressive disorder      Endometriosis      Periumbilical hernia        Past Surgical History:    Past Surgical History:   Procedure Laterality Date      SECTION  2018, 2020    x 2     HERNIORRHAPHY UMBILICAL N/A 2021    Procedure: OPEN UMBILICAL HERNIA REPAIR WITH MESH;  Surgeon: Ankit Nunez MD;  Location: [facility] OR     LAPAROSCOPY DIAGNOSTIC (GYN)  2015    Endometriosis     OTHER SURGICAL HISTORY      ablation     WOUND DEBRIDEMENT N/A 2020    Abdominal      ZZC ORAL SURGERY PROCEDURE      [place] teeth        Family History:    family history includes Alcoholism in her father and mother;  "Breast Cancer in her maternal grandmother; Depression in her father and mother; Diabetes in her maternal grandmother; Heart Disease in her father; Other - See Comments in her mother.    Social History:   reports that she has been smoking cigarettes. She has been smoking about 0.50 packs per day. She has never used smokeless tobacco. She reports previous alcohol use. She reports that she does not use drugs.  [facility]     Physical Exam     Patient Vitals for the past 24 hrs:   BP Temp Temp src Pulse Resp SpO2 Height Weight   05/26/22 1323 117/76 98.3  F (36.8  C) Oral 82 16 98 % -- --   05/26/22 1321 -- -- -- -- -- -- 1.554 m (5' 1.2\") 89.1 kg (196 lb 6.4 oz)   05/26/22 1156 122/78 98.7  F (37.1  C) Temporal 99 16 98 % -- --        Physical Exam  Eyes:  The pupils are equal and round    Conjunctivae and sclerae are normal  ENT:    The nose is normal    Pinnae are normal  CV:  Regular rate and rhythm     No edema  Resp:  Lungs are clear    Non-labored  MS:  Normal muscular tone    No asymmetric leg swelling  Skin:  No rash or acute skin lesions noted  Neuro:   Awake, alert.      Speech is normal and fluent.    Face is symmetric.     Moves all extremities  Psych:  Good eye contact. Denies SI/HI    Emergency Department Course     Laboratory:  Labs Ordered and Resulted from Time of ED Arrival to Time of ED Departure   DRUG ABUSE SCREEN 77 URINE (FL, RH, SH) - Abnormal       Result Value    Amphetamines Urine Screen Negative      Barbiturates Urine Screen Negative      Benzodiazepines Urine Screen Negative      Cannabinoids Urine Screen Positive (*)     Cocaine Urine Screen Negative      Opiates Urine Screen Negative      PCP Urine Screen Negative     COVID-19 VIRUS (CORONAVIRUS) BY PCR - Normal    SARS CoV2 PCR Negative          Emergency Department Course:    Reviewed:  I reviewed nursing notes and vitals    Interventions:  Medications   nicotine (NICORETTE) gum 2-4 mg (4 mg Buccal Given 5/26/22 " 1453)   hydrOXYzine (ATARAX) tablet 50 mg (50 mg Oral Given 5/26/22 1520)        Disposition:  The patient was transferred to [facility].     Impression & Plan      Medical Decision Making:  Joanne Escalante is a 31 year old female who presents to the emergency department with concerns about mental health.  She is not suicidal or homicidal.  She is interested in having her medications adjusted.  She has been anxious and depressed.  COVID testing here is negative.  She denies other concerns and will be transferred to [facility] for further evaluation.      Diagnosis:    ICD-10-CM    1. Depression, unspecified depression type  F32.A         Discharge Medications:  New Prescriptions    No medications on file        5/26/2022   Terrance Bruno MD [place], Aaron Joseph, MD  05/26/22 1320

## 2022-05-26 NOTE — ED TRIAGE NOTES
Pt presents with reports of rapid cycling for several months. No longer has Psych MD, needs medication management.      Triage Assessment     Row Name 05/26/22 115       Triage Assessment (Adult)    Airway WDL WDL       Respiratory WDL    Respiratory WDL WDL       Skin Circulation/Temperature WDL    Skin Circulation/Temperature WDL WDL       Cardiac WDL    Cardiac WDL WDL       Peripheral/Neurovascular WDL    Peripheral Neurovascular WDL WDL       Cognitive/Neuro/Behavioral WDL    Cognitive/Neuro/Behavioral WDL mood/behavior    Mood/Behavior sad

## 2022-05-26 NOTE — PROGRESS NOTES
Patient agreeable to discharge plan. Discharge instructions reviewed with patient including follow-up care plan. Reviewed safety plan and outpatient resources. Denies SI. All belongings that were brought into the hospital have been returned to patient. Escorted off the unit at 1745 accompanied by [facility] staff. Discharged to home via ride from significant other.

## 2022-05-26 NOTE — CONSULTS
5/26/2022  Joanne Escalante 1990     [facility] Crisis Assessment    Patient was assessed: in person  Patient location: [facility]  Was a release of information signed: No. Reason: n/a    Referral Data and Chief Complaint  Joanne is a 31 year old who uses she/her pronouns. Patient presented to the ED alone and was referred to the ED by self. Patient is presenting to the ED for the following concerns: depression, anxiety, bismark.      Informed Consent and Assessment Methods    Patient is her own guardian. Writer met with patient and explained the crisis assessment process, including applicable information disclosures and limits to confidentiality, assessed understanding of the process, and obtained consent to proceed with the assessment. Patient was observed to be able to participate in the assessment as evidenced by verbal understanding of the assessment process. Assessment methods included conducting a formal interview with patient, review of medical records, collaboration with medical staff, and obtaining relevant collateral information from family and community providers when available.    Narrative Summary of Presenting Problem and Current Functioning  What led to the patient presenting for crisis services, factors that make the crisis life threatening or complex, stressors, how is this disrupting the patient's life, and how current functioning is in comparison to baseline. How is patient presenting during the assessment.     Pt is a 31 year old female who uses she/her pronouns.  Pt currently resides with her mom, kodi, 2 kids and brother and sister.  She reports that her plan to move out with her family was delayed until next month.  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    She reports that she came in today to have her medications adjusted.  She reports that she was dropped from her outpatient services therefore she had no one to reach out too.    Pt reports a long hx of  depression, anxiety, PTSD and Bipolar disorder.  PT reports that she has been having increased MH sx since her father  in August.  She states that she has been rapid cycling daily between tearful depression with hopelessness and worthless to euphoria.  Pt reports that she feels exhausted. Pt reports that she has been getting less and less sleep.  PT reports an increase in sexual drive as well as poor financial choices as well.  Pt endorses auditory hallucinations telling her that she is better off dead and/or radio static.  Pt denies command hallucinations.  PT reports that she has been having visual hallucinations of her father.  PT reports mild paranoia at times.  PT presents as fearful, but calm and cooperative, alert and oriented.      Pt reports passive suicidal ideation at times.  PT denies any current suicidal ideation, intent or planning.  Pt denies any self harm or homicidal ideation.  Pt presents as highly future and goal oriented.    History of the Crisis  Duration of the current crisis, coping skills attempted to reduce the crisis, community resources used, and past presentations.    Pt denies any previous hospitalizations.  Pt denies any previous PHP or IOP.  Pt denies any active therapist or psychiatrist as they both were discontinued.  PT desires both services.   PT is currently med compliant.  Pt occasionally uses [facility].    Pt reports endometriosis and reports a future hysterectomy      Collateral Information  The following information was received from Jacob whose relationship to the patient is significant other . Information was obtained via phone. Their phone number is  and they last had contact with patient on today.    What happened today: He reports that he was aware that she choose to come in to look at her medications    What is different about patient's functioning: He reports that she has been struggling with mood cycling and poor regulation.  He states that she likely needs  a medication adjustment.  He states that she was having some auditory hallucinations but doesn't know if that is currently happening or not.    Concern about alcohol/drug use: No    What do you think the patient needs: medication adjustments    Has patient made comments about wanting to kill themselves/others:  No    If d/c is recommended, can they take part in safety/aftercare planning: Yes .    Other information:     Risk Assessment    Risk of Harm to Self     ESS-6  1.a. Over the past 2 weeks, have you had thoughts of killing yourself? No  1.b. Have you ever attempted to kill yourself and, if yes, when did this last happen? No   2. Recent or current suicide plan? No   3. Recent or current intent to act on ideation? No  4. Lifetime psychiatric hospitalization? No  5. Pattern of excessive substance use? No  6. Current irritability, agitation, or aggression? No  Scoring note: BOTH 1a and 1b must be yes for it to score 1 point, if both are not yes it is zero. All others are 1 point per number. If all questions 1a/1b - 6 are no, risk is negligible. If one of 1a/1b is yes, then risk is mild. If either question 2 or 3, but not both, is yes, then risk is automatically moderate regardless of total score. If both 2 and 3 are yes, risk is automatically high regardless of total score.     Score: 0, negligible risk    The patient has the following risk factors for suicide: depressive symptoms, poor impulse control, psychosis and recent loss    Is the patient experiencing current suicidal ideation: Yes. Passive wish to be dead without thoughts or plan.     Is the patient engaging in preparatory suicide behaviors (formulating how to act on plan, giving away possessions, saying goodbye, displaying dramatic behavior changes, etc)? No    Does the patient have access to firearms or other lethal means? yes, lethal means counseling was provided and the following plan for risk mitigation was discussed: Pt reports there are firearms in  the home, however she does not have access to the key.     The patient has the following protective factors: social support, voluntarily seeking mental health support, displays resiliency , future focused thinking, displays insight, expresses desire to engage in treatment, sense of obligation to people/pets and safe/stable housing    Support system information: Family, social support    Patient strengths: help seeking, treatment motivated, future oriented.    Does the patient engage in non-suicidal self-injurious behavior (NSSI/SIB)? no    Is the patient vulnerable to sexual exploitation?  No    Is the patient experiencing abuse or neglect? no    Is the patient a vulnerable adult? No      Risk of Harm to Others  The patient has the following risk factors of harm to others: no risk factors identified    Does the patient have thoughts of harming others? No    Is the patient engaging in sexually inappropriate behavior?  no       Current Substance Abuse    Is there recent substance abuse? Occasional THC    Was a urine drug screen or blood alcohol level obtained: No    CAGE AID  Have you felt you ought to cut down on your drinking or drug use?  No  Have people annoyed you by criticizing your drinking or drug use? No  Have you felt bad or guilty about your drinking or drug use? No  Have you ever had a drink or used drugs first thing in the morning to steady your nerves or to get rid of a hangover? No  Score: 0/4       Current Symptoms/Concerns    Symptoms  Attention, hyperactivity, and impulsivity symptoms present: No    Anxiety symptoms present: Yes: Panic attacks and Generalized Symptoms: Cognitive anxiety - feelings of doom, racing thoughts, difficulty concentrating , Excessive worry and Pacing      Appetite symptoms present: No     Behavioral difficulties present: No     Cognitive impairment symptoms present: No    Depressive symptoms present: Yes Crying or feels like crying, Depressed mood, Feelings of hopelessness ,  Feelings of worthlessness , Impaired concentration, Impaired decision making , Increased irritability/agitation, Loss of interest / Anhedonia  and Sleep disturbance      Eating disorder symptoms present: No    Learning disabilities, cognitive challenges, and/or developmental disorder symptoms present: No     Manic/hypomanic symptoms present: Yes Elevated mood, Decreased need for sleep, Increased goal directed behavior (work) and Increased irritability/agitation    Personality and interpersonal functioning difficulties present : Yes: Emotional Deregulation, Impaired Impulse Control and Impaired Interpersonal Functioning    Psychosis symptoms present: Yes: Hallucinations: Visual and Paranoia      Sleep difficulties present: Yes: Difficulty falling asleep     Substance abuse disorder symptoms present: No     Trauma and stressor related symptoms present: PTSD dx, not discussed    Mental Status Exam   Affect: Appropriate   Appearance: Appropriate    Attention Span/Concentration: Attentive?    Eye Contact: Engaged   Fund of Knowledge: Appropriate    Language /Speech Content: Fluent   Language /Speech Volume: Soft    Language /Speech Rate/Productions: Normal    Recent Memory: Intact   Remote Memory: Intact   Mood: Anxious    Orientation to Person: Yes    Orientation to Place: Yes   Orientation to Time of Day: Yes    Orientation to Date: Yes    Situation (Do they understand why they are here?): Yes    Psychomotor Behavior: Normal    Thought Content: Clear   Thought Form: Intact       Mental Health and Substance Abuse History    History  Current and historical diagnoses or mental health concerns: anxiety, depression, PSTD, bipolar 2    Prior MH services (inpatient, programmatic care, outpatient, etc) : Yes outpatient only    Has the patient used [facility] crisis team services before?: No    History of substance abuse: No    Prior [place] services (inpatient, programmatic care, detox, outpatient, etc) : No    History of commitment:  No    Family history of MH/[place]: No    Trauma history: Yes .    Medication  Psychotropic medications: Yes. Pt is currently taking abilify, lamictal, visteril, recent d/c of remeron. Medication compliant: Yes. Recent medication changes: Yes .    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 No     Patient can ambulate independently? Yes     Other medical concerns? No     History of concussion or TBI? No        Diagnosis    309.81 (F43.10) Posttraumatic Stress Disorder (includes Posttraumatic Stress Disorder for Children 6 Years and Younger)  With dissociative symptoms - primary     296.89 Bipolar II Disorder With rapid cycling - primary     Therapeutic Intervention  The following therapeutic methodologies were employed when working with the patient: establishing rapport, active listening, assessing dimensions of crisis, solution focused brief therapy, identifying additional supports and alternative coping skills, establishing a discharge plan, safety planning, motivational interviewing and brief supportive therapy. Patient response to intervention: engaged and cooperative.      Disposition  Recommended disposition: Individual Therapy and Medication Management      Reviewed case and recommendations with attending provider. Attending Name: Wojciech GARCIA      Attending concurs with disposition: Yes      Patient concurs with disposition: Yes      Guardian concurs with disposition: NA     Final disposition: Individual  therapy  and Medication management.     Clinical Substantiation of Recommendations   Rationale with supporting factors for disposition and diagnosis.     PT denies any current suicidal ideation, intent or planning.  PT denies any self harm. Pt denies any current homicidal ideation, intent or planning.  PT is able to engage in safety planning. Pt appears future and goal oriented.  PT is able to engage in a discussion about their protective factors.  PT does not currently appear to be in need of acute stabilization for overt psychosis, bismark, delusional thinking or paranoia.  PT is appropriate to transition into outpatient community services at this time.     At the time of discharge, the patient's acute suicide risk was determined to be low due to the following factors: Reduction in the intensity of mood/anxiety symptoms that preceded the admission, denial of suicidal thoughts, denies feeling helpless or helpless, not currently under the influence of alcohol or illicit substances, denies experiencing command hallucinations, no immediate access to firearms. The patient's acute risk could be higher if noncompliant with their treatment plan, medications, follow-up appointments or using illicit substances or alcohol. Protective factors include: social supports, stable housing    Assessment Details  Patient interview started at: 1500 and completed at: 1600.    Total duration spent on the patient case in minutes: 1.0 hrs     CPT code(s) utilized: 71090 - Psychotherapy for Crisis - 60 (30-74*) min       Aftercare and Safety Planning  Follow up plans with MH/[place] services: Yes therapy and psychiatry appts      Aftercare plan placed in the AVS and provided to patient: Yes. Given to patient by DANIEL Silveira, [facility]      Aftercare Plan    [facility]  [address] [phone] 6/8/2022 10:00 am teletherapy    Delaney Suarez DNP, CNP,RN [facility]  [address] [place] [address], [address](054)  [phone] 5/31/2022 2:00 pm Telepsychiatry    If I am feeling unsafe or I am in a crisis, I will:   Contact my established care providers   Call the National Suicide Prevention Lifeline: 649.537.8636   Go to the nearest emergency room   Call 911     Warning signs that I or other people might notice when a crisis is developing for me: increased hallucinations, increased anxiety and depression symptoms, isolation, increased risky behaviors    Things I am able to do on my own to cope or help me feel better: reading, watching TV, taking space and walks     Things that I am able to do with others to cope or help me better: being present with others, taking walks, playing games    Things I can use or do for distraction: reading, puzzles, watching TV     Changes I can make to support my mental health and wellness: attending therapy and psychiatry     People in my life that I can ask for help: Mom, finance, brother, sister     Your [facility] has a mental health crisis team you can call 24/7: [facility], [phone]      Other things that are important when I m in crisis: My children and family are important to me     Additional resources and appointment information:      Crisis Lines  Crisis Text Line  Text 823127  You will be connected with a trained live crisis counselor to provide support.    Gambling Hotline  1.799.333.hope [4673]    National Hope Line  1.800.SUICIDE [4491413]    National Suicide Prevention Lifeline  Free and confidential support  1.304.611.TALK [4552]  http://suicidepreventionlifeline.org    The [employer] Project (LGBTQ Youth Crisis Line)  3.875.953.6475  text START to 868-773    's Crisis Line  5.202.021.8389 (Press 1)  or text 005158    Community Resources  Fast Tracker  Linking people to mental health and substance use disorder resources  fastMashupsckCloudCarn.org     [place] Mental Health Warm Line  Peer to peer support  Monday thru Saturday, 12 pm to 10 pm  297.955.4161 or  "9.128.093.9548  Text \"Support\" to 03189    National [place] on Mental Illness (IMANI)  050.227.0972 or 1.888.IMANI.HELPS    [facility] Counsel[facility] Center  Free mental health counseling  [address] [place]  167.455.0655    Mental Health Apps  My3  https://MiserWare.woodpellets.com/    VirtualHopeBox  https://Carezone.com/apps/virtual-hope-box/    Suicide Safety Plan (9Cookies)    Calm Harm      Additional information:  Today you were seen by a licensed mental health professional through Triage and Transition services, [facility] ([facility])  for a crisis assessment in the Emergency Department at [facility].  It is recommended that you follow up with your established providers (psychiatrist, mental health therapist, and/or primary care doctor - as relevant) as soon as possible. Coordinators from [facility] will be calling you in the next 24-48 hours to ensure that you have the resources you need.  You can also contact [facility] coordinators directly at [phone]. You may have been scheduled for or offered an appointment with a mental health provider. [facility] maintains an extensive network of licensed behavioral health providers to connect patients with the services they need.  We do not charge providers a fee to participate in our referral network.  We match patients with providers based on a patient's specific needs, insurance coverage, and location.  Our first effort will be to refer you to a provider within your care system, and will utilize providers outside your care system as needed.                    "

## 2022-05-26 NOTE — PROGRESS NOTES
RN introduced self to patient at start of shift. Patient expressed feeling anxious and requested to take hydroxyzine. Given PRN hydroxyzine 50 mg. Patient currently waiting to meet with Psychiatrist.

## 2022-05-26 NOTE — ED PROVIDER NOTES
"EmPATH Unit - Psychiatric Consultation  [facility] Emergency Department  2022    Joanne Escalante MRN: [id number]   Age: 31 year old YOB: 1990     History     Chief Complaint   Patient presents with     Psychiatric Evaluation     HPI  Joanne Escalante is a 31 year old female with history notable for bipolar disorder, anxiety, depression, and PTSD who presents to EmPATH for mood lability and concerns that medications are needing adjustment. Pt was being seeing by psychiatry at [facility] but was discharged due to no shows. Pt says she has now been manic for about a week. She has been spending money excessively, had increased libido and arousal, mood lability, and has been sleeping 2-3h/night. She now feels like she is \"on a crash down\" and thinks her medications are not managing her MH symptoms. She originally reported experiencing A/VH and noticed that after her dad  she was experiencing AH saying that it would be better if she wasn't alive anymore. She also was seeing figures of her dad in their house in the months after his death. She has not experienced A/VH since 2022.   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     Pt endorses feeling depressed today. She denies SI thoughts/plan/intent. Pt takes lamotrigine 200mg, mirtazapine 7.5mg, hydroxyzine 50 PRN for anxiety, and aripiprazole 5mg. Most recently pt had been taking aripiprazole 10mg but experienced akithisia so the dose was decreased to 5mg in the Fall. She hasn't had medications managed by psychiatry since 2021. She's tried trazodone for sleep in the past. \"I've been on a bunch of different antidepressants and they don't do much for me besides make me extremely suicidal.\"     Past Medical History  Past Medical History:   Diagnosis Date     Anxiety      Depressive " "disorder      Endometriosis      Periumbilical hernia      Past Surgical History:   Procedure Laterality Date      SECTION  2018, 2020    x 2     HERNIORRHAPHY UMBILICAL N/A 2021    Procedure: OPEN UMBILICAL HERNIA REPAIR WITH MESH;  Surgeon: Ankit Nunez MD;  Location: [facility] OR     LAPAROSCOPY DIAGNOSTIC (GYN)  2015    Endometriosis     OTHER SURGICAL HISTORY      ablation     WOUND DEBRIDEMENT N/A 2020    Abdominal      ZZC ORAL SURGERY PROCEDURE      [place] teeth     acetaminophen (TYLENOL) 500 MG tablet  ARIPiprazole (ABILIFY) 5 MG tablet  cyclobenzaprine (FLEXERIL) 10 MG tablet  hydrOXYzine (ATARAX) 25 MG tablet  ibuprofen (ADVIL/MOTRIN) 200 MG tablet  lamoTRIgine (LAMICTAL) 200 MG tablet  mirtazapine (REMERON SOL-TAB) 15 MG ODT  ondansetron (ZOFRAN-ODT) 4 MG ODT tab      Allergies   Allergen Reactions     Sulfa Drugs      Family History  Family History   Problem Relation Age of Onset     Depression Mother      Alcoholism Mother      Other - See Comments Mother         fibroids, cervical abnormalty     Depression Father      Alcoholism Father      Heart Disease Father         mi     Breast Cancer Maternal Grandmother      Diabetes Maternal Grandmother      Social History   Social History     Tobacco Use     Smoking status: Current Every Day Smoker     Packs/day: 0.50     Types: Cigarettes     Smokeless tobacco: Never Used   Substance Use Topics     Alcohol use: Not Currently     Drug use: Never      Past medical history, past surgical history, medications, allergies, family history, and social history were reviewed with the patient. No additional pertinent items.       Review of Systems  A complete review of systems was performed with pertinent positives and negatives noted in the HPI, and all other systems negative.    Physical Examination   BP: 122/78  Pulse: 99  Temp: 98.7  F (37.1  C)  Resp: 16  Height: 155.4 cm (5' 1.2\")  Weight: 89.1 kg (196 lb 6.4 oz)  SpO2: 98 %    Physical " Exam  General: No acute distress. Appears stated age.   Neuro: Alert and fully oriented. Extremities appear to demonstrate normal strength on visual inspection.   Integumentary/Skin: no rash visualized, normal color    Psychiatric Examination   Appearance: awake, alert, adequately groomed and appeared as age stated  Attitude:  cooperative  Eye Contact:  good  Mood:  anxious and depressed  Affect:  appropriate and in normal range and mood congruent  Speech:  clear, coherent and normal prosody  Psychomotor Behavior:  no evidence of tardive dyskinesia, dystonia, or tics  Throught Process:  logical, linear, goal oriented and future oriented  Associations:  no loose associations  Thought Content:  no evidence of suicidal ideation or homicidal ideation, no auditory hallucinations present and no visual hallucinations present  Insight:  good  Judgement:  intact  Oriented to:  time, person, and place  Attention Span and Concentration:  intact  Recent and Remote Memory:  intact  Language: able to name/identify objects without impairment  Fund of Knowledge: intact with awareness of current and past events    ED Course   Reviewed labs completed in ED including UTOX, negative COVID.       Labs Ordered and Resulted from Time of ED Arrival to Time of ED Departure   DRUG ABUSE SCREEN 77 URINE (FL, RH, [facility]) - Abnormal       Result Value    Amphetamines Urine Screen Negative      Barbiturates Urine Screen Negative      Benzodiazepines Urine Screen Negative      Cannabinoids Urine Screen Positive (*)     Cocaine Urine Screen Negative      Opiates Urine Screen Negative      PCP Urine Screen Negative     COVID-19 VIRUS (CORONAVIRUS) BY PCR - Normal    SARS CoV2 PCR Negative         Assessments & Plan (with Medical Decision Making)   Patient presenting with depression, recent hypomania, and c/o ongoing mood lability who is requesting medication adjustment. While pt does not appear hypomanic during exam, she reports decreased need for  "sleep, increased spending, increased libido, mood lability over the past week. Her primary goal is to treat the \"bismark\" at this point. We discussed stopped aripiprazole and switching to another SGA like olanzapine, risperidone, or quetiapine, none of which pt has tried. Recommended quetiapine today as it can be quite sedating in addition to treating mixed sx of bipolar disorder. Pt is agreeable to stop aripiprazole and switch to quetiapine at bedtime. No safety concerns. Nursing notes reviewed noting no acute issues.     I have reviewed the assessment completed by the WALT Arnett on 5/26/2022.     EmPATH diagnosis:    ICD-10-CM    1. Bipolar affective disorder, remission status unspecified (H)  F31.9    2. Depression, unspecified depression type  F32.A         Treatment Plan:  -discharge home  -stop aripiprazole  -start quetiapine 50-100mg at bedtime for sleep and mood stabilization  -stop mirtazapine if HS medications are too sedating in combination  -follow up with psychiatry appt 5/31/22    --  Shantel Jeffrey NP   [facility] EMERGENCY DEPT  EmPATH Unit  5/26/2022      Shantel Jeffrey NP  05/26/22 1649    "

## 2022-05-26 NOTE — DISCHARGE INSTRUCTIONS
[facility]  [address] [phone] 6/8/2022 10:00 am teletherapy    Delaney Suarez DNP, CNP,RN [facility], [address], [phone] 5/31/2022 2:00 pm Telepsychiatry    If I am feeling unsafe or I am in a crisis, I will:   Contact my established care providers   Call the National Suicide Prevention Lifeline: 147.227.6498   Go to the nearest emergency room   Call 911     Warning signs that I or other people might notice when a crisis is developing for me: increased hallucinations, increased anxiety and depression symptoms, isolation, increased risky behaviors    Things I am able to do on my own to cope or help me feel better: reading, watching TV, taking space and walks     Things that I am able to do with others to cope or help me better: being present with others, taking walks, playing games    Things I can use or do for distraction: reading, puzzles, watching TV     Changes I can make to support my mental health and wellness: attending therapy and psychiatry     People in my life that I can ask for help: Mom, finance, brother, sister     Your [facility] has a mental health crisis team you can call 24/7: [facility], [phone]      Other things that are important when I m in crisis: My children and family are important to me     Additional resources and appointment information:      Crisis Lines  Crisis Text Line  Text 656047  You will be connected with a trained live crisis counselor to provide support.    Gambling Hotline  1.800333.hope [4673]    National Hope Line  1.800.SUICIDE [7553725]    National Suicide Prevention Lifeline  Free and confidential support  1.321.238.TALK [4427]  http://suicidepreventionlifeline.org    The Misael Project (LGBTQ Youth Crisis Line)  7.004.046.2337  text START to 533-348    's Crisis Line  3.117.597.7633 (Press 1)  or text 256276    Community Resources  Fast Tracker  Linking people to mental  "health and substance use disorder resources  [employer]     [place] Mental Health Warm Line  Peer to peer support  Monday thru Saturday, 12 pm to 10 pm  985.840.8124 or 2.723.352.8038  Text \"Support\" to 92733    National [place] on Mental Illness (IMANI)  991.161.5891 or 1.888.IMANI.HELPS    Walk-in Counseling Center  Free mental health counseling  [address] [facility]  312.679.4252    Mental Health Apps  My3  https://Member Savings Program.[employer]/    VirtualHopeBox  https://Root[employer] Software/apps/virtual-hope-box/    Suicide Safety Plan (Alt[employer])    Calm Harm      Additional information:  Today you were seen by a licensed mental health professional through Triage and Transition services, [facility] ([facility])  for a crisis assessment in the Emergency Department at [facility].  It is recommended that you follow up with your established providers (psychiatrist, mental health therapist, and/or primary care doctor - as relevant) as soon as possible. Coordinators from [facility] will be calling you in the next 24-48 hours to ensure that you have the resources you need.  You can also contact [facility] coordinators directly at [phone]. You may have been scheduled for or offered an appointment with a mental health provider. [facility] maintains an extensive network of licensed behavioral health providers to connect patients with the services they need.  We do not charge providers a fee to participate in our referral network.  We match patients with providers based on a patient's specific needs, insurance coverage, and location.  Our first effort will be to refer you to a provider within your care system, and will utilize providers outside your care system as needed.          "

## 2022-05-26 NOTE — PROGRESS NOTES
Per report:  Pt states she is Bipolar and is ramping up.  She has been on her medications for a long time and she feels they need to be adjusted.  Pt states she did sleep last night and is a little better. Advised if she needs very much medication change [facility]  Upon arrival to [facility] pt is calm, pleasant and cooperative. Pt reports that her meds no longer work for her and needs med adjustment. Pt denies any SI today and verbally contracts for safety.    Nursing and risk assessments completed. Assessments reviewed with LMHP and physician. Video monitoring in progress, patient informed.  Admission information reviewed with patient. Patient given a tour of [facility] and instructions on using the facility. Questions regarding EmPATH addressed. Pt search completed and belongings inventoried.

## 2022-05-26 NOTE — TELEPHONE ENCOUNTER
Pt calling for appointment with Bina WILL today.  She states she has seen Bina WILL. She does not currently have a psychiatrist.  She states she is Bipolar and is ramping up.  She has been on her medications for a long time and she feels they need to be reworked.  She spoke with a RN last night, see message who advised her go go to ER.  Pt states she did sleep last night and is a little better. Advised if she needs very much medication change she would be better off going to EMPATH at [facility] ER.  Pt states she knows where that is and will go there now.    Sima REDDINGN, RN

## 2022-05-26 NOTE — LETTER
May 26, 2022      To Whom It May Concern:      Joanne Escalante was seen in our Emergency Department today, 05/26/22. She may return to work when improved.    Sincerely,        Jannet Hart RN

## 2022-05-30 ENCOUNTER — NURSE TRIAGE (OUTPATIENT)
Dept: NURSING | Facility: CLINIC | Age: 32
End: 2022-05-30
Payer: COMMERCIAL

## 2022-05-30 NOTE — TELEPHONE ENCOUNTER
Nurse Triage SBAR    Is this a 2nd Level Triage? NO    Situation: Medication question    Assessment: Pt started Seroquel 3-4 days ago.  Since then, she's had worsening depression and irritability.  Pt denies thoughts of self-harm or harm to others.  She's able to take care of herself.      She has an appt with psychiatry tomorrow.     Protocol Recommended Disposition: See Physician within 24 hours    Advised pt to keep her psych appt for tomorrow and to call us back with any new or worsening symptoms.  If she develops thoughts of self harm/harm to others, she should go to the ED.  Patient verbalized understanding and had no further questions.      COVID 19 Nurse Triage Plan/Patient Instructions    Please be aware that novel coronavirus (COVID-19) may be circulating in the community. If you develop symptoms such as fever, cough, or SOB or if you have concerns about the presence of another infection including coronavirus (COVID-19), please contact your health care provider or visit [email or web address].     Disposition/Instructions    Thank you for taking steps to prevent the spread of this virus.  o Limit your contact with others.  o Wear a simple mask to cover your cough.  o Wash your hands well and often.    Resources    [facility]: About COVID-19: www.[facility].org/covid19/    CDC: What to Do If You're Sick: www.cdc.gov/coronavirus/2019-ncov/about/steps-when-sick.html    CDC: Ending Home Isolation: www.cdc.gov/coronavirus/2019-ncov/hcp/disposition-in-home-patients.html     CDC: Caring for Someone: www.cdc.gov/coronavirus/2019-ncov/if-you-are-sick/care-for-someone.html     [facility]: Interim Guidance for Hospital Discharge to Home: www.health.[facility].[place]/diseases/coronavirus/hcp/hospdischarge.pdf    [facility] clinical trials (COVID-19 research studies): clinicalaffairs.[facility]/umn-clinical-trials     Below are the COVID-19 hotlines at the [place] Department of Health ([facility]).  Interpreters are available.   o For health questions: Call 909-398-4085 or 1-241.960.4153 (7 a.m. to 7 p.m.)  o For questions about schools and childcare: Call 925-803-1292 or 1-937.344.8101 (7 a.m. to 7 p.m.)     Elizabeth Iglesias RN  [facility] Nurse Advisor      Reason for Disposition    [1] Depression AND [2] worsening (e.g.,sleeping poorly, less able to do activities of daily living)    Additional Information    Negative: Patient attempted suicide    Negative: Patient is threatening suicide now    Negative: Violent behavior, or threatening to physically hurt or kill someone    Negative: [1] Patient is very confused (disoriented, slurred speech) AND [2] no other adult (e.g., friend or family member) available    Negative: [1] Difficult to awaken or acting very confused (disoriented, slurred speech) AND [2] new onset    Negative: Sounds like a life-threatening emergency to the triager    Negative: [1] Depression AND [2] unable to do any of normal activities (e.g., self care, school, work; in comparison to baseline).    Negative: Very strange or confused behavior    Negative: Patient sounds very sick or weak to the triager    Protocols used: DEPRESSION-A-AH

## 2022-06-13 ENCOUNTER — LAB (OUTPATIENT)
Dept: LAB | Facility: CLINIC | Age: 32
End: 2022-06-13
Payer: COMMERCIAL

## 2022-06-13 DIAGNOSIS — F31.9 BIPOLAR DISORDER (H): Primary | ICD-10-CM

## 2022-06-13 LAB
BASOPHILS # BLD AUTO: 0.1 10E3/UL (ref 0–0.2)
BASOPHILS NFR BLD AUTO: 1 %
BUN SERPL-MCNC: 10 MG/DL (ref 7–30)
EOSINOPHIL # BLD AUTO: 0.1 10E3/UL (ref 0–0.7)
EOSINOPHIL NFR BLD AUTO: 1 %
ERYTHROCYTE [DISTWIDTH] IN BLOOD BY AUTOMATED COUNT: 12.2 % (ref 10–15)
HBA1C MFR BLD: 5.6 % (ref 0–5.6)
HCT VFR BLD AUTO: 41.1 % (ref 35–47)
HGB BLD-MCNC: 14.3 G/DL (ref 11.7–15.7)
IMM GRANULOCYTES # BLD: 0 10E3/UL
IMM GRANULOCYTES NFR BLD: 0 %
LYMPHOCYTES # BLD AUTO: 2.4 10E3/UL (ref 0.8–5.3)
LYMPHOCYTES NFR BLD AUTO: 21 %
MCH RBC QN AUTO: 29.8 PG (ref 26.5–33)
MCHC RBC AUTO-ENTMCNC: 34.8 G/DL (ref 31.5–36.5)
MCV RBC AUTO: 86 FL (ref 78–100)
MONOCYTES # BLD AUTO: 0.5 10E3/UL (ref 0–1.3)
MONOCYTES NFR BLD AUTO: 5 %
NEUTROPHILS # BLD AUTO: 8.1 10E3/UL (ref 1.6–8.3)
NEUTROPHILS NFR BLD AUTO: 73 %
PLATELET # BLD AUTO: 263 10E3/UL (ref 150–450)
RBC # BLD AUTO: 4.8 10E6/UL (ref 3.8–5.2)
T3FREE SERPL-MCNC: 2.6 PG/ML (ref 2.3–4.2)
WBC # BLD AUTO: 11.2 10E3/UL (ref 4–11)

## 2022-06-13 PROCEDURE — 80076 HEPATIC FUNCTION PANEL: CPT

## 2022-06-13 PROCEDURE — 83036 HEMOGLOBIN GLYCOSYLATED A1C: CPT

## 2022-06-13 PROCEDURE — 85025 COMPLETE CBC W/AUTO DIFF WBC: CPT

## 2022-06-13 PROCEDURE — 80061 LIPID PANEL: CPT

## 2022-06-13 PROCEDURE — 36415 COLL VENOUS BLD VENIPUNCTURE: CPT

## 2022-06-13 PROCEDURE — 82947 ASSAY GLUCOSE BLOOD QUANT: CPT

## 2022-06-13 PROCEDURE — 84443 ASSAY THYROID STIM HORMONE: CPT

## 2022-06-13 PROCEDURE — 84520 ASSAY OF UREA NITROGEN: CPT

## 2022-06-13 PROCEDURE — 82565 ASSAY OF CREATININE: CPT

## 2022-06-13 PROCEDURE — 84439 ASSAY OF FREE THYROXINE: CPT

## 2022-06-13 PROCEDURE — 84481 FREE ASSAY (FT-3): CPT

## 2022-06-14 LAB
ALBUMIN SERPL-MCNC: 3.6 G/DL (ref 3.4–5)
ALP SERPL-CCNC: 44 U/L (ref 40–150)
ALT SERPL W P-5'-P-CCNC: 22 U/L (ref 0–50)
AST SERPL W P-5'-P-CCNC: 14 U/L (ref 0–45)
BILIRUB DIRECT SERPL-MCNC: <0.1 MG/DL (ref 0–0.2)
BILIRUB SERPL-MCNC: 0.3 MG/DL (ref 0.2–1.3)
CHOLEST SERPL-MCNC: 177 MG/DL
CREAT SERPL-MCNC: 0.71 MG/DL (ref 0.52–1.04)
FASTING STATUS PATIENT QL REPORTED: YES
FASTING STATUS PATIENT QL REPORTED: YES
GFR SERPL CREATININE-BSD FRML MDRD: >90 ML/MIN/1.73M2
GLUCOSE BLD-MCNC: 93 MG/DL (ref 70–99)
HDLC SERPL-MCNC: 52 MG/DL
LDLC SERPL CALC-MCNC: 111 MG/DL
NONHDLC SERPL-MCNC: 125 MG/DL
PROT SERPL-MCNC: 6.6 G/DL (ref 6.8–8.8)
T4 FREE SERPL-MCNC: 0.79 NG/DL (ref 0.76–1.46)
TRIGL SERPL-MCNC: 69 MG/DL
TSH SERPL DL<=0.005 MIU/L-ACNC: 1.04 MU/L (ref 0.4–4)

## 2022-06-16 ENCOUNTER — TELEPHONE (OUTPATIENT)
Dept: NURSING | Facility: CLINIC | Age: 32
End: 2022-06-16
Payer: COMMERCIAL

## 2022-06-16 NOTE — TELEPHONE ENCOUNTER
Yanelis is calling needing recent lab results from 6/13 faxed to [phone], attn:Yanelis REDDINGN, RN

## 2022-07-07 ENCOUNTER — TELEPHONE (OUTPATIENT)
Dept: NURSING | Facility: CLINIC | Age: 32
End: 2022-07-07

## 2022-07-07 NOTE — TELEPHONE ENCOUNTER
Telephone call    Patient calling to ask if she should take her lithium.  She took it last night but us a little unsure if she took it this morning.  She did not receive the medication at [place]  So not on her list.  Recommended that she call the psychiatrist office and ask for there recommendation and the Pharmacy. We have no record of the dose she is on.  She did get a pill box so it woun't happen again.    Mariam Carrillo RN   [facility] Nurse Advisor  11:54 AM 7/7/2022

## 2022-07-11 ENCOUNTER — LAB (OUTPATIENT)
Dept: LAB | Facility: CLINIC | Age: 32
End: 2022-07-11
Payer: COMMERCIAL

## 2022-07-11 DIAGNOSIS — F31.9 BIPOLAR DISORDER (H): Primary | ICD-10-CM

## 2022-07-11 LAB — LITHIUM SERPL-SCNC: 0.3 MMOL/L (ref 0.6–1.2)

## 2022-07-11 PROCEDURE — 36415 COLL VENOUS BLD VENIPUNCTURE: CPT

## 2022-07-11 PROCEDURE — 80178 ASSAY OF LITHIUM: CPT

## 2022-07-13 ENCOUNTER — TELEPHONE (OUTPATIENT)
Dept: NURSING | Facility: CLINIC | Age: 32
End: 2022-07-13

## 2022-07-13 NOTE — TELEPHONE ENCOUNTER
[facility]anelis [facility] looking for lab results to be faxed to [phone]  Needing recent Lithium level, kidney (creatinine and BUN) as well as TSH, T3, and T4      Francine REDDINGN, RN

## 2022-07-18 ENCOUNTER — DOCUMENTATION ONLY (OUTPATIENT)
Dept: LAB | Facility: CLINIC | Age: 32
End: 2022-07-18

## 2022-07-18 NOTE — PROGRESS NOTES
Left message for patient to return call. Just wanted to know who will be ordering her lab work? Are the labs from an outside provider?

## 2022-07-19 ENCOUNTER — LAB (OUTPATIENT)
Dept: LAB | Facility: CLINIC | Age: 32
End: 2022-07-19
Payer: COMMERCIAL

## 2022-07-19 DIAGNOSIS — F31.9 BIPOLAR DISORDER, UNSPECIFIED (H): ICD-10-CM

## 2022-07-19 DIAGNOSIS — F31.9 BIPOLAR DISORDER, UNSPECIFIED (H): Primary | ICD-10-CM

## 2022-07-19 LAB
BUN SERPL-MCNC: 10 MG/DL (ref 7–30)
CREAT SERPL-MCNC: 0.77 MG/DL (ref 0.52–1.04)
GFR SERPL CREATININE-BSD FRML MDRD: >90 ML/MIN/1.73M2
TSH SERPL DL<=0.005 MIU/L-ACNC: 0.86 MU/L (ref 0.4–4)

## 2022-07-19 PROCEDURE — 80178 ASSAY OF LITHIUM: CPT

## 2022-07-19 PROCEDURE — 36415 COLL VENOUS BLD VENIPUNCTURE: CPT

## 2022-07-19 PROCEDURE — 84443 ASSAY THYROID STIM HORMONE: CPT

## 2022-07-19 PROCEDURE — 82565 ASSAY OF CREATININE: CPT

## 2022-07-19 PROCEDURE — 84520 ASSAY OF UREA NITROGEN: CPT

## 2022-07-19 NOTE — PROGRESS NOTES
Joanne is a 31 year old who is being evaluated via a billable video visit.      How would you like to obtain your AVS? MyChart  If the video visit is dropped, the invitation should be resent by: Text to cell phone: [phone]  Will anyone else be joining your video visit? No    Billin min spent on patient today including chart review, history, exam, and explaining treatment plan and follow-up.       Assessment & Plan     Bipolar affective disorder, current episode mixed, current episode severity unspecified (H)  Improving, not to goal, continue with therapy, dbt starts soon, and psych    Fatty liver  Reschedule f/u [facility] hepatologist when able, she does have a scan to complete for them    Horseshoe kidney  No further workup needed, just noted in chart    Anxiety  Continue [facility] psych  - QUEtiapine (SEROQUEL) 25 MG tablet; Take 1 tablet (25 mg) by mouth 2 times daily as needed (anxiety)        No follow-ups on file.    Bina Turcios PA-C  [facility] [place]    Subjective   Joanne is a 31 year old accompanied by her Self, presenting for the following health issues:  Establish Care      HPI       Looking for new job. Got let go from her previous.       Establish care with PCP.    Saw patient last year for physical.   Bipolar affective disorder and ptsd-got care at [facility] and [facility] but was dismissed due to no shows. Then went to er due to emotional labitliy 2 months ago.  Was  On lamictal and abilify and atarax prn. remeron at bedtime. But they changed her meds as below.   Set her up with [facility] and counseling.     New psych? Through [facility] Lorenza Suarez, [place]. Office in [facility].  Seeing every 2 weeks. Therapy once a week.     New psych started her on lithium. This was all added to her chart as well. Overall she is improving.     Starts dbt therapy on monday.   Denies suicidal or homicidal thoughts.  Patient instructed to go to the emergency room or call 911 if these  occur.         Other diagnosis:    horshoe kidney and probably fatty liver on US last year.  abdominal pain is better.  Will f/u wtih gi when feeling better patient still has number.   Hepatologist discussed weight loss with patient. When she is feeling better we can focus on that. meds may make this challenging but she is off the remeron which should help some.   She saw urology for horshoe kidney. I do not see any plan needed there.   Saw [facility] for her liver issues and they did scans and was told to f/u in 2 months.         From empath chart:    EmPATH diagnosis:      ICD-10-CM     1. Bipolar affective disorder, remission status unspecified (H)  F31.9     2. Depression, unspecified depression type  F32.A           Treatment Plan:  -discharge home  -stop aripiprazole  -start quetiapine 50-100mg at bedtime for sleep and mood stabilization  -stop mirtazapine if HS medications are too sedating in combination  -follow up with psychiatry appt 5/31/22       Shantel Jeffrey NP   [facility] EMERGENCY DEPT  EmPATH Unit  5/26/2022                    Review of Systems   Constitutional, HEENT, cardiovascular, pulmonary, GI, , musculoskeletal, neuro, skin, endocrine and psych systems are negative, except as otherwise noted.      Objective           Vitals:  No vitals were obtained today due to virtual visit.    Physical Exam   GENERAL: Healthy, alert and no distress  EYES: Eyes grossly normal to inspection.  No discharge or erythema, or obvious scleral/conjunctival abnormalities.  RESP: No audible wheeze, cough, or visible cyanosis.  No visible retractions or increased work of breathing.    SKIN: Visible skin clear. No significant rash, abnormal pigmentation or lesions.  NEURO: Cranial nerves grossly intact.  Mentation and speech appropriate for age.  PSYCH: Mentation appears normal, affect normal/bright, judgement and insight intact, normal speech and appearance well-groomed.            Video-Visit  Details    Video Start Time: 12:33 pm    Type of service:  Video Visit    Video End Time:12:43 PM    Originating Location (pt. Location): Home    Distant Location (provider location):  [facility] [place]     Platform used for Video Visit: NanoBio    .  Sudeep.

## 2022-07-20 LAB — LITHIUM SERPL-SCNC: 0.4 MMOL/L (ref 0.6–1.2)

## 2022-07-28 ENCOUNTER — VIRTUAL VISIT (OUTPATIENT)
Dept: FAMILY MEDICINE | Facility: CLINIC | Age: 32
End: 2022-07-28
Payer: COMMERCIAL

## 2022-07-28 DIAGNOSIS — K76.0 FATTY LIVER: ICD-10-CM

## 2022-07-28 DIAGNOSIS — Q63.1 HORSESHOE KIDNEY: ICD-10-CM

## 2022-07-28 DIAGNOSIS — F41.9 ANXIETY: ICD-10-CM

## 2022-07-28 DIAGNOSIS — F31.60 BIPOLAR AFFECTIVE DISORDER, CURRENT EPISODE MIXED, CURRENT EPISODE SEVERITY UNSPECIFIED (H): Primary | ICD-10-CM

## 2022-07-28 PROBLEM — T14.8XXA WOUND INFECTION: Status: RESOLVED | Noted: 2020-08-05 | Resolved: 2022-07-28

## 2022-07-28 PROBLEM — L08.9 WOUND INFECTION: Status: RESOLVED | Noted: 2020-08-05 | Resolved: 2022-07-28

## 2022-07-28 PROBLEM — F43.23 ADJUSTMENT DISORDER WITH MIXED ANXIETY AND DEPRESSED MOOD: Status: RESOLVED | Noted: 2020-09-16 | Resolved: 2022-07-28

## 2022-07-28 PROBLEM — Z98.891 HISTORY OF CESAREAN SECTION: Status: RESOLVED | Noted: 2020-08-19 | Resolved: 2022-07-28

## 2022-07-28 PROBLEM — Z98.891 STATUS POST REPEAT LOW TRANSVERSE CESAREAN SECTION: Status: RESOLVED | Noted: 2020-01-18 | Resolved: 2022-07-28

## 2022-07-28 PROBLEM — F33.2 SEVERE EPISODE OF RECURRENT MAJOR DEPRESSIVE DISORDER, WITHOUT PSYCHOTIC FEATURES (H): Status: RESOLVED | Noted: 2018-07-19 | Resolved: 2022-07-28

## 2022-07-28 PROCEDURE — 99214 OFFICE O/P EST MOD 30 MIN: CPT | Mod: 95 | Performed by: PHYSICIAN ASSISTANT

## 2022-07-28 RX ORDER — QUETIAPINE FUMARATE 25 MG/1
12.5-25 TABLET, FILM COATED ORAL 2 TIMES DAILY PRN
COMMUNITY
Start: 2022-07-28 | End: 2024-09-18

## 2022-07-28 RX ORDER — LITHIUM CARBONATE 300 MG/1
300 TABLET, FILM COATED, EXTENDED RELEASE ORAL 2 TIMES DAILY
COMMUNITY
Start: 2022-07-28 | End: 2023-09-17

## 2022-07-28 RX ORDER — LITHIUM CARBONATE 150 MG/1
150 CAPSULE ORAL AT BEDTIME
COMMUNITY
Start: 2022-07-28 | End: 2022-11-24

## 2022-08-11 ENCOUNTER — LAB (OUTPATIENT)
Dept: LAB | Facility: CLINIC | Age: 32
End: 2022-08-11
Payer: COMMERCIAL

## 2022-08-11 DIAGNOSIS — F31.9 BIPOLAR DISORDER (H): Primary | ICD-10-CM

## 2022-08-11 LAB
CREAT SERPL-MCNC: 0.79 MG/DL (ref 0.52–1.04)
GFR SERPL CREATININE-BSD FRML MDRD: >90 ML/MIN/1.73M2
LITHIUM SERPL-SCNC: 0.5 MMOL/L (ref 0.6–1.2)
T4 FREE SERPL-MCNC: 0.9 NG/DL (ref 0.76–1.46)
TSH SERPL DL<=0.005 MIU/L-ACNC: 1.3 MU/L (ref 0.4–4)

## 2022-08-11 PROCEDURE — 80178 ASSAY OF LITHIUM: CPT

## 2022-08-11 PROCEDURE — 84439 ASSAY OF FREE THYROXINE: CPT

## 2022-08-11 PROCEDURE — 84443 ASSAY THYROID STIM HORMONE: CPT

## 2022-08-11 PROCEDURE — 36415 COLL VENOUS BLD VENIPUNCTURE: CPT

## 2022-08-11 PROCEDURE — 82565 ASSAY OF CREATININE: CPT

## 2022-08-19 ENCOUNTER — DOCUMENTATION ONLY (OUTPATIENT)
Dept: LAB | Facility: CLINIC | Age: 32
End: 2022-08-19

## 2022-08-22 ENCOUNTER — LAB (OUTPATIENT)
Dept: LAB | Facility: CLINIC | Age: 32
End: 2022-08-22
Payer: COMMERCIAL

## 2022-08-22 DIAGNOSIS — F31.9 BIPOLAR DISORDER, UNSPECIFIED (H): Primary | ICD-10-CM

## 2022-08-22 DIAGNOSIS — J02.9 SORE THROAT: ICD-10-CM

## 2022-08-22 LAB — LITHIUM SERPL-SCNC: 1 MMOL/L (ref 0.6–1.2)

## 2022-08-22 PROCEDURE — 80178 ASSAY OF LITHIUM: CPT

## 2022-08-22 PROCEDURE — 36415 COLL VENOUS BLD VENIPUNCTURE: CPT

## 2022-08-22 NOTE — PROGRESS NOTES
I do not monitor lithium levels that should come from the prescriber. They need to order the lab because they adjust the medication.     Bina

## 2022-09-06 ENCOUNTER — TELEPHONE (OUTPATIENT)
Dept: FAMILY MEDICINE | Facility: CLINIC | Age: 32
End: 2022-09-06

## 2022-09-06 ENCOUNTER — NURSE TRIAGE (OUTPATIENT)
Dept: FAMILY MEDICINE | Facility: CLINIC | Age: 32
End: 2022-09-06

## 2022-09-06 NOTE — TELEPHONE ENCOUNTER
"Pt calling requesting home care advice until she can be seen. Pt states she cannot use ibuprofen due to her other medications and states she is not confident how to wrap her knee with an elastic wrap so she declines to try that. Pt states she is not available to be seen until tomorrow. RN reviewed home care instructions (while avoiding NSAIDs as pt indicated). RN recommended the Acute [facility] Walk-in Clinic at [facility] tomorrow since she is not available today, and provided pt with number to call ahead to check wait times.    MAGGIE Padilla, RN    Reason for Disposition    MILD or MODERATE swelling (e.g., can't move joint normally, can't do usual activities) (Exceptions: Itchy, localized swelling; swelling is chronic.)    Additional Information    Negative: Followed a knee injury    Negative: Followed a bee sting and has localized swelling (e.g., small area of puffy or swollen skin)    Negative: Followed an insect bite and has localized swelling (e.g., small area of puffy or swollen skin)    Negative: Knee pain is main symptom    Negative: Swelling of calf or leg is main symptom    Negative: Knee pain and fever    Negative: Knee redness and fever    Negative: Patient sounds very sick or weak to the triager    Negative: SEVERE pain (e.g., excruciating, unable to walk) and not improved after 2 hours of pain medicine    Negative: Redness and painful when touched and no fever    Negative: Red area or streak > 2 inches (or 5 cm)    Negative: Thigh or calf pain and only 1 side and present > 1 hour    Negative: Thigh or calf swelling and only 1 side    Negative: SEVERE swelling (e.g., can't move swollen knee at all)    Negative: Looks like a boil, infected sore, deep ulcer or other infected rash (spreading redness, pus)    Negative: Redness of the skin and no fever    Negative: Patient wants to be seen    Answer Assessment - Initial Assessment Questions  1. LOCATION: \"Where is the swelling located?\"  " "(e.g., left, right, both knees)      Rt    2. ONSET: \"When did the swelling start?\" \"Does it come and go, or is it there all the time?\"      Started swelling 9/5/22 after playing basketball    3. SWELLING: \"How bad is the swelling?\" Or, \"How large is it?\" (e.g., mild, moderate, severe; size of localized swelling)     - NONE: No joint swelling.    - LOCALIZED: Localized; small area of puffy or swollen skin (e.g., insect bite, skin irritation).    - MILD: Joint looks or feels mildly swollen or puffy.    - MODERATE: Swollen; interferes with normal activities (e.g., work or school); can't move joint normally (bend and straighten completely); may be limping.    - SEVERE: Very swollen; can't move swollen joint at all; limping a lot or unable to walk.      Ambulating independently, but limping    4. PAIN: \"Is there any pain?\" If Yes, ask: \"How bad is it?\" (Scale 1-10; or mild, moderate, severe)    - NONE (0): no pain.    - MILD (1-3): doesn't interfere with normal activities.     - MODERATE (4-7): interferes with normal activities (e.g., work or school) or awakens from sleep, limping.     - SEVERE (8-10): excruciating pain, unable to do any normal activities, unable to walk.       #6/10    5. SETTING: \"Has there been any recent work, exercise or other activity that involved that part of the body?\"       Played basketball    6. AGGRAVATING FACTORS: \"What makes the knee swelling worse?\" (e.g., walking, climbing stairs, running)      N/A    7. ASSOCIATED SYMPTOMS: \"Is there any pain or redness?\"      Swollen \"4 times\" it's usual size, mild discoloration compared to non-injured knee    8. OTHER SYMPTOMS: \"Do you have any other symptoms?\" (e.g., chest pain, difficulty breathing, fever, calf pain)      Denies    Protocols used: KNEE SWELLING-A-OH      "

## 2022-09-07 NOTE — TELEPHONE ENCOUNTER
Called and offered appt, patient declined, is going to ortho walk in clinic.    Truman Quevedo

## 2022-09-07 NOTE — TELEPHONE ENCOUNTER
Please advise if we can put this patient in your approval spot this afternoon. It looks like she is having knee pain.Mckayla Hwang MA/ELIZABETH

## 2022-09-07 NOTE — TELEPHONE ENCOUNTER
Your 10:00 is already double booked. Can we use you approval spot for this afternoon?Mckayla Hwang MA/ELIZABETH

## 2022-09-07 NOTE — TELEPHONE ENCOUNTER
Reason for Call:  Other appointment    Detailed comments: Patient called at 10:56 pm last night 9/6 and had talked to a triage nurse that told her she should get in to be seen right away. She goes to [facility], and is wondering if someone can fit her into their schedule so she doesn't have to go to urgent care and wait for a long time. I do see that Dr. Zaire Jha has an appt that needs prior approval. Can you ask him if he'd be willing to see this patient since he has a slot open?  Thank you!     Phone Number Patient can be reached at: [phone]    Best Time: ASAP    Can we leave a detailed message on this number? Yes     Call taken on 9/6/2022 at 10:56 PM by Gilda Lim

## 2022-10-13 ENCOUNTER — LAB (OUTPATIENT)
Dept: LAB | Facility: CLINIC | Age: 32
End: 2022-10-13
Payer: COMMERCIAL

## 2022-10-13 DIAGNOSIS — F31.9 BIPOLAR DISORDER (H): ICD-10-CM

## 2022-10-13 DIAGNOSIS — F31.9 BIPOLAR DISORDER (H): Primary | ICD-10-CM

## 2022-10-13 LAB — LITHIUM SERPL-SCNC: 0.7 MMOL/L (ref 0.6–1.2)

## 2022-10-13 PROCEDURE — 80178 ASSAY OF LITHIUM: CPT

## 2022-10-13 PROCEDURE — 36415 COLL VENOUS BLD VENIPUNCTURE: CPT

## 2022-10-23 ENCOUNTER — E-VISIT (OUTPATIENT)
Dept: FAMILY MEDICINE | Facility: CLINIC | Age: 32
End: 2022-10-23
Payer: COMMERCIAL

## 2022-10-23 DIAGNOSIS — R05.1 ACUTE COUGH: Primary | ICD-10-CM

## 2022-10-23 PROCEDURE — 99423 OL DIG E/M SVC 21+ MIN: CPT | Performed by: PHYSICIAN ASSISTANT

## 2022-10-24 ENCOUNTER — NURSE TRIAGE (OUTPATIENT)
Dept: NURSING | Facility: CLINIC | Age: 32
End: 2022-10-24

## 2022-10-24 NOTE — TELEPHONE ENCOUNTER
Pt is phoning stating that she has been sick for over a week     Pt is productive cough - green mucous, headache, body aches, chills, sore throat, runny nose     Temperature 102.0 - forehead   Pt states that her cough is interferes with her ability to sleep     Per Disposition: Call PCP Within 24 Hours    Transferred pt to scheduling for a COVID test and virtual visit with a provider     Care advice given per protocol and when to call back. Pt verbalized understanding and agrees to plan of care.    Virginia Carter RN  [place] Nurse Advisor  7:48 AM 10/24/2022      COVID 19 Nurse Triage Plan/Patient Instructions    Please be aware that novel coronavirus (COVID-19) may be circulating in the community. If you develop symptoms such as fever, cough, or SOB or if you have concerns about the presence of another infection including coronavirus (COVID-19), please contact your health care provider or visit [email or web address].     Disposition/Instructions    Virtual Visit with provider recommended. Reference Visit Selection Guide.    Thank you for taking steps to prevent the spread of this virus.  o Limit your contact with others.  o Wear a simple mask to cover your cough.  o Wash your hands well and often.    Resources    [facility]: About COVID-19: [email or web address]    CDC: What to Do If You're Sick: www.cdc.gov/coronavirus/2019-ncov/about/steps-when-sick.html    CDC: Ending Home Isolation: www.cdc.gov/coronavirus/2019-ncov/hcp/disposition-in-home-patients.html     CDC: Caring for Someone: www.cdc.gov/coronavirus/2019-ncov/if-you-are-sick/care-for-someone.html     [facility]: Interim Guidance for Hospital Discharge to Home: www.health.[facility].[place].us/diseases/coronavirus/hcp/hospdischarge.pdf    [facility] clinical trials (COVID-19 research studies): clinicalaffairs.[facility]/umn-clinical-trials     Below are the COVID-19 hotlines at the [place] Department of Health ([facility]). Interpreters  are available.   o For health questions: Call 752-803-8682 or 1-256.376.4781 (7 a.m. to 7 p.m.)  o For questions about schools and childcare: Call 695-944-4582 or 1-823.437.7374 (7 a.m. to 7 p.m.)                           Reason for Disposition    [1] Continuous (nonstop) coughing interferes with work or school AND [2] no improvement using cough treatment per Care Advice    Additional Information    Negative: SEVERE difficulty breathing (e.g., struggling for each breath, speaks in single words)    Negative: Difficult to awaken or acting confused (e.g., disoriented, slurred speech)    Negative: Bluish (or gray) lips or face now    Negative: Shock suspected (e.g., cold/pale/clammy skin, too weak to stand, low BP, rapid pulse)    Negative: Sounds like a life-threatening emergency to the triager    Negative: [1] Diagnosed or suspected COVID-19 AND [2] symptoms lasting 3 or more weeks    Negative: [1] COVID-19 exposure AND [2] no symptoms    Negative: COVID-19 vaccine reaction suspected (e.g., fever, headache, muscle aches) occurring 1 to 3 days after getting vaccine    Negative: COVID-19 vaccine, questions about    Negative: [1] Lives with someone known to have influenza (flu test positive) AND [2] flu-like symptoms (e.g., cough, runny nose, sore throat, SOB; with or without fever)    Negative: [1] Adult with possible COVID-19 symptoms AND [2] triager concerned about severity of symptoms or other causes    Negative: COVID-19 and breastfeeding, questions about    Negative: SEVERE or constant chest pain or pressure  (Exception: Mild central chest pain, present only when coughing.)    Negative: MODERATE difficulty breathing (e.g., speaks in phrases, SOB even at rest, pulse 100-120)    Negative: [1] Headache AND [2] stiff neck (can't touch chin to chest)    Negative: Oxygen level (e.g., pulse oximetry) 90 percent or lower    Negative: Chest pain or pressure    Negative: Patient sounds very sick or weak to the triager     Negative: MILD difficulty breathing (e.g., minimal/no SOB at rest, SOB with walking, pulse <100)    Negative: Fever > 103 F (39.4 C)    Negative: [1] Fever > 101 F (38.3 C) AND [2] age > 60 years    Negative: [1] Fever > 100.0 F (37.8 C) AND [2] bedridden (e.g., nursing home patient, CVA, chronic illness, recovering from surgery)    Negative: HIGH RISK for severe COVID complications (e.g., weak immune system, age > 64 years, obesity with BMI > 25, pregnant, chronic lung disease or other chronic medical condition)  (Exception: Already seen by PCP and no new or worsening symptoms.)    Negative: [1] HIGH RISK patient AND [2] influenza is widespread in the community AND [3] ONE OR MORE respiratory symptoms: cough, sore throat, runny or stuffy nose    Negative: [1] HIGH RISK patient AND [2] influenza exposure within the last 7 days AND [3] ONE OR MORE respiratory symptoms: cough, sore throat, runny or stuffy nose    Negative: Oxygen level (e.g., pulse oximetry) 91 to 94 percent    Negative: Fever present > 3 days (72 hours)    Negative: [1] Fever returns after gone for over 24 hours AND [2] symptoms worse or not improved    Protocols used: CORONAVIRUS (COVID-19) DIAGNOSED OR [id number] 1.18.2022

## 2022-10-24 NOTE — PATIENT INSTRUCTIONS
"Discharge Instructions for COVID-19 Patients  You have--or may have--COVID-19. Please follow the instructions listed below.   If you have a weakened immune system, discuss with your doctor any other actions you need to take.  How can I protect others?  If you have symptoms (fever, cough, body aches or trouble breathing):  Stay home and away from others (self-isolate) until:  Your other symptoms have resolved (gotten better). And   You've had no fever--and no medicine that reduces fever--for 1 full day (24 hours). And   At least 10 days have passed since your symptoms started. (You may need to wait 20 days. Follow the advice of your care team.)  If you don't show symptoms, but testing showed that you have COVID-19:  Stay home and away from others (self-isolate) until at least 10 days have passed since the date of your first positive COVID-19 test.  During this time  Stay in your own room, even for meals. Use your own bathroom if you can.  Stay away from others in your home. No hugging, kissing or shaking hands. No visitors.  Don't go to work, school or anywhere else.  Clean \"high touch\" surfaces often (doorknobs, counters, handles). Use household cleaning spray or wipes.  You'll find a full list of  on the EPA website: www.epa.gov/pesticide-registration/list-n-disinfectants-use-against-sars-cov-2.  Cover your mouth and nose with a mask or other face covering to avoid spreading germs.  Wash your hands and face often. Use soap and water.  Caregivers in these groups are at risk for severe illness due to COVID-19:  People 65 years and older  People who live in a nursing home or long-term care facility  People with chronic disease (lung, heart, cancer, diabetes, kidney, liver, immunologic)  People who have a weakened immune system, including those who:  Are in cancer treatment  Take medicine that weakens the immune system, such as corticosteroids  Had a bone marrow or organ transplant  Have an immune " deficiency  Have poorly controlled HIV or AIDS  Are obese (body mass index of 40 or higher)  Smoke regularly  Caregivers should wear gloves while washing dishes, handling laundry and cleaning bedrooms and bathrooms.  Use caution when washing and drying laundry: Don't shake dirty laundry and use the warmest water setting that you can.  For more tips on managing your health at home, go to www.cdc.gov/coronavirus/2019-ncov/downloads/10Things.pdf.  How can I take care of myself at home?  Get lots of rest. Drink extra fluids (unless a doctor has told you not to).  Take Tylenol (acetaminophen) for fever or pain. If you have liver or kidney problems, ask your family doctor if it's okay to take Tylenol.   Adults can take either:   650 mg (two 325 mg pills) every 4 to 6 hours, or   1,000 mg (two 500 mg pills) every 8 hours as needed.  Note: Don't take more than 3,000 mg in one day. Acetaminophen is found in many medicines (both prescribed and over-the-counter medicines). Read all labels to be sure you don't take too much.   For children, check the Tylenol bottle for the right dose. The dose is based on the child's age or weight.  If you have other health problems (like cancer, heart failure, an organ transplant or severe kidney disease): Call your specialty clinic if you don't feel better in the next 2 days.  Know when to call 911. Emergency warning signs include:  Trouble breathing or shortness of breath  Pain or pressure in the chest that doesn't go away  Feeling confused like you haven't felt before, or not being able to wake up  Bluish-colored lips or face  Your doctor may have prescribed a blood thinner medicine. Follow their instructions.  Where can I get more information?   [facility] - About COVID-19:   [email or web address]  CDC - What to Do If You're Sick: www.cdc.gov/coronavirus/2019-ncov/about/steps-when-sick.html  CDC - Ending Home Isolation:  www.cdc.gov/coronavirus/2019-ncov/hcp/disposition-in-home-patients.html  CDC - Caring for Someone: www.cdc.gov/coronavirus/2019-ncov/if-you-are-sick/care-for-someone.html  [facility] - Interim Guidance for Hospital Discharge to Home: www.health.[facility].mn.us/diseases/coronavirus/hcp/hospdischarge.pdf  Below are the COVID-19 hotlines at the [place] Department of Health ([facility]). Interpreters are available.  For health questions: Call 065-598-0081 or 1-950.625.6007 (7 a.m. to 7 p.m.)  For questions about schools and childcare: Call 336-444-8987 or 1-958.854.5885 (7 a.m. to 7 p.m.)    For informational purposes only. Not to replace the advice of your health care provider. Clinically reviewed by Dr. Mega Noonan.   Copyright   2020 [facility]. All rights reserved. [employer] 105703 - REV 01/05/21.

## 2022-11-24 ENCOUNTER — NURSE TRIAGE (OUTPATIENT)
Dept: NURSING | Facility: CLINIC | Age: 32
End: 2022-11-24

## 2022-11-24 ENCOUNTER — OFFICE VISIT (OUTPATIENT)
Dept: URGENT CARE | Facility: URGENT CARE | Age: 32
End: 2022-11-24
Payer: COMMERCIAL

## 2022-11-24 VITALS
TEMPERATURE: 99 F | DIASTOLIC BLOOD PRESSURE: 87 MMHG | OXYGEN SATURATION: 97 % | SYSTOLIC BLOOD PRESSURE: 135 MMHG | BODY MASS INDEX: 36.04 KG/M2 | HEART RATE: 105 BPM | WEIGHT: 192 LBS

## 2022-11-24 DIAGNOSIS — F31.9 BIPOLAR AFFECTIVE DISORDER, REMISSION STATUS UNSPECIFIED (H): ICD-10-CM

## 2022-11-24 DIAGNOSIS — R51.9 HEADACHE, UNSPECIFIED HEADACHE TYPE: Primary | ICD-10-CM

## 2022-11-24 DIAGNOSIS — H53.8 BLURRED VISION: ICD-10-CM

## 2022-11-24 LAB
BASOPHILS # BLD AUTO: 0.1 10E3/UL (ref 0–0.2)
BASOPHILS NFR BLD AUTO: 1 %
EOSINOPHIL # BLD AUTO: 0.3 10E3/UL (ref 0–0.7)
EOSINOPHIL NFR BLD AUTO: 2 %
ERYTHROCYTE [DISTWIDTH] IN BLOOD BY AUTOMATED COUNT: 11.9 % (ref 10–15)
HCT VFR BLD AUTO: 41.2 % (ref 35–47)
HGB BLD-MCNC: 14.1 G/DL (ref 11.7–15.7)
IMM GRANULOCYTES # BLD: 0 10E3/UL
IMM GRANULOCYTES NFR BLD: 0 %
LYMPHOCYTES # BLD AUTO: 3.5 10E3/UL (ref 0.8–5.3)
LYMPHOCYTES NFR BLD AUTO: 26 %
MCH RBC QN AUTO: 29.9 PG (ref 26.5–33)
MCHC RBC AUTO-ENTMCNC: 34.2 G/DL (ref 31.5–36.5)
MCV RBC AUTO: 88 FL (ref 78–100)
MONOCYTES # BLD AUTO: 0.8 10E3/UL (ref 0–1.3)
MONOCYTES NFR BLD AUTO: 6 %
NEUTROPHILS # BLD AUTO: 8.8 10E3/UL (ref 1.6–8.3)
NEUTROPHILS NFR BLD AUTO: 65 %
PLATELET # BLD AUTO: 343 10E3/UL (ref 150–450)
RBC # BLD AUTO: 4.71 10E6/UL (ref 3.8–5.2)
WBC # BLD AUTO: 13.4 10E3/UL (ref 4–11)

## 2022-11-24 PROCEDURE — 80178 ASSAY OF LITHIUM: CPT | Performed by: PHYSICIAN ASSISTANT

## 2022-11-24 PROCEDURE — 99214 OFFICE O/P EST MOD 30 MIN: CPT | Performed by: PHYSICIAN ASSISTANT

## 2022-11-24 PROCEDURE — 80053 COMPREHEN METABOLIC PANEL: CPT | Performed by: PHYSICIAN ASSISTANT

## 2022-11-24 PROCEDURE — 36415 COLL VENOUS BLD VENIPUNCTURE: CPT | Performed by: PHYSICIAN ASSISTANT

## 2022-11-24 PROCEDURE — 85025 COMPLETE CBC W/AUTO DIFF WBC: CPT | Performed by: PHYSICIAN ASSISTANT

## 2022-11-24 NOTE — PROGRESS NOTES
Assessment & Plan     Headache, unspecified headache type  CMP, CBC, and lithium level pending. Continue to monitor symptoms. Discussed in detail symptoms that would warrant emergent evaluation in the ED. Patient agrees with plan and will follow up as needed.      - Comprehensive metabolic panel; Future  - CBC with platelets and differential; Future  - Comprehensive metabolic panel  - CBC with platelets and differential    Blurred vision  No focal deficit on exam. Would recommend follow up in the ED if symptoms worsen or new neurological symptoms develop. Patient agrees with plan.   - Comprehensive metabolic panel; Future  - CBC with platelets and differential; Future  - Comprehensive metabolic panel  - CBC with platelets and differential    Bipolar affective disorder, remission status unspecified (H)    - Lithium level; Future  - Lithium level               Return in about 1 day (around 11/25/2022), or if symptoms worsen or fail to improve.    Mell Gill PA-C  [facility]                Subjective   Chief Complaint   Patient presents with     Urgent Care     Pt believes she has lithiumtoxicity, been craving salt the last couple of days blurred vision and headache, called nurse line and was told to get blood drawn and blood sugar            HPI     Headache/blurred vision     Onset of symptoms was several days   Course of illness is same.    Severity mild/mod  Current and Associated symptoms: craving salt, headache, intermittent blurred vision, taking medication as prescribed. Last time it was checked was 1 month ago and dose has been the same   Treatment measures tried include None tried.  Predisposing factors include currently on lithium and needs levels checked.                  Review of Systems   Constitutional, HEENT, cardiovascular, pulmonary, gi and gu systems are negative, except as otherwise noted.      Objective    /87 (BP Location: Left arm, Patient Position:  Sitting, Cuff Size: Adult Large)   Pulse 105   Temp 99  F (37.2  C) (Oral)   Wt 87.1 kg (192 lb)   SpO2 97%   BMI 36.04 kg/m    Body mass index is 36.04 kg/m .  Physical Exam  Constitutional:       Appearance: She is well-developed.   HENT:      Head: Normocephalic.      Right Ear: Tympanic membrane and ear canal normal.      Left Ear: Tympanic membrane and ear canal normal.   Eyes:      Conjunctiva/sclera: Conjunctivae normal.   Cardiovascular:      Rate and Rhythm: Normal rate.      Heart sounds: Normal heart sounds.   Pulmonary:      Effort: Pulmonary effort is normal.      Breath sounds: Normal breath sounds.   Skin:     General: Skin is warm and dry.      Findings: No rash.   Neurological:      General: No focal deficit present.   Psychiatric:         Mood and Affect: Mood normal.         Speech: Speech normal.         Behavior: Behavior normal.         Thought Content: Thought content normal.

## 2022-11-25 ENCOUNTER — NURSE TRIAGE (OUTPATIENT)
Dept: NURSING | Facility: CLINIC | Age: 32
End: 2022-11-25

## 2022-11-25 LAB
ALBUMIN SERPL-MCNC: 3.9 G/DL (ref 3.4–5)
ALP SERPL-CCNC: 45 U/L (ref 40–150)
ALT SERPL W P-5'-P-CCNC: 23 U/L (ref 0–50)
ANION GAP SERPL CALCULATED.3IONS-SCNC: 4 MMOL/L (ref 3–14)
AST SERPL W P-5'-P-CCNC: 10 U/L (ref 0–45)
BILIRUB SERPL-MCNC: 0.3 MG/DL (ref 0.2–1.3)
BUN SERPL-MCNC: 10 MG/DL (ref 7–30)
CALCIUM SERPL-MCNC: 9.6 MG/DL (ref 8.5–10.1)
CHLORIDE BLD-SCNC: 105 MMOL/L (ref 94–109)
CO2 SERPL-SCNC: 26 MMOL/L (ref 20–32)
CREAT SERPL-MCNC: 0.78 MG/DL (ref 0.52–1.04)
GFR SERPL CREATININE-BSD FRML MDRD: >90 ML/MIN/1.73M2
GLUCOSE BLD-MCNC: 113 MG/DL (ref 70–99)
LITHIUM SERPL-SCNC: 0.8 MMOL/L (ref 0.6–1.2)
POTASSIUM BLD-SCNC: 4 MMOL/L (ref 3.4–5.3)
PROT SERPL-MCNC: 7 G/DL (ref 6.8–8.8)
SODIUM SERPL-SCNC: 135 MMOL/L (ref 133–144)

## 2022-11-25 NOTE — TELEPHONE ENCOUNTER
Yesterday called. Thinks she has lithium toxicity. Had labs drawn yesterday.   Called Psych and they're closed. There are no test results posted yet for the lithium level. She will try back later.  Marisa Gutierrez RN  [place] [facility]    Reason for Disposition    Information only question and nurse able to answer    Additional Information    Negative: Nursing judgment    Negative: Nursing judgment    Negative: Nursing judgment    Negative: Nursing judgment    Protocols used: INFORMATION ONLY CALL - NO TRIAGE-A-OH

## 2022-12-04 ENCOUNTER — E-VISIT (OUTPATIENT)
Dept: FAMILY MEDICINE | Facility: CLINIC | Age: 32
End: 2022-12-04
Payer: COMMERCIAL

## 2022-12-04 DIAGNOSIS — M54.40 LOW BACK PAIN WITH SCIATICA, SCIATICA LATERALITY UNSPECIFIED, UNSPECIFIED BACK PAIN LATERALITY, UNSPECIFIED CHRONICITY: ICD-10-CM

## 2022-12-04 PROCEDURE — 99421 OL DIG E/M SVC 5-10 MIN: CPT | Performed by: PHYSICIAN ASSISTANT

## 2022-12-05 RX ORDER — CYCLOBENZAPRINE HCL 10 MG
10 TABLET ORAL 3 TIMES DAILY PRN
Qty: 60 TABLET | Refills: 1 | Status: SHIPPED | OUTPATIENT
Start: 2022-12-05 | End: 2023-09-17

## 2022-12-05 NOTE — PATIENT INSTRUCTIONS
Thank you for choosing us for your care. I have placed an order for a prescription so that you can start treatment. View your full visit summary for details by clicking on the link below. Your pharmacist will able to address any questions you may have about the medication.      If you're not feeling better within 2-3 weeks please schedule an appointment.  You can schedule an appointment right here in [facility], or call [phone]  If the visit is for the same symptoms as your eVisit, we'll refund the cost of your eVisit if seen within seven days.

## 2022-12-30 ENCOUNTER — NURSE TRIAGE (OUTPATIENT)
Dept: NURSING | Facility: CLINIC | Age: 32
End: 2022-12-30

## 2022-12-30 ENCOUNTER — E-VISIT (OUTPATIENT)
Dept: FAMILY MEDICINE | Facility: CLINIC | Age: 32
End: 2022-12-30
Payer: COMMERCIAL

## 2022-12-30 DIAGNOSIS — N94.6 DYSMENORRHEA: Primary | ICD-10-CM

## 2022-12-30 DIAGNOSIS — F43.10 PTSD (POST-TRAUMATIC STRESS DISORDER): Primary | ICD-10-CM

## 2022-12-30 PROCEDURE — 99207 PR NO CHARGE LOS: CPT | Performed by: PHYSICIAN ASSISTANT

## 2022-12-30 NOTE — TELEPHONE ENCOUNTER
Caller reproting severe menstrual cramps unrelieved with tylenol 1000 mg and Flexiril; states unable to take NSAIDS  Menses is at normal time; normal bleeding; Barrier  Contraception. Has not done  pregnancy test.   Triage protocol reviewed    Routed to [facility] primary nurse pool to contact patient for disposition     Caller advised to call back if no response; may elect to be sen in [facility]  today as an option   Kenia Major RN  FNA    Reason for Disposition    SEVERE pain (e.g., excruciating cramps) and worse than ever before and not improved with ibuprofen or naproxen    Additional Information    Negative: Sounds like a life-threatening emergency to the triager    Negative: Abdominal cramps unrelated to menstrual period    Negative: SEVERE pain and pain clearly increases with coughing    Negative: SEVERE vaginal bleeding (e.g., soaking 2 pads or tampons per hour and present 2 or more hours; 1 menstrual cup every 2 hours)    Negative: Patient sounds very sick or weak to the triager    Negative: Fever > 100.4 F (38.0 C)    Negative: Could be pregnant (e.g., missed last menstrual period)    Protocols used: ABDOMINAL PAIN - MENSTRUAL CRAMPS-A-OH

## 2022-12-30 NOTE — TELEPHONE ENCOUNTER
Patient notified of provider's message as written below. Patient verbalized good understanding, had no further questions and needed no further support.Aga Powers R.N.

## 2023-01-05 ENCOUNTER — LAB (OUTPATIENT)
Dept: LAB | Facility: CLINIC | Age: 33
End: 2023-01-05
Payer: COMMERCIAL

## 2023-01-05 DIAGNOSIS — F43.10 PTSD (POST-TRAUMATIC STRESS DISORDER): ICD-10-CM

## 2023-01-05 LAB
BUN SERPL-MCNC: 18 MG/DL (ref 7–30)
CREAT SERPL-MCNC: 0.85 MG/DL (ref 0.52–1.04)
GFR SERPL CREATININE-BSD FRML MDRD: >90 ML/MIN/1.73M2
LITHIUM SERPL-SCNC: 0.5 MMOL/L (ref 0.6–1.2)
TSH SERPL DL<=0.005 MIU/L-ACNC: 3.52 MU/L (ref 0.4–4)

## 2023-01-05 PROCEDURE — 84520 ASSAY OF UREA NITROGEN: CPT

## 2023-01-05 PROCEDURE — 36415 COLL VENOUS BLD VENIPUNCTURE: CPT

## 2023-01-05 PROCEDURE — 80178 ASSAY OF LITHIUM: CPT

## 2023-01-05 PROCEDURE — 84443 ASSAY THYROID STIM HORMONE: CPT

## 2023-01-05 PROCEDURE — 82565 ASSAY OF CREATININE: CPT

## 2023-04-23 ENCOUNTER — HEALTH MAINTENANCE LETTER (OUTPATIENT)
Age: 33
End: 2023-04-23

## 2023-04-27 ENCOUNTER — LAB (OUTPATIENT)
Dept: LAB | Facility: CLINIC | Age: 33
End: 2023-04-27
Payer: COMMERCIAL

## 2023-04-27 DIAGNOSIS — F31.81 BIPOLAR 2 DISORDER (H): Primary | ICD-10-CM

## 2023-04-27 LAB
BUN SERPL-MCNC: 13 MG/DL (ref 7–30)
CREAT SERPL-MCNC: 0.75 MG/DL (ref 0.52–1.04)
GFR SERPL CREATININE-BSD FRML MDRD: >90 ML/MIN/1.73M2
T4 FREE SERPL-MCNC: 0.91 NG/DL (ref 0.76–1.46)
TSH SERPL DL<=0.005 MIU/L-ACNC: 3.54 MU/L (ref 0.4–4)

## 2023-04-27 PROCEDURE — 84520 ASSAY OF UREA NITROGEN: CPT

## 2023-04-27 PROCEDURE — 84439 ASSAY OF FREE THYROXINE: CPT

## 2023-04-27 PROCEDURE — 84443 ASSAY THYROID STIM HORMONE: CPT

## 2023-04-27 PROCEDURE — 80178 ASSAY OF LITHIUM: CPT

## 2023-04-27 PROCEDURE — 36415 COLL VENOUS BLD VENIPUNCTURE: CPT

## 2023-04-27 PROCEDURE — 82565 ASSAY OF CREATININE: CPT

## 2023-04-29 LAB — LITHIUM SERPL-SCNC: 0.7 MMOL/L (ref 0.6–1.2)

## 2023-05-12 DIAGNOSIS — F31.81 BIPOLAR 2 DISORDER (H): Primary | ICD-10-CM

## 2023-06-02 ENCOUNTER — MYC MEDICAL ADVICE (OUTPATIENT)
Dept: EDUCATION SERVICES | Facility: CLINIC | Age: 33
End: 2023-06-02

## 2023-06-03 ENCOUNTER — TELEPHONE (OUTPATIENT)
Dept: FAMILY MEDICINE | Facility: CLINIC | Age: 33
End: 2023-06-03
Payer: COMMERCIAL

## 2023-06-03 NOTE — LETTER
[place] 3, 2023      Joanne Escalante  [address] [facility] [address]        To Whom It May Concern:    Joanne Escalante  was seen on 6/2/23.  Please excuse her  until 6/4/23 due to illness.  When her symptoms have improved, she may return to work with no restrictions.        Sincerely,      Bina Mead MD

## 2023-06-03 NOTE — LETTER
[place] 3, 2023      Joanne Escalante  [address] [facility] [address]        To Whom It May Concern:    Joanne Escalante  was seen on 6/2/23.  Please excuse her  until 6/4/23 due to illness.  When her symptoms have improved, she may return to work with no restrictions.        Sincerely,      Bina Mead MD

## 2023-06-03 NOTE — TELEPHONE ENCOUNTER
Forms/Letter Request    Type of form/letter: Work    Have you been seen for this request: Yes - pt was seen Virtually on 6/2    Do we have the form/letter: Pt is needing a note for work    Who is the form from? Cely - pt is needing a note for work  Where did/will the form come from? Patient or family brought in       When is form/letter needed by: 6/6    How would you like the form/letter returned: [facility]    Patient Notified form requests are processed in 3-5 business days:Yes    Could we send this information to you in [employer] or would you prefer to receive a phone call?:   Patient would prefer a phone call   Okay to leave a detailed message?: Yes at Home number on file [phone] (home)

## 2023-06-17 ENCOUNTER — LAB (OUTPATIENT)
Dept: LAB | Facility: CLINIC | Age: 33
End: 2023-06-17
Payer: COMMERCIAL

## 2023-06-17 DIAGNOSIS — F31.81 BIPOLAR 2 DISORDER (H): ICD-10-CM

## 2023-06-17 PROCEDURE — 80178 ASSAY OF LITHIUM: CPT

## 2023-06-17 PROCEDURE — 36415 COLL VENOUS BLD VENIPUNCTURE: CPT

## 2023-06-18 LAB — LITHIUM SERPL-SCNC: 0.8 MMOL/L (ref 0.6–1.2)

## 2023-06-26 NOTE — PATIENT INSTRUCTIONS
If you have any questions regarding your visit, Please contact your care team.     [facility]: [phone]  To Schedule an Appointment 24/7  Call: [phone] TELEPHONE NUM[facility]   Maty Armando, DNP, APRN, NP-BC    [facility] - Surgery Scheduler  [place] - Surgery Scheduler    DANIEL Rosas, DANIEL Hall RN   Monday- [place]  8:00 am - 12:00 pm    Tuesday- [place]  8:00 am - 4:30 pm    Wednesday- [place]  8:00 am - 4:30 pm    Thursday- [place]  8:00 am - 4:30 pm    Friday- [place]  8:00 am - 4:30 pm [facility]  [address] [address] [address]  Phone: [phone]   Fax: [phone]     Imaging Scheduling-All Locations [phone]    [facility]  [address] [place] [address]     Urgent Care locations:  [facility] Monday-Friday   10 am - 8 pm  Saturday and Sunday   9 am - 5 pm [phone]   [facility] Labor and Delivery:  [phone]    If you need a medication refill, please contact your pharmacy. Please allow 3 business days for your refill to be completed.  As always, Thank you for trusting us with your healthcare needs!  see additional instructions from your care team below

## 2023-06-27 ENCOUNTER — PRENATAL OFFICE VISIT (OUTPATIENT)
Dept: OBGYN | Facility: CLINIC | Age: 33
End: 2023-06-27
Payer: COMMERCIAL

## 2023-06-27 VITALS
SYSTOLIC BLOOD PRESSURE: 141 MMHG | DIASTOLIC BLOOD PRESSURE: 94 MMHG | WEIGHT: 203.4 LBS | HEART RATE: 86 BPM | BODY MASS INDEX: 38.18 KG/M2 | OXYGEN SATURATION: 96 %

## 2023-06-27 DIAGNOSIS — N92.1 MENORRHAGIA WITH IRREGULAR CYCLE: ICD-10-CM

## 2023-06-27 DIAGNOSIS — K76.0 FATTY LIVER: ICD-10-CM

## 2023-06-27 DIAGNOSIS — N80.9 ENDOMETRIOSIS DETERMINED BY LAPAROSCOPY: Primary | ICD-10-CM

## 2023-06-27 DIAGNOSIS — Z30.013 ENCOUNTER FOR INITIAL PRESCRIPTION OF INJECTABLE CONTRACEPTIVE: ICD-10-CM

## 2023-06-27 LAB
ERYTHROCYTE [DISTWIDTH] IN BLOOD BY AUTOMATED COUNT: 12.4 % (ref 10–15)
HCG SERPL QL: NEGATIVE
HCT VFR BLD AUTO: 42.7 % (ref 35–47)
HGB BLD-MCNC: 14.9 G/DL (ref 11.7–15.7)
MCH RBC QN AUTO: 30.2 PG (ref 26.5–33)
MCHC RBC AUTO-ENTMCNC: 34.9 G/DL (ref 31.5–36.5)
MCV RBC AUTO: 86 FL (ref 78–100)
PLATELET # BLD AUTO: 326 10E3/UL (ref 150–450)
RBC # BLD AUTO: 4.94 10E6/UL (ref 3.8–5.2)
WBC # BLD AUTO: 13.8 10E3/UL (ref 4–11)

## 2023-06-27 PROCEDURE — 84703 CHORIONIC GONADOTROPIN ASSAY: CPT

## 2023-06-27 PROCEDURE — 96372 THER/PROPH/DIAG INJ SC/IM: CPT

## 2023-06-27 PROCEDURE — 80076 HEPATIC FUNCTION PANEL: CPT

## 2023-06-27 PROCEDURE — 36415 COLL VENOUS BLD VENIPUNCTURE: CPT

## 2023-06-27 PROCEDURE — 85027 COMPLETE CBC AUTOMATED: CPT

## 2023-06-27 PROCEDURE — 99214 OFFICE O/P EST MOD 30 MIN: CPT | Mod: 25

## 2023-06-27 RX ORDER — CARIPRAZINE 4.5 MG/1
CAPSULE, GELATIN COATED ORAL
COMMUNITY
Start: 2023-06-12 | End: 2024-03-05

## 2023-06-27 RX ORDER — IBUPROFEN 200 MG
1000 CAPSULE ORAL DAILY
Qty: 180 TABLET | Refills: 3 | Status: SHIPPED | OUTPATIENT
Start: 2023-06-27 | End: 2024-03-05

## 2023-06-27 RX ORDER — SWAB
1 SWAB, NON-MEDICATED MISCELLANEOUS DAILY
Qty: 90 CAPSULE | Refills: 3 | Status: SHIPPED | OUTPATIENT
Start: 2023-06-27 | End: 2024-03-05

## 2023-06-27 RX ORDER — PROPRANOLOL HYDROCHLORIDE 10 MG/1
1 TABLET ORAL
COMMUNITY
Start: 2023-06-09

## 2023-06-27 RX ORDER — MEDROXYPROGESTERONE ACETATE 150 MG/ML
150 INJECTION, SUSPENSION INTRAMUSCULAR
Status: ACTIVE | OUTPATIENT
Start: 2023-06-27 | End: 2024-06-21

## 2023-06-27 RX ADMIN — MEDROXYPROGESTERONE ACETATE 150 MG: 150 INJECTION, SUSPENSION INTRAMUSCULAR at 11:44

## 2023-06-27 ASSESSMENT — PATIENT HEALTH QUESTIONNAIRE - PHQ9: SUM OF ALL RESPONSES TO PHQ QUESTIONS 1-9: 12

## 2023-06-27 NOTE — PROGRESS NOTES
SUBJECTIVE:     HPI: Joanne Escalante is a 32 year old  who presents today to discuss endometriosis and management options for endometriosis and heavy, painful periods.     Joanne states her endometriosis was officially diagnosed with surgical laparoscopy in .  In the past she has used both Depo and the Mirena IUD for contraception and for management of her endometriosis with improved endo symptoms.  She went of of contraception for pregnancy and then decided after pregnancy that she wanted to try going for a time without hormonal management of her endometriosis symptoms. However, her symptoms of heavy, painful periods have returned and she reports experiencing debilitating pain prior to and with during her periods causing her to miss work.  She also reports increaesed pain with intercourse.  At this time she would like to discuss returning to Depo injections as this managed her endo symptoms well, the Mirena IUD also gave her good management of her symptoms but gave her recurrent yeast infections.    Currently menses are regular and occur every 28-32 days.  Flow is very heavy from the start, does taper some in the final day or two of bleeding.  Flow lasts around 7-8 days.  Patient reports severe associated cramping to her pelvis, bilateral thighs and bilateral low back.    Menarche 11.   Patient denies intermenstrual bleeding.  Patient denies post-coital bleeding.    Patient is sexually active. She is in a long term sexual relationship with 1 male partner.   She is using condoms for contraception.    She denies vaginal discharge, dysmenorrhea, dyspareunia.   She denies fevers/chills, nausea/vomiting, abdominal pain or bloating.   She denies dysuria, hematuria, constipation or diarrhea.   She denies visual changes, headaches or galactorrhea.    Ob Hx: .      Gyn Hx: Last pap was 2021.  Next pap due 2024   STI history Denies  STD testing offered?  Declined    Medical History:  Past Medical  History:   Diagnosis Date     Anxiety      Depressive disorder      Endometriosis      Periumbilical hernia    Non-alcoholic fatty liver    Surgical History:  Past Surgical History:   Procedure Laterality Date      SECTION  2018, 2020    x 2     HERNIORRHAPHY UMBILICAL N/A 2021    Procedure: OPEN UMBILICAL HERNIA REPAIR WITH MESH;  Surgeon: Ankit Nunez MD;  Location: [facility]     LAPAROSCOPY DIAGNOSTIC (GYN)  2015    Endometriosis     OTHER SURGICAL HISTORY      ablation     WOUND DEBRIDEMENT N/A 2020    Abdominal      ZZC ORAL SURGERY PROCEDURE      [place] teeth     Medication Hx:   Current medications reviewed with patient.    Recent or current use of exogenous hormones? No    Family Hx:  Family history of endometriosis, bleeding disorder: No  Family history of gyn-related malignancies including breast, ovarian, uterine: No    Social History:  Social History     Tobacco Use     Smoking status: Every Day     Packs/day: 0.50     Types: Cigarettes     Smokeless tobacco: Never   Vaping Use     Vaping Use: Never used   Substance Use Topics     Alcohol use: Not Currently     Drug use: Never      reports that she has been smoking cigarettes. She has been smoking an average of .5 packs per day. She has never used smokeless tobacco.  Tobacco Cessation Action Plan: Information offered: Patient not interested at this time    Today's PHQ-2 Score:     2021     2:53 PM   PHQ-2 (  [employer])   Q1: Little interest or pleasure in doing things 2   Q2: Feeling down, depressed or hopeless 2   PHQ-2 Score 4   PHQ-2 Total Score (12-17 Years)- Positive if 3 or more points; Administer PHQ-A if positive 4   Q1: Little interest or pleasure in doing things More than half the days   Q2: Feeling down, depressed or hopeless More than half the days   PHQ-2 Score 4     Today's PHQ-9 Score:       2021     2:53 PM   PHQ-9 SCORE   PHQ-9 Total Score MyChart 20 (Severe depression)   PHQ-9 Total Score 20      Today's GENARO-7 Score:       2021     4:02 PM   GENARO-7 SCORE   Total Score 7 (mild anxiety)   Total Score 7         Problem list and histories reviewed & adjusted, as indicated.  Additional history: as documented.    Patient Active Problem List   Diagnosis     Umbilical hernia without obstruction and without gangrene     Abdominal wall cellulitis     ESBL (extended spectrum beta-lactamase) producing bacteria infection     Anxiety disorder     Cigarette smoker     Endometriosis determined by laparoscopy     Prenatal care, subsequent pregnancy in first trimester     RUQ abdominal pain     Menorrhagia with irregular cycle     Fatty liver     Horseshoe kidney     Past Surgical History:   Procedure Laterality Date      SECTION  2018, 2020    x 2     HERNIORRHAPHY UMBILICAL N/A 2021    Procedure: OPEN UMBILICAL HERNIA REPAIR WITH MESH;  Surgeon: Ankit Nunez MD;  Location: [facility]     LAPAROSCOPY DIAGNOSTIC (GYN)  2015    Endometriosis     OTHER SURGICAL HISTORY      ablation     WOUND DEBRIDEMENT N/A 2020    Abdominal      ZZC ORAL SURGERY PROCEDURE      [place] teeth      Social History     Tobacco Use     Smoking status: Every Day     Packs/day: 0.50     Types: Cigarettes     Smokeless tobacco: Never   Substance Use Topics     Alcohol use: Not Currently      Problem (# of Occurrences) Relation (Name,Age of Onset)    Depression (2) Mother, Father    Diabetes (1) Maternal Grandmother    Heart Disease (1) Father: mi    Breast Cancer (1) Maternal Grandmother    Other - See Comments (1) Mother: fibroids, cervical abnormalty    Alcoholism (2) Mother, Father            cyclobenzaprine (FLEXERIL) 10 MG tablet, Take 1 tablet (10 mg) by mouth 3 times daily as needed for muscle spasms  lamoTRIgine (LAMICTAL) 200 MG tablet, Take 1 tablet (200 mg) by mouth daily  lithium ER (LITHOBID) 300 MG CR tablet, Take 1 tablet (300 mg) by mouth 2 times daily  loperamide (LOPERAMIDE A-D) 2 MG tablet, Take 1  tablet (2 mg) by mouth 4 times daily as needed for diarrhea  ondansetron (ZOFRAN) 4 MG tablet, Take 1 tablet (4 mg) by mouth every 8 hours as needed for nausea or vomiting  QUEtiapine (SEROQUEL) 25 MG tablet, Take 1 tablet (25 mg) by mouth 2 times daily as needed (anxiety)  QUEtiapine (SEROQUEL) 50 MG tablet, Take 1-2 tablets ( mg) by mouth At Bedtime  [DISCONTINUED] ARIPiprazole (ABILIFY) 5 MG tablet, Take 1 tablet (5 mg) by mouth daily    No current facility-administered medications on file prior to visit.    Allergies   Allergen Reactions     Sulfa Antibiotics    Depression Screening Follow-up        6/27/2023    12:24 PM   PHQ   PHQ-9 Total Score 12   Q9: Thoughts of better off dead/self-harm past 2 weeks Not at all       Does the patient currently have a mental health provider?  No  Follow Up Actions Taken  Patient to follow up with PCP. Clinic staff to schedule appointment if able.  Patient declined referral.     Maty Rendon, APRN CNP      ROS: 10 Point review of systems negative other noted above in HPI    OBJECTIVE:     EXAM:  BP (!) 141/94 (BP Location: Left arm, Patient Position: Chair, Cuff Size: Adult Regular)   Pulse 86   Wt 92.3 kg (203 lb 6.4 oz)   LMP 06/21/2023 (Exact Date)   SpO2 96%   BMI 38.18 kg/m    General - Pleasant female in no acute distress.  Skin - No suspicious lesions, ecchymosis, or rashes. No acne. No male pattern hair loss or hirsutism.   EENT-  PERRLA, euthyroid with out palpable nodules  Neck - supple without lymphadenopathy.  Lungs - clear to auscultation bilaterally.  Heart - regular rate and rhythm without murmur.  Abdomen - soft, nontender, nondistended, no masses or organomegaly noted, no CVA tenderness.  Musculoskeletal - no gross deformities.  Neurological - normal strength, sensation, and mental status.    Pelvic Exam:  EG/BUS: Normal genital architecture without lesions, erythema or abnormal secretions Bartholin's, Urethra, Walworth's normal   Urethral  meatus: normal   Urethra: no masses, tenderness, or scarring   Bladder: no masses or tenderness   Vagina: moist, pink, rugae with creamy, white and odorless  secretions  Uterus: anteverted,  and small, smooth, firm, mobile w/o pain, evaluation limited by body habitus  Adnexa: Within normal limits, No masses, nodularity, tenderness, evaluation limited by body habitus  Rectum: deferred    ASSESSMENT/PLAN:                                                      Joanne Escalante is a 32 year old  who presents today for consultation regarding abnormal bleeding.    (N80.9) Endometriosis determined by laparoscopy  (primary encounter diagnosis)  Plan: CBC with platelets,    US Pelvic Complete with Transvaginal,    medroxyPROGESTERone (DEPO-PROVERA) injection 150 mg    (N92.1) Menorrhagia with irregular cycle  Plan:   CBC with platelets,    US Pelvic Complete with Transvaginal to rule out structural cause of bleeding    (Z30.013) Encounter for initial prescription of injectable contraceptive  Comment: For endometriosis management  Plan: Hepatic panel (Albumin, ALT, AST, Bili, Alk Phos, TP),    HCG qualitative,   medroxyPROGESTERone (DEPO-PROVERA) injection 150 mg,    vitamin D3 (CHOLECALCIFEROL) 10 MCG (400 UNIT) capsule,    calcium carbonate (OSCAL 500) 1250 (500 Ca) MG TABS tablet   Continue to use condoms with intercourse due to possible interaction with Lamotrigin   Return if no improvement in endometrial symptoms    (K76.0) Fatty liver  Comment: History of Non-Alcoholic Fatty Liver disease. Has been stable.  Plan: Hepatic panel (Albumin, ALT, AST, Bili, Alk Phos, TP)   Plan to monitor hepatic panel at least once annually while on Depo Provera injections      30 minutes spent on the date of the encounter doing chart review, history and exam, documentation and further activities as noted above    ISH Patel [facility]  M [facility]

## 2023-06-28 LAB
ALBUMIN SERPL BCG-MCNC: 4.6 G/DL (ref 3.5–5.2)
ALP SERPL-CCNC: 49 U/L (ref 35–104)
ALT SERPL W P-5'-P-CCNC: 35 U/L (ref 0–50)
AST SERPL W P-5'-P-CCNC: 32 U/L (ref 0–45)
BILIRUB DIRECT SERPL-MCNC: <0.2 MG/DL (ref 0–0.3)
BILIRUB SERPL-MCNC: 0.2 MG/DL
PROT SERPL-MCNC: 7.1 G/DL (ref 6.4–8.3)

## 2023-07-17 ENCOUNTER — NURSE TRIAGE (OUTPATIENT)
Dept: FAMILY MEDICINE | Facility: CLINIC | Age: 33
End: 2023-07-17
Payer: COMMERCIAL

## 2023-07-17 NOTE — TELEPHONE ENCOUNTER
"Patient is reports that she believes she has shingles on her face. There is itching on her forehead and a rash. There are 3 open sores that were fluid filled and have now \"popped\" due to itching and a pustule like rash around this area. It has been there for about 2 days. There was burning before the rash appeared. She has had shingles before. There is pain/discomfort rated 6/10 from the top of her head down to her chin. The rash is on the right side her forehead. It is just above her eye brow. It does not involve her eye. It is in her hair also. Her current temp is 100.1.    Nursing Advice:  Patient needs to be seen on clinic now.  She was advised to be seen in urgent care. Patient was given signs and symptoms to go to the E.R. Patient verbalizes good understanding, agrees with plan and states she needs no further support. Aga Powers R.N.    Reason for Disposition    Shingles rash (matches SYMPTOMS) and onset within past 72 hours    Additional Information    Negative: Difficult to awaken or acting confused (e.g., disoriented, slurred speech)    Negative: Sounds like a life-threatening emergency to the triager    Negative: Shingles rash of face and eye pain or blurred vision    Negative: Shingles rash on the eyelid or tip of the nose    Negative: Shingles rash and spots start appearing other places on body    Negative: Patient sounds very sick or weak to the triager    Negative: Fever > 100.4 F (38.0 C)    Negative: Shingles rash of face or ear and earache or ringing in the ear    Negative: Shingles rash of face and facial weakness    Negative: Shingles rash (matches SYMPTOMS) and weak immune system (e.g., HIV positive,  cancer chemotherapy, chronic steroid treatment, splenectomy) and NOT taking antiviral medication    Negative: SEVERE pain (e.g., excruciating)    Protocols used: SHINGLES-A-OH      "

## 2023-07-23 ENCOUNTER — NURSE TRIAGE (OUTPATIENT)
Dept: NURSING | Facility: CLINIC | Age: 33
End: 2023-07-23
Payer: COMMERCIAL

## 2023-07-23 NOTE — TELEPHONE ENCOUNTER
Pt calling to report that she got the Depo-provera birth control shot last month and now has her period and period cramps. Pt is calling because she thought that she would no longer get her period.     Disposition: Home Care. Writer reviewed education in the care advice with the patient.     Reason for Disposition    [1] Irregular vaginal bleeding or spotting AND [2] getting the birth control shot    Additional Information    Negative: [1] Abdominal pain AND [2] pregnant < 20 weeks    Negative: [1] Vaginal bleeding AND [2] pregnant < 20 weeks    Negative: Vaginal discharge is main symptom    Negative: [1] SEVERE abdominal pain (e.g., excruciating) AND [2] present > 1 hour    Negative: SEVERE vaginal bleeding (e.g., soaking 2 pads or tampons per hour and present 2 or more hours; 1 menstrual cup every 2 hours)    Negative: SEVERE dizziness (e.g., unable to stand, requires support to walk, feels like passing out now)    Negative: Patient sounds very sick or weak to the triager    Negative: MODERATE vaginal bleeding (e.g., soaking 1 pad or tampon per hour and present > 6 hours; 1 menstrual cup every 6 hours)    Negative: [1] Vaginal bleeding is WORSE than normal (e.g., heavier) AND [2] dizziness or lightheadedness    Negative: [1] Constant abdominal pain AND [2] present > 2 hours    Negative: [1] Caller has URGENT question AND [2] triager unable to answer question    Negative: Injection site looks infected (spreading redness, red streak, or pus)    Negative: [1] Redness or red streak around the injection site AND [2] begins > 48 hoursafter shot    Negative: [1] Caller has NON-URGENT question AND [2] triager unable to answer question    Negative: [1] Vaginal bleeding with > 6 soaked pads or tampons per day AND [2] lasts > 7 days    Negative: [1] Getting birth control shot (DMPA/Depo-Provera) AND [2] more than 3 months (12-13 weeks) since last injection    Negative: [1] Vaginal bleeding with > 6 soaked pads or tampons  per day BUT [2] lasts 7 days or less    Negative: Vaginal bleeding lasts > 7 days    Negative: [1] No monthly bleeding AND [2] stopped getting the birth control shot > 6 months ago    Negative: Pregnant    Protocols used: CONTRACEPTION - BIRTH CONTROL SHOT SYMPTOMS AND [id number]  Cathie Plasencia RN  [facility] Nurse Advisor 12:50 PM 7/23/2023

## 2023-08-11 ENCOUNTER — E-VISIT (OUTPATIENT)
Dept: FAMILY MEDICINE | Facility: CLINIC | Age: 33
End: 2023-08-11
Payer: COMMERCIAL

## 2023-08-11 ENCOUNTER — NURSE TRIAGE (OUTPATIENT)
Dept: NURSING | Facility: CLINIC | Age: 33
End: 2023-08-11
Payer: COMMERCIAL

## 2023-08-11 ENCOUNTER — LAB (OUTPATIENT)
Dept: FAMILY MEDICINE | Facility: CLINIC | Age: 33
End: 2023-08-11
Attending: NURSE PRACTITIONER
Payer: COMMERCIAL

## 2023-08-11 DIAGNOSIS — J02.9 SORE THROAT: ICD-10-CM

## 2023-08-11 DIAGNOSIS — J02.9 SORE THROAT: Primary | ICD-10-CM

## 2023-08-11 LAB
DEPRECATED S PYO AG THROAT QL EIA: NEGATIVE
GROUP A STREP BY PCR: NOT DETECTED

## 2023-08-11 PROCEDURE — 87651 STREP A DNA AMP PROBE: CPT

## 2023-08-11 PROCEDURE — 99421 OL DIG E/M SVC 5-10 MIN: CPT | Performed by: NURSE PRACTITIONER

## 2023-08-11 ASSESSMENT — PATIENT HEALTH QUESTIONNAIRE - PHQ9
10. IF YOU CHECKED OFF ANY PROBLEMS, HOW DIFFICULT HAVE THESE PROBLEMS MADE IT FOR YOU TO DO YOUR WORK, TAKE CARE OF THINGS AT HOME, OR GET ALONG WITH OTHER PEOPLE: VERY DIFFICULT
SUM OF ALL RESPONSES TO PHQ QUESTIONS 1-9: 11
SUM OF ALL RESPONSES TO PHQ QUESTIONS 1-9: 11

## 2023-08-11 NOTE — PATIENT INSTRUCTIONS
Thank you for choosing us for your care. Given your symptoms, I would like you to do a lab-only visit to determine what is causing them.  I have placed the orders for a strep test.  Please schedule an appointment with the lab right here in [facility], or call [phone].  I will let you know when the results are back and next steps to take.

## 2023-08-11 NOTE — TELEPHONE ENCOUNTER
"Nurse Triage SBAR    Is this a 2nd Level Triage? NO    Situation:   Patient calling reporting sore throat starting this morning.    Background:   Known exposure to hand foot and mouth in home.    Assessment:   Patient reporting waking this morning with a \"very sore throat.\"   Feels chilled, feverish (thermometer not available) patient is working.  Swollen glands in neck. White patches on tonsils.  Taking fluids. Denies difficulty breathing.  Rating pain \"5-6\" on 1-10 pain scale.    Protocol Recommended Disposition:     See PCP with in 24 hours. Patient agrees to complete E-Visit now.      Reason for Disposition   [1] Pus on tonsils (back of throat) AND [2]  fever AND [3] swollen neck lymph nodes (\"glands\")    Additional Information   Negative: SEVERE difficulty breathing (e.g., struggling for each breath, speaks in single words, stridor)   Negative: Sounds like a life-threatening emergency to the triager   Negative: [1] Drooling or spitting out saliva (because can't swallow) AND [2] normal breathing   Negative: Unable to open mouth completely   Negative: [1] Difficulty breathing AND [2] not severe   Negative: Fever > 104 F (40 C)   Negative: [1] Refuses to drink anything AND [2] for > 12 hours   Negative: [1] Drinking very little AND [2] dehydration suspected (e.g., no urine > 12 hours, very dry mouth, very lightheaded)   Negative: Patient sounds very sick or weak to the triager   Negative: SEVERE (e.g., excruciating) throat pain    Protocols used: Sore Throat-A-AH    "

## 2023-09-17 ENCOUNTER — HOSPITAL ENCOUNTER (OUTPATIENT)
Facility: CLINIC | Age: 33
Setting detail: OBSERVATION
Discharge: HOME | End: 2023-09-18
Attending: EMERGENCY MEDICINE | Admitting: EMERGENCY MEDICINE

## 2023-09-17 DIAGNOSIS — F31.32 BIPOLAR AFFECTIVE DISORDER, CURRENTLY DEPRESSED, MODERATE (H): ICD-10-CM

## 2023-09-17 DIAGNOSIS — R45.851 SUICIDAL IDEATION: ICD-10-CM

## 2023-09-17 DIAGNOSIS — F41.9 ANXIETY: ICD-10-CM

## 2023-09-17 PROCEDURE — 99285 EMERGENCY DEPT VISIT HI MDM: CPT | Mod: 25

## 2023-09-17 RX ORDER — PROPRANOLOL HYDROCHLORIDE 10 MG/1
10 TABLET ORAL ONCE
Status: COMPLETED | OUTPATIENT
Start: 2023-09-17 | End: 2023-09-18

## 2023-09-17 RX ORDER — QUETIAPINE FUMARATE 50 MG/1
50-100 TABLET, EXTENDED RELEASE ORAL
COMMUNITY
End: 2024-09-18

## 2023-09-17 RX ORDER — LITHIUM CARBONATE 300 MG/1
300 CAPSULE ORAL DAILY
COMMUNITY
End: 2023-09-18

## 2023-09-17 RX ORDER — LITHIUM CARBONATE 300 MG/1
1200 CAPSULE ORAL EVERY EVENING
COMMUNITY

## 2023-09-17 RX ORDER — CLONAZEPAM 0.5 MG/1
0.5 TABLET ORAL 2 TIMES DAILY PRN
COMMUNITY

## 2023-09-17 RX ORDER — LAMOTRIGINE 100 MG/1
200 TABLET ORAL AT BEDTIME
Status: DISCONTINUED | OUTPATIENT
Start: 2023-09-17 | End: 2023-09-18 | Stop reason: HOSPADM

## 2023-09-17 RX ORDER — LITHIUM CARBONATE 300 MG/1
1200 CAPSULE ORAL ONCE
Status: COMPLETED | OUTPATIENT
Start: 2023-09-17 | End: 2023-09-18

## 2023-09-17 ASSESSMENT — ACTIVITIES OF DAILY LIVING (ADL): ADLS_ACUITY_SCORE: 33

## 2023-09-18 VITALS
RESPIRATION RATE: 16 BRPM | HEART RATE: 85 BPM | OXYGEN SATURATION: 98 % | HEIGHT: 61 IN | SYSTOLIC BLOOD PRESSURE: 111 MMHG | BODY MASS INDEX: 38.83 KG/M2 | WEIGHT: 205.7 LBS | TEMPERATURE: 98.5 F | DIASTOLIC BLOOD PRESSURE: 70 MMHG

## 2023-09-18 PROBLEM — F31.9 BIPOLAR DISORDER, UNSPECIFIED (H): Status: ACTIVE | Noted: 2023-09-18

## 2023-09-18 PROBLEM — F32.9 MAJOR DEPRESSIVE DISORDER, SINGLE EPISODE, UNSPECIFIED: Status: RESOLVED | Noted: 2023-09-18 | Resolved: 2023-09-18

## 2023-09-18 PROBLEM — R45.851 SUICIDAL IDEATION: Status: ACTIVE | Noted: 2023-09-18

## 2023-09-18 PROBLEM — F32.9 MAJOR DEPRESSIVE DISORDER, SINGLE EPISODE, UNSPECIFIED: Status: ACTIVE | Noted: 2023-09-18

## 2023-09-18 LAB
ALBUMIN SERPL BCG-MCNC: 4.2 G/DL (ref 3.5–5.2)
ALP SERPL-CCNC: 42 U/L (ref 35–104)
ALT SERPL W P-5'-P-CCNC: 21 U/L (ref 0–50)
AMPHETAMINES UR QL SCN: NORMAL
ANION GAP SERPL CALCULATED.3IONS-SCNC: 10 MMOL/L (ref 7–15)
AST SERPL W P-5'-P-CCNC: 14 U/L (ref 0–45)
BARBITURATES UR QL SCN: NORMAL
BENZODIAZ UR QL SCN: NORMAL
BILIRUB SERPL-MCNC: 0.3 MG/DL
BUN SERPL-MCNC: 11.9 MG/DL (ref 6–20)
BZE UR QL SCN: NORMAL
CALCIUM SERPL-MCNC: 10 MG/DL (ref 8.6–10)
CANNABINOIDS UR QL SCN: NORMAL
CHLORIDE SERPL-SCNC: 106 MMOL/L (ref 98–107)
CREAT SERPL-MCNC: 0.8 MG/DL (ref 0.51–0.95)
DEPRECATED HCO3 PLAS-SCNC: 23 MMOL/L (ref 22–29)
EGFRCR SERPLBLD CKD-EPI 2021: >90 ML/MIN/1.73M2
FENTANYL UR QL: NORMAL
GLUCOSE SERPL-MCNC: 119 MG/DL (ref 70–99)
HCG UR QL: NEGATIVE
LITHIUM SERPL-SCNC: 0.74 MMOL/L (ref 0.6–1.2)
OPIATES UR QL SCN: NORMAL
PCP QUAL URINE (ROCHE): NORMAL
POTASSIUM SERPL-SCNC: 4 MMOL/L (ref 3.4–5.3)
PROT SERPL-MCNC: 6.9 G/DL (ref 6.4–8.3)
SODIUM SERPL-SCNC: 139 MMOL/L (ref 136–145)

## 2023-09-18 PROCEDURE — 81025 URINE PREGNANCY TEST: CPT | Performed by: PSYCHIATRY & NEUROLOGY

## 2023-09-18 PROCEDURE — 36415 COLL VENOUS BLD VENIPUNCTURE: CPT | Performed by: PSYCHIATRY & NEUROLOGY

## 2023-09-18 PROCEDURE — 80307 DRUG TEST PRSMV CHEM ANLYZR: CPT | Performed by: PSYCHIATRY & NEUROLOGY

## 2023-09-18 PROCEDURE — 250N000013 HC RX MED GY IP 250 OP 250 PS 637: Performed by: PSYCHIATRY & NEUROLOGY

## 2023-09-18 PROCEDURE — 90791 PSYCH DIAGNOSTIC EVALUATION: CPT

## 2023-09-18 PROCEDURE — 99236 HOSP IP/OBS SAME DATE HI 85: CPT | Mod: GC | Performed by: PSYCHIATRY & NEUROLOGY

## 2023-09-18 PROCEDURE — 82374 ASSAY BLOOD CARBON DIOXIDE: CPT | Performed by: PSYCHIATRY & NEUROLOGY

## 2023-09-18 PROCEDURE — 80178 ASSAY OF LITHIUM: CPT | Performed by: PSYCHIATRY & NEUROLOGY

## 2023-09-18 PROCEDURE — G0378 HOSPITAL OBSERVATION PER HR: HCPCS

## 2023-09-18 PROCEDURE — 250N000013 HC RX MED GY IP 250 OP 250 PS 637: Performed by: EMERGENCY MEDICINE

## 2023-09-18 RX ORDER — POLYETHYLENE GLYCOL 3350 17 G
4 POWDER IN PACKET (EA) ORAL
Status: DISCONTINUED | OUTPATIENT
Start: 2023-09-18 | End: 2023-09-18 | Stop reason: HOSPADM

## 2023-09-18 RX ORDER — LITHIUM CARBONATE 450 MG
450 TABLET, EXTENDED RELEASE ORAL EVERY 12 HOURS SCHEDULED
Status: CANCELLED | OUTPATIENT
Start: 2023-09-18

## 2023-09-18 RX ORDER — LITHIUM CARBONATE 450 MG
450 TABLET, EXTENDED RELEASE ORAL AT BEDTIME
Qty: 15 TABLET | Refills: 1 | Status: SHIPPED | OUTPATIENT
Start: 2023-09-18 | End: 2024-03-05

## 2023-09-18 RX ORDER — HYDROXYZINE HYDROCHLORIDE 25 MG/1
25 TABLET, FILM COATED ORAL 3 TIMES DAILY PRN
Status: DISCONTINUED | OUTPATIENT
Start: 2023-09-18 | End: 2023-09-18 | Stop reason: HOSPADM

## 2023-09-18 RX ORDER — TRAZODONE HYDROCHLORIDE 50 MG/1
50 TABLET, FILM COATED ORAL
Status: DISCONTINUED | OUTPATIENT
Start: 2023-09-18 | End: 2023-09-18 | Stop reason: HOSPADM

## 2023-09-18 RX ADMIN — CARIPRAZINE 4.5 MG: 4.5 CAPSULE, GELATIN COATED ORAL at 00:12

## 2023-09-18 RX ADMIN — PROPRANOLOL HYDROCHLORIDE 10 MG: 10 TABLET ORAL at 00:13

## 2023-09-18 RX ADMIN — LAMOTRIGINE 200 MG: 100 TABLET ORAL at 00:12

## 2023-09-18 RX ADMIN — NICOTINE POLACRILEX 4 MG: 2 LOZENGE ORAL at 09:38

## 2023-09-18 RX ADMIN — LITHIUM CARBONATE 1200 MG: 300 CAPSULE, GELATIN COATED ORAL at 00:12

## 2023-09-18 ASSESSMENT — COLUMBIA-SUICIDE SEVERITY RATING SCALE - C-SSRS
ATTEMPT SINCE LAST CONTACT: NO
TOTAL  NUMBER OF ABORTED OR SELF INTERRUPTED ATTEMPTS SINCE LAST CONTACT: NO
6. HAVE YOU EVER DONE ANYTHING, STARTED TO DO ANYTHING, OR PREPARED TO DO ANYTHING TO END YOUR LIFE?: NO
1. SINCE LAST CONTACT, HAVE YOU WISHED YOU WERE DEAD OR WISHED YOU COULD GO TO SLEEP AND NOT WAKE UP?: NO
TOTAL  NUMBER OF INTERRUPTED ATTEMPTS SINCE LAST CONTACT: NO
2. HAVE YOU ACTUALLY HAD ANY THOUGHTS OF KILLING YOURSELF?: NO
SUICIDE, SINCE LAST CONTACT: NO

## 2023-09-18 ASSESSMENT — ACTIVITIES OF DAILY LIVING (ADL)
ADLS_ACUITY_SCORE: 35

## 2023-09-18 NOTE — DISCHARGE INSTRUCTIONS
Aftercare Plan    Appointment information and/or additional resources available to me:     [facility]  For free, confidential and competent therapy, you can go to the [facility]. You can see a therapist on a walk-in basis, first-come first-serve. You can decide with your therapist to meet for scheduled sessions after that point, if possible.   Address: [address] [place] [id number]  Hours:   Monday, Wednesday, Friday: 1:00 - 3:00  Monday through Thursday: 6:30 - 8:30      Community resources for free and/or sliding scale fees for mental health services was provided.      If I am feeling unsafe or I am in a crisis, I will:   Contact my established care providers   Call the National Suicide Prevention Lifeline: 996.446.6388   Go to the nearest emergency room   Call 911     Your [facility] has a mental health crisis team you can call 24/7: [facility], [phone]    Warning signs that I or other people might notice when a crisis is developing for me: Lack of sleep    Things I am able to do on my own to cope or help me feel better: Bake.  -Practice square breathing when I begin to feel anxious - in breath through the nose for the count   of 4 and the first line on the square. Out breath through the mouth for the count of 4 for the second line   of the square. Repeat to complete the square. Repeat the square as many times as needed.    Things that I am able to do with others to cope or help me feel better: Play bingo at [employer].  -Use community resources, including hotline numbers, [facility] crisis and support meetings    Things I can use or do for distraction: Read.  -Distraction skills of: going for walks, watching TV, spending time outside, calling a friend or family member  -Download a meditation patricia and spend 15-20 minutes per day mediating/relaxing. Some apps to   download include: Calm, Headspace and Insight Timer. All 3 of these apps have free version    Changes I  "can make to support my mental health and wellness: 5 minutes to myself to engage in deep breathing.  -Attend scheduled mental health therapy and psychiatric appointments and follow all recommendations  -Maintain a daily schedule/routine  -Practice deep breathing skills  -Abstain from all mood altering chemicals not currently prescribed to me     People in my life that I can ask for help: Dale Gaspar Ginni  [facility] (IMANI)  936.300.3851 or 1.888.IMANI.HELPS    Other things that are important when I m in crisis: Be a cheerleader, listen to me and refrain from problem solving.  -Commit to 30 minutes of self care daily - this can be as simple as taking a shower, going for a walk, cooking a meal, read, writing, etc        Crisis Lines  Crisis Text Line  Text 733844  You will be connected with a trained live crisis counselor to provide support.    Por espanol, texto  MELVI a 928717 o texto a 442-AYUDAME en WhatsApp    National Hope Line  1.800.SUICIDE [1929177]      Community Resources  Fast Tracker  Linking people to mental health and substance use disorder resources  [employer]org     [place] Mental [facility] Warm Line  Peer to peer support  Monday thru Saturday, 12 pm to 10 pm  720.288.9710 or 7.362.450.2606  Text \"Support\" to 68773    [facility] (IMANI)  926.285.2514 or 1.888.IMANI.HELPS        Mental Health Apps  My3  https://my[employer]pp.org/    VirtualHopeBox  https://[employer] (MIPS).org/apps/virtual-hope-box/      Additional Information  Today you were seen by a licensed mental health professional through Triage and Transition services, [facility] ([facility])  for a crisis assessment in the Emergency Department at [facility].  It is recommended that you follow up with your established providers (psychiatrist, mental health therapist, and/or primary care doctor - as relevant) as soon as possible. Coordinators from P will be calling you " in the next 24-48 hours to ensure that you have the resources you need.  You can also contact [facility] coordinators directly at [phone]. You may have been scheduled for or offered an appointment with a mental health provider. [facility] maintains an extensive network of licensed behavioral health providers to connect patients with the services they need.  We do not charge providers a fee to participate in our referral network.  We match patients with providers based on a patient's specific needs, insurance coverage, and location.  Our first effort will be to refer you to a provider within your care system, and will utilize providers outside your care system as needed.

## 2023-09-18 NOTE — ED TRIAGE NOTES
"Pt presents with vague suicidal thoughts. Pt states \"It's not that I want to kill myself, I just don't want to be alive anymore\". Pt has Hx of Bipolar> pt has been in Emp[facility] unit before and is asking to return     Triage Assessment       Row Name 09/17/23 5541       Triage Assessment (Adult)    Airway WDL WDL       Respiratory WDL    Respiratory WDL WDL       Skin Circulation/Temperature WDL    Skin Circulation/Temperature WDL WDL       Cardiac WDL    Cardiac WDL WDL       Peripheral/Neurovascular WDL    Peripheral Neurovascular WDL WDL       Cognitive/Neuro/Behavioral WDL    Cognitive/Neuro/Behavioral WDL X;mood/behavior    Mood/Behavior sad                    "

## 2023-09-18 NOTE — ED NOTES
Pt. overwhelmed and tearful as soon as writer started interacting with her. Lacking sleep, lacking support, trying to secure housing so she doesn't have to live with mother or mother in law to be. Has 2 young children ( works nights and fiance works days ) Interested in getting into therapy. VSS this AM. Ordered and ate breakfast. Awaiting evaluation by psychiatry.
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
## 2023-09-18 NOTE — PROGRESS NOTES
"32 year old female with history of bipolar, anxiety, depression and PTSD, received from ED due to increased anxiety and SI. Reports feeling like she has \" a lot going on\". Reports SI with no plan, contracts to safety. No HI. Nursing and risk assessments completed. Assessments reviewed with LMHP and physician. Admission information reviewed with patient. Patient given a tour of [facility] and instructions on using the facility. Questions regarding [facility] addressed. Pt safety search completed.     "

## 2023-09-18 NOTE — ED NOTES
[facility]  ED to EMPATH Checklist:      Goal for EMPATH: Suicidality    Current Behavior: Calm and Cooperative    Safety Concerns: Suicidal, no plan    Legal Hold Status: Voluntary    Medically Cleared by ED provider: Yes    Patient Therapeutically Searched: not searched    Belongings: Remain with patient    Independent Ambulation at Baseline: Yes/No: Yes    Participates in Care/Conversation: Yes/No: Yes    Patient Informed about EMPATH: Yes/No: Yes    DEC: Not ordered    Patient Ready to be Transferred to EMPATH? Yes/No: Yes

## 2023-09-18 NOTE — PLAN OF CARE
"Joanne Escalante  September 18, 2023  Plan of Care Hand-off Note     Patient Care Path: (Case consultation is still in progress at the time of this assessment. Final disposition to be determined upon re-assessment in AM and provider's assessment. Patient to remain on observation on [facility] tonight.)    Plan for Care:   Patient presented to the ED today after an increase in recent life stressors and inability to cope with these life stressors. Patient reports a recent evication from her mother's home, lack of sleep due to working overnights and lack of childcare, lack of insurance leading to inability to continue engaging in outpatient therapy, strained interpersonal relationships with her mother and her kodi's mother, etc. Patient reports an increase in suicide ideation which includes thoughts of, \"everyone would be better without me\" and \"my kids don't deserve this and Hubert deserves much better than this.\" Patient reports these thoughts are more intense recently. Patient denies homicide ideation, recent substance use, thoughts/urges to engage in self-harm, and auditory.visual hallucinations. Given patient's current symptom presentation, recommendation is for patient to remain on observation on [facility] tonight and get rest before meeting with psychiatry in AM to discuss possible medication changes. Patient is also open to more affordable therapy options.    Identified Goals and Safety Issues: Patient to get rest. Meet with psychiatry to discuss medication. Provide patient with alternative, more affordable therapy options.    Overview: Emergency Contact - Hubert (kodi) - [phone]. Patient has requested, if her mother calls, to not confirm she is here. She has requested to not contact her mother, though her information may be listed in her chart.    Legal Status: Legal Status at Admission: Voluntary/Patient has signed consent for treatment    Psychiatry Consult: Patient will see psychiatry on [facility].    Updated RN " regarding plan of care.      Dima Chao, JORGE, LGSW, Psychotherapist Trainee

## 2023-09-18 NOTE — ED PROVIDER NOTES
History     Chief Complaint:  Suicidal       The history is provided by the patient.      Joanne Escalante is a 32 year old female with a history of anxiety and depression who presents to the ED with suicidal ideation. Patient reports that about 1 month ago, her mother evicted the patient from her house, which has been hard for the patient. She states that she has had suicidal ideation today but had not formulated any plan for suicide. She was driven to the ED by her fiance. Joanne states that she wanted to be moved to [facility]. She notes that she is taking medications, including 1500 mg of lithium, 10 mg of propranolol, 4.5 mg of Vraylar, and 200 mg of lamotrigine, and had not missed any dose. Additionally, she states that she took a dose of clorazepam before presenting to the ED. Patient denies any recent change in medications. She also denies any drug or alcohol use, or any recent sickness. Joanne notes that she had presented to [facility] about 1 year ago for similar mental health reasons. Outside of this, she notes that she has been seeing a psychiatrist, but not a therapist as she does not have insurance.     Independent Historian:    None - patient only    Review of External Notes:  Reviewed [facility] visit from May 2022     Medications:    Lamictal   Lithium   Loperamide   Inderal   Seroquel   Vraylar    Past Medical History:    Anxiety   Depression   Endometriosis   Fatty liver   Horseshoe kidney  Hernia periumbilical      Past Surgical History:     section x2  Herniorrhaphy   Laparoscopy   Wound debridement   [place] teeth removal      Physical Exam   Patient Vitals for the past 24 hrs:   BP Temp Temp src Pulse Resp SpO2   23 2256 (!) 140/93 97.8  F (36.6  C) Oral 99 18 100 %      Physical Exam  General: Sitting up in bed  Eyes:  The pupils are equal and round    Conjunctivae and sclerae are normal  ENT:    Atraumatic face  Neck:  Normal range of motion  CV:  Regular rate     Skin warm and well perfused    Resp:  Non labored breathing on room air    No tachypnea    No cough heard  MS:  Normal muscular tone  Skin:  No rash or acute skin lesions noted  Neuro:   Awake, alert.      Speech is normal and fluent.    Face is symmetric.     Moves all extremities equally  Psych: Normal affect.  Appropriate interactions.    Emergency Department Course     Emergency Department Course & Assessments:    Assessments:  2309 I obtained history and examined the patient as noted above. I explained findings to the patient and we discussed plan for transfer to [facility]. The patient is comfortable with this plan.     Independent Interpretation (X-rays, CTs, rhythm strip):  None    Consultations/Discussion of Management or Tests:  Patient will be evaluated by DEC       Social Determinants of Health affecting care:  Homelessness/Housing Insecurity     Disposition:  The patient was transferred to [facility].     Impression & Plan      Medical Decision Making:  Joanne Escalante is a 32-year-old female who presented to the emergency department with suicidal ideation.  She has passive thoughts with no active plan.  She has not acted on these thoughts.  She has had a rough several weeks after getting kicked out of her mother's house.  She has been taking her medications as prescribed.  She would like to go to [facility].  Patient is calm and appropriate for [facility].  She was given her nighttime meds    Diagnosis:    ICD-10-CM    1. Suicidal ideation  R45.851          Scribe Disclosure:  I, Je Lebron, am serving as a scribe at 12:11 AM on 9/18/2023 to document services personally performed by Olinda Shane MD based on my observations and the provider's statements to me.    IRajani, am serving as a scribe at 12:11 AM on 9/18/2023 to document services personally performed by Olinda Shane MD based on my observations and the provider's statements to me.    9/17/2023   Olinda Shane MD Goertz, Maria  MD Adriana  09/18/23 0035

## 2023-09-18 NOTE — ED NOTES
Pt. seen by LMHP and psychiatry. Discharge plan determined and medication adjustments complete. Discharge instructions reviewed with patient including follow-up care plan. Educated on medication regime and advised not to stop prescribed medication without consulting their physician. Reviewed safety plan and outpatient resources/appointments.Verbalized understanding of discharge instructions. Denies SI. All belongings which where brought into the hospital have been returned to patient. Will be escorted to her ride at 1430.

## 2023-09-18 NOTE — PROGRESS NOTES
[facility] Discharge Note    Writer met with patient to discuss discharge planning. Reviewed current presentation on the unit and assessed for safety. Patient participated in aftercare and safety planning. The following plan was developed:      [place]mo Suicide Severity Rating Scale Since Last Contact: 09/18/2023  Suicidal Ideation (Since Last Contact)  1. Wish to be Dead (Since Last Contact): No  2. Non-Specific Active Suicidal Thoughts (Since Last Contact): No  Suicidal Behavior (Since Last Contact)  Actual Attempt (Since Last Contact): No  Has subject engaged in non-suicidal self-injurious behavior? (Since Last Contact): No  Interrupted Attempts (Since Last Contact): No  Aborted or Self-Interrupted Attempt (Since Last Contact): No  Preparatory Acts or Behavior (Since Last Contact): No  Suicide (Since Last Contact): No     C-SSRS Risk (Since Last Contact)  Calculated C-SSRS Risk Score (Since Last Contact): No Risk Indicated        Aftercare Plan     Appointment information and/or additional resources available to me:      [facility]  For free, confidential and competent therapy, you can go to the [facility]. You can see a therapist on a walk-in basis, first-come first-serve. You can decide with your therapist to meet for scheduled sessions after that point, if possible.   Address: [address] [place] [address]  Hours:   Monday, Wednesday, Friday: 1:00 - 3:00  Monday through Thursday: 6:30 - 8:30        Community resources for free and/or sliding scale fees for mental health services was provided.        If I am feeling unsafe or I am in a crisis, I will:   Contact my established care providers   Call the National Suicide Prevention Lifeline: 258.713.5209   Go to the nearest emergency room   Call 911      Your Critical access hospital has a mental health crisis team you can call 24/7: [facility], [phone]     Warning signs that I or other people might notice when a crisis is developing  for me: Lack of sleep     Things I am able to do on my own to cope or help me feel better: Bake.  -Practice square breathing when I begin to feel anxious - in breath through the nose for the count   of 4 and the first line on the square. Out breath through the mouth for the count of 4 for the second line   of the square. Repeat to complete the square. Repeat the square as many times as needed.     Things that I am able to do with others to cope or help me feel better: Play [employer] [facility] [employer].  -Use community resources, including [employer], [facility] crisis and support meetings     Things I can use or do for distraction: Read.  -Distraction skills of: going for walks, watching TV, spending time outside, calling a friend or family member  -Download a meditation patricia and spend 15-20 minutes per day mediating/relaxing. Some apps to   download include: Calm, Headspace and Insight Timer. All 3 of these apps have free version     Changes I can make to support my mental health and wellness: 5 minutes to myself to engage in deep breathing.  -Attend scheduled mental health therapy and psychiatric appointments and follow all recommendations  -Maintain a daily schedule/routine  -Practice deep breathing skills  -Abstain from all mood altering chemicals not currently prescribed to me      People in my life that I can ask for help: Dale Gaspar Ginni  [employer] [place] [employer] (IMANI)  474.732.0486 or 1.888.IMANI.HELPS     Other things that are important when I m in crisis: Be a cheerleader, listen to me and refrain from problem solving.  -Commit to 30 minutes of self care daily - this can be as simple as taking a shower, going for a walk, cooking a meal, read, writing, etc           Crisis Lines  Crisis Text Line  Text 239390  You will be connected with a trained live crisis counselor to provide support.     Por espanol, texto  MELVI a 254626 o texto a 442-AYUDAME en WhatsApp     National Hope Line   "1.800.SUICIDE [6114721]        Community Resources  Fast Tracker  Linking people to mental health and substance use disorder resources  Rallyhoodn.org      [place] Mental [facility] Warm Line  Peer to peer support  Monday thru Saturday, 12 pm to 10 pm  955.132.8278 or 2.837.992.8240  Text \"Support\" to 08592     National [place] [employer] Mental [employer]ness (IMANI)  171.991.9385 or 1.888.IMANI.HELPS           Mental Health Apps  My3  https://Vimessa.org/     VirtualHopeBox  https://Modern Feed/apps/virtual-hope-box/        Additional Information  Today you were seen by a licensed mental health professional through Triage and Transition services, [facility] ([facility])  for a crisis assessment in the Emergency Department at [facility].  It is recommended that you follow up with your established providers (psychiatrist, mental health therapist, and/or primary care doctor - as relevant) as soon as possible. Coordinators from [facility] will be calling you in the next 24-48 hours to ensure that you have the resources you need.  You can also contact [facility] coordinators directly at [phone]. You may have been scheduled for or offered an appointment with a mental health provider. [facility] maintains an extensive network of licensed behavioral health providers to connect patients with the services they need.  We do not charge providers a fee to participate in our referral network.  We match patients with providers based on a patient's specific needs, insurance coverage, and location.  Our first effort will be to refer you to a provider within your care system, and will utilize providers outside your care system as needed.      "

## 2023-09-18 NOTE — PROGRESS NOTES
[facility] Progress Note    Client Name: Joanne Escalante  Date: September 18, 2023  Service Type:  Group Therapy  Facilitator: REYNA Jarrell on 9/18/2023 at 10:15 AM      Response:  Patient did not participate in group.      REYNA Jarrell

## 2023-09-26 ENCOUNTER — LAB (OUTPATIENT)
Dept: LAB | Facility: CLINIC | Age: 33
End: 2023-09-26

## 2023-09-26 DIAGNOSIS — F31.81 BIPOLAR 2 DISORDER (H): Primary | ICD-10-CM

## 2023-09-26 LAB — LITHIUM SERPL-SCNC: 0.53 MMOL/L (ref 0.6–1.2)

## 2023-09-26 PROCEDURE — 36415 COLL VENOUS BLD VENIPUNCTURE: CPT

## 2023-09-26 PROCEDURE — 80178 ASSAY OF LITHIUM: CPT

## 2023-10-19 NOTE — TELEPHONE ENCOUNTER
"Pt had a  on 2020.  Pt has been seen in clinic and ED several times since then for wound infection.  The last time she was seen in clinic was on 2020.  Looks like an antibiotic was prescribed.    Spoke with pt.  She states that she finished the course of antibiotics that Dr. Grier prescribed her on 2020.  She was instructed to follow up in clinic in 2 weeks if wound did not heal.  Pt states she would like to be seen sooner because it does not appear that it has improved at all since taking the antibiotics and she feels \"crummy\".    Wound is not healing, about a dime size of open area on incision.  Bloody drainage.  Pain and warmth around the opening.  Foul smell but pt is unsure if that is from around the wound where she has had yeast in the past that has smelt or if from wound drainage.  Pt unsure if she has fever as she doesn't have a thermometer.  She states she hasn't been feeling well.   Pt has been moving so unsure if that is why wound is not healing.      Advised that pt follow up in clinic.  Pt prefers to see Dr. Grier.  Scheduled her with Dr. Grier on Thursday, advised to keep wound area clean and dry, take Tylenol for pain and be seen sooner if symptoms worsen.    Pt verbalized understanding and agreed to plan.    Alison Cordoba RN          "
Reason for call:  Other   Patient called regarding (reason for call): call back  Additional comments: Patient is requesting advice on whether she needs to be seen. Her scar has not healed and she is experiencing pain.     Phone number to reach patient:  Home number on file [phone] (home)    Best Time:  any    Can we leave a detailed message on this number?  YES    Travel screening: Negative    
Male

## 2023-11-12 ENCOUNTER — NURSE TRIAGE (OUTPATIENT)
Dept: NURSING | Facility: CLINIC | Age: 33
End: 2023-11-12

## 2023-11-13 NOTE — TELEPHONE ENCOUNTER
"5 days ago shoulder pain right shoulder.   Reason for Disposition   [1] MODERATE pain (e.g., interferes with normal activities) AND [2] present > 3 days    Additional Information   Negative: Passed out (i.e., lost consciousness, collapsed and was not responding)   Negative: Shock suspected (e.g., cold/pale/clammy skin, too weak to stand, low BP, rapid pulse)   Negative: [1] Similar pain previously AND [2] it was from \"heart attack\"   Negative: [1] Similar pain previously AND [2] it was from \"angina\" AND [3] not relieved by nitroglycerin   Negative: Sounds like a life-threatening emergency to the triager   Negative: Difficulty breathing or unusual sweating (e.g., sweating without exertion)   Negative: [1] Pain lasting > 5 minutes AND [2] pain also present in chest  (Exception: Pain is clearly made worse by movement.)   Negative: [1] Age > 40 AND [2] no obvious cause AND [3] pain even when not moving the arm  (Exception: Pain is clearly made worse by moving arm or bending neck.)   Negative: [1] SEVERE pain AND [2] not improved 2 hours after pain medicine   Negative: [1] Red area or streak AND [2] fever   Negative: [1] Swollen joint AND [2] fever   Negative: Patient sounds very sick or weak to the triager   Negative: Entire arm is swollen   Negative: Weakness (i.e., loss of strength) in hand or fingers  (Exception: Not truly weak; hand feels weak because of pain.)   Negative: [1] Shoulder pains with exertion (e.g., walking) AND [2] pain goes away on resting AND [3] not present now   Negative: [1] Painful rash AND [2] multiple small blisters grouped together (i.e., dermatomal distribution or \"band\" or \"stripe\")   Negative: Looks like a boil, infected sore, deep ulcer or other infected rash (spreading redness, pus)   Negative: [1] Localized rash is very painful AND [2] no fever   Negative: Numbness (i.e., loss of sensation) in hand or fingers   Negative: [1] Unable to use arm at all AND [2] because of shoulder pain or " stiffness    Protocols used: Shoulder Pain-A-AH

## 2023-11-29 ENCOUNTER — TELEPHONE (OUTPATIENT)
Dept: FAMILY MEDICINE | Facility: CLINIC | Age: 33
End: 2023-11-29

## 2023-11-29 NOTE — TELEPHONE ENCOUNTER
[facility] mental health clinic calling for lab results for pt's most recent lithium level (ordered by their provider Delaney Suarez) to be faxed over to them at [phone].  Cesia Obrien RN    [facility]- [facility]

## 2023-11-30 NOTE — TELEPHONE ENCOUNTER
I have faxed the lab results to [facility] @ [phone].  Effie Quevedo

## 2023-12-22 ENCOUNTER — E-VISIT (OUTPATIENT)
Dept: FAMILY MEDICINE | Facility: CLINIC | Age: 33
End: 2023-12-22

## 2023-12-22 ENCOUNTER — NURSE TRIAGE (OUTPATIENT)
Dept: FAMILY MEDICINE | Facility: CLINIC | Age: 33
End: 2023-12-22
Payer: MEDICAID

## 2023-12-22 DIAGNOSIS — R10.2 PELVIC PAIN IN FEMALE: Primary | ICD-10-CM

## 2023-12-22 PROCEDURE — 99207 PR NON-BILLABLE SERV PER CHARTING: CPT | Performed by: NURSE PRACTITIONER

## 2023-12-22 NOTE — TELEPHONE ENCOUNTER
"Pt with menstrual cramping for 3 days now. Pt states this morning her pain got worse. Rates at 7/10 on pain scale and pt unable to tolerate it. Pt describes pain as squeezing/cramping. States pain radiates down both legs.     Pt with history of endometriosis, but stats she has never had this amount of pain before.     Pt states she has tried Tylenol and warm compressions.   Pt states she cannot take ibuprofen because she takes Lithium medication.     Pt reports she got her period twice this month. Pt had a cycle starting 12/4-12/12 then started again on 12/19- pt on day 3 now. Pt states she has not taken a pregnancy test. States they use condoms and pt does not think that she may be pregnant.     Denies any dizziness/lightheadedness, severe weakness, pale or cold skin, fever, fatigue, and sob/difficulty breathing.     Affirms some nausea and barely able to walk. Pt states she has been turned away from urgent care for gyn issues.     RN reviewed options (did offer to connect with OB nurse) with patient and pt wants to go to ER to be further evaluated.   Pt will be going to the ER.    Emily Cardona RN    [facility]                Reason for Disposition   Patient sounds very sick or weak to the triager    Additional Information   Negative: SEVERE pain and pain clearly increases with coughing   Negative: Sounds like a life-threatening emergency to the triager   Negative: Abdominal cramps unrelated to menstrual period   Negative: SEVERE vaginal bleeding (e.g., soaking 2 pads or tampons per hour and present 2 or more hours; 1 menstrual cup every 2 hours)    Answer Assessment - Initial Assessment Questions  1. LOCATION: \"Where does it hurt?\"      Lower abdominal pain  2. ONSET: \"When did this episode of pain begin?\"        This morning  3. SEVERITY: \"How bad is the pain?\" \"Are you missing school or work because of the pain?\"  (e.g., Scale 1-10; mild, moderate, or severe)    - MILD (1-3): Doesn't interfere with normal " "activities, lasting 1 to 2 days.     - MODERATE (4-7): Interferes with normal activities (missing work or school), lasting 2 to 3 days, some associated GI symptoms.     - SEVERE (8-10): Excruciating pain, lasting 2-7 days, associated GI symptoms, pain radiating into thighs and back.      7/10  4. VAGINAL BLEEDING: \"Describe the bleeding that you are having.\" \"How much bleeding is there?\"     - SPOTTING: spotting, or pinkish / brownish mucous discharge; does not fill panty liner or pad     - MILD:  less than 1 pad / hour; less than patient's usual menstrual bleeding    - MODERATE: 1-2 pads / hour; 1 menstrual cup every 6 hours; small-medium blood clots (e.g., pea, grape, small coin)    - SEVERE: soaking 2 or more pads/hour for 2 or more hours; 1 menstrual cup every 2 hours; bleeding not contained by pads or continuous red blood from vagina; large blood clots (e.g., golf ball, large coin)       Mild bleeding  5. MENSTRUAL HISTORY:  \"When did this menstrual period begin?\", \"Is this a normal period for you?\"        3 days ago  6. LMP:  \"When did your last menstrual period begin?\"      12/4  7. OTHER SYMPTOMS: \"Do you have any other symptoms?\" (e.g., back pain, diarrhea, dizzy or lightheaded, fever, urination pain, vaginal discharge, vomiting)      Affirms back pain, but not new. Blood clots size of dime  Denies pain with urination    8. PREGNANCY: \"Is there any chance you are pregnant?\" (e.g., unprotected intercourse, missed birth control pill, broken condom)      Using condoms. Has not taken a pregnancy test  Does not think she is pregnant    Protocols used: Abdominal Pain - Menstrual Cramps-A-OH    "

## 2023-12-22 NOTE — PATIENT INSTRUCTIONS
Thank you for choosing us for your care. Based on the information provided, I believe you need to be seen in person.  If you pain is severe, you should be seen in urgent care or the ER.  Otherwise, please make a clinic appointment. .     You will not be charged for this eVisit.

## 2023-12-28 ENCOUNTER — E-VISIT (OUTPATIENT)
Dept: URGENT CARE | Facility: CLINIC | Age: 33
End: 2023-12-28

## 2023-12-28 DIAGNOSIS — R21 RASH AND NONSPECIFIC SKIN ERUPTION: Primary | ICD-10-CM

## 2023-12-28 PROCEDURE — 99207 PR NON-BILLABLE SERV PER CHARTING: CPT | Performed by: NURSE PRACTITIONER

## 2023-12-29 NOTE — PATIENT INSTRUCTIONS
Dear Joanne Escalante,    We are sorry you are not feeling well. Based on the responses you provided, it is recommended that you be seen in-person in urgent care so we can better evaluate your symptoms. Please click here to find the nearest urgent care location to you.   You will not be charged for this Visit. Thank you for trusting us with your care.    Philomena Downs NP

## 2024-01-30 ENCOUNTER — TELEPHONE (OUTPATIENT)
Dept: FAMILY MEDICINE | Facility: CLINIC | Age: 34
End: 2024-01-30

## 2024-01-30 ENCOUNTER — E-VISIT (OUTPATIENT)
Dept: URGENT CARE | Facility: CLINIC | Age: 34
End: 2024-01-30
Payer: MEDICAID

## 2024-01-30 DIAGNOSIS — J06.9 VIRAL URI WITH COUGH: ICD-10-CM

## 2024-01-30 DIAGNOSIS — R05.1 ACUTE COUGH: Primary | ICD-10-CM

## 2024-01-30 DIAGNOSIS — J02.9 PHARYNGITIS, UNSPECIFIED ETIOLOGY: Primary | ICD-10-CM

## 2024-01-30 PROCEDURE — 99207 PR NON-BILLABLE SERV PER CHARTING: CPT | Performed by: EMERGENCY MEDICINE

## 2024-01-30 NOTE — TELEPHONE ENCOUNTER
Order/Referral Request    Who is requesting: Patient    Orders being requested: Covid Test    Reason service is needed/diagnosis: Experiencing loss of smell    When are orders needed by: ASAP    Has this been discussed with Provider: Yes. Had an evisit with provider. Would like covid test added on with strep and flu.       Does patient have an appointment scheduled?: No    Where to send orders: Place orders within Epic    Could we send this information to you in [employer] or would you prefer to receive a phone call?:   Patient would like to be contacted via [employer]

## 2024-02-02 ENCOUNTER — OFFICE VISIT (OUTPATIENT)
Dept: URGENT CARE | Facility: URGENT CARE | Age: 34
End: 2024-02-02
Payer: MEDICAID

## 2024-02-02 ENCOUNTER — ANCILLARY PROCEDURE (OUTPATIENT)
Dept: GENERAL RADIOLOGY | Facility: CLINIC | Age: 34
End: 2024-02-02
Attending: PHYSICIAN ASSISTANT
Payer: MEDICAID

## 2024-02-02 ENCOUNTER — NURSE TRIAGE (OUTPATIENT)
Dept: FAMILY MEDICINE | Facility: CLINIC | Age: 34
End: 2024-02-02
Payer: MEDICAID

## 2024-02-02 VITALS
BODY MASS INDEX: 41.75 KG/M2 | OXYGEN SATURATION: 97 % | TEMPERATURE: 98.2 F | DIASTOLIC BLOOD PRESSURE: 85 MMHG | HEART RATE: 119 BPM | RESPIRATION RATE: 16 BRPM | SYSTOLIC BLOOD PRESSURE: 138 MMHG | WEIGHT: 218.3 LBS

## 2024-02-02 DIAGNOSIS — J02.9 SORE THROAT: Primary | ICD-10-CM

## 2024-02-02 DIAGNOSIS — R10.31 RIGHT INGUINAL PAIN: ICD-10-CM

## 2024-02-02 LAB
DEPRECATED S PYO AG THROAT QL EIA: NEGATIVE
GROUP A STREP BY PCR: NOT DETECTED

## 2024-02-02 PROCEDURE — 73502 X-RAY EXAM HIP UNI 2-3 VIEWS: CPT | Mod: TC | Performed by: STUDENT IN AN ORGANIZED HEALTH CARE EDUCATION/TRAINING PROGRAM

## 2024-02-02 PROCEDURE — 99214 OFFICE O/P EST MOD 30 MIN: CPT | Performed by: PHYSICIAN ASSISTANT

## 2024-02-02 PROCEDURE — 87651 STREP A DNA AMP PROBE: CPT | Performed by: PHYSICIAN ASSISTANT

## 2024-02-02 RX ORDER — GABAPENTIN 300 MG/1
CAPSULE ORAL
COMMUNITY
Start: 2024-01-24

## 2024-02-02 ASSESSMENT — ENCOUNTER SYMPTOMS
NECK PAIN: 0
NECK STIFFNESS: 0
RHINORRHEA: 0
ARTHRALGIAS: 1
BACK PAIN: 0
FEVER: 0
SORE THROAT: 1
SHORTNESS OF BREATH: 0
JOINT SWELLING: 0
CARDIOVASCULAR NEGATIVE: 1
COUGH: 1
ENDOCRINE NEGATIVE: 1
WEAKNESS: 0
CHILLS: 0
LIGHT-HEADEDNESS: 0
MYALGIAS: 0
HEADACHES: 0
DIARRHEA: 0
EYES NEGATIVE: 1
ALLERGIC/IMMUNOLOGIC NEGATIVE: 1
PALPITATIONS: 0
NAUSEA: 0
VOMITING: 0
WOUND: 0
DIZZINESS: 0

## 2024-02-02 NOTE — TELEPHONE ENCOUNTER
Pt calling because she woke up this morning and feels like she pulled something in her right groin/hip area.  States that pain is a 6/10. Worsens with walking.   Constant stabbing pain.   States that she has taken tylenol and helped a little.   No redness or swelling to the area. No bruising.    Pt wants to be seen today. No clinic visits available.  Pt said that she will walk in to  today.      Reason for Disposition   Patient wants to be seen    Additional Information   Negative: Chest pain   Negative: Difficulty breathing   Negative: Entire foot is cool or blue in comparison to other side   Negative: Unable to walk   Negative: Fever and red area (or area very tender to touch)   Negative: Swollen joint and fever   Negative: Thigh or calf pain in only one leg and present > 1 hour   Negative: Thigh, calf, or ankle swelling in only one leg   Negative: Thigh, calf, or ankle swelling in both legs, but one side is definitely more swollen   Negative: History of prior 'blood clot' in leg or lungs (i.e., deep vein thrombosis, pulmonary embolism)   Negative: History of inherited increased risk of blood clots (e.g., factor 5 Leiden, antithrombin 3, protein C or protein S deficiency, prothrombin mutation)   Negative: Major surgery in past month   Negative: Hip or leg fracture (broken bone) in past month (or had cast on leg or ankle in past month)   Negative: Illness requiring prolonged bedrest in past month (e.g., immobilization, long hospital stay)   Negative: Long-distance travel in past month (e.g., car, bus, train, plane; with trip lasting 6 or more hours)   Negative: Cancer treatment in past six months (or has cancer now)   Negative: Patient sounds very sick or weak to the triager   Negative: Painful rash with multiple small blisters grouped together (i.e., dermatomal distribution or 'band' or 'stripe')   Negative: Looks like a boil, infected sore, deep ulcer, or other infected rash (spreading redness, pus)   Negative:  "Localized rash is very painful (no fever)   Negative: SEVERE pain (e.g., excruciating, unable to do any normal activities)   Negative: Cast on leg or ankle and now has increasing pain   Negative: Red area or streak > 2 inches (or 5 cm)   Negative: Followed a hip injury   Negative: Leg pain is main symptom   Negative: Back pain radiating (shooting) into hip   Negative: SEVERE pain (e.g., excruciating, unable to do any normal activities) and fever   Negative: Can't stand (bear weight) or walk   Negative: Fever and red area (or area very tender to touch)   Negative: Patient sounds very sick or weak to the triager   Negative: SEVERE pain (e.g., excruciating, unable to do any normal activities)   Negative: Red area or streak > 2 inches (or 5 cm)   Negative: Painful rash with multiple small blisters grouped together (i.e., dermatomal distribution or 'band' or 'stripe')   Negative: Looks like a boil, infected sore, deep ulcer, or other infected rash (spreading redness, pus)   Negative: Localized rash is very painful (no fever)    Answer Assessment - Initial Assessment Questions  1. LOCATION and RADIATION: \"Where is the pain located?\"       Right hip/groin area  2. QUALITY: \"What does the pain feel like?\"  (e.g., sharp, dull, aching, burning)      stabing  3. SEVERITY: \"How bad is the pain?\" \"What does it keep you from doing?\"   (Scale 1-10; or mild, moderate, severe)    -  MILD (1-3): doesn't interfere with normal activities     -  MODERATE (4-7): interferes with normal activities (e.g., work or school) or awakens from sleep, limping     -  SEVERE (8-10): excruciating pain, unable to do any normal activities, unable to walk      6/10 currently  4. ONSET: \"When did the pain start?\" \"Does it come and go, or is it there all the time?\"      This morning she got up and felt like she pulled something. Constant  5. WORK OR EXERCISE: \"Has there been any recent work or exercise that involved this part of the body?\"       no  6. " "CAUSE: \"What do you think is causing the hip pain?\"        This morning she got up and felt like she pulled something.  7. AGGRAVATING FACTORS: \"What makes the hip pain worse?\" (e.g., walking, climbing stairs, running)      Walking.   8. OTHER SYMPTOMS: \"Do you have any other symptoms?\" (e.g., back pain, pain shooting down leg,  fever, rash)      no    Protocols used: Leg Pain-A-OH, Hip Pain-A-OH      Samantha Suazo RN  [facility]    "

## 2024-02-02 NOTE — PROGRESS NOTES
Chief Complaint:     Chief Complaint   Patient presents with    Urgent Care    Pharyngitis     X 4-5 days, booger in throat     Cough     X 4-5 days     Groin Pain     Right side, stood up this morning and turn and when turn felt something pulled        ASSESSMENT     1. Sore throat    2. Right inguinal pain         PLAN    XR of the pelvis and R hip was negative for any acute fracture per my read.  Ice to the affected area.  Recommended rest and avoidance of activities which cause pain or swelling.  Pain relief: Acetaminophen and or Ibuprofen with food.  RST was negative and communicated to the patient.  Patient verbalized understanding, and agrees with this plan.    32 minutes was spent in the care of this patient including chart review, HPI, ROS, PE, review of plan, and placing of orders.      HPI: Joanne Escalante is an 33 year old female who presents today for evaluation of R sided groin pain.  Patient states that she woke up with this.  The pain is worse with walking.  She does not recall any acute injury.  She has tried Tylenol with some relief.      Patient denies any numbness, tingling, or dysfunction of the R leg.    Patient also mentions cough, and sore throat for the past 4-5 days    ROS:      Review of Systems   Constitutional:  Negative for chills and fever.   HENT:  Positive for congestion and sore throat. Negative for ear pain and rhinorrhea.    Eyes: Negative.    Respiratory:  Positive for cough. Negative for shortness of breath.    Cardiovascular: Negative.  Negative for chest pain and palpitations.   Gastrointestinal:  Negative for diarrhea, nausea and vomiting.   Endocrine: Negative.    Genitourinary: Negative.    Musculoskeletal:  Positive for arthralgias. Negative for back pain, joint swelling, myalgias, neck pain and neck stiffness.   Skin: Negative.  Negative for rash and wound.   Allergic/Immunologic: Negative.  Negative for immunocompromised state.   Neurological:  Negative for dizziness,  weakness, light-headedness and headaches.        Problem history  Patient Active Problem List   Diagnosis    Umbilical hernia without obstruction and without gangrene    Abdominal wall cellulitis    ESBL (extended spectrum beta-lactamase) producing bacteria infection    Anxiety disorder    Cigarette smoker    Endometriosis determined by laparoscopy    Prenatal care, subsequent pregnancy in first trimester    RUQ abdominal pain    Menorrhagia with irregular cycle    Fatty liver    Horseshoe kidney    Suicidal ideation    Bipolar disorder, unspecified (H)        Allergies  Allergies   Allergen Reactions    Sulfa Antibiotics         Smoking History  History   Smoking Status    Every Day    Packs/day: 0.50    Types: Cigarettes   Smokeless Tobacco    Never        Current Meds    Current Outpatient Medications:     clonazePAM (KLONOPIN) 0.5 MG tablet, Take 0.5 mg by mouth 2 times daily as needed for anxiety, Disp: , Rfl:     gabapentin (NEURONTIN) 300 MG capsule, TAKE 1 CAPSULE BY MOUTH EVERY MORNING AND 2 AT BEDTIME, Disp: , Rfl:     lamoTRIgine (LAMICTAL) 200 MG tablet, Take 1 tablet (200 mg) by mouth daily, Disp: 30 tablet, Rfl: 0    lithium 300 MG capsule, Take 1,200 mg by mouth every evening, Disp: , Rfl:     propranolol (INDERAL) 10 MG tablet, Take 1 tablet by mouth 3 times daily, Disp: , Rfl:     VRAYLAR 4.5 MG capsule, take 1 capsule by mouth every day at bedtime, Disp: , Rfl:     calcium carbonate (OSCAL 500) 1250 (500 Ca) MG TABS tablet, Take 2 tablets (1,000 mg) by mouth daily (Patient not taking: Reported on 9/18/2023), Disp: 180 tablet, Rfl: 3    lithium (ESKALITH CR/LITHOBID) 450 MG CR tablet, Take 1 tablet (450 mg) by mouth At Bedtime Combined with four 300mg tabs to total 1,650 mg nightly., Disp: 15 tablet, Rfl: 1    loperamide (LOPERAMIDE A-D) 2 MG tablet, Take 1 tablet (2 mg) by mouth 4 times daily as needed for diarrhea, Disp: 20 tablet, Rfl: 3    ondansetron (ZOFRAN) 4 MG tablet, Take 1 tablet (4 mg)  by mouth every 8 hours as needed for nausea or vomiting (Patient not taking: Reported on 9/18/2023), Disp: 10 tablet, Rfl: 0    QUEtiapine (SEROQUEL XR) 50 MG TB24 24 hr tablet, Take  mg by mouth nightly as needed (Patient not taking: Reported on 2/2/2024), Disp: , Rfl:     QUEtiapine (SEROQUEL) 25 MG tablet, Take 12.5-25 mg by mouth 2 times daily as needed (anxiety) (Patient not taking: Reported on 2/2/2024), Disp: , Rfl:     vitamin D3 (CHOLECALCIFEROL) 10 MCG (400 UNIT) capsule, Take 1 capsule (400 Units) by mouth daily (Patient not taking: Reported on 2/2/2024), Disp: 90 capsule, Rfl: 3    Current Facility-Administered Medications:     medroxyPROGESTERone (DEPO-PROVERA) injection 150 mg, 150 mg, Intramuscular, Q90 Days, Maty Rendon, APRN CNP, 150 mg at 06/27/23 1144        Vital signs reviewed by Ti Shelton PA-C  /85 (BP Location: Left arm, Patient Position: Sitting, Cuff Size: Adult Large)   Pulse 119   Temp 98.2  F (36.8  C) (Tympanic)   Resp 16   Wt 99 kg (218 lb 4.8 oz)   SpO2 97%   BMI 41.75 kg/m      Physical Exam     Physical Exam  Vitals and nursing note reviewed.   Constitutional:       General: She is not in acute distress.     Appearance: She is well-developed. She is not ill-appearing, toxic-appearing or diaphoretic.   HENT:      Head: Normocephalic and atraumatic.      Right Ear: Tympanic membrane and external ear normal. No drainage, swelling or tenderness. Tympanic membrane is not perforated, erythematous, retracted or bulging.      Left Ear: Tympanic membrane and external ear normal. No drainage, swelling or tenderness. Tympanic membrane is not perforated, erythematous, retracted or bulging.      Nose: No mucosal edema, congestion or rhinorrhea.      Right Sinus: No maxillary sinus tenderness or frontal sinus tenderness.      Left Sinus: No maxillary sinus tenderness or frontal sinus tenderness.      Mouth/Throat:      Pharynx: No pharyngeal swelling, oropharyngeal  exudate, posterior oropharyngeal erythema or uvula swelling.      Tonsils: No tonsillar abscesses.   Eyes:      Pupils: Pupils are equal, round, and reactive to light.   Neck:      Trachea: Trachea normal.   Cardiovascular:      Rate and Rhythm: Normal rate and regular rhythm.      Heart sounds: Normal heart sounds, S1 normal and S2 normal. No murmur heard.     No friction rub. No gallop.   Pulmonary:      Effort: Pulmonary effort is normal. No respiratory distress.      Breath sounds: Normal breath sounds. No decreased breath sounds, wheezing, rhonchi or rales.   Abdominal:      General: Bowel sounds are normal. There is no distension.      Palpations: Abdomen is soft. Abdomen is not rigid. There is no mass.      Tenderness: There is no abdominal tenderness. There is no guarding or rebound.   Musculoskeletal:      Cervical back: Full passive range of motion without pain, normal range of motion and neck supple.      Right hip: No deformity, tenderness, bony tenderness or crepitus. Normal range of motion. Normal strength.        Legs:       Comments: Pain in R groin with flexion and extension of R hip.     Lymphadenopathy:      Cervical: No cervical adenopathy.   Skin:     General: Skin is warm and dry.   Neurological:      Mental Status: She is alert and oriented to person, place, and time.      Cranial Nerves: No cranial nerve deficit.      Deep Tendon Reflexes: Reflexes are normal and symmetric.   Psychiatric:         Behavior: Behavior normal. Behavior is cooperative.         Thought Content: Thought content normal.         Judgment: Judgment normal.             Ti Shelton PA-C  2/2/2024, 4:17 PM

## 2024-02-03 ENCOUNTER — E-VISIT (OUTPATIENT)
Dept: URGENT CARE | Facility: CLINIC | Age: 34
End: 2024-02-03

## 2024-02-03 DIAGNOSIS — J06.9 VIRAL URI: Primary | ICD-10-CM

## 2024-02-03 PROCEDURE — 99207 PR NON-BILLABLE SERV PER CHARTING: CPT | Performed by: NURSE PRACTITIONER

## 2024-02-03 NOTE — PATIENT INSTRUCTIONS
Viral Respiratory Infection: Care Instructions  Overview     A viral respiratory infection is an infection of the nose, sinuses, or throat caused by a virus. Colds and the flu are common types of viral respiratory infections.  The symptoms of a viral respiratory infection often start quickly. They include a fever, sore throat, and runny nose. You may also just not feel well. Or you may not want to eat much.  Most viral infections can be treated with home care. This may include drinking lots of fluids and taking over-the-counter pain medicine. You will probably feel better in 4 to 10 days.  Antibiotics are not used to treat a viral infection. Antibiotics don't kill viruses, so they won't help cure a viral illness.  In some cases, a doctor may prescribe antiviral medicine to help your body fight a serious viral infection.  Follow-up care is a key part of your treatment and safety. Be sure to make and go to all appointments, and call your doctor if you are having problems. It's also a good idea to know your test results and keep a list of the medicines you take.  How can you care for yourself at home?  To prevent dehydration, drink plenty of fluids. Choose water and other clear liquids until you feel better. If you have kidney, heart, or liver disease and have to limit fluids, talk with your doctor before you increase the amount of fluids you drink.  Ask your doctor if you can take an over-the-counter pain medicine, such as acetaminophen (Tylenol), ibuprofen (Advil, Motrin), or naproxen (Aleve). Be safe with medicines. Read and follow all instructions on the label. No one younger than 20 should take aspirin. It has been linked to Reye syndrome, a serious illness.  Be careful when taking over-the-counter cold or flu medicines and Tylenol at the same time. Many of these medicines have acetaminophen, which is Tylenol. Read the labels to make sure that you are not taking more than the recommended dose. Too much  "acetaminophen (Tylenol) can be harmful.  Get plenty of rest.  Use saline (saltwater) nasal washes to help keep your nasal passages open and wash out mucus and allergens. You can buy saline nose sprays at a grocery store or drugstore. Follow the instructions on the package. Or you can make your own at home. Add 1 teaspoon of non-iodized salt and 1 teaspoon of baking soda to 2 cups of distilled or boiled and cooled water. Fill a squeeze bottle or neti pot with the nasal wash. Then put the tip into your nostril, and lean over the sink. With your mouth open, gently squirt the liquid. Repeat on the other side.  Use a vaporizer or humidifier to add moisture to your bedroom. Follow the instructions for cleaning the machine.  Do not smoke or allow others to smoke around you. If you need help quitting, talk to your doctor about stop-smoking programs and medicines. These can increase your chances of quitting for good.  When should you call for help?   Call 911 anytime you think you may need emergency care. For example, call if:    You have severe trouble breathing.   Call your doctor now or seek immediate medical care if:    You have a new or higher fever.     Your fever lasts more than 48 hours.     You have trouble breathing.     You have a fever with a stiff neck or a severe headache.     You are sensitive to light.     You feel very sleepy or confused.   Watch closely for changes in your health, and be sure to contact your doctor if:    You do not get better as expected.   Where can you learn more?  Go to https://www.[employer].net/patiented  Enter Q795 in the search box to learn more about \"Viral Respiratory Infection: Care Instructions.\"  Current as of: June 13, 2023               Content Version: 13.8    6376-8116 [employer].   Care instructions adapted under license by your healthcare professional. If you have questions about a medical condition or this instruction, always ask your healthcare " professional. [employer] disclaims any warranty or liability for your use of this information.      Darshan Rubio, I read through your UC note and the evisit you did a couple of days ago with Dr. Hong.   Bacterial sinus infections take time to develop and we do not treat them with antibiotic until symptoms have been more than 2 weeks. Most sinus infections are viral and will resolve on their own.   Rest your voice for the laryngitis and use salt water gargles, lemon tea with honey to sooth the throat.   If you are not feeling better in a week, you can make another evisit.

## 2024-02-14 DIAGNOSIS — F41.1 GENERALIZED ANXIETY DISORDER: Primary | ICD-10-CM

## 2024-02-17 ENCOUNTER — LAB (OUTPATIENT)
Dept: LAB | Facility: CLINIC | Age: 34
End: 2024-02-17
Payer: MEDICAID

## 2024-02-17 DIAGNOSIS — F41.1 GENERALIZED ANXIETY DISORDER: ICD-10-CM

## 2024-02-17 LAB
CHOLEST SERPL-MCNC: 215 MG/DL
CREAT SERPL-MCNC: 0.84 MG/DL (ref 0.51–0.95)
EGFRCR SERPLBLD CKD-EPI 2021: >90 ML/MIN/1.73M2
FASTING STATUS PATIENT QL REPORTED: YES
FASTING STATUS PATIENT QL REPORTED: YES
GLUCOSE SERPL-MCNC: 107 MG/DL (ref 70–99)
HDLC SERPL-MCNC: 44 MG/DL
LDLC SERPL CALC-MCNC: 150 MG/DL
LITHIUM SERPL-SCNC: 0.54 MMOL/L (ref 0.6–1.2)
NONHDLC SERPL-MCNC: 171 MG/DL
TRIGL SERPL-MCNC: 104 MG/DL
TSH SERPL DL<=0.005 MIU/L-ACNC: 2.14 UIU/ML (ref 0.3–4.2)

## 2024-02-17 PROCEDURE — 82565 ASSAY OF CREATININE: CPT

## 2024-02-17 PROCEDURE — 80061 LIPID PANEL: CPT

## 2024-02-17 PROCEDURE — 36415 COLL VENOUS BLD VENIPUNCTURE: CPT

## 2024-02-17 PROCEDURE — 82947 ASSAY GLUCOSE BLOOD QUANT: CPT

## 2024-02-17 PROCEDURE — 84443 ASSAY THYROID STIM HORMONE: CPT

## 2024-02-17 PROCEDURE — 80178 ASSAY OF LITHIUM: CPT

## 2024-02-22 ENCOUNTER — NURSE TRIAGE (OUTPATIENT)
Dept: NURSING | Facility: CLINIC | Age: 34
End: 2024-02-22
Payer: MEDICAID

## 2024-02-22 NOTE — TELEPHONE ENCOUNTER
Nurse Triage SBAR    Is this a 2nd Level Triage? NO    Situation: Pt calling with concerns for foot injury. She is wonting to know if she can go to  or if she needs to go to the ER.    Background: Pt states she fell down the stairs this morning landing on her foot and knee.    Assessment: Pt is c/o a bump on top of her foot as well as numbness since injury. Denies bleeding and deformation.    Protocol Recommended Disposition:   Emergency department    Recommendation: Recommendation to go to . Pt aware of location she will go to.    Reason for Disposition   [1] Numbness (new loss of sensation) of toe(s) AND [2] present now   Followed an ankle or foot injury   Numbness (new loss of sensation) of toe(s)    Additional Information   Negative: Serious injury with multiple fractures   Negative: [1] Major bleeding (e.g., actively dripping or spurting) AND [2] can't be stopped   Negative: Amputation   Negative: Looks like a dislocated joint (very crooked or deformed)   Negative: Sounds like a life-threatening emergency to the triager   Negative: Bullet wound, stabbed by knife, or other serious penetrating wound   Negative: Sounds like a serious injury to the triager   Negative: [1] Bleeding AND [2] won't stop after 10 minutes of direct pressure (using correct technique)   Negative: Skin is split open or gaping (or length > 1/2 inch or 12 mm)   Negative: Can't stand (bear weight) or walk   Negative: [1] Dirt in the wound AND [2] not removed with 15 minutes of scrubbing   Negative: Major bleeding (actively dripping or spurting) that can't be stopped   Negative: Amputation or bone sticking through the skin   Negative: Looks like a dislocated joint (crooked or deformed)   Negative: Serious injury with multiple fractures (broken bones)   Negative: Sounds like a life-threatening emergency to the triager   Negative: Bullet, stabbed by knife or other serious penetrating wound   Negative: Skin is split open or gaping (length >  1/2 inch or 12 mm)   Negative: Bleeding won't stop after 10 minutes of direct pressure (using correct technique)   Negative: Can't stand (bear weight) or walk (e.g., 4 steps)   Negative: Dirt in the wound and not removed after 15 minutes of scrubbing   Negative: Sounds like a serious injury to the triager    Protocols used: Foot and Ankle Injury-A-AH, Foot Pain-A-OH, Ankle and Foot Injury-A-OH    Teri Robison RN  [facility] Nurse Advisor   2/22/2024  2:05 PM

## 2024-03-05 ENCOUNTER — OFFICE VISIT (OUTPATIENT)
Dept: FAMILY MEDICINE | Facility: CLINIC | Age: 34
End: 2024-03-05
Payer: COMMERCIAL

## 2024-03-05 VITALS
HEIGHT: 61 IN | OXYGEN SATURATION: 98 % | DIASTOLIC BLOOD PRESSURE: 79 MMHG | WEIGHT: 216.2 LBS | HEART RATE: 107 BPM | RESPIRATION RATE: 14 BRPM | BODY MASS INDEX: 40.82 KG/M2 | SYSTOLIC BLOOD PRESSURE: 117 MMHG

## 2024-03-05 DIAGNOSIS — Z00.00 ROUTINE GENERAL MEDICAL EXAMINATION AT A HEALTH CARE FACILITY: Primary | ICD-10-CM

## 2024-03-05 DIAGNOSIS — N39.41 URGE INCONTINENCE OF URINE: ICD-10-CM

## 2024-03-05 DIAGNOSIS — Z80.3 FAMILY HISTORY OF MALIGNANT NEOPLASM OF BREAST: ICD-10-CM

## 2024-03-05 DIAGNOSIS — R06.83 SNORING: ICD-10-CM

## 2024-03-05 DIAGNOSIS — R73.09 ELEVATED GLUCOSE: ICD-10-CM

## 2024-03-05 DIAGNOSIS — F17.200 NICOTINE DEPENDENCE, UNCOMPLICATED, UNSPECIFIED NICOTINE PRODUCT TYPE: ICD-10-CM

## 2024-03-05 DIAGNOSIS — J02.9 SORE THROAT: ICD-10-CM

## 2024-03-05 DIAGNOSIS — R35.0 URINARY FREQUENCY: ICD-10-CM

## 2024-03-05 DIAGNOSIS — N39.44 BED WETTING: ICD-10-CM

## 2024-03-05 DIAGNOSIS — N80.9 ENDOMETRIOSIS DETERMINED BY LAPAROSCOPY: ICD-10-CM

## 2024-03-05 PROBLEM — R19.8 IRREGULAR BOWEL HABITS: Status: RESOLVED | Noted: 2022-02-14 | Resolved: 2024-03-05

## 2024-03-05 PROBLEM — R45.851 SUICIDAL IDEATION: Status: RESOLVED | Noted: 2023-09-18 | Resolved: 2024-03-05

## 2024-03-05 PROBLEM — F31.81 BIPOLAR 2 DISORDER, MAJOR DEPRESSIVE EPISODE (H): Status: ACTIVE | Noted: 2021-08-24

## 2024-03-05 PROBLEM — K76.0 FATTY LIVER: Status: ACTIVE | Noted: 2022-02-14

## 2024-03-05 PROBLEM — L03.311 ABDOMINAL WALL CELLULITIS: Status: RESOLVED | Noted: 2020-08-19 | Resolved: 2024-03-05

## 2024-03-05 PROBLEM — Q63.1 HORSESHOE KIDNEY: Status: ACTIVE | Noted: 2022-07-28

## 2024-03-05 PROBLEM — Z16.12 ESBL (EXTENDED SPECTRUM BETA-LACTAMASE) PRODUCING BACTERIA INFECTION: Status: RESOLVED | Noted: 2020-08-19 | Resolved: 2024-03-05

## 2024-03-05 PROBLEM — R10.11 RUQ ABDOMINAL PAIN: Status: RESOLVED | Noted: 2021-11-04 | Resolved: 2024-03-05

## 2024-03-05 PROBLEM — A49.9 ESBL (EXTENDED SPECTRUM BETA-LACTAMASE) PRODUCING BACTERIA INFECTION: Status: RESOLVED | Noted: 2020-08-19 | Resolved: 2024-03-05

## 2024-03-05 LAB
ALBUMIN UR-MCNC: NEGATIVE MG/DL
APPEARANCE UR: CLEAR
BILIRUB UR QL STRIP: NEGATIVE
COLOR UR AUTO: YELLOW
DEPRECATED S PYO AG THROAT QL EIA: NEGATIVE
GLUCOSE UR STRIP-MCNC: NEGATIVE MG/DL
GROUP A STREP BY PCR: NOT DETECTED
HBA1C MFR BLD: 5.5 % (ref 0–5.6)
HGB UR QL STRIP: NEGATIVE
KETONES UR STRIP-MCNC: NEGATIVE MG/DL
LEUKOCYTE ESTERASE UR QL STRIP: NEGATIVE
NITRATE UR QL: NEGATIVE
PH UR STRIP: 6 [PH] (ref 5–7)
SP GR UR STRIP: <=1.005 (ref 1–1.03)
UROBILINOGEN UR STRIP-ACNC: 0.2 E.U./DL

## 2024-03-05 PROCEDURE — 99395 PREV VISIT EST AGE 18-39: CPT | Mod: 25

## 2024-03-05 PROCEDURE — 99214 OFFICE O/P EST MOD 30 MIN: CPT | Mod: 25

## 2024-03-05 PROCEDURE — 81003 URINALYSIS AUTO W/O SCOPE: CPT

## 2024-03-05 PROCEDURE — 91320 SARSCV2 VAC 30MCG TRS-SUC IM: CPT

## 2024-03-05 PROCEDURE — 80048 BASIC METABOLIC PNL TOTAL CA: CPT

## 2024-03-05 PROCEDURE — 90480 ADMN SARSCOV2 VAC 1/ONLY CMP: CPT

## 2024-03-05 PROCEDURE — 90746 HEPB VACCINE 3 DOSE ADULT IM: CPT

## 2024-03-05 PROCEDURE — 90472 IMMUNIZATION ADMIN EACH ADD: CPT

## 2024-03-05 PROCEDURE — 87651 STREP A DNA AMP PROBE: CPT

## 2024-03-05 PROCEDURE — 36415 COLL VENOUS BLD VENIPUNCTURE: CPT

## 2024-03-05 PROCEDURE — 90471 IMMUNIZATION ADMIN: CPT

## 2024-03-05 PROCEDURE — 83036 HEMOGLOBIN GLYCOSYLATED A1C: CPT

## 2024-03-05 PROCEDURE — 90677 PCV20 VACCINE IM: CPT

## 2024-03-05 RX ORDER — LURASIDONE HYDROCHLORIDE 20 MG/1
TABLET, FILM COATED ORAL
COMMUNITY
Start: 2024-02-20 | End: 2024-09-18

## 2024-03-05 RX ORDER — ACETAMINOPHEN 500 MG
1000 TABLET ORAL
COMMUNITY
Start: 2024-02-22

## 2024-03-05 RX ORDER — LITHIUM CARBONATE 150 MG/1
CAPSULE ORAL
COMMUNITY
Start: 2024-01-22

## 2024-03-05 RX ORDER — LITHIUM CARBONATE 300 MG/1
CAPSULE ORAL
COMMUNITY
Start: 2022-09-01

## 2024-03-05 SDOH — HEALTH STABILITY: PHYSICAL HEALTH: ON AVERAGE, HOW MANY DAYS PER WEEK DO YOU ENGAGE IN MODERATE TO STRENUOUS EXERCISE (LIKE A BRISK WALK)?: 2 DAYS

## 2024-03-05 ASSESSMENT — PATIENT HEALTH QUESTIONNAIRE - PHQ9
10. IF YOU CHECKED OFF ANY PROBLEMS, HOW DIFFICULT HAVE THESE PROBLEMS MADE IT FOR YOU TO DO YOUR WORK, TAKE CARE OF THINGS AT HOME, OR GET ALONG WITH OTHER PEOPLE: VERY DIFFICULT
SUM OF ALL RESPONSES TO PHQ QUESTIONS 1-9: 8
SUM OF ALL RESPONSES TO PHQ QUESTIONS 1-9: 8

## 2024-03-05 ASSESSMENT — SOCIAL DETERMINANTS OF HEALTH (SDOH): HOW OFTEN DO YOU GET TOGETHER WITH FRIENDS OR RELATIVES?: NEVER

## 2024-03-05 NOTE — PATIENT INSTRUCTIONS
Preventive Care Advice   This is general advice given by our system to help you stay healthy. However, your care team may have specific advice just for you. Please talk to your care team about your preventive care needs.  Nutrition  Eat 5 or more servings of fruits and vegetables each day.  Try wheat bread, brown rice and whole grain pasta (instead of white bread, rice, and pasta).  Get enough calcium and vitamin D. Check the label on foods and aim for 100% of the RDA (recommended daily allowance).  Lifestyle  Exercise at least 150 minutes each week   (30 minutes a day, 5 days a week).  Do muscle strengthening activities 2 days a week. These help control your weight and prevent disease.  No smoking.  Wear sunscreen to prevent skin cancer.  Have a dental exam and cleaning every 6 months.  Yearly exams  See your health care team every year to talk about:  Any changes in your health.  Any medicines your care team has prescribed.  Preventive care, family planning, and ways to prevent chronic diseases.  Shots (vaccines)   HPV shots (up to age 26), if you've never had them before.  Hepatitis B shots (up to age 59), if you've never had them before.  COVID-19 shot: Get this shot when it's due.  Flu shot: Get a flu shot every year.  Tetanus shot: Get a tetanus shot every 10 years.  Pneumococcal, hepatitis A, and RSV shots: Ask your care team if you need these based on your risk.  Shingles shot (for age 50 and up).  General health tests  Diabetes screening:  Starting at age 35, Get screened for diabetes at least every 3 years.  If you are younger than age 35, ask your care team if you should be screened for diabetes.  Cholesterol test: At age 39, start having a cholesterol test every 5 years, or more often if advised.  Bone density scan (DEXA): At age 50, ask your care team if you should have this scan for osteoporosis (brittle bones).  Hepatitis C: Get tested at least once in your life.  STIs (sexually transmitted  infections)  Before age 24: Ask your care team if you should be screened for STIs.  After age 24: Get screened for STIs if you're at risk. You are at risk for STIs (including HIV) if:  You are sexually active with more than one person.  You don't use condoms every time.  You or a partner was diagnosed with a sexually transmitted infection.  If you are at risk for HIV, ask about PrEP medicine to prevent HIV.  Get tested for HIV at least once in your life, whether you are at risk for HIV or not.  Cancer screening tests  Cervical cancer screening: If you have a cervix, begin getting regular cervical cancer screening tests at age 21. Most people who have regular screenings with normal results can stop after age 65. Talk about this with your provider.  Breast cancer scan (mammogram): If you've ever had breasts, begin having regular mammograms starting at age 40. This is a scan to check for breast cancer.  Colon cancer screening: It is important to start screening for colon cancer at age 45.  Have a colonoscopy test every 10 years (or more often if you're at risk) Or, ask your provider about stool tests like a FIT test every year or Cologuard test every 3 years.  To learn more about your testing options, visit: https://[email or web address]/349797.pdf.  For help making a decision, visit: https://bit.ly/ci06071.  Prostate cancer screening test: If you have a prostate and are age 55 to 69, ask your provider if you would benefit from a yearly prostate cancer screening test.  Lung cancer screening: If you are a current or former smoker age 50 to 80, ask your care team if ongoing lung cancer screenings are right for you.  For informational purposes only. Not to replace the advice of your health care provider. Copyright   2023 [employer]. All rights reserved. Clinically reviewed by the [facility]. [employer] 916419 - REV 01/24.    Relationships for Good Health  Relationships are important for  our health and happiness. Social isolation, loneliness and lack of support are bad for your health. Studies show that loneliness can harm health and limit your life span as much as high blood pressure and smoking.   Take some time to reflect on your relationships. Then answer these questions:  Are there people in your life that cause you stress or drain your energy? What can you do to set limits?  ________________________________________________________________________________________________________________________________________________________________________________________________________________________________________________________________________________________________________________________________________________  Who do you enjoy spending time with? Who can you go to for support?  ________________________________________________________________________________________________________________________________________________________________________________________________________________________________________________________________________________________________________________________________________________  What can you do to improve your relationships with others?  __________________________________________________________________________________________________________________________________________________________________________________________________________________  ______________________________________________________________________________________________________________________________  What do you like most about your relationships with others?  ________________________________________________________________________________________________________________________________________________________________________________________________________________________________________________________________________________________________________________________________________________  My  goal: ______________________________________________________________________  I will ______________________________________________________________________________________________________________________________________________________________________________________________    For informational purposes only. Not to replace the advice of your health care provider. Copyright   2018 [facility]. All rights reserved. Clinically reviewed by [facility]  Team. [employer] 840018 - Rev 04/21.    Learning About Stress  What is stress?     Stress is your body's response to a hard situation. Your body can have a physical, emotional, or mental response. Stress is a fact of life for most people, and it affects everyone differently. What causes stress for you may not be stressful for someone else.  A lot of things can cause stress. You may feel stress when you go on a job interview, take a test, or run a race. This kind of short-term stress is normal and even useful. It can help you if you need to work hard or react quickly. For example, stress can help you finish an important job on time.  Long-term stress is caused by ongoing stressful situations or events. Examples of long-term stress include long-term health problems, ongoing problems at work, or conflicts in your family. Long-term stress can harm your health.  How does stress affect your health?  When you are stressed, your body responds as though you are in danger. It makes hormones that speed up your heart, make you breathe faster, and give you a burst of energy. This is called the fight-or-flight stress response. If the stress is over quickly, your body goes back to normal and no harm is done.  But if stress happens too often or lasts too long, it can have bad effects. Long-term stress can make you more likely to get sick, and it can make symptoms of some diseases worse. If you tense up when you are stressed, you may develop neck, shoulder, or low  back pain. Stress is linked to high blood pressure and heart disease.  Stress also harms your emotional health. It can make you naqvi, tense, or depressed. Your relationships may suffer, and you may not do well at work or school.  What can you do to manage stress?  You can try these things to help manage stress:   Do something active. Exercise or activity can help reduce stress. Walking is a great way to get started. Even everyday activities such as housecleaning or yard work can help.  Try yoga or seema chi. These techniques combine exercise and meditation. You may need some training at first to learn them.  Do something you enjoy. For example, listen to music or go to a movie. Practice your hobby or do volunteer work.  Meditate. This can help you relax, because you are not worrying about what happened before or what may happen in the future.  Do guided imagery. Imagine yourself in any setting that helps you feel calm. You can use online videos, books, or a teacher to guide you.  Do breathing exercises. For example:  From a standing position, bend forward from the waist with your knees slightly bent. Let your arms dangle close to the floor.  Breathe in slowly and deeply as you return to a standing position. Roll up slowly and lift your head last.  Hold your breath for just a few seconds in the standing position.  Breathe out slowly and bend forward from the waist.  Let your feelings out. Talk, laugh, cry, and express anger when you need to. Talking with supportive friends or family, a counselor, or a francisco leader about your feelings is a healthy way to relieve stress. Avoid discussing your feelings with people who make you feel worse.  Write. It may help to write about things that are bothering you. This helps you find out how much stress you feel and what is causing it. When you know this, you can find better ways to cope.  What can you do to prevent stress?  You might try some of these things to help prevent  "stress:  Manage your time. This helps you find time to do the things you want and need to do.  Get enough sleep. Your body recovers from the stresses of the day while you are sleeping.  Get support. Your family, friends, and community can make a difference in how you experience stress.  Limit your news feed. Avoid or limit time on social media or news that may make you feel stressed.  Do something active. Exercise or activity can help reduce stress. Walking is a great way to get started.  Where can you learn more?  Go to https://www.panpan.net/patiented  Enter N032 in the search box to learn more about \"Learning About Stress.\"  Current as of: February 26, 2023               Content Version: 13.8    0992-6996 [employer].   Care instructions adapted under license by your healthcare professional. If you have questions about a medical condition or this instruction, always ask your healthcare professional. [employer] disclaims any warranty or liability for your use of this information.      Learning About Depression Screening  What is depression screening?  Depression screening is a way to see if you have depression symptoms. It may be done by a doctor or counselor. It's often part of a routine checkup. That's because your mental health is just as important as your physical health.  Depression is a mental health condition that affects how you feel, think, and act. You may:  Have less energy.  Lose interest in your daily activities.  Feel sad and grouchy for a long time.  Depression is very common. It affects people of all ages.  Many things can lead to depression. Some people become depressed after they have a stroke or find out they have a major illness like cancer or heart disease. The death of a loved one or a breakup may lead to depression. It can run in families. Most experts believe that a combination of inherited genes and stressful life events can cause it.  What happens during " "screening?  You may be asked to fill out a form about your depression symptoms. You and the doctor will discuss your answers. The doctor may ask you more questions to learn more about how you think, act, and feel.  What happens after screening?  If you have symptoms of depression, your doctor will talk to you about your options.  Doctors usually treat depression with medicines or counseling. Often, combining the two works best. Many people don't get help because they think that they'll get over the depression on their own. But people with depression may not get better unless they get treatment.  The cause of depression is not well understood. There may be many factors involved. But if you have depression, it's not your fault.  A serious symptom of depression is thinking about death or suicide. If you or someone you care about talks about this or about feeling hopeless, get help right away.  It's important to know that depression can be treated. Medicine, counseling, and self-care may help.  Where can you learn more?  Go to https://www.[employer].net/patiented  Enter T185 in the search box to learn more about \"Learning About Depression Screening.\"  Current as of: June 25, 2023               Content Version: 13.8    8282-6735 [employer].   Care instructions adapted under license by your healthcare professional. If you have questions about a medical condition or this instruction, always ask your healthcare professional. [employer] disclaims any warranty or liability for your use of this information.      "

## 2024-03-05 NOTE — PROGRESS NOTES
"Preventive Care Visit  [facility]  ISH Menendez CNP, Nurse Practitioner Primary Care  Mar 5, 2024    Assessment & Plan     Routine general medical examination at a health care facility  - pt declines influenza vaccine  - REVIEW OF HEALTH MAINTENANCE PROTOCOL ORDERS  - Pneumococcal 20 Valent Conjugate (Prevnar 20)  - HEPATITIS B, ADULT 20+ (ENGERIX-B/RECOMBIVAX HB)  - COVID-19 12+ (2023-24) (PFIZER)  - PRIMARY CARE FOLLOW-UP SCHEDULING    Sore throat  - strep exposure:  - Streptococcus A Rapid Screen w/Reflex to PCR - Clinic Collect  - Group A Streptococcus PCR Throat Swab  - continue supportive management pending results    Snoring  - Adult Sleep Eval & Management  Referral    Bed wetting  Urinary frequency  Urge incontinence of urine  - reviewed ddx, no red flag symptoms today  - will check:  - UA Macroscopic with reflex to Microscopic and Culture - Lab Collect  - Basic metabolic panel  (Ca, Cl, CO2, Creat, Gluc, K, Na, BUN)  - referral to GYN and PT  - Ob/Gyn  Referral  - Physical Therapy  Referral  - advised to RTC for persistent / worsening symptoms     Elevated glucose  - Hemoglobin A1c    Endometriosis determined by laparoscopy  - pt interested in hysterectomy  - Ob/Gyn  Referral    Nicotine dependence, uncomplicated, unspecified nicotine product type  - interested in quitting, declines referral to MTM or Quit Partner, prefers to work with her psychiatrist  - encouragement provided in cessation    Family history of malignant neoplasm of breast  - Adult Genetics & Metabolism Referral    BMI  Estimated body mass index is 40.3 kg/m  as calculated from the following:    Height as of this encounter: 1.56 m (5' 1.42\").    Weight as of this encounter: 98.1 kg (216 lb 3.2 oz).   Weight management plan: offered weight management referral, pt declined. Provided education. Provided encouragement as she is working hard on lifestyle modifications. "     Counseling  Appropriate preventive services were discussed with this patient, including applicable screening as appropriate for fall prevention, nutrition, physical activity, Tobacco-use cessation, weight loss and cognition.  Checklist reviewing preventive services available has been given to the patient.  Reviewed patient's diet, addressing concerns and/or questions.   She is at risk for lack of exercise and has been provided with information to increase physical activity for the benefit of her well-being.   Patient is at risk for social isolation and has been provided with information about the benefit of social connection.   The patient's PHQ-9 score is consistent with mild depression. She was provided with information regarding depression.     RTC in 2-4 wks or prn sooner. The patient verbalized understanding and agreement with the plan today and has no additional questions or concerns at this time.    Colton Rubio is a 33 year old, presenting for the following:  Physical and Pharyngitis (Exposed to strep from son)        3/5/2024     2:09 PM   Additional Questions   Roomed by lisa      HPI    Answers submitted by the patient for this visit:  Patient Health Questionnaire (Submitted on 3/5/2024)  If you checked off any problems, how difficult have these problems made it for you to do your work, take care of things at home, or get along with other people?: Very difficult  PHQ9 TOTAL SCORE: 8    Pre-Provider Visit Orders - Rapid Strep  Does the patient have shortness of breath/trouble breathing, an earache, drooling/too much saliva, or any difficulty opening her mouth/moving neck?  No  Does the patient have a sore throat and either history of fever >100.4 in the previous 24 hours without a cough or recent exposure to a known case of strep throat? Yes   Sore throat was exposed to strep by her son, he was positive over the weekend. Patient has been feeling okay. Back of the throat seems red and is painful.      Urinary urgency and nighttime incontinence - having some urinary frequency, urge incontinence, and nighttime incontinence but drinking a lot of water. No dysuria, fevers, chills, back pain, numbness/tingling. Symptoms seem worse with periods.        3/5/2024   General Health   How would you rate your overall physical health? (!) FAIR   Feel stress (tense, anxious, or unable to sleep) Rather much   (!) STRESS CONCERN      3/5/2024   Nutrition   Three or more servings of calcium each day? Yes   Diet: Regular (no restrictions)   How many servings of fruit and vegetables per day? (!) 2-3   How many sweetened beverages each day? 0-1         3/5/2024   Exercise   Days per week of moderate/strenous exercise 2 days   (!) EXERCISE CONCERN      3/5/2024   Social Factors   Frequency of gathering with friends or relatives Never   Worry food won't last until get money to buy more No   Food not last or not have enough money for food? No   Do you have housing?  Yes   Are you worried about losing your housing? No   Lack of transportation? No   Unable to get utilities (heat,electricity)? No   (!) SOCIAL CONNECTIONS CONCERN      3/5/2024   Dental   Dentist two times every year? (!) DECLINE         3/5/2024   TB Screening   Were you born outside of [place]?  No     Today's PHQ-9 Score:       3/5/2024     2:10 PM   PHQ-9 SCORE   PHQ-9 Total Score MyChart 8 (Mild depression)   PHQ-9 Total Score 8   Follows with psychiatry.         3/5/2024   Substance Use   Alcohol more than 3/day or more than 7/wk Not Applicable   Do you use any other substances recreationally? No     Social History     Tobacco Use    Smoking status: Every Day     Packs/day: 0.50     Years: 10.00     Additional pack years: 0.00     Total pack years: 5.00     Types: Cigarettes    Smokeless tobacco: Never   Vaping Use    Vaping Use: Never used   Substance Use Topics    Alcohol use: Not Currently    Drug use: Not Currently     Types: Marijuana         11/4/2021   LAST  FHS-7 RESULTS   1st degree relative breast or ovarian cancer Unknown   Any relative bilateral breast cancer No   Any male have breast cancer No   Any ONE woman have BOTH breast AND ovarian cancer Yes   Any woman with breast cancer before 50yrs Unknown   2 or more relatives with breast AND/OR ovarian cancer No   2 or more relatives with breast AND/OR bowel cancer Unknown     Family history of breast cancer, interested in genetic screening.        3/5/2024   STI Screening   New sexual partner(s) since last STI/HIV test? No     History of abnormal Pap smear:       2021     3:20 PM 2019    12:00 AM   PAP / HPV   PAP Negative for Intraepithelial Lesion or Malignancy (NILM)     PAP-ABSTRACT  See Scanned Document           This result is from an external source.         3/5/2024   Contraception/Family Planning   Questions about contraception or family planning No     Reviewed and updated as needed this visit by Provider   Tobacco  Allergies  Meds  Problems  Med Hx  Surg Hx  Fam Hx  Soc   Hx Sexual Activity        Past Medical History:   Diagnosis Date    Abdominal wall cellulitis     Anxiety     Depressive disorder     Endometriosis     ESBL (extended spectrum beta-lactamase) producing bacteria infection     Irregular bowel habits 2022    Periumbilical hernia     RUQ abdominal pain     Suicidal ideation      Past Surgical History:   Procedure Laterality Date     SECTION  2018, 2020    x 2    HERNIORRHAPHY UMBILICAL N/A 2021    Procedure: OPEN UMBILICAL HERNIA REPAIR WITH MESH;  Surgeon: Ankit Nunez MD;  Location: [facility] OR    LAPAROSCOPY DIAGNOSTIC (GYN)  2015    Endometriosis    OTHER SURGICAL HISTORY      ablation    WOUND DEBRIDEMENT N/A 2020    Abdominal     ZZC ORAL SURGERY PROCEDURE      Haddock teeth     OB History    Para Term  AB Living   2 2 2 0 0 2   SAB IAB Ectopic Multiple Live Births   0 0 0 0 2      # Outcome Date GA Lbr Derrick/2nd Weight Sex  "Delivery Anes PTL Lv   2 Term 01/18/20 37w4d  2.76 kg (6 lb 1.4 oz) M CS-LTranv Spinal Y EDILBERTO      Birth Comments: wd infection, depression      Complications: Gestational diabetes      Name: KARLO,BABY BOY      Apgar1: 8  Apgar5: 9   1 Term 04/07/18 39w3d  3.15 kg (6 lb 15.1 oz) M CS-Unspec   EDILBERTO      Birth Comments: Back labor.  C/S due to failure to progress.  +GBS      Complications: Postpartum depression      Name: Jamel      Apgar1: 9  Apgar5: 9     Review of Systems  CONSTITUTIONAL: NEGATIVE for fever, chills, change in weight  INTEGUMENTARY/SKIN: NEGATIVE for worrisome rashes, moles or lesions  EYES: NEGATIVE for vision changes or irritation  ENT/MOUTH: NEGATIVE for ear, mouth and throat problems  RESP: NEGATIVE for significant cough or SOB  BREAST: NEGATIVE for masses, tenderness or discharge  CV: NEGATIVE for chest pain, palpitations or peripheral edema  GI: NEGATIVE for nausea, abdominal pain, heartburn, or change in bowel habits  : NEGATIVE for dysuria or hematuria  MUSCULOSKELETAL: NEGATIVE for significant arthralgias or myalgia  NEURO: NEGATIVE for weakness, dizziness or paresthesias  ENDOCRINE: NEGATIVE for temperature intolerance, skin/hair changes  HEME: NEGATIVE for bleeding problems  PSYCHIATRIC: NEGATIVE for changes in mood or affect     Objective    Exam  /79   Pulse 107   Resp 14   Ht 1.56 m (5' 1.42\")   Wt 98.1 kg (216 lb 3.2 oz)   LMP 02/16/2024   SpO2 98%   BMI 40.30 kg/m     Estimated body mass index is 40.3 kg/m  as calculated from the following:    Height as of this encounter: 1.56 m (5' 1.42\").    Weight as of this encounter: 98.1 kg (216 lb 3.2 oz).    Physical Exam  GENERAL: alert and no distress  EYES: Eyes grossly normal to inspection, PERRL and conjunctivae and sclerae normal  HENT: ear canals and TM's normal, nose and mouth without ulcers or lesions. OP +mild erythema.  NECK: no adenopathy, no asymmetry, masses, or scars  RESP: lungs clear to auscultation - no rales, " rhonchi or wheezes  BREAST: pt declines today   CV: regular rate and rhythm, normal S1 S2, no S3 or S4, no murmur, click or rub, no peripheral edema  ABDOMEN: soft, nontender, no hepatosplenomegaly, no masses and bowel sounds normal   (female): defer to November with cervical cancer screening  MS: no gross musculoskeletal defects noted, no edema  SKIN: no suspicious lesions or rashes  NEURO: Normal strength and tone, mentation intact and speech normal  PSYCH: mentation appears normal, affect normal/bright    Signed Electronically by: ISH Menendez CNP

## 2024-03-05 NOTE — NURSING NOTE
Prior to immunization administration, verified patients identity using patient s name and date of birth. Please see Immunization Activity for additional information.     Screening Questionnaire for Adult Immunization    Are you sick today?   No   Do you have allergies to medications, food, a vaccine component or latex?   Yes   Have you ever had a serious reaction after receiving a vaccination?   No   Do you have a long-term health problem with heart, lung, kidney, or metabolic disease (e.g., diabetes), asthma, a blood disorder, no spleen, complement component deficiency, a cochlear implant, or a spinal fluid leak?  Are you on long-term aspirin therapy?   No   Do you have cancer, leukemia, HIV/AIDS, or any other immune system problem?   No   Do you have a parent, brother, or sister with an immune system problem?   No   In the past 3 months, have you taken medications that affect  your immune system, such as prednisone, other steroids, or anticancer drugs; drugs for the treatment of rheumatoid arthritis, Crohn s disease, or psoriasis; or have you had radiation treatments?   No   Have you had a seizure, or a brain or other nervous system problem?   No   During the past year, have you received a transfusion of blood or blood    products, or been given immune (gamma) globulin or antiviral drug?   No   For women: Are you pregnant or is there a chance you could become       pregnant during the next month?   No   Have you received any vaccinations in the past 4 weeks?   No           Patient instructed to remain in clinic for 15 minutes afterwards, and to report any adverse reactions.     Screening performed by Paula Thomson MA on 3/5/2024 at 3:08 PM.

## 2024-03-06 ENCOUNTER — PATIENT OUTREACH (OUTPATIENT)
Dept: ONCOLOGY | Facility: CLINIC | Age: 34
End: 2024-03-06
Payer: COMMERCIAL

## 2024-03-06 LAB
ANION GAP SERPL CALCULATED.3IONS-SCNC: 10 MMOL/L (ref 7–15)
BUN SERPL-MCNC: 14.3 MG/DL (ref 6–20)
CALCIUM SERPL-MCNC: 9.8 MG/DL (ref 8.6–10)
CHLORIDE SERPL-SCNC: 103 MMOL/L (ref 98–107)
CREAT SERPL-MCNC: 0.82 MG/DL (ref 0.51–0.95)
DEPRECATED HCO3 PLAS-SCNC: 23 MMOL/L (ref 22–29)
EGFRCR SERPLBLD CKD-EPI 2021: >90 ML/MIN/1.73M2
GLUCOSE SERPL-MCNC: 94 MG/DL (ref 70–99)
POTASSIUM SERPL-SCNC: 4.2 MMOL/L (ref 3.4–5.3)
SODIUM SERPL-SCNC: 136 MMOL/L (ref 135–145)

## 2024-03-06 NOTE — PROGRESS NOTES
New Patient Oncology Nurse Navigator Note     Referring provider: Ravi Lopez APRN CNPBk [facility]    Referred to (specialty): Genetic Counseling     Date Referral Received: 3/5/2024     Evaluation for : Family history of malignant neoplasm of breast

## 2024-03-14 ENCOUNTER — NURSE TRIAGE (OUTPATIENT)
Dept: FAMILY MEDICINE | Facility: CLINIC | Age: 34
End: 2024-03-14
Payer: COMMERCIAL

## 2024-03-14 NOTE — TELEPHONE ENCOUNTER
"Pt calling because the last 2 days she has been sick.  States that she has diarrhea and a fever  No abdominal pain, no vomiting.   Some nausea at times.   Pt is drinking and eating as normal.  No sure what could be causing it.   Pt said that she will would rather walk in to  today.      Reason for Disposition   MODERATE diarrhea (e.g., 4-6 times / day more than normal) and present > 48 hours (2 days)    Additional Information   Negative: Vomiting also present and worse than the diarrhea   Negative: Blood in stool and without diarrhea   Negative: Shock suspected (e.g., cold/pale/clammy skin, too weak to stand, low BP, rapid pulse)   Negative: Difficult to awaken or acting confused (e.g., disoriented, slurred speech)   Negative: Sounds like a life-threatening emergency to the triager   Negative: SEVERE abdominal pain (e.g., excruciating) and present > 1 hour   Negative: SEVERE abdominal pain and age > 60 years   Negative: Bloody, black, or tarry bowel movements  (Exception: Chronic-unchanged black-grey bowel movements and is taking iron pills or Pepto-Bismol.)   Negative: SEVERE diarrhea (e.g., 7 or more times / day more than normal) and age > 60 years   Negative: Constant abdominal pain lasting > 2 hours   Negative: Drinking very little and dehydration suspected (e.g., no urine > 12 hours, very dry mouth, very lightheaded)   Negative: Patient sounds very sick or weak to the triager    Answer Assessment - Initial Assessment Questions  1. DIARRHEA SEVERITY: \"How bad is the diarrhea?\" \"How many more stools have you had in the past 24 hours than normal?\"     - NO DIARRHEA (SCALE 0)    - MILD (SCALE 1-3): Few loose or mushy BMs; increase of 1-3 stools over normal daily number of stools; mild increase in ostomy output.    -  MODERATE (SCALE 4-7): Increase of 4-6 stools daily over normal; moderate increase in ostomy output.    -  SEVERE (SCALE 8-10; OR \"WORST POSSIBLE\"): Increase of 7 or more stools daily over normal; " "moderate increase in ostomy output; incontinence.      Mild  2. ONSET: \"When did the diarrhea begin?\"       2 days ago  3. BM CONSISTENCY: \"How loose or watery is the diarrhea?\"       loose  4. VOMITING: \"Are you also vomiting?\" If Yes, ask: \"How many times in the past 24 hours?\"       no  5. ABDOMEN PAIN: \"Are you having any abdomen pain?\" If Yes, ask: \"What does it feel like?\" (e.g., crampy, dull, intermittent, constant)       no  6. ABDOMEN PAIN SEVERITY: If present, ask: \"How bad is the pain?\"  (e.g., Scale 1-10; mild, moderate, or severe)    - MILD (1-3): doesn't interfere with normal activities, abdomen soft and not tender to touch     - MODERATE (4-7): interferes with normal activities or awakens from sleep, abdomen tender to touch     - SEVERE (8-10): excruciating pain, doubled over, unable to do any normal activities        none  7. ORAL INTAKE: If vomiting, \"Have you been able to drink liquids?\" \"How much liquids have you had in the past 24 hours?\"      Staying hydrated and taking in food  8. HYDRATION: \"Any signs of dehydration?\" (e.g., dry mouth [not just dry lips], too weak to stand, dizziness, new weight loss) \"When did you last urinate?\"      no  9. EXPOSURE: \"Have you traveled to a foreign country recently?\" \"Have you been exposed to anyone with diarrhea?\" \"Could you have eaten any food that was spoiled?\"      no  10. ANTIBIOTIC USE: \"Are you taking antibiotics now or have you taken antibiotics in the past 2 months?\"        no  11. OTHER SYMPTOMS: \"Do you have any other symptoms?\" (e.g., fever, blood in stool)        Fever. Last temp of 100.2  12. PREGNANCY: \"Is there any chance you are pregnant?\" \"When was your last menstrual period?\"        *No Answer*    Protocols used: Diarrhea-A-OH      Samantha Suazo RN  [facility]    "

## 2024-03-18 ENCOUNTER — MYC MEDICAL ADVICE (OUTPATIENT)
Dept: OBGYN | Facility: CLINIC | Age: 34
End: 2024-03-18
Payer: COMMERCIAL

## 2024-03-18 NOTE — PATIENT INSTRUCTIONS
If you have labs or imaging done, the results will automatically release in [facility] without an interpretation.  Your health care professional will review those results and send an interpretation with recommendations as soon as possible, but this may be 1-3 business days.    If you have any questions regarding your visit, please contact your care team.     [employer] Access Services: [phone]  [facility] CLINIC HOURS TELEPHONE NUMBER   Cathie Leroy, OSCAR Hall-DANIEL Rosas-DANIEL Diaz-Surgery Scheduler  [place]-       Monday- [place]  8:00 am-4:00 pm    Tuesday- [place]  8:00 am-4:00 pm    Wednesday- [place] 8:00 am-4:00 pm    Thursday- [place] 8:00 am-4:00 pm    Friday- [place]  8:00 am-4:00 pm [facility]  [address] [place]  [place] [address]  PH: [phone]  Fax: [phone]    Imaging Scheduling all locations  PH: [phone]     [facility] Labor and Delivery  [address] [place] [address] Dr.  [place], [place] [address] [phone]    [facility]  [address] [place] [address]  PH: [phone]     **Surgeries** Our Surgery Schedulers will contact you to schedule. If you do not receive a call within 3 business days, please call [phone].  Urgent Care locations:  [facility]       Monday-Friday   10 am - 8 pm    Saturday and Sunday   9 am - 5 pm   [phone]   If you need a medication refill, please contact your pharmacy. Please allow 3 business days for your refill to be completed.  As always, Thank you for trusting us with your healthcare needs!

## 2024-03-19 ENCOUNTER — OFFICE VISIT (OUTPATIENT)
Dept: OBGYN | Facility: CLINIC | Age: 34
End: 2024-03-19
Payer: COMMERCIAL

## 2024-03-19 VITALS
OXYGEN SATURATION: 98 % | WEIGHT: 212.8 LBS | SYSTOLIC BLOOD PRESSURE: 138 MMHG | DIASTOLIC BLOOD PRESSURE: 90 MMHG | HEART RATE: 102 BPM | BODY MASS INDEX: 39.66 KG/M2

## 2024-03-19 DIAGNOSIS — N80.9 ENDOMETRIOSIS DETERMINED BY LAPAROSCOPY: Primary | ICD-10-CM

## 2024-03-19 DIAGNOSIS — K76.0 FATTY LIVER: ICD-10-CM

## 2024-03-19 DIAGNOSIS — Z32.00 ENCOUNTER FOR PREGNANCY TEST, RESULT UNKNOWN: ICD-10-CM

## 2024-03-19 PROCEDURE — 99214 OFFICE O/P EST MOD 30 MIN: CPT

## 2024-03-19 PROCEDURE — 84702 CHORIONIC GONADOTROPIN TEST: CPT

## 2024-03-19 PROCEDURE — 36415 COLL VENOUS BLD VENIPUNCTURE: CPT

## 2024-03-19 PROCEDURE — 80053 COMPREHEN METABOLIC PANEL: CPT

## 2024-03-19 PROCEDURE — 84439 ASSAY OF FREE THYROXINE: CPT

## 2024-03-19 RX ORDER — ELAGOLIX 150 MG/1
1 TABLET, FILM COATED ORAL DAILY
Qty: 90 TABLET | Refills: 0 | Status: SHIPPED | OUTPATIENT
Start: 2024-03-19 | End: 2024-09-18

## 2024-03-19 NOTE — PROGRESS NOTES
Subjective:  Joanne is a 33 year old   is here today with the following concerns:    Endometriosis: this has been found on laparoscopy. She has tried and failed NSAIDs, IUD, Depo, multiple different OCPs. She is possibly interested in hysterectomy if that will help. She currently has regular periods but they are slightly more frequent and painful since having children. Reports debilitating lower back, hip, and leg pain while on period. This has been more noticeable in the past six months. She has had TVUS in  which was WNL. Her last Depo shot was last summer 2023. She is currently using condoms for birth control. She is open to discussing GnRH analogue today but a little concerned about the effect if will have on her mood.     ROS: Pertinent ROS as above.    Medical history  OB History    Para Term  AB Living   2 2 2 0 0 2   SAB IAB Ectopic Multiple Live Births   0 0 0 0 2      # Outcome Date GA Lbr Derrick/2nd Weight Sex Delivery Anes PTL Lv   2 Term 20 37w4d  2.76 kg (6 lb 1.4 oz) M CS-LTranv Spinal Y EDILBERTO      Birth Comments: wd infection, depression      Complications: Gestational diabetes      Name: CLAUS DIETRICH BOY      Apgar1: 8  Apgar5: 9   1 Term 18 39w3d  3.15 kg (6 lb 15.1 oz) M CS-Unspec   EDILBERTO      Birth Comments: Back labor.  C/S due to failure to progress.  +GBS      Complications: Postpartum depression      Name: Jamel      Apgar1: 9  Apgar5: 9      Past Medical History:   Diagnosis Date    Abdominal wall cellulitis     Anxiety     Depressive disorder     Endometriosis     ESBL (extended spectrum beta-lactamase) producing bacteria infection     Irregular bowel habits 2022    Periumbilical hernia     RUQ abdominal pain     Suicidal ideation       Past Surgical History:   Procedure Laterality Date     SECTION  2018, 2020    x 2    HERNIORRHAPHY UMBILICAL N/A 2021    Procedure: OPEN UMBILICAL HERNIA REPAIR WITH MESH;  Surgeon: Ankit Nunez MD;   Location: [facility] OR    LAPAROSCOPY DIAGNOSTIC (GYN)  2015    Endometriosis    OTHER SURGICAL HISTORY      ablation    WOUND DEBRIDEMENT N/A 2020    Abdominal     ZZC ORAL SURGERY PROCEDURE      [place] teeth       ALL/Meds: Her medication and allergy histories were reviewed and are documented in their appropriate chart areas.    SH: Reviewed and documented in the appropriate area of the chart.  FH:  Her family history is reviewed and updated in the chart, today.  PMH: Her past medical, surgical, and obstetric histories were reviewed and updated today in the appropriate chart areas.    Objective:  PE: BP (!) 138/90 (BP Location: Left arm, Patient Position: Sitting, Cuff Size: Adult Large)   Pulse 102   Wt 96.5 kg (212 lb 12.8 oz)   LMP 2024   SpO2 98%   BMI 39.66 kg/m    Body mass index is 39.66 kg/m .    Pertinent Physical exam findings:    General Appearance:  healthy, alert, active, no distress   Pelvic: deferred    A/P:  Joanne Escalante is a 33 year old  here today with the following concerns:  (N80.9) Endometriosis determined by laparoscopy  (primary encounter diagnosis)  Plan: Elagolix Sodium (ORILISSA) 150 MG TABS daily for 3 months. Follow up with me to discuss how things are going and if add-back therapy is warranted at that time. Discussed common side effects of GnRH analogue and that she may experience hot flashes, night sweats, vaginal dryness, and mood changes. She will try this first before pursuing hysterectomy. Aware that if she wants surgical management option she must meet with one of the physicians. Counseled on taking daily calcium and vitamin D to help BMD. Discussed weight bearing exercise and pilate's good for BMD, mental health, and help with VSM from Orilissa. She is meeting with her psych provider tomorrow and will inform them she is going to start Orilissa and see if they want to make any changes. Discussed that she can take this for up to 24 months and the transition to  daily progesterone to hopefully keep her symptoms in remission. Reach out if she has mental health crisis and seek emergent medical care if she develops any plan to harm self or others. Discussed importance of anti-inflammatory diet and losing weight to help with symptoms as well.    (K76.0) Fatty liver  Comment: Ensure liver function appropriate before starting Orilissa.  Plan: Comprehensive metabolic panel (BMP + Alb, Alk         Phos, ALT, AST, Total. Bili, TP)          (Z32.00) Encounter for pregnancy test, result unknown  Comment: Ensure neg pregnancy before starting orilissa  Plan: HCG quantitative pregnancy    45 minutes spent on the date of the encounter doing chart review, review of test results, patient visit, documentation, and discussion with other provider(s)       ISH Odonnell CNP

## 2024-03-20 ENCOUNTER — TELEPHONE (OUTPATIENT)
Dept: OBGYN | Facility: CLINIC | Age: 34
End: 2024-03-20
Payer: COMMERCIAL

## 2024-03-20 LAB
ALBUMIN SERPL BCG-MCNC: 4.3 G/DL (ref 3.5–5.2)
ALP SERPL-CCNC: 46 U/L (ref 40–150)
ALT SERPL W P-5'-P-CCNC: 35 U/L (ref 0–50)
ANION GAP SERPL CALCULATED.3IONS-SCNC: 12 MMOL/L (ref 7–15)
AST SERPL W P-5'-P-CCNC: 21 U/L (ref 0–45)
BILIRUB SERPL-MCNC: 0.2 MG/DL
BUN SERPL-MCNC: 9.2 MG/DL (ref 6–20)
CALCIUM SERPL-MCNC: 9.8 MG/DL (ref 8.6–10)
CHLORIDE SERPL-SCNC: 104 MMOL/L (ref 98–107)
CREAT SERPL-MCNC: 0.77 MG/DL (ref 0.51–0.95)
DEPRECATED HCO3 PLAS-SCNC: 22 MMOL/L (ref 22–29)
EGFRCR SERPLBLD CKD-EPI 2021: >90 ML/MIN/1.73M2
GLUCOSE SERPL-MCNC: 102 MG/DL (ref 70–99)
HCG INTACT+B SERPL-ACNC: <1 MIU/ML
POTASSIUM SERPL-SCNC: 4.2 MMOL/L (ref 3.4–5.3)
PROT SERPL-MCNC: 7 G/DL (ref 6.4–8.3)
SODIUM SERPL-SCNC: 138 MMOL/L (ref 135–145)
T4 FREE SERPL-MCNC: 1.14 NG/DL (ref 0.9–1.7)

## 2024-03-20 NOTE — TELEPHONE ENCOUNTER
Prior Authorization Specialty Medication Request    Medication/Dose: Orilissa 150mg  Diagnosis and ICD code (if different than what is on RX):  Endometriosis N80.9  New/renewal/insurance change PA/secondary ins. PA:  Previously Tried and Failed:    Has tried all forms of birth control, IUD oral birth control pills,NSAIDS,Depo provera    Important Lab Values: See chart  Rationale: see chart    Insurance   Primary:   Insurance ID:  Self pay?    Secondary (if applicable):see chart  Insurance ID:  chart    Pharmacy Information (if different than what is on RX)  Name:  [facility]  Phone:  [phone]  Fax:[phone]    AVIS Liu 3/20/2024

## 2024-04-01 NOTE — TELEPHONE ENCOUNTER
Central Prior Authorization Team  Phone: [phone]    PA Initiation    Medication: ORILISSA 150 MG PO TABS  Insurance Company: [employer] - Phone [phone] Fax [phone]  Pharmacy Filling the Rx: [id number] - [place] - [address] [address]  Filling Pharmacy Phone: [phone]  Filling Pharmacy Fax:    Start Date: 4/1/2024

## 2024-04-03 NOTE — TELEPHONE ENCOUNTER
Prior Authorization Approval    Authorization Effective Date: 3/2/2024  Authorization Expiration Date: 9/28/2024  Medication: Orilissa 150mg-APPROVED  Reference #:     Insurance Company: [employer] - Phone [phone] Fax [phone]  Which Pharmacy is filling the prescription (Not needed for infusion/clinic administered): [id number] - [place] - [address] [address] [facility]  Pharmacy Notified: Yes  Patient Notified: Instructed pharmacy to notify patient when script is ready to /ship.

## 2024-04-10 ENCOUNTER — E-VISIT (OUTPATIENT)
Dept: FAMILY MEDICINE | Facility: CLINIC | Age: 34
End: 2024-04-10
Payer: COMMERCIAL

## 2024-04-10 DIAGNOSIS — M54.40 LOW BACK PAIN WITH SCIATICA, SCIATICA LATERALITY UNSPECIFIED, UNSPECIFIED BACK PAIN LATERALITY, UNSPECIFIED CHRONICITY: Primary | ICD-10-CM

## 2024-04-10 PROCEDURE — 99421 OL DIG E/M SVC 5-10 MIN: CPT

## 2024-05-08 ENCOUNTER — VIRTUAL VISIT (OUTPATIENT)
Dept: ONCOLOGY | Facility: CLINIC | Age: 34
End: 2024-05-08
Payer: COMMERCIAL

## 2024-05-08 DIAGNOSIS — Z80.3 FAMILY HISTORY OF MALIGNANT NEOPLASM OF BREAST: ICD-10-CM

## 2024-05-08 PROCEDURE — 96040 HC GENETIC COUNSELING, EACH 30 MINUTES: CPT | Mod: GT,95 | Performed by: GENETIC COUNSELOR, MS

## 2024-05-08 NOTE — LETTER
5/8/2024         RE: Joanne Escalante  [address]  [place] [address]        Dear Colleague,    Thank you for referring your patient, Joanne Escalante, to the [facility]. Please see a copy of my visit note below.    Virtual Visit Details    Type of service:  Video Visit   Joined the call at 5/8/2024, 8:57:59 am.  Left the call at 5/8/2024, 9:28:41 am.    Originating Location (pt. Location): Home  Distant Location (provider location):  Off-site  Platform used for Video Visit: [employer]    5/8/2024    Referring Provider: Ravi DENG CNP    Presenting Information:   I met with Joanne Escalante today for genetic counseling as part of the Cancer Risk Management Program (virtual visit) to discuss her family history of breast cancer.  She is here today to review this history, cancer screening recommendations, and available genetic testing options.    Personal History:  Joanne is a 33 year old female. She does not have any personal history of cancer.  She had her first menstrual period when 10 and her first child when 27.  She has her ovaries, fallopian tubes and uterus currently in place.  She has previously been evaluated by urology due to having a horseshoe kidney.      Family History: (Please see scanned pedigree for detailed family history information)  Joanne has two sons, ages 4 and 6  Her maternal grandmother was diagnosed with breast cancer at age 61 and passed away when 71.  Of note, she was found to have a breast tumor in her 40's  Joanne's father passed away at age 51 due to a heart attack  One paternal aunt was diagnosed with breast cancer at age 43. She has not completed genetic testing  One paternal aunt is 57 and has a history of GI problems/Crohn's disease and possible colon cancer.    Her paternal grandfather had a history of lung cancer   Her maternal ethnicity is Polish and . Her paternal ethnicity is Malay and Swedish.      Discussion:  The features of  sporadic, familial, and hereditary cancers were discussed, as it pertains to Joanne's history of cancer. Joanne's family history of early onset breast cancer may be suggestive of a hereditary cancer syndrome.  A detailed handout regarding hereditary cancer and the information we discussed can be found in the after visit summary. Topics included: inheritance pattern, cancer risks, cancer screening recommendations, as well as risks, benefits and limitations of testing.  Joanne meets current National Comprehensive Cancer Network (NCCN) criteria for genetic testing of hereditary breast and ovarian cancer syndrome (BRCA1/2_.    We discussed that there are additional genes that could cause increased risk for the cancers in Joanne's family. As many of these genes present with overlapping features in a family and accurate cancer risk cannot always be established based upon the pedigree analysis alone, it would be reasonable for Joanne to consider panel genetic testing to analyze multiple genes at once.  The information from genetic testing may determine additional cancer screening and risk management options, including earlier/more frequent imaging, and possible risk reducing surgeries.  For individuals with an active cancer diagnosis, the results from genetic testing may guide targeted therapies (i.e. immunotherapy for individuals with Calderon syndrome, PARP inhibitors, etc.). These recommendations will be discussed in detail once genetic testing is completed.   An individual's personal and/or family history of cancer may be explained by more than one inherited cancer syndrome.  We reviewed available genetic testing options to address this history, including a tailored disease-focused panel, and expanded multi-cancer panels. Discussion included risks, benefits and limitations of testing.  Joanne elected to proceed with genetic testing today.    The purpose of this genetic testing is to determine if Joanne carries an inherited  variant(s) associated with increased risk to develop cancer.  Genes included on this test may be associated with multiple types of cancer and are also associated with varying levels of cancer risk.  Depending on these cancer risks, specific screening and medical management recommendations may be available.  Possible results from testing typically include  positive  (pathogenic/likely pathogenic variant),  negative  (normal), or  variant of unknown significance  (VUS).  Genetic test results have implications for Joanne's family. Should a variant be identified, close relatives may also have a risk to carry the genetic variant. Some of these genes may have additional reproductive risks and options for further testing.    There are federal laws in place that prohibit health insurers and employers from discriminating based on genetic information (for example, the Genetic Information Nondiscrimination Act (HAILEE) of 2008; some exceptions to apply.   This protection does not cover life insurance, long term care, or disability insurances.   Consent was obtained over virtual visit, and Joanne elected to proceed with an expanded hereditary cancers panel to address genes related to many types of cancers.  Accepted sample types include saliva and blood.  Test turn around time: 4 weeks.      Genetic Testing: order placed for BRCA1/2 testing with automatic reflex to the Multi Cancer panel.  Blood draw will be scheduled    Plan:  1) Joanne elected to proceed with germline genetic testing, including BRCA1/2 and the [facility] Multi Cancer Panel.  2) A blood draw will be scheduled at an [facility] clinic.    3) Joanne will be contacted once testing is completed to schedule a return genetic counseling visit, in which the results from testing and medical management recommendations will be discussed.  Test turn around time: approximately 2-4 weeks.        Karon Mcnair MS, JD [facility]  Licensed, Certified Genetic Counselor

## 2024-05-08 NOTE — PROGRESS NOTES
Virtual Visit Details    Type of service:  Video Visit   Joined the call at 5/8/2024, 8:57:59 am.  Left the call at 5/8/2024, 9:28:41 am.    Originating Location (pt. Location): Home  Distant Location (provider location):  Off-site  Platform used for Video Visit: Sandeep    5/8/2024    Referring Provider: Ravi DENG CNP    Presenting Information:   I met with Joanne Escalante today for genetic counseling as part of the Cancer Risk Management Program (virtual visit) to discuss her family history of breast cancer.  She is here today to review this history, cancer screening recommendations, and available genetic testing options.    Personal History:  Joanne is a 33 year old female. She does not have any personal history of cancer.  She had her first menstrual period when 10 and her first child when 27.  She has her ovaries, fallopian tubes and uterus currently in place.  She has previously been evaluated by urology due to having a horseshoe kidney.      Family History: (Please see scanned pedigree for detailed family history information)  Joanne has two sons, ages 4 and 6  Her maternal grandmother was diagnosed with breast cancer at age 61 and passed away when 71.  Of note, she was found to have a breast tumor in her 40's  Joanne's father passed away at age 51 due to a heart attack  One paternal aunt was diagnosed with breast cancer at age 43. She has not completed genetic testing  One paternal aunt is 57 and has a history of GI problems/Crohn's disease and possible colon cancer.    Her paternal grandfather had a history of lung cancer   Her maternal ethnicity is [place] and . Her paternal ethnicity is [place] and [place].      Discussion:  The features of sporadic, familial, and hereditary cancers were discussed, as it pertains to Joanne's history of cancer. Joanne's family history of early onset breast cancer may be suggestive of a hereditary cancer syndrome.  A detailed handout regarding hereditary cancer  and the information we discussed can be found in the after visit summary. Topics included: inheritance pattern, cancer risks, cancer screening recommendations, as well as risks, benefits and limitations of testing.  Joanne meets current National Comprehensive Cancer Network (NCCN) criteria for genetic testing of hereditary breast and ovarian cancer syndrome (BRCA1/2_.    We discussed that there are additional genes that could cause increased risk for the cancers in Joanne's family. As many of these genes present with overlapping features in a family and accurate cancer risk cannot always be established based upon the pedigree analysis alone, it would be reasonable for Joanne to consider panel genetic testing to analyze multiple genes at once.  The information from genetic testing may determine additional cancer screening and risk management options, including earlier/more frequent imaging, and possible risk reducing surgeries.  For individuals with an active cancer diagnosis, the results from genetic testing may guide targeted therapies (i.e. immunotherapy for individuals with Calderon syndrome, PARP inhibitors, etc.). These recommendations will be discussed in detail once genetic testing is completed.   An individual's personal and/or family history of cancer may be explained by more than one inherited cancer syndrome.  We reviewed available genetic testing options to address this history, including a tailored disease-focused panel, and expanded multi-cancer panels. Discussion included risks, benefits and limitations of testing.  Joanne elected to proceed with genetic testing today.    The purpose of this genetic testing is to determine if Joanne carries an inherited variant(s) associated with increased risk to develop cancer.  Genes included on this test may be associated with multiple types of cancer and are also associated with varying levels of cancer risk.  Depending on these cancer risks, specific screening and  medical management recommendations may be available.  Possible results from testing typically include  positive  (pathogenic/likely pathogenic variant),  negative  (normal), or  variant of unknown significance  (VUS).  Genetic test results have implications for Joanne's family. Should a variant be identified, close relatives may also have a risk to carry the genetic variant. Some of these genes may have additional reproductive risks and options for further testing.    There are federal laws in place that prohibit health insurers and employers from discriminating based on genetic information (for example, the Genetic Information Nondiscrimination Act (HAILEE) of 2008; some exceptions to apply.   This protection does not cover life insurance, long term care, or disability insurances.   Consent was obtained over virtual visit, and Joanne elected to proceed with an expanded hereditary cancers panel to address genes related to many types of cancers.  Accepted sample types include saliva and blood.  Test turn around time: 4 weeks.      Genetic Testing: order placed for BRCA1/2 testing with automatic reflex to the Multi Cancer panel.  Blood draw will be scheduled    Plan:  1) Joanne elected to proceed with germline genetic testing, including BRCA1/2 and the [facility] Multi Cancer Panel.  2) A blood draw will be scheduled at an [facility].    3) Joanne will be contacted once testing is completed to schedule a return genetic counseling visit, in which the results from testing and medical management recommendations will be discussed.  Test turn around time: approximately 2-4 weeks.        Karon Mcnair MS, [facility]  Licensed, Certified Genetic Counselor

## 2024-05-08 NOTE — PATIENT INSTRUCTIONS
Assessing Cancer Risk  Cancer is a common diagnosis which impacts many families.  Individuals may develop cancer due to environmental factors (such as exposures and lifestyle), aging, genetic predisposition, or a combination of these factors.  The vast majority of cancer diagnoses are considered sporadic, and not primarily due to an inherited factor. Approximately 5-10% of cancer diagnoses are thought to be caused by inherited risk factors.       Many of the genes we are born with impact our risk of certain diseases, such as cancer.  When one of these genes is not working properly due to a mistake (known as a  mutation  or  variant ), this may lead to an increased risk of developing cancer.      Families impacted by a hereditary cancer syndrome are more likely to have relatives across several generations diagnosed with cancer, earlier ages of diagnoses (prior to age 50), certain rare tumors, or relatives diagnosed with multiple primary cancers.  However, this may not be the case for all families which carry a cancer risk gene, such as those with small family size or limited history information.         Genetic Testing  For some families, genetic testing may help to explain why their cancer developed, provide tailored management options, and clarify the risk of developing cancer in the future.      Genetic testing involves a simple blood or saliva test and will look at the genetic information in select genes for variants associated with cancer risk.  This testing may include analysis of a single gene due to a known variant in the family, multiple genes most associated with the cancers in a family, or an expanded panel of genes related to many types of cancers.    Results  There are several possible genetic test results, including:   Positive--a harmful mutation (also known as a  pathogenic  or  likely pathogenic  variant) was identified in a gene associated with increased cancer risk.  These risks, as well as  medical management options, depend on the specific genetic variant identified.    Negative--no variants were identified in the genes analyzed   Variant of unknown significance--a variant was identified in one or more genes, though it is currently unclear how this impacts cancer risk in the family.  Genetic testing labs are working to collect evidence about these uncertain variants and may provide updates in the future.    Medical Management  If a harmful mutation is found in a cancer risk gene, there may be increased cancer surveillance or preventative surgeries that can be offered. This information will be discussed after genetic testing is completed. If no mutations are found on genetic testing, screening is often recommended based on personal and/or family history of cancer. All cancer risk management options should be discussed in more detail with an individual's medical providers.    Inheritance   Variants in most cancer risk genes are inherited in an autosomal dominant pattern.  This means that if a parent has a variant, each of their children will have a 50% chance of inheriting that same variant.  Therefore, each child would have a 50% chance of being at increased risk for developing cancer.    Some cancer risk genes are inherited in an autosomal recessive pattern.  These risks are present when an individual inherits mutations from both parents in the same gene.         Genetic Information Nondiscrimination Act (HAILEE)  The Genetic Information Nondiscrimination Act of 2008 (HAILEE) is a federal law that protects individuals from health insurance or employment discrimination based on a genetic test result alone (with some exceptions, including employers with fewer than 15 employees, and ).  However, HAILEE's protection does not cover life insurance, long term care, or disability insurances.  The [facility] [place] is a great resource to learn more.    Questions to Think About  Regarding Genetic Testing  What effect will the test result have on me and my relationship with my family members if I have an inherited gene mutation?  If I don't have a gene mutation?  Should I share my test results, and how will my family react to this news, which may also affect them?  Are my children ready to learn new information that may one day affect their own health?    Please call us if you have any questions or concerns   (Appointments: [phone])    Capo Tidwell, MS [facility] [place]  [phone]  Joana Redd, MS, [facility] [place] [phone]  Jacquelyn Wells, MS, [facility] [place]  [phone]  Karon Mcnair, MS, [facility] [place]  [phone]  Edel Worrell, MS, [facility] [place]  [phone]  Padma Wilkinson, MS, [facility] [place]  [phone]  Guera Meza, MS, [facility] [place]  [phone]

## 2024-05-08 NOTE — NURSING NOTE
Is the patient currently in the state of [place]? YES    Visit mode:VIDEO    If the visit is dropped, the patient can be reconnected by: VIDEO VISIT: Send to e-mail at: [email or web address]    Will anyone else be joining the visit? NO  (If patient encounters technical issues they should call [phone]    How would you like to obtain your AVS? MyChart    Are changes needed to the allergy or medication list? N/A    Are refills needed on medications prescribed by this physician? NO    Reason for visit: Consult    Rocael SORTO

## 2024-08-27 DIAGNOSIS — F41.1 GENERALIZED ANXIETY DISORDER: Primary | ICD-10-CM

## 2024-08-29 ENCOUNTER — E-VISIT (OUTPATIENT)
Dept: URGENT CARE | Facility: CLINIC | Age: 34
End: 2024-08-29
Payer: COMMERCIAL

## 2024-08-29 DIAGNOSIS — L30.9 DERMATITIS: Primary | ICD-10-CM

## 2024-08-29 PROCEDURE — 99207 PR NON-BILLABLE SERV PER CHARTING: CPT | Performed by: NURSE PRACTITIONER

## 2024-08-29 RX ORDER — TRIAMCINOLONE ACETONIDE 0.25 MG/G
OINTMENT TOPICAL 2 TIMES DAILY
Qty: 80 G | Refills: 1 | Status: SHIPPED | OUTPATIENT
Start: 2024-08-29

## 2024-08-30 NOTE — PATIENT INSTRUCTIONS
Dear Joanne Escalante    After reviewing your responses, I've been able to diagnose you with dermatitis. Could be caused by your deodorant. Try using one without aluminum.     Based on your responses, I have prescribed a steroid ointment to treat this. Please follow the instructions on the medication. If you experience irritation of your skin, new rash, or any other new symptoms, you should stop using this medication and contact your primary care provider.     If this treatment does not work for you or you will run out of refills, please plan to follow- up with your primary care provider to set refills for a longer period of time or to try other options.     Things you can do to help prevent this:     Do not scratch your rash.Bacteria from your fingernails may enter your open sores during scratching and cause an infection.     Use moisturizes or emollients, such as petroleum jelly.These help relieve itching and help prevent bacteria from getting in your sores. If you have a doctor's order for medicated cream, apply that first. Then apply the moisturizer or emollient on top.    Thanks for choosing us as your health care partner,    Marcela Salmon, CNP

## 2024-09-09 ENCOUNTER — VIRTUAL VISIT (OUTPATIENT)
Dept: FAMILY MEDICINE | Facility: CLINIC | Age: 34
End: 2024-09-09
Payer: COMMERCIAL

## 2024-09-09 DIAGNOSIS — R68.81 EARLY SATIETY: ICD-10-CM

## 2024-09-09 DIAGNOSIS — R11.2 NAUSEA AND VOMITING, UNSPECIFIED VOMITING TYPE: ICD-10-CM

## 2024-09-09 DIAGNOSIS — R11.2 NAUSEA AND VOMITING, UNSPECIFIED VOMITING TYPE: Primary | ICD-10-CM

## 2024-09-09 DIAGNOSIS — R14.0 BLOATING: ICD-10-CM

## 2024-09-09 PROCEDURE — 99214 OFFICE O/P EST MOD 30 MIN: CPT | Mod: 95 | Performed by: PHYSICIAN ASSISTANT

## 2024-09-09 RX ORDER — ONDANSETRON 4 MG/1
4 TABLET, ORALLY DISINTEGRATING ORAL EVERY 8 HOURS PRN
Qty: 30 TABLET | Refills: 0 | Status: SHIPPED | OUTPATIENT
Start: 2024-09-09

## 2024-09-09 ASSESSMENT — PATIENT HEALTH QUESTIONNAIRE - PHQ9
SUM OF ALL RESPONSES TO PHQ QUESTIONS 1-9: 11
SUM OF ALL RESPONSES TO PHQ QUESTIONS 1-9: 11
10. IF YOU CHECKED OFF ANY PROBLEMS, HOW DIFFICULT HAVE THESE PROBLEMS MADE IT FOR YOU TO DO YOUR WORK, TAKE CARE OF THINGS AT HOME, OR GET ALONG WITH OTHER PEOPLE: SOMEWHAT DIFFICULT

## 2024-09-09 ASSESSMENT — ENCOUNTER SYMPTOMS
NAUSEA: 1
PALPITATIONS: 0
ARTHRALGIAS: 0
SORE THROAT: 0
COUGH: 0
DIARRHEA: 1
FATIGUE: 0
HEADACHES: 0
VOMITING: 1
DIZZINESS: 0
DYSURIA: 0
BACK PAIN: 0
COLOR CHANGE: 0
SHORTNESS OF BREATH: 0
FEVER: 0
ABDOMINAL PAIN: 1
UNEXPECTED WEIGHT CHANGE: 0
EYE PAIN: 0
SEIZURES: 0
CHILLS: 0
LIGHT-HEADEDNESS: 0
HEMATURIA: 0

## 2024-09-09 NOTE — PROGRESS NOTES
Joanne is a 33 year old who is being evaluated via a billable video visit.    How would you like to obtain your AVS? [email or web address]  If the video visit is dropped, the invitation should be resent by: Text to cell phone: [phone]  Will anyone else be joining your video visit? No      Assessment & Plan     Nausea and vomiting, unspecified vomiting type  Bloating  Early satiety  Joanne has been having 2 weeks of nausea, vomiting, bloating and early satiety.  Nausea and vomiting is typically worse in the morning and at bedtime.  She is having difficulty eating due to the nausea and vomiting and states that she is lost 15 pounds as she is not eating very much.  No changes in her recent medications.  No recent travel.  Some diarrhea without any blood.  She is not taking NSAIDs at this time.  She states that when she does eat she gets very full quickly.  She is possibly having GERD symptoms.  Negative COVID test and pregnancy test yesterday.  Will start her on Zofran as needed take this 30 minutes prior to eating.  We will also do some testing and check a CBC, CMP, lipase and a urine pregnancy test.  Also rule out H. pylori with a stool test.  After she does the stool test I would like her to trial omeprazole as she may have underlying GERD or a gastric ulcer.  She is to monitor symptoms in the meantime.  If symptoms are still ongoing despite Zofran and omeprazole and if workup is negative would consider having her do a EGD for further evaluation.  Symptoms for reevaluation were discussed in detail.  She will reach out to me via Kivo in a week or 2 on how things are going  - Helicobacter pylori Antigen Stool; Future  - ondansetron (ZOFRAN ODT) 4 MG ODT tab; Take 1 tablet (4 mg) by mouth every 8 hours as needed for nausea.  - omeprazole (PRILOSEC) 20 MG DR capsule; Take 1 capsule (20 mg) by mouth daily.  - CBC with platelets; Future  - Comprehensive metabolic panel (BMP + Alb, Alk Phos, ALT, AST, Total. Bili, TP); Future  -  Lipase; Future  - HCG qualitative urine POCT, Enter/Edit Result      Subjective   Joanne is a 33 year old, presenting for the following health issues:  Nausea (Started a few weeks ago and got worse worse over weekend, and now vomited several times the last 2-3 days, took a covid and pregnancy test and both were negative, headache)        9/9/2024     7:14 AM   Additional Questions   Roomed by Gen RAMIN CMA     Video Start Time: 7:28 AM    Joanne is a pleasant 33-year-old female that presents via video visit to discuss nausea and vomiting.  Over the last 2 weeks she has become nauseous.  Over the weekend she has now developed vomiting typically in the mornings.  She states that she is having difficulty eating due to the nausea and the vomiting.  She does have epigastric discomfort.  Foods tend to make it worse.  She states that the nausea is typically worse first thing in the morning and then when she is lying down at bedtime.  She is not having any constipation but does have diarrhea.  No blood in her stool.  She has not had any changes in her medications.  She states that when she does eat she feels very full fast.  She is not having any increased belching but does notes possibly some reflux symptoms.  She did a home COVID test and pregnancy test yesterday which were both negative.  No significant family history of GI issues.    Nausea  Associated symptoms include abdominal pain (Upper abdominal pain at times), nausea and vomiting. Pertinent negatives include no arthralgias, chest pain, chills, coughing, fatigue, fever, headaches, rash or sore throat.   History of Present Illness       Reason for visit:  Im very nauseous all of the time  Symptom onset:  1-2 weeks ago  Symptoms include:  Feeling like im going to puke all of the time, little sleep, hard time eating  Symptom intensity:  Moderate  Symptom progression:  Worsening  Had these symptoms before:  No She is missing 2 dose(s) of medications per week.  She is not  taking prescribed medications regularly due to other.       Review of Systems   Constitutional:  Negative for chills, fatigue, fever and unexpected weight change.   HENT:  Negative for ear pain and sore throat.    Eyes:  Negative for pain and visual disturbance.   Respiratory:  Negative for cough and shortness of breath.    Cardiovascular:  Negative for chest pain and palpitations.   Gastrointestinal:  Positive for abdominal pain (Upper abdominal pain at times), diarrhea, nausea and vomiting.   Genitourinary:  Negative for dysuria and hematuria.   Musculoskeletal:  Negative for arthralgias and back pain.   Skin:  Negative for color change and rash.   Neurological:  Negative for dizziness, seizures, syncope, light-headedness and headaches.   All other systems reviewed and are negative.          Objective           Vitals:  No vitals were obtained today due to virtual visit.    Physical Exam   GENERAL: alert and no distress  EYES: Eyes grossly normal to inspection.  No discharge or erythema, or obvious scleral/conjunctival abnormalities.  RESP: No audible wheeze, cough, or visible cyanosis.    SKIN: Visible skin clear. No significant rash, abnormal pigmentation or lesions.  NEURO: Cranial nerves grossly intact.  Mentation and speech appropriate for age.  PSYCH: Appropriate affect, tone, and pace of words        Video-Visit Details    Type of service:  Video Visit   Video End Time:7:50 AM  Originating Location (pt. Location): Home    Distant Location (provider location):  On-site  Platform used for Video Visit: Sandeep  Signed Electronically by: Rito Butler PA-C    Answers submitted by the patient for this visit:  Patient Health Questionnaire (Submitted on 9/9/2024)  If you checked off any problems, how difficult have these problems made it for you to do your work, take care of things at home, or get along with other people?: Somewhat difficult  PHQ9 TOTAL SCORE: 11

## 2024-09-18 ENCOUNTER — OFFICE VISIT (OUTPATIENT)
Dept: URGENT CARE | Facility: URGENT CARE | Age: 34
End: 2024-09-18
Payer: COMMERCIAL

## 2024-09-18 ENCOUNTER — ANCILLARY PROCEDURE (OUTPATIENT)
Dept: GENERAL RADIOLOGY | Facility: CLINIC | Age: 34
End: 2024-09-18
Attending: PHYSICIAN ASSISTANT
Payer: COMMERCIAL

## 2024-09-18 VITALS
BODY MASS INDEX: 38.02 KG/M2 | RESPIRATION RATE: 18 BRPM | TEMPERATURE: 98.4 F | WEIGHT: 204 LBS | SYSTOLIC BLOOD PRESSURE: 147 MMHG | HEART RATE: 85 BPM | DIASTOLIC BLOOD PRESSURE: 87 MMHG | OXYGEN SATURATION: 97 %

## 2024-09-18 DIAGNOSIS — S99.912A ANKLE INJURY, LEFT, INITIAL ENCOUNTER: Primary | ICD-10-CM

## 2024-09-18 PROCEDURE — 99213 OFFICE O/P EST LOW 20 MIN: CPT | Performed by: PHYSICIAN ASSISTANT

## 2024-09-18 PROCEDURE — 73610 X-RAY EXAM OF ANKLE: CPT | Mod: TC | Performed by: RADIOLOGY

## 2024-09-18 RX ORDER — LIDOCAINE 50 MG/G
OINTMENT TOPICAL 3 TIMES DAILY PRN
Qty: 240 G | Refills: 0 | Status: SHIPPED | OUTPATIENT
Start: 2024-09-18

## 2024-09-18 RX ORDER — SENNOSIDES 8.6 MG
650 CAPSULE ORAL EVERY 8 HOURS PRN
Qty: 30 TABLET | Refills: 0 | Status: SHIPPED | OUTPATIENT
Start: 2024-09-18

## 2024-09-18 ASSESSMENT — ENCOUNTER SYMPTOMS
NUMBNESS: 0
MYALGIAS: 1
CHILLS: 0
NECK STIFFNESS: 0
BACK PAIN: 0
CARDIOVASCULAR NEGATIVE: 1
NECK PAIN: 0
PALPITATIONS: 0
ARTHRALGIAS: 1
FATIGUE: 0
FEVER: 0
COLOR CHANGE: 1
WOUND: 0
JOINT SWELLING: 1

## 2024-09-18 ASSESSMENT — PAIN SCALES - GENERAL: PAINLEVEL: EXTREME PAIN (8)

## 2024-09-18 NOTE — PROGRESS NOTES
Subjective   Joanne is a 33 year old, presenting for the following health issues:  Trauma (Tripped yesterday and left ankle hurt a little bit yesterday but now it is more painful and swollen, able to walk on it and move it but it is painful )    HPI   Musculoskeletal problem/pain  Onset/Duration: yesterday  Description  Location: L ankle  Joint Swelling: Yes with bruising  Redness: no  Pain: YES  Warmth: no  Intensity:  moderate  Progression of Symptoms:  same  Accompanying signs and symptoms:   Fevers: no  Numbness/tingling/weakness: no  History  Trauma to the area: Yes, sustained a L ankle injury after tripping and falling over a curb yesterday.  No head or neck injuries or LOC.  Scraped her L elbow and R knee in the process as well.   Recent illness:  no  Previous similar problem: no  Previous evaluation:  no  Precipitating or alleviating factors:  Aggravating factors include: standing, walking, overuse  Therapies tried and outcome: rest/inactivity, ice, immobilization, acetaminophen, with minimal relief    Patient Active Problem List   Diagnosis    Umbilical hernia without obstruction and without gangrene    Anxiety disorder    Cigarette smoker    Endometriosis determined by laparoscopy    Menorrhagia with irregular cycle    Fatty liver    Horseshoe kidney    Bipolar 2 disorder, major depressive episode (H)     Current Outpatient Medications   Medication Sig Dispense Refill    acetaminophen (TYLENOL) 500 MG tablet Take 1,000 mg by mouth      clonazePAM (KLONOPIN) 0.5 MG tablet Take 0.5 mg by mouth 2 times daily as needed for anxiety      gabapentin (NEURONTIN) 300 MG capsule TAKE 1 CAPSULE BY MOUTH EVERY MORNING AND 2 AT BEDTIME      lamoTRIgine (LAMICTAL) 200 MG tablet Take 1 tablet (200 mg) by mouth daily 30 tablet 0    lithium (ESKALITH) 150 MG capsule TAKE 1 CAPSULE EVERY DAY WITH 300MG CAPS FOR A TOTAL DAILY DOSE OF 1650MG      lithium 300 MG capsule       lithium 300 MG capsule Take 1,200 mg by mouth  every evening      omeprazole (PRILOSEC) 20 MG DR capsule TAKE 1 CAPSULE(20 MG) BY MOUTH DAILY 90 capsule 1    ondansetron (ZOFRAN ODT) 4 MG ODT tab Take 1 tablet (4 mg) by mouth every 8 hours as needed for nausea. 30 tablet 0    propranolol (INDERAL) 10 MG tablet Take 1 tablet by mouth 3 times daily      triamcinolone (KENALOG) 0.025 % external ointment Apply topically 2 times daily. 80 g 1     No current facility-administered medications for this visit.        Allergies   Allergen Reactions    Sulfa Antibiotics      Review of Systems   Constitutional:  Negative for chills, fatigue and fever.   Cardiovascular: Negative.  Negative for chest pain, palpitations and leg swelling.   Musculoskeletal:  Positive for arthralgias, gait problem, joint swelling and myalgias. Negative for back pain, neck pain and neck stiffness.   Skin:  Positive for color change. Negative for pallor, rash and wound.   Neurological:  Negative for numbness.   All other systems reviewed and are negative.          Objective    BP (!) 147/87 (BP Location: Left arm, Patient Position: Sitting, Cuff Size: Adult Regular)   Pulse 85   Temp 98.4  F (36.9  C) (Tympanic)   Resp 18   Wt 92.5 kg (204 lb)   LMP 08/26/2024   SpO2 97%   BMI 38.02 kg/m    Body mass index is 38.02 kg/m .  Physical Exam  Vitals and nursing note reviewed.   Constitutional:       General: She is not in acute distress.     Appearance: Normal appearance. She is well-developed. She is obese. She is not ill-appearing.   Musculoskeletal:      Right ankle: Normal. No swelling. No tenderness. Normal range of motion.      Right Achilles Tendon: Normal.      Left ankle: Swelling and ecchymosis present. No deformity or lacerations. Tenderness present over the lateral malleolus and AITF ligament. No medial malleolus, CF ligament, posterior TF ligament or proximal fibula tenderness. Decreased range of motion. Anterior drawer test negative. Normal pulse.      Left Achilles Tendon: Normal.       Right foot: Normal.      Left foot: Normal range of motion and normal capillary refill. No swelling, deformity, tenderness, bony tenderness or crepitus. Normal pulse.   Skin:     General: Skin is warm and dry.      Comments: Distal pulses are 2+ and symmetric.  No peripheral edema.   Neurological:      Mental Status: She is alert and oriented to person, place, and time.      Sensory: Sensation is intact. No sensory deficit.      Motor: Motor function is intact.      Gait: Gait abnormal.      Deep Tendon Reflexes: Reflexes are normal and symmetric.   Psychiatric:         Mood and Affect: Mood normal.         Behavior: Behavior normal.         Thought Content: Thought content normal.         Judgment: Judgment normal.        No results found for this or any previous visit (from the past 24 hour(s)).    3V of L ankle:  No acute fractures or dislocations.  No soft tissue swelling or masses.  Per my read.  Will send for overread.      Assessment/Plan:  Ankle injury, left, initial encounter:  Xrays are negative for acute fractures or dislocations. Most likely strain/sprain/contusion.  Walking boot and crutches were given in clinic  Recommend RICE and will give tylenol and lidocaine ointment prn pain.  Will avoid NSAIDs due to her lithium.  Will also send to orthopedics if no improvement.  Recheck in clinic if symptoms worsen or if symptoms do not improve.   -     XR Ankle Left G/E 3 Views  -     Orthopedic  Referral; Future  -     lidocaine (XYLOCAINE) 5 % external ointment; Apply topically 3 times daily as needed for moderate pain.  -     acetaminophen (TYLENOL) 650 MG CR tablet; Take 1 tablet (650 mg) by mouth every 8 hours as needed for pain.  -     Crutches Order for DME - ONLY FOR DME  -     Ankle/Foot Bracing Supplies Order Walking Boot; Left; Non-pneumatic; Tall        Naomy See NII Canseco

## 2024-09-19 ENCOUNTER — PATIENT OUTREACH (OUTPATIENT)
Dept: CARE COORDINATION | Facility: CLINIC | Age: 34
End: 2024-09-19
Payer: COMMERCIAL

## 2024-09-27 ENCOUNTER — NURSE TRIAGE (OUTPATIENT)
Dept: ONCOLOGY | Facility: CLINIC | Age: 34
End: 2024-09-27

## 2024-10-29 ENCOUNTER — TELEPHONE (OUTPATIENT)
Dept: SLEEP MEDICINE | Facility: CLINIC | Age: 34
End: 2024-10-29

## 2024-10-29 ENCOUNTER — TELEPHONE (OUTPATIENT)
Dept: FAMILY MEDICINE | Facility: CLINIC | Age: 34
End: 2024-10-29

## 2024-10-29 NOTE — TELEPHONE ENCOUNTER
Call into clinic from sleep medicine scheduling with patient on the line. Sleep medicine  is wondering if patient can still be seen for her appointment this afternoon due to current symptoms, or if she needs to reschedule her appointment. No nurse available at sleep medicine currently.    Patient reports ongoing cold for 4-5 days. States symptoms are staying the same/not going away. Reports cough is worsening. Denies shortness of breath. States she cannot smoke because of the cough. Also reports runny nose, sore throat. Denies fever.    Offered nurse triage, but patient declined. Patient states she is only looking for recommendation on whether she can still go to her sleep medicine appointment.    Writer deferred decision regarding sleep medicine appointment back to RNs at the clinic where the appointment is to take place, as writer does not know or have access to sleep medicine clinic procedures related to illness and visits.    Hope Karimi RN  [facility]

## 2024-10-29 NOTE — TELEPHONE ENCOUNTER
General Call      Reason for Call:  patient caled and needs to speak with a sleep RN.    Illness.    Patient has an appt today at 2:15 PM.    Cannot get a hold of the clinic.    Please contat patient.  Thank you.    What are your questions or concerns:  yes    Date of last appointment with provider: today    Could we send this information to you in [place] or would you prefer to receive a phone call?:   Patient would prefer a phone call   Okay to leave a detailed message?: Yes at Cell number on file:    Telephone Information:   Mobile [phone]

## 2024-10-30 ENCOUNTER — E-VISIT (OUTPATIENT)
Dept: URGENT CARE | Facility: CLINIC | Age: 34
End: 2024-10-30
Payer: COMMERCIAL

## 2024-10-30 DIAGNOSIS — J01.90 ACUTE BACTERIAL SINUSITIS: Primary | ICD-10-CM

## 2024-10-30 DIAGNOSIS — B96.89 ACUTE BACTERIAL SINUSITIS: Primary | ICD-10-CM

## 2024-10-30 PROCEDURE — 99207 PR NON-BILLABLE SERV PER CHARTING: CPT | Performed by: EMERGENCY MEDICINE

## 2024-10-30 NOTE — PATIENT INSTRUCTIONS
Acute Sinusitis: Care Instructions  Overview     Acute sinusitis is an inflammation of the mucous membranes inside the nose and sinuses. Sinuses are the hollow spaces in your skull around the eyes and nose. Acute sinusitis often follows a cold. Acute sinusitis causes thick, discolored mucus that drains from the nose or down the back of the throat. It also can cause pain and pressure in your head and face along with a stuffy or blocked nose.  In most cases, sinusitis gets better on its own in 1 to 2 weeks. But some mild symptoms may last for several weeks. Sometimes antibiotics are needed if there is a bacterial infection.  Follow-up care is a key part of your treatment and safety. Be sure to make and go to all appointments, and call your doctor if you are having problems. It's also a good idea to know your test results and keep a list of the medicines you take.  How can you care for yourself at home?  Use saline (saltwater) nasal washes. This can help keep your nasal passages open and wash out mucus and allergens.  You can buy saline nose washes at a grocery store or drugstore. Follow the instructions on the package.  You can make your own at home. Add 1 teaspoon of non-iodized salt and 1 teaspoon of baking soda to 2 cups of distilled or boiled and cooled water. Fill a squeeze bottle or a nasal cleansing pot (such as a neti pot) with the nasal wash. Then put the tip into your nostril, and lean over the sink. With your mouth open, gently squirt the liquid. Repeat on the other side.  Try a decongestant nasal spray like oxymetazoline (Afrin). Do not use it for more than 3 days in a row. Using it for more than 3 days can make your congestion worse.  If needed, take an over-the-counter pain medicine, such as acetaminophen (Tylenol), ibuprofen (Advil, Motrin), or naproxen (Aleve). Read and follow all instructions on the label.  If the doctor prescribed antibiotics, take them as directed. Do not stop taking them just  "because you feel better. You need to take the full course of antibiotics.  Be careful when taking over-the-counter cold or flu medicines and Tylenol at the same time. Many of these medicines have acetaminophen, which is Tylenol. Read the labels to make sure that you are not taking more than the recommended dose. Too much acetaminophen (Tylenol) can be harmful.  Try a steroid nasal spray. It may help with your symptoms.  Breathe warm, moist air. You can use a steamy shower, a hot bath, or a sink filled with hot water. Avoid cold, dry air. Using a humidifier in your home may help. Follow the directions for cleaning the machine.  When should you call for help?   Call your doctor now or seek immediate medical care if:    You have new or worse swelling, redness, or pain in your face or around one or both of your eyes.     You have double vision or a change in your vision.     You have a high fever.     You have a severe headache and a stiff neck.     You have mental changes, such as feeling confused or much less alert.   Watch closely for changes in your health, and be sure to contact your doctor if:    You are not getting better as expected.   Where can you learn more?  Go to https://www.[employer].net/patiented  Enter I933 in the search box to learn more about \"Acute Sinusitis: Care Instructions.\"  Current as of: September 27, 2023  Content Version: 14.2 2024 [facility].   Care instructions adapted under license by your healthcare professional. If you have questions about a medical condition or this instruction, always ask your healthcare professional. [employer] disclaims any warranty or liability for your use of this information.    Dear Joanne Escalante      Based on your responses and diagnosis, I have prescribed Augmentin  to treat your symptoms. I have sent this to your pharmacy.?     It is also important to stay well hydrated, get lots of rest and take over-the-counter " decongestants,?tylenol?or ibuprofen if you?are able to?take those medications per your primary care provider to help relieve discomfort.?     It is important that you take?all of?your prescribed medication even if your symptoms are improving after a few doses.? Taking?all of?your medicine helps prevent the symptoms from returning.?     If your symptoms worsen, you develop severe headache, vomiting, high fever (>102), or are not improving in 7 days, please contact your primary care provider for an appointment or visit any of our convenient Walk-in Care or Urgent Care Centers to be seen which can be found on our website?here.?     Thanks again for choosing?us?as your health care partner,?   ?  Ti Hong MD?   Thank you for choosing us for your care. I have placed an order for a prescription so that you can start treatment. View your full visit summary for details by clicking on the link below. Your pharmacist will able to address any questions you may have about the medication.     If you're not feeling better within 5-7 days, please schedule an appointment.  You can schedule an appointment right here in [facility], or call [phone]  If the visit is for the same symptoms as your eVisit, we'll refund the cost of your eVisit if seen within seven days.

## 2024-11-11 ENCOUNTER — VIRTUAL VISIT (OUTPATIENT)
Dept: FAMILY MEDICINE | Facility: CLINIC | Age: 34
End: 2024-11-11
Payer: COMMERCIAL

## 2024-11-11 DIAGNOSIS — R41.3 MEMORY LOSS: Primary | ICD-10-CM

## 2024-11-11 PROCEDURE — 99213 OFFICE O/P EST LOW 20 MIN: CPT | Mod: 95

## 2024-11-11 ASSESSMENT — PATIENT HEALTH QUESTIONNAIRE - PHQ9
SUM OF ALL RESPONSES TO PHQ QUESTIONS 1-9: 18
SUM OF ALL RESPONSES TO PHQ QUESTIONS 1-9: 18
10. IF YOU CHECKED OFF ANY PROBLEMS, HOW DIFFICULT HAVE THESE PROBLEMS MADE IT FOR YOU TO DO YOUR WORK, TAKE CARE OF THINGS AT HOME, OR GET ALONG WITH OTHER PEOPLE: EXTREMELY DIFFICULT

## 2024-11-11 NOTE — PROGRESS NOTES
"Joanne is a 33 year old who is being evaluated via a billable video visit.    How would you like to obtain your AVS? [employer]  If the video visit is dropped, the invitation should be resent by: Text to cell phone: [phone]  Will anyone else be joining your video visit? No    If patient has telephone visit, have they been educated on video visit as preferred visit method and offered to change to video visit? NOT APPLICABLE    Instructions Relayed to Patient by Virtual Roomer:     Patient Confirmed they will join visit via: [employer]  Reminded patient to ensure they were logged on to virtual visit by arrival time listed.   Asked if patient has flexibility to initiate visit sooner than arrival time: patient is unable to initiate visit earlier than arrival time     If pediatric virtual visit, ensured pediatric patient along with parent/guardian will be present for video visit.     Patient offered the website [email or web address] and/or phone number to [employer] Help line: [phone]     Assessment & Plan     There are no diagnoses linked to this encounter.    BMI  Estimated body mass index is 38.02 kg/m  as calculated from the following:    Height as of 3/5/24: 1.56 m (5' 1.42\").    Weight as of 9/18/24: 92.5 kg (204 lb).   Weight management plan: Discussed healthy diet and exercise guidelines    Depression Screening Follow Up        11/11/2024    11:27 AM   PHQ   PHQ-9 Total Score 18    Q9: Thoughts of better off dead/self-harm past 2 weeks Several days    F/U: Thoughts of suicide or self-harm No    F/U: Safety concerns No        Patient-reported     {Last PHQ9 Response (Optional):665951}      { Link to C-SSRS ([facility]) Flowsheet :906868}  {C-SSRS results from today (Required):428270}      {After C-SRSS completed-Select to see recommended follow up based on results (Optional):219913}    Follow Up Actions Taken  {ACTIONS TAKEN:599761::\"Crisis resource information provided in the After Visit " "Summary\"}    Discussed the following ways the patient can remain in a safe environment:  {SAFE ENVIRONMENT:055053}    {FOLLOW UP PLANS (Optional) Includes COVID19 Treatment Plan:368206}    Colton Rubio is a 33 year old, presenting for the following health issues:  Memory issues       11/11/2024     3:33 PM   Additional Questions   Roomed by Phillip   Accompanied by self         11/11/2024     3:33 PM   Patient Reported Additional Medications   Patient reports taking the following new medications No     Video Start Time:  5:34 PM    History of Present Illness       Reason for visit:  I am having issues with my memory  Symptom onset:  More than a month  Symptoms include:  If it is not written down, i rarely remember. The reason i made the appointment is it is getting worse where i forgot my husbands phone number he has had for 8+ years  Symptom intensity:  Severe  Symptom progression:  Worsening  Had these symptoms before:  Yes  Has tried/received treatment for these symptoms:  No  What makes it worse:  No  What makes it better:  Not really   She is taking medications regularly.     Has trouble remembering things with increasing frequency. This morning she was unable to remember her 's phone number, which has been the same for the past 8 years. This has been a gradual change over the past several years. Trouble retaining new information. Impact on short term and long term memory. It has gotten to the point that if she doesn't write something down, she doesn't remember it. Hasn't been able to focus on activities, hasn't read a book in months. 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   No family history of dementia. No involuntary movements, parkinsonism. Tolerating medications without adverse effects.    Medication management by Merary URENA. Currently on Lamotrigine, gabapentin, lithium, clonazepam.   Depression right now is severe.     Review of " Systems  Constitutional, HEENT, cardiovascular, pulmonary, gi and gu systems are negative, except as otherwise noted.      Objective       Vitals:  No vitals were obtained today due to virtual visit.    Physical Exam   GENERAL: alert and no distress  EYES: Eyes grossly normal to inspection.  No discharge or erythema, or obvious scleral/conjunctival abnormalities.  RESP: No audible wheeze, cough, or visible cyanosis.    SKIN: Visible skin clear. No significant rash, abnormal pigmentation or lesions.  NEURO: Cranial nerves grossly intact.  Mentation and speech appropriate for age.  PSYCH: Appropriate affect, tone, and pace of words      Video-Visit Details    Type of service:  Video Visit   Video End Time:5:52 PM  Originating Location (pt. Location): Home  Distant Location (provider location):  On-site  Platform used for Video Visit: Sandeep    Signed Electronically by: ISH Goodson CNP

## 2024-11-23 NOTE — TELEPHONE ENCOUNTER
"Nurse Triage SBAR    Is this a 2nd Level Triage? NO    Situation: Pt calling to report some sx since starting Caplyta     Background: Pt's psychologist provider at [facility] is the prescriber, but they are not open today. Pt does not have a PCP at [facility] per her report     Assessment:   Caplyta was started recently. Ever since then she has had a headache and has been tired. She then developed light sensitivity and today she has shakiness of her \"whole body\" but is evident in her hands    On lithium      Protocol Recommended Disposition:   Go to ED Now (Or PCP Triage)    Recommendation: ED. Pt does not have a PCP. She states that the listed provider in [facility] is not her PCP. She was advised to be seen in the ED for evaluation of her sx.     Reason for Disposition   [1] New-onset muscle jerks (twitches, spasms) AND [2] present now   [1] Muscle rigidity or tightness AND [2] present now    Additional Information   Negative: Difficult to awaken or acting confused (e.g., disoriented, slurred speech)   Negative: Sounds like a life-threatening emergency to the triager   Negative: Abnormal twitch, blinking, tic, or spasm of eyelid(s)   Negative: Suspected alcohol withdrawal   Negative: Suspected substance use (drug use), addiction, or withdrawal   Negative: [1] Shivering or chills AND [2] fever   Negative: [1] Shivering or chills AND [2] cold exposure (R/O hypothermia)   Negative: Face, arm, or leg weakness    Protocols used: Muscle Jerks - Tics - [id number] Plasencia RN   Triage Nurse Advisor on 11/23/2024 at 9:49 AM  "

## 2024-12-05 ENCOUNTER — OFFICE VISIT (OUTPATIENT)
Dept: FAMILY MEDICINE | Facility: CLINIC | Age: 34
End: 2024-12-05
Payer: COMMERCIAL

## 2024-12-05 VITALS
HEIGHT: 61 IN | OXYGEN SATURATION: 97 % | RESPIRATION RATE: 18 BRPM | SYSTOLIC BLOOD PRESSURE: 122 MMHG | TEMPERATURE: 98.6 F | BODY MASS INDEX: 38.14 KG/M2 | DIASTOLIC BLOOD PRESSURE: 83 MMHG | HEART RATE: 96 BPM | WEIGHT: 202 LBS

## 2024-12-05 DIAGNOSIS — R22.32 MASS OF LEFT AXILLA: Primary | ICD-10-CM

## 2024-12-05 NOTE — PROGRESS NOTES
"  Assessment & Plan   Problem List Items Addressed This Visit    None  Visit Diagnoses       Mass of left axilla    -  Primary    Relevant Orders    US Axillary Left    MA Screen Bilateral w/Grant    US Breast Left Limited 1-3 Quadrants           See workup    Subjective   Joanne is a 34 year old, presenting for the following health issues:  Mass (Lump in left arm pit notice 2 months ago and it is getting bigger)        12/5/2024    10:43 AM   Additional Questions   Roomed by Felicitas CUETO CMA   Accompanied by self     Mass    History of Present Illness       Reason for visit:  I have a lump in my armpit. It started out fairly small and it is getting bigger  Symptom onset:  More than a month  Symptoms include:  The lump does not cause me pain, its not annoying or anything, i dont even notice it. It just keep getting bigger  Symptom intensity:  Moderate  Symptom progression:  Worsening  Had these symptoms before:  No  What makes it worse:  No, it is not cause me pain  What makes it better:  It is not causing me pain, just getting bigger   She is taking medications regularly.         Objective    /83 (BP Location: Right arm, Patient Position: Chair, Cuff Size: Adult Large)   Pulse 96   Temp 98.6  F (37  C) (Temporal)   Resp 18   Ht 1.56 m (5' 1.42\")   Wt 91.6 kg (202 lb)   LMP 11/27/2024 (Approximate)   SpO2 97%   BMI 37.65 kg/m    Body mass index is 37.65 kg/m .  Physical Exam   Gen NAD  Left axilla [id number] firm mass noted             Signed Electronically by: COLBY YATES DO    "

## 2024-12-13 ENCOUNTER — ANCILLARY PROCEDURE (OUTPATIENT)
Dept: ULTRASOUND IMAGING | Facility: CLINIC | Age: 34
End: 2024-12-13
Attending: FAMILY MEDICINE
Payer: COMMERCIAL

## 2024-12-13 ENCOUNTER — ANCILLARY PROCEDURE (OUTPATIENT)
Dept: MAMMOGRAPHY | Facility: CLINIC | Age: 34
End: 2024-12-13
Attending: FAMILY MEDICINE
Payer: COMMERCIAL

## 2024-12-13 DIAGNOSIS — R22.32 MASS OF LEFT AXILLA: ICD-10-CM

## 2024-12-13 PROCEDURE — 76642 ULTRASOUND BREAST LIMITED: CPT | Mod: 50 | Performed by: STUDENT IN AN ORGANIZED HEALTH CARE EDUCATION/TRAINING PROGRAM

## 2024-12-13 PROCEDURE — 77066 DX MAMMO INCL CAD BI: CPT | Performed by: STUDENT IN AN ORGANIZED HEALTH CARE EDUCATION/TRAINING PROGRAM

## 2024-12-13 PROCEDURE — G0279 TOMOSYNTHESIS, MAMMO: HCPCS | Performed by: STUDENT IN AN ORGANIZED HEALTH CARE EDUCATION/TRAINING PROGRAM

## 2024-12-30 ENCOUNTER — OFFICE VISIT (OUTPATIENT)
Dept: PEDIATRICS | Facility: CLINIC | Age: 34
End: 2024-12-30
Payer: COMMERCIAL

## 2024-12-30 ENCOUNTER — TELEPHONE (OUTPATIENT)
Dept: FAMILY MEDICINE | Facility: CLINIC | Age: 34
End: 2024-12-30
Payer: COMMERCIAL

## 2024-12-30 ENCOUNTER — ANCILLARY PROCEDURE (OUTPATIENT)
Dept: GENERAL RADIOLOGY | Facility: CLINIC | Age: 34
End: 2024-12-30
Attending: EMERGENCY MEDICINE
Payer: COMMERCIAL

## 2024-12-30 ENCOUNTER — NURSE TRIAGE (OUTPATIENT)
Dept: FAMILY MEDICINE | Facility: CLINIC | Age: 34
End: 2024-12-30
Payer: COMMERCIAL

## 2024-12-30 VITALS
OXYGEN SATURATION: 97 % | SYSTOLIC BLOOD PRESSURE: 115 MMHG | HEART RATE: 76 BPM | TEMPERATURE: 98.2 F | DIASTOLIC BLOOD PRESSURE: 78 MMHG

## 2024-12-30 DIAGNOSIS — M70.61 GREATER TROCHANTERIC BURSITIS OF RIGHT HIP: Primary | ICD-10-CM

## 2024-12-30 DIAGNOSIS — M25.551 HIP PAIN, RIGHT: ICD-10-CM

## 2024-12-30 PROCEDURE — 73502 X-RAY EXAM HIP UNI 2-3 VIEWS: CPT | Mod: RT | Performed by: STUDENT IN AN ORGANIZED HEALTH CARE EDUCATION/TRAINING PROGRAM

## 2024-12-30 PROCEDURE — 99214 OFFICE O/P EST MOD 30 MIN: CPT | Performed by: EMERGENCY MEDICINE

## 2024-12-30 RX ORDER — PREDNISONE 20 MG/1
TABLET ORAL
Qty: 15 TABLET | Refills: 0 | Status: SHIPPED | OUTPATIENT
Start: 2024-12-30

## 2024-12-30 RX ORDER — LUMATEPERONE 42 MG/1
CAPSULE ORAL
COMMUNITY
Start: 2024-11-20

## 2024-12-30 NOTE — TELEPHONE ENCOUNTER
Nurse Triage SBAR    Is this a 2nd Level Triage? NO    Situation: Last 1 week Patient complaining of right hip pain that radiates into right buttock.     Background: Patient states that she was told she has a cyst on Right ovary about 1 year ago.   Patient states that ovary cyst pain presents as hip pain before her menstrual cycle.     Assessment:   -Right hip pain for 1 week. Worsening throughout the week.  -Patient states hip pain radiates into right buttock.   -Patients pain is 7-8/10 when walking/stairs and pain is 3-4/10 when sitting.  -Patient is unable to get comfortable or have relief from tylenol, heat pack, or bath.   -No recent injury or fall. No sore or redness of the right hip. No fever, numbness or tingling.   -Ovarian cyst for the last 1 year that sometimes presents as hip pain before menstrual cycle. This pain is similar but radiates into buttock which she normally never has.      Protocol Recommended Disposition:   See in Office Within 3 Days    Recommendation: Go to ADS will cancel appointment with same day provider for 12/31 since ADS will see patient.        Does the patient meet one of the following criteria for ADS visit consideration? 16+ years old, with an [facility] PCP     Jo Ann Schmitt RN  [facility]          Reason for Disposition   MODERATE pain (e.g., interferes with normal activities, limping) and present > 3 days    Additional Information   Negative: Looks like a broken bone or dislocated joint (e.g., crooked or deformed)   Negative: Sounds like a life-threatening emergency to the triager   Negative: Followed a hip injury   Negative: Leg pain is main symptom   Negative: Back pain radiating (shooting) into hip   Negative: SEVERE pain (e.g., excruciating, unable to do any normal activities) and fever   Negative: Can't stand (bear weight) or walk   Negative: Fever and red area (or area very tender to touch)   Negative: Patient sounds very sick or weak to the  Ventricular Rate : 72  Atrial Rate : 72  P-R Interval : 160  QRS Duration : 98  Q-T Interval : 402  QTC Calculation(Bezet) : 440  P Axis : 51  R Axis : 23  T Axis : 15  Diagnosis : Normal sinus rhythm  Normal ECG  When compared with ECG of 16-MAR-2012 21:11,  No significant change was found  Confirmed by MD WU. (CCE), GERHARD ([id number]) on 7/16/2019 5:31:42 PM   "triager   Negative: Painful rash with multiple small blisters grouped together (i.e., dermatomal distribution or 'band' or 'stripe')   Negative: Looks like a boil, infected sore, deep ulcer, or other infected rash (spreading redness, pus)   Negative: Localized rash is very painful (no fever)   Negative: Numbness in a leg or foot (i.e., loss of sensation)   Negative: Patient wants to be seen   Negative: SEVERE pain (e.g., excruciating, unable to do any normal activities)   Negative: Red area or streak > 2 inches (or 5 cm)    Answer Assessment - Initial Assessment Questions  1. LOCATION and RADIATION: \"Where is the pain located?\"       Right hip and right buttock.   2. QUALITY: \"What does the pain feel like?\"  (e.g., sharp, dull, aching, burning)      Sharp aching pain.   3. SEVERITY: \"How bad is the pain?\" \"What does it keep you from doing?\"   (Scale 1-10; or mild, moderate, severe)      3-4/10 sitting and 7-8/10 with walking and stairs.   4. ONSET: \"When did the pain start?\" \"Does it come and go, or is it there all the time?\"      12/23. Constantly there but worse with movements.   5. WORK OR EXERCISE: \"Has there been any recent work or exercise that involved this part of the body?\"       Nothing  6. CAUSE: \"What do you think is causing the hip pain?\"       Patient is unsure- does have a cyst on right ovary about 1 year now.   7. AGGRAVATING FACTORS: \"What makes the hip pain worse?\" (e.g., walking, climbing stairs, running)      Walking and climbing stairs.   8. OTHER SYMPTOMS: \"Do you have any other symptoms?\" (e.g., back pain, pain shooting down leg,  fever, rash)      No numbness or weakness in right leg.    Protocols used: Hip Pain-A-OH    "

## 2024-12-30 NOTE — PATIENT INSTRUCTIONS
Fill your prescription and take as directed    You may use ice or heat to the hip for comfort    Avoid trauma or any exercise that would aggravate the bursa further.    A referral to the orthopedic clinic was placed in the computer system.  They should call you in the next 48 hours to help you set up an appointment to be seen by their sports medicine department.  If they do not call you please call them at [phone]

## 2024-12-30 NOTE — TELEPHONE ENCOUNTER
Patient Quality Outreach    Patient is due for the following:   Cervical Cancer Screening - PAP Needed    Action(s) Taken:   Schedule a office visit for PAP    Type of outreach:    Sent [employer] message.    Questions for provider review:    None           Joanne Solares MA

## 2024-12-30 NOTE — PROGRESS NOTES
Acute and Diagnostic Services Clinic Visit    Nursing triage note    Subjective   Joanne is a 34 year old, presenting for the following health issues:  Musculoskeletal Problem (Right hip pain)    Musculoskeletal problem/pain  Onset/Duration: 12/24  Description:       Location: right hip       Joint swelling: no        Redness: no        Pain: moderate       Warmth: no   Progression of symptoms worse  Accompanying signs and symptoms:       Fevers: no        Numbness/tingling/weakness: YES- radiates to knees but no N/T/W  History        Trauma to the area: no         Previous history of Gout: no         Alcohol usage: no         Diuretic use: no         Recent illness: no         Previous similar problem: YES        Previous evaluation: no   Aggravating factors include: standing, walking, climbing stairs, and overuse  Therapies tried and outcome: rest/inactivity, heat, ice, and acetaminophen  Have you had any surgeries on your arteries of veins: No          Objective    LMP 11/27/2024 (Approximate)   There is no height or weight on file to calculate BMI.          [facility] PROVIDER NOTE  [facility]      History     Chief Complaint   Patient presents with    Musculoskeletal Problem     Right hip pain     HPI  Joanne Escalante is a 34 year old female who presents to the [facility] for evaluation of some right hip pain that she has been having for a couple weeks.  Patient describes her pain as being over the lateral aspect of the right hip.  Apparently in the past she has had some right hip pain that may have been related to a ovarian cyst however the patient denies any abdominal pain at this time.  The patient denies any trauma denies any fevers and denies possibility of pregnancy.    I have reviewed the Medications, Allergies, Past Medical and Surgical History, and Social History in the [facility] system.    Past Medical History:   Diagnosis Date    Abdominal wall cellulitis     Anxiety     Depressive disorder      Endometriosis     ESBL (extended spectrum beta-lactamase) producing bacteria infection     Irregular bowel habits 2022    Periumbilical hernia     RUQ abdominal pain     Suicidal ideation        Past Surgical History:   Procedure Laterality Date     SECTION  2018, 2020    x 2    HERNIORRHAPHY UMBILICAL N/A 2021    Procedure: OPEN UMBILICAL HERNIA REPAIR WITH MESH;  Surgeon: Ankit Nunez MD;  Location: [facility] OR    LAPAROSCOPY DIAGNOSTIC (GYN)  2015    Endometriosis    OTHER SURGICAL HISTORY      ablation    WOUND DEBRIDEMENT N/A 2020    Abdominal     ZZC ORAL SURGERY PROCEDURE      [place] teeth         Dose / Directions   * acetaminophen 500 MG tablet  Commonly known as: TYLENOL      Dose: 1,000 mg  Take 1,000 mg by mouth  Refills: 0     * acetaminophen 650 MG CR tablet  Commonly known as: TYLENOL  Used for: Ankle injury, left, initial encounter      Dose: 650 mg  Take 1 tablet (650 mg) by mouth every 8 hours as needed for pain.  Quantity: 30 tablet  Refills: 0     Caplyta 42 MG capsule  Generic drug: lumateperone      Refills: 0     clonazePAM 0.5 MG tablet  Commonly known as: klonoPIN      Dose: 0.5 mg  Take 0.5 mg by mouth 2 times daily as needed for anxiety  Refills: 0     gabapentin 300 MG capsule  Commonly known as: NEURONTIN      TAKE 1 CAPSULE BY MOUTH EVERY MORNING AND 2 AT BEDTIME  Refills: 0     lamoTRIgine 200 MG tablet  Commonly known as: LaMICtal      Dose: 200 mg  Take 1 tablet (200 mg) by mouth daily  Quantity: 30 tablet  Refills: 0     lidocaine 5 % external ointment  Commonly known as: XYLOCAINE  Used for: Ankle injury, left, initial encounter      Apply topically 3 times daily as needed for moderate pain.  Quantity: 240 g  Refills: 0     lithium 300 MG capsule      Dose: 1,650 mg  Take 1,650 mg by mouth every evening.  Refills: 0     omeprazole 20 MG DR capsule  Commonly known as: PriLOSEC  Used for: Nausea and vomiting, unspecified vomiting type      Dose: 20  mg  TAKE 1 CAPSULE(20 MG) BY MOUTH DAILY  Quantity: 90 capsule  Refills: 1     ondansetron 4 MG ODT tab  Commonly known as: ZOFRAN ODT  Used for: Nausea and vomiting, unspecified vomiting type      Dose: 4 mg  Take 1 tablet (4 mg) by mouth every 8 hours as needed for nausea.  Quantity: 30 tablet  Refills: 0     propranolol 10 MG tablet  Commonly known as: INDERAL      Dose: 1 tablet  Take 1 tablet by mouth 3 times daily  Refills: 0     triamcinolone 0.025 % external ointment  Commonly known as: KENALOG  Used for: Dermatitis      Apply topically 2 times daily.  Quantity: 80 g  Refills: 1       Past medical history, past surgical history, medications, and allergies were reviewed with the patient. Additional pertinent items: None    Family History   Problem Relation Age of Onset    Depression Mother     Alcoholism Mother     Other - See Comments Mother         fibroids, cervical abnormalty    Depression Father          at 51 of heart attack    Alcoholism Father     Heart Disease Father         mi    Breast Cancer Maternal Grandmother     Diabetes Maternal Grandmother     Ovarian Cancer No family hx of     Colon Cancer No family hx of     Colon Polyps No family hx of        Social History     Tobacco Use    Smoking status: Every Day     Current packs/day: 0.50     Average packs/day: 0.5 packs/day for 10.0 years (5.0 ttl pk-yrs)     Types: Cigarettes     Passive exposure: Current    Smokeless tobacco: Never   Substance Use Topics    Alcohol use: Not Currently     Social history was reviewed with the patient. Additional pertinent items: None    Allergies   Allergen Reactions    Sulfa Antibiotics        Review of Systems  A medically appropriate review of systems was performed with pertinent positives and negatives noted in the HPI, and all other systems negative.    Physical Exam   BP: 115/78  Pulse: 76  Temp: 98.2  F (36.8  C)  SpO2: 97 %      Physical Exam  Vitals and nursing note reviewed.   Constitutional:        Comments: Ambulatory without difficulty   HENT:      Head: Atraumatic.   Eyes:      Extraocular Movements: Extraocular movements intact.      Pupils: Pupils are equal, round, and reactive to light.   Pulmonary:      Effort: No respiratory distress.   Abdominal:      Palpations: Abdomen is soft.      Tenderness: There is no abdominal tenderness. There is no right CVA tenderness, left CVA tenderness, guarding or rebound.   Musculoskeletal:      Cervical back: Neck supple.      Comments: Musculoskeletal exam reveals tenderness over the right greater trochanter bursa area maximally with tenderness mildly around that more posterior than anterior.   Neurological:      General: No focal deficit present.      Mental Status: She is alert and oriented to person, place, and time.   Psychiatric:         Mood and Affect: Mood normal.         ED Course     Orders Placed This Encounter   Procedures    XR Hip Right 2-3 Views    Orthopedic  Referral       Procedures         EXAM: XR HIP RIGHT 2-3 VIEWS  LOCATION: [facility]  DATE: 12/30/2024     INDICATION: hip pain  COMPARISON: 02/02/2024                                                                      IMPRESSION: Normal joint spaces and alignment. No fracture.             Assessments & Plan (with Medical Decision Making)     I have reviewed the nursing notes.    At this time clinically I believe the patient has a greater trochanter bursitis and will be treated with the medication below.  Referral will be made to the orthopedic clinic for potential steroid injection.    I have reviewed the findings, diagnosis, plan and need for follow up with the patient.    TOTAL PATIENT CARE TIME INCLUDING DOCUMENTATION, FAMILY COMMUNICATION AND CARE COORDINATION WAS 20 MINUTES.     New Prescriptions    PREDNISONE (DELTASONE) 20 MG TABLET    Take 2 tablets daily for 5 days then take 1 tablet daily for 5 days       Final diagnoses:   Greater trochanteric  bursitis of right hip     Fill your prescription and take as directed    You may use ice or heat to the hip for comfort    Avoid trauma or any exercise that would aggravate the bursa further.    A referral to the orthopedic clinic was placed in the computer system.  They should call you in the next 48 hours to help you set up an appointment to be seen by their sports medicine department.  If they do not call you please call them at [phone]    Routine discharge instructions were given for this diagnosis        LUDWIN GOMEZ MD    12/30/2024   [facility]

## 2025-01-02 ENCOUNTER — NURSE TRIAGE (OUTPATIENT)
Dept: FAMILY MEDICINE | Facility: CLINIC | Age: 35
End: 2025-01-02
Payer: COMMERCIAL

## 2025-01-02 NOTE — TELEPHONE ENCOUNTER
No appointments available in the clinic today. If patient in severe pain and looking for pain management may need to be seen in the ER. UC has Toradol injection which pt may not be able to get due to below limitations with pt being on lithium (per below note from pt).     Hilda Bravo has opening's tomorrow we could schedule patient in.       RN called and spoke with patient. Reviewed the options with patient. Pt states she does not think she can wait until tomorrow to be seen. RN did offer to get her connected with central scheduling to see if anyone in [facility] system has openings today, but pt declined. Pt states she will be going in to UC or ER today.       No further questions/concerns.         Emily Cardona RN    [facility]

## 2025-01-02 NOTE — TELEPHONE ENCOUNTER
Beata Ballard,    Nurse Triage SBAR    Is this a 2nd Level Triage? YES, LICENSED PRACTITIONER REVIEW IS REQUIRED    Situation:   Patient transferred via [facility] for hip pain     Background:   [facility] 12/30/24 diagnosed with R hip bursitis     Assessment:   Patient reports current pain level 7/10  Pain decreasing mobility, but still able to walk to bathroom  Denies fever, redness    Scheduled with ortho sports medicine 1/17/25  Has been taking prednisone in the mean time  Reports currently taking Lithium and unable to take ibuprofen   Patient currently using heat, ice, not laying on it, hot water bath, tylenol q6hrs, lidocaine cream   These decrease pain slightly but patient requesting to review if any other options available    Protocol Recommended Disposition:   Go To Office Now    Recommendation:   Advised to be seen now  Please call patient back with best next steps    Patient verbalized understanding, agreed with plan of care, and will call back if new or worsening symptoms in the mean time    Routed to team    Does the patient meet one of the following criteria for ADS visit consideration? 16+ years old, with an [facility] PCP     TIP  Providers, please consider if this condition is appropriate for management at one of our Acute and Diagnostic Services sites.     If patient is a good candidate, please use dotphrase <dot>triageresponse and select Refer to ADS to document.    Thanks,  Terri FAUSTIN RN    Reason for Disposition   SEVERE pain (e.g., excruciating, unable to do any normal activities)    Additional Information   Negative: Looks like a broken bone or dislocated joint (e.g., crooked or deformed)   Negative: Sounds like a life-threatening emergency to the triager   Negative: Followed a hip injury   Negative: Leg pain is main symptom   Negative: Back pain radiating (shooting) into hip   Negative: SEVERE pain (e.g., excruciating, unable to do any normal activities) and fever   Negative: Can't stand (bear weight) or  walk   Negative: Fever and red area (or area very tender to touch)   Negative: Patient sounds very sick or weak to the triager    Protocols used: Hip Pain-A-[place]

## 2025-01-06 ENCOUNTER — OFFICE VISIT (OUTPATIENT)
Dept: ORTHOPEDICS | Facility: CLINIC | Age: 35
End: 2025-01-06
Payer: COMMERCIAL

## 2025-01-06 DIAGNOSIS — M25.551 GREATER TROCHANTERIC PAIN SYNDROME OF RIGHT LOWER EXTREMITY: Primary | ICD-10-CM

## 2025-01-06 DIAGNOSIS — M70.61 GREATER TROCHANTERIC BURSITIS OF RIGHT HIP: ICD-10-CM

## 2025-01-06 PROCEDURE — 20611 DRAIN/INJ JOINT/BURSA W/US: CPT | Mod: RT | Performed by: FAMILY MEDICINE

## 2025-01-06 PROCEDURE — 99203 OFFICE O/P NEW LOW 30 MIN: CPT | Mod: 25 | Performed by: FAMILY MEDICINE

## 2025-01-06 RX ORDER — TRIAMCINOLONE ACETONIDE 40 MG/ML
40 INJECTION, SUSPENSION INTRA-ARTICULAR; INTRAMUSCULAR
Status: COMPLETED | OUTPATIENT
Start: 2025-01-06 | End: 2025-01-06

## 2025-01-06 RX ADMIN — TRIAMCINOLONE ACETONIDE 40 MG: 40 INJECTION, SUSPENSION INTRA-ARTICULAR; INTRAMUSCULAR at 11:47

## 2025-01-06 ASSESSMENT — PAIN SCALES - GENERAL: PAINLEVEL_OUTOF10: MODERATE PAIN (5)

## 2025-01-06 NOTE — NURSING NOTE
[facility]   ORTHOPEDICS & SPORTS MEDICINE  [address] [place] [address]  Dept: [phone]  ______________________________________________________________________________    Patient: Joanne Escalante   : 1990   MRN: [id number]   2025    INVASIVE PROCEDURE SAFETY CHECKLIST    Date: 2025   Procedure: right hip injection  Patient Name: Joanne Escalante  MRN: [id number]  YOB: 1990    Action: Complete sections as appropriate. Any discrepancy results in a HARD COPY until resolved.     PRE PROCEDURE:  Patient ID verified with 2 identifiers (name and  or MRN): Yes  Procedure and site verified with patient/designee (when able): Yes  Accurate consent documentation in medical record: Yes  H&P (or appropriate assessment) documented in medical record: Yes  H&P must be up to 20 days prior to procedure and updates within 24 hours of procedure as applicable: NA  Relevant diagnostic and radiology test results appropriately labeled and displayed as applicable: NA  Procedure site(s) marked with provider initials: NA    TIMEOUT:  Time-Out performed immediately prior to starting procedure, including verbal and active participation of all team members addressing the following:Yes  * Correct patient identify  * Confirmed that the correct side and site are marked  * An accurate procedure consent form  * Agreement on the procedure to be done  * Correct patient position  * Relevant images and results are properly labeled and appropriately displayed  * The need to administer antibiotics or fluids for irrigation purposes during the procedure as applicable   * Safety precautions based on patient history or medication use    DURING PROCEDURE: Verification of correct person, site, and procedures any time the responsibility for care of the patient is transferred to another member of the care team.       Prior to injection, verified patient identity using patient's name and date of  birth.  Due to injection administration, patient instructed to remain in clinic for 15 minutes  afterwards, and to report any adverse reaction to me immediately.    Bursa injection was performed.      Drug Amount Wasted:  None.  Vial/Syringe: Single dose vial  Expiration Date:  7/31/2026      Sebas Conroy, EMT  January 6, 2025

## 2025-01-06 NOTE — LETTER
1/6/2025      Joanne Escalante  [address]  [place] [address]      Dear Colleague,    Thank you for referring your patient, Joanne Escalante, to the [facility] SPORTS MEDICINE [facility] [place]. Please see a copy of my visit note below.    CHIEF COMPLAINT:  Pain, New Patient, and Numbness of the Right Hip (Pain and tingling since around rio 2024)       HISTORY OF PRESENT ILLNESS  Ms. Escalante is a 34 year old female who presents to clinic today with right leg pain.  Joanne develops a acute pain in her right lateral hip a few weeks ago with no single inciting event.  She points to the lateral aspect of her hip.  Pain radiates down to the middle of her thigh.  At times the pain radiates down past her knee into her foot, this is numb and tingly as well.    Additional history: as documented    MEDICAL HISTORY  Patient Active Problem List   Diagnosis     Umbilical hernia without obstruction and without gangrene     Anxiety disorder     Cigarette smoker     Endometriosis determined by laparoscopy     Menorrhagia with irregular cycle     Fatty liver     Horseshoe kidney     Bipolar 2 disorder, major depressive episode (H)       Current Outpatient Medications   Medication Sig Dispense Refill     acetaminophen (TYLENOL) 500 MG tablet Take 1,000 mg by mouth       acetaminophen (TYLENOL) 650 MG CR tablet Take 1 tablet (650 mg) by mouth every 8 hours as needed for pain. 30 tablet 0     CAPLYTA 42 MG capsule        clonazePAM (KLONOPIN) 0.5 MG tablet Take 0.5 mg by mouth 2 times daily as needed for anxiety       gabapentin (NEURONTIN) 300 MG capsule TAKE 1 CAPSULE BY MOUTH EVERY MORNING AND 2 AT BEDTIME       lamoTRIgine (LAMICTAL) 200 MG tablet Take 1 tablet (200 mg) by mouth daily 30 tablet 0     lidocaine (XYLOCAINE) 5 % external ointment Apply topically 3 times daily as needed for moderate pain. 240 g 0     lithium 300 MG capsule Take 1,650 mg by mouth every evening.       omeprazole (PRILOSEC) 20 MG   capsule TAKE 1 CAPSULE(20 MG) BY MOUTH DAILY 90 capsule 1     ondansetron (ZOFRAN ODT) 4 MG ODT tab Take 1 tablet (4 mg) by mouth every 8 hours as needed for nausea. 30 tablet 0     predniSONE (DELTASONE) 20 MG tablet Take 2 tablets daily for 5 days then take 1 tablet daily for 5 days 15 tablet 0     propranolol (INDERAL) 10 MG tablet Take 1 tablet by mouth 3 times daily       triamcinolone (KENALOG) 0.025 % external ointment Apply topically 2 times daily. 80 g 1       Allergies   Allergen Reactions     Sulfa Antibiotics        Family History   Problem Relation Age of Onset     Depression Mother      Alcoholism Mother      Other - See Comments Mother         fibroids, cervical abnormalty     Depression Father          at 51 of heart attack     Alcoholism Father      Heart Disease Father         mi     Breast Cancer Maternal Grandmother      Diabetes Maternal Grandmother      Ovarian Cancer No family hx of      Colon Cancer No family hx of      Colon Polyps No family hx of        Additional medical/Social/Surgical histories reviewed in [facility] and updated as appropriate.        PHYSICAL EXAM  General  - normal appearance, in no obvious distress  Musculoskeletal - right hip  - stance: normal gait without limp  - inspection: no swelling or effusion  - palpation: tender over the greater trochanter  - ROM: pain with active abduction, normal and painless flexion, extension, IR, ER  - strength: 5/5 in all planes  - special tests:  (-) LO  (-) FADIR  Neuro  - no sensory or motor deficit, grossly normal coordination, normal muscle tone               ASSESSMENT & PLAN  Ms. Escalante is a 34 year old female who presents to clinic today with right hip pain.    I reviewed her xray in the room with her, this reveals no obvious acute or chronic issues.    Her hip pain does seem to be consistent with trochanteric enthesopathy, we discussed the utility of a corticosteroid injection, which we did perform today through shared decision  making.    We also discussed that her lumbar radiculopathy very well may be bothering her to a great degree as well.    She is going to let me know how she is doing in the coming week, especially if her injection is not helping her.    It was a pleasure seeing Joanne today.    Greater than 20 minutes were spent on the day of visit reviewing records, in direct face-to-face consultation and exam, and documentation independent of the procedure.       Jeremiah Allred DO, [facility]  Primary Care Sports Medicine      This note was constructed using Dragon dictation software, please excuse any minor errors in spelling, grammar, or syntax.    Large Joint Injection/Arthocentesis: R greater trochanteric bursa    Date/Time: 1/6/2025 11:47 AM    Performed by: Jeremiah Allred DO  Authorized by: Jeremiah Allred DO    Indications:  Pain  Needle Size:  22 G  Guidance: ultrasound    Location:  Hip      Site:  R greater trochanteric bursa  Medications:  40 mg triamcinolone 40 MG/ML  Outcome:  Tolerated well, no immediate complications  Procedure discussed: discussed risks, benefits, and alternatives    Consent Given by:  Patient  Timeout: timeout called immediately prior to procedure    Prep: patient was prepped and draped in usual sterile fashion       Trochanteric Hip Injection - Ultrasound Guided  The patient was informed of the risks and the benefits of the procedure and a written consent was signed.  The patient's right lateral hip was prepped with chlorhexidine in sterile fashion.   40 mg of triamcinolone suspension was drawn up into a 5 mL syringe with 4 mL of 1% lidocaine.  Injection was performed using sterile technique.  Under ultrasound guidance a 3.5-inch 22-gauge needle was used to enter the robles-trochanteric tissue.  Needle placement was visualized and documented with ultrasound which was deemed necessary to ensure medication did not enter the tendon itself, which could potentially cause tendon damage.   Injection performed  long axis to the probe with probe in short axis to the greater trochanter.  Expansion of the robles-trochanteric tissue was visualized under ultrasound upon injection.  Images were permanently stored for the patient's record.  There were no complications. The patient tolerated the procedure well. There was negligible bleeding.           Again, thank you for allowing me to participate in the care of your patient.        Sincerely,    Jeremiah Allred, DO    Electronically signed

## 2025-01-06 NOTE — NURSING NOTE
Chief Complaint   Patient presents with    Right Hip - Pain, New Patient, Numbness     Pain and tingling since around christmas 2024       There were no vitals filed for this visit.    There is no height or weight on file to calculate BMI.      JARAD Mullen NREMT

## 2025-01-06 NOTE — PROGRESS NOTES
CHIEF COMPLAINT:  Pain, New Patient, and Numbness of the Right Hip (Pain and tingling since around christmas 2024)       HISTORY OF PRESENT ILLNESS  Ms. Escalante is a 34 year old female who presents to clinic today with right leg pain.  Joanne develops a acute pain in her right lateral hip a few weeks ago with no single inciting event.  She points to the lateral aspect of her hip.  Pain radiates down to the middle of her thigh.  At times the pain radiates down past her knee into her foot, this is numb and tingly as well.    Additional history: as documented    MEDICAL HISTORY  Patient Active Problem List   Diagnosis    Umbilical hernia without obstruction and without gangrene    Anxiety disorder    Cigarette smoker    Endometriosis determined by laparoscopy    Menorrhagia with irregular cycle    Fatty liver    Horseshoe kidney    Bipolar 2 disorder, major depressive episode (H)       Current Outpatient Medications   Medication Sig Dispense Refill    acetaminophen (TYLENOL) 500 MG tablet Take 1,000 mg by mouth      acetaminophen (TYLENOL) 650 MG CR tablet Take 1 tablet (650 mg) by mouth every 8 hours as needed for pain. 30 tablet 0    CAPLYTA 42 MG capsule       clonazePAM (KLONOPIN) 0.5 MG tablet Take 0.5 mg by mouth 2 times daily as needed for anxiety      gabapentin (NEURONTIN) 300 MG capsule TAKE 1 CAPSULE BY MOUTH EVERY MORNING AND 2 AT BEDTIME      lamoTRIgine (LAMICTAL) 200 MG tablet Take 1 tablet (200 mg) by mouth daily 30 tablet 0    lidocaine (XYLOCAINE) 5 % external ointment Apply topically 3 times daily as needed for moderate pain. 240 g 0    lithium 300 MG capsule Take 1,650 mg by mouth every evening.      omeprazole (PRILOSEC) 20 MG DR capsule TAKE 1 CAPSULE(20 MG) BY MOUTH DAILY 90 capsule 1    ondansetron (ZOFRAN ODT) 4 MG ODT tab Take 1 tablet (4 mg) by mouth every 8 hours as needed for nausea. 30 tablet 0    predniSONE (DELTASONE) 20 MG tablet Take 2 tablets daily for 5 days then take 1 tablet daily for  5 days 15 tablet 0    propranolol (INDERAL) 10 MG tablet Take 1 tablet by mouth 3 times daily      triamcinolone (KENALOG) 0.025 % external ointment Apply topically 2 times daily. 80 g 1       Allergies   Allergen Reactions    Sulfa Antibiotics        Family History   Problem Relation Age of Onset    Depression Mother     Alcoholism Mother     Other - See Comments Mother         fibroids, cervical abnormalty    Depression Father          at 51 of heart attack    Alcoholism Father     Heart Disease Father         mi    Breast Cancer Maternal Grandmother     Diabetes Maternal Grandmother     Ovarian Cancer No family hx of     Colon Cancer No family hx of     Colon Polyps No family hx of        Additional medical/Social/Surgical histories reviewed in [facility] and updated as appropriate.        PHYSICAL EXAM  General  - normal appearance, in no obvious distress  Musculoskeletal - right hip  - stance: normal gait without limp  - inspection: no swelling or effusion  - palpation: tender over the greater trochanter  - ROM: pain with active abduction, normal and painless flexion, extension, IR, ER  - strength: 5/5 in all planes  - special tests:  (-) LO  (-) FADIR  Neuro  - no sensory or motor deficit, grossly normal coordination, normal muscle tone               ASSESSMENT & PLAN  Ms. Escalante is a 34 year old female who presents to clinic today with right hip pain.    I reviewed her xray in the room with her, this reveals no obvious acute or chronic issues.    Her hip pain does seem to be consistent with trochanteric enthesopathy, we discussed the utility of a corticosteroid injection, which we did perform today through shared decision making.    We also discussed that her lumbar radiculopathy very well may be bothering her to a great degree as well.    She is going to let me know how she is doing in the coming week, especially if her injection is not helping her.    It was a pleasure seeing Joanne today.    Greater than 20  minutes were spent on the day of visit reviewing records, in direct face-to-face consultation and exam, and documentation independent of the procedure.       Jeremiah Allred DO, [facility]  Primary Care Sports Medicine      This note was constructed using Dragon dictation software, please excuse any minor errors in spelling, grammar, or syntax.    Large Joint Injection/Arthocentesis: R greater trochanteric bursa    Date/Time: 1/6/2025 11:47 AM    Performed by: Jeremiah Allred DO  Authorized by: Jeremiah Allred DO    Indications:  Pain  Needle Size:  22 G  Guidance: ultrasound    Location:  Hip      Site:  R greater trochanteric bursa  Medications:  40 mg triamcinolone 40 MG/ML  Outcome:  Tolerated well, no immediate complications  Procedure discussed: discussed risks, benefits, and alternatives    Consent Given by:  Patient  Timeout: timeout called immediately prior to procedure    Prep: patient was prepped and draped in usual sterile fashion       Trochanteric Hip Injection - Ultrasound Guided  The patient was informed of the risks and the benefits of the procedure and a written consent was signed.  The patient's right lateral hip was prepped with chlorhexidine in sterile fashion.   40 mg of triamcinolone suspension was drawn up into a 5 mL syringe with 4 mL of 1% lidocaine.  Injection was performed using sterile technique.  Under ultrasound guidance a 3.5-inch 22-gauge needle was used to enter the robles-trochanteric tissue.  Needle placement was visualized and documented with ultrasound which was deemed necessary to ensure medication did not enter the tendon itself, which could potentially cause tendon damage.   Injection performed long axis to the probe with probe in short axis to the greater trochanter.  Expansion of the robles-trochanteric tissue was visualized under ultrasound upon injection.  Images were permanently stored for the patient's record.  There were no complications. The patient tolerated the procedure  well. There was negligible bleeding.

## 2025-01-13 ENCOUNTER — E-VISIT (OUTPATIENT)
Dept: FAMILY MEDICINE | Facility: CLINIC | Age: 35
End: 2025-01-13
Payer: COMMERCIAL

## 2025-01-13 DIAGNOSIS — G89.29 CHRONIC BILATERAL LOW BACK PAIN WITHOUT SCIATICA: Primary | ICD-10-CM

## 2025-01-13 DIAGNOSIS — M54.50 CHRONIC BILATERAL LOW BACK PAIN WITHOUT SCIATICA: Primary | ICD-10-CM

## 2025-01-13 RX ORDER — CYCLOBENZAPRINE HCL 10 MG
10 TABLET ORAL 3 TIMES DAILY PRN
Qty: 30 TABLET | Refills: 0 | Status: SHIPPED | OUTPATIENT
Start: 2025-01-13

## 2025-01-13 NOTE — PATIENT INSTRUCTIONS
Thank you for choosing us for your care. I have placed an order for a prescription so that you can start treatment. View your full visit summary for details by clicking on the link below. Your pharmacist will able to address any questions you may have about the medication.      If you're not feeling better within 2-3 weeks please schedule an appointment.  You can schedule an appointment right here in [facility], or call [phone]  If the visit is for the same symptoms as your eVisit, we'll refund the cost of your eVisit if seen within seven days.    Casper Rubio,    In addition to Flexeril (sent to pharmacy) and Tylenol, I recommend gentle stretching, massage, and heat to help reduce muscle tension in your back. I have attached some exercises that can help stretch and strengthen your back to help with back pain.     Please do not hesitate to reach out if you have questions or concerns!

## 2025-02-05 ENCOUNTER — LAB (OUTPATIENT)
Dept: LAB | Facility: CLINIC | Age: 35
End: 2025-02-05
Attending: NURSE PRACTITIONER
Payer: COMMERCIAL

## 2025-02-05 ENCOUNTER — E-VISIT (OUTPATIENT)
Dept: URGENT CARE | Facility: CLINIC | Age: 35
End: 2025-02-05
Payer: COMMERCIAL

## 2025-02-05 DIAGNOSIS — J06.9 VIRAL URI WITH COUGH: ICD-10-CM

## 2025-02-05 DIAGNOSIS — J06.9 VIRAL URI WITH COUGH: Primary | ICD-10-CM

## 2025-02-05 LAB
FLUAV AG SPEC QL IA: NEGATIVE
FLUBV AG SPEC QL IA: NEGATIVE

## 2025-02-05 PROCEDURE — 87635 SARS-COV-2 COVID-19 AMP PRB: CPT

## 2025-02-05 PROCEDURE — 87804 INFLUENZA ASSAY W/OPTIC: CPT

## 2025-02-05 RX ORDER — ALBUTEROL SULFATE 90 UG/1
2 INHALANT RESPIRATORY (INHALATION) EVERY 6 HOURS PRN
Qty: 18 G | Refills: 0 | Status: SHIPPED | OUTPATIENT
Start: 2025-02-05

## 2025-02-05 RX ORDER — BENZONATATE 100 MG/1
100 CAPSULE ORAL 3 TIMES DAILY PRN
Qty: 30 CAPSULE | Refills: 0 | Status: SHIPPED | OUTPATIENT
Start: 2025-02-05

## 2025-02-05 NOTE — PATIENT INSTRUCTIONS
Dear Joanne,    After reviewing your responses, I would like you to come in for a swab to make sure we treat you correctly. This swab is to evaluate you for possible COVID and Flu, and should be scheduled for today or tomorrow. Please use the Schedule Now button in [facility] to schedule your swab. Otherwise, click this link to schedule a lab only appointment.    Lab appointments are not available at most locations on the weekends. If no Lab Only appointment is available, you should be seen in any of our convenient Urgent Care Centers for an in person visit, which can be found on our website here.    You will receive instructions with your results in [facility] once they are available.     If your symptoms worsen, you develop difficulty breathing, difficulty with drinking enough to stay hydrated, or fevers for more than 5 days, please contact your primary care provider for an appointment or visit an Urgent Care Center to be seen.      Thanks again for choosing us as your health care partner.   ISH JESUS CNP

## 2025-02-06 LAB — SARS-COV-2 RNA RESP QL NAA+PROBE: NEGATIVE

## 2025-02-09 ENCOUNTER — OFFICE VISIT (OUTPATIENT)
Dept: URGENT CARE | Facility: URGENT CARE | Age: 35
End: 2025-02-09
Payer: COMMERCIAL

## 2025-02-09 VITALS
WEIGHT: 200 LBS | SYSTOLIC BLOOD PRESSURE: 136 MMHG | TEMPERATURE: 98.2 F | DIASTOLIC BLOOD PRESSURE: 103 MMHG | OXYGEN SATURATION: 97 % | BODY MASS INDEX: 37.27 KG/M2 | RESPIRATION RATE: 18 BRPM | HEART RATE: 87 BPM

## 2025-02-09 DIAGNOSIS — R50.9 FEVER, UNSPECIFIED FEVER CAUSE: ICD-10-CM

## 2025-02-09 DIAGNOSIS — J06.9 VIRAL URI: Primary | ICD-10-CM

## 2025-02-09 DIAGNOSIS — R06.2 WHEEZING: ICD-10-CM

## 2025-02-09 LAB
DEPRECATED S PYO AG THROAT QL EIA: NEGATIVE
FLUAV AG SPEC QL IA: NEGATIVE
FLUBV AG SPEC QL IA: NEGATIVE
S PYO DNA THROAT QL NAA+PROBE: NOT DETECTED

## 2025-02-09 PROCEDURE — 99214 OFFICE O/P EST MOD 30 MIN: CPT | Performed by: PHYSICIAN ASSISTANT

## 2025-02-09 PROCEDURE — 87651 STREP A DNA AMP PROBE: CPT | Performed by: PHYSICIAN ASSISTANT

## 2025-02-09 PROCEDURE — 87804 INFLUENZA ASSAY W/OPTIC: CPT | Performed by: PHYSICIAN ASSISTANT

## 2025-02-09 PROCEDURE — 87635 SARS-COV-2 COVID-19 AMP PRB: CPT | Performed by: PHYSICIAN ASSISTANT

## 2025-02-09 RX ORDER — PREDNISONE 20 MG/1
40 TABLET ORAL DAILY
Qty: 10 TABLET | Refills: 0 | Status: SHIPPED | OUTPATIENT
Start: 2025-02-09 | End: 2025-02-14

## 2025-02-09 ASSESSMENT — PAIN SCALES - GENERAL: PAINLEVEL_OUTOF10: NO PAIN (0)

## 2025-02-09 ASSESSMENT — ENCOUNTER SYMPTOMS
ALLERGIC/IMMUNOLOGIC NEGATIVE: 1
EYES NEGATIVE: 1
NAUSEA: 0
DIZZINESS: 0
SORE THROAT: 1
CARDIOVASCULAR NEGATIVE: 1
WEAKNESS: 0
SHORTNESS OF BREATH: 0
LIGHT-HEADEDNESS: 0
RHINORRHEA: 0
MYALGIAS: 1
ARTHRALGIAS: 0
ENDOCRINE NEGATIVE: 1
FEVER: 1
COUGH: 1
NECK STIFFNESS: 0
BACK PAIN: 0
JOINT SWELLING: 0
DIARRHEA: 0
VOMITING: 0
HEADACHES: 0
CHILLS: 0
NECK PAIN: 0
PALPITATIONS: 0
WOUND: 0

## 2025-02-09 NOTE — PROGRESS NOTES
Chief Complaint:     Chief Complaint   Patient presents with    Cough     Cough since Wednesday - was tested for covid and flu and was negative - was prescribed inhaler and tessalon but is still having trouble sleeping as she coughs so much when laying down    Fever     Fever from Wed-Fri     Chest Pain     Chest pain - is thinking from coughing - worried about pneumonia        Results for orders placed or performed in visit on 02/09/25   Streptococcus A Rapid Screen w/Reflex to PCR - Clinic Collect     Status: Normal    Specimen: Throat; Swab   Result Value Ref Range    Group A Strep antigen Negative Negative   Influenza A & B Antigen - Clinic Collect     Status: Normal    Specimen: Nose; Swab   Result Value Ref Range    Influenza A antigen Negative Negative    Influenza B antigen Negative Negative    Narrative    Test results must be correlated with clinical data. If necessary, results should be confirmed by a molecular assay or viral culture.       Medical Decision Making:    Vital signs reviewed by Ti Shelton PA-C  BP (!) 136/103 (BP Location: Left arm, Patient Position: Sitting, Cuff Size: Adult Regular)   Pulse 87   Temp 98.2  F (36.8  C) (Oral)   Resp 18   Wt 90.7 kg (200 lb)   LMP 02/09/2025 (Exact Date)   SpO2 97%   BMI 37.27 kg/m      Differential Diagnosis:  URI Adult/Peds:  Bronchitis-viral, Influenza, Pneumonia, Strep pharyngitis, Viral syndrome, and Viral upper respiratory illness        ASSESSMENT    1. Viral URI    2. Fever, unspecified fever cause    3. Wheezing        PLAN    Patient is in no acute distress.    Temp is 98.2 in clinic today, lung have wheezing, and O2 sats at 97% on RA.    Rx for Prednisone sent in.    RST was negative.  We will call with PCR results only if positive.  Influenza was negative.  COVID swab collected in clinic today.  Rest, Push fluids, vaporizer, elevation of head of bed.  Ibuprofen and or Tylenol for any fever or body aches.  Over the counter cough  suppressant- PRN- as discussed.   If symptoms worsen, recheck immediately otherwise follow up with your PCP in 1 week if symptoms are not improving.  Worrisome symptoms discussed with instructions to go to the ED.  Patient verbalized understanding and agreed with this plan.    Labs:    Results for orders placed or performed in visit on 02/09/25   Streptococcus A Rapid Screen w/Reflex to PCR - Clinic Collect     Status: Normal    Specimen: Throat; Swab   Result Value Ref Range    Group A Strep antigen Negative Negative   Influenza A & B Antigen - Clinic Collect     Status: Normal    Specimen: Nose; Swab   Result Value Ref Range    Influenza A antigen Negative Negative    Influenza B antigen Negative Negative    Narrative    Test results must be correlated with clinical data. If necessary, results should be confirmed by a molecular assay or viral culture.        Vital signs reviewed by Ti Shelton PA-C  BP (!) 136/103 (BP Location: Left arm, Patient Position: Sitting, Cuff Size: Adult Regular)   Pulse 87   Temp 98.2  F (36.8  C) (Oral)   Resp 18   Wt 90.7 kg (200 lb)   LMP 02/09/2025 (Exact Date)   SpO2 97%   BMI 37.27 kg/m      Current Meds      Current Outpatient Medications:     acetaminophen (TYLENOL) 500 MG tablet, Take 1,000 mg by mouth, Disp: , Rfl:     acetaminophen (TYLENOL) 650 MG CR tablet, Take 1 tablet (650 mg) by mouth every 8 hours as needed for pain., Disp: 30 tablet, Rfl: 0    albuterol (PROAIR HFA/PROVENTIL HFA/VENTOLIN HFA) 108 (90 Base) MCG/ACT inhaler, Inhale 2 puffs into the lungs every 6 hours as needed for shortness of breath, wheezing or cough., Disp: 18 g, Rfl: 0    benzonatate (TESSALON) 100 MG capsule, Take 1 capsule (100 mg) by mouth 3 times daily as needed for cough., Disp: 30 capsule, Rfl: 0    CAPLYTA 42 MG capsule, , Disp: , Rfl:     clonazePAM (KLONOPIN) 0.5 MG tablet, Take 0.5 mg by mouth 2 times daily as needed for anxiety, Disp: , Rfl:     cyclobenzaprine (FLEXERIL) 10  MG tablet, Take 1 tablet (10 mg) by mouth 3 times daily as needed for muscle spasms., Disp: 30 tablet, Rfl: 0    gabapentin (NEURONTIN) 300 MG capsule, TAKE 1 CAPSULE BY MOUTH EVERY MORNING AND 2 AT BEDTIME, Disp: , Rfl:     lamoTRIgine (LAMICTAL) 200 MG tablet, Take 1 tablet (200 mg) by mouth daily, Disp: 30 tablet, Rfl: 0    lidocaine (XYLOCAINE) 5 % external ointment, Apply topically 3 times daily as needed for moderate pain., Disp: 240 g, Rfl: 0    lithium 300 MG capsule, Take 1,650 mg by mouth every evening., Disp: , Rfl:     omeprazole (PRILOSEC) 20 MG DR capsule, TAKE 1 CAPSULE(20 MG) BY MOUTH DAILY, Disp: 90 capsule, Rfl: 1    ondansetron (ZOFRAN ODT) 4 MG ODT tab, Take 1 tablet (4 mg) by mouth every 8 hours as needed for nausea., Disp: 30 tablet, Rfl: 0    predniSONE (DELTASONE) 20 MG tablet, Take 2 tablets (40 mg) by mouth daily for 5 days., Disp: 10 tablet, Rfl: 0    propranolol (INDERAL) 10 MG tablet, Take 1 tablet by mouth 3 times daily, Disp: , Rfl:     triamcinolone (KENALOG) 0.025 % external ointment, Apply topically 2 times daily., Disp: 80 g, Rfl: 1    predniSONE (DELTASONE) 20 MG tablet, Take 2 tablets daily for 5 days then take 1 tablet daily for 5 days, Disp: 15 tablet, Rfl: 0      Respiratory History    occasional episodes of bronchitis      SUBJECTIVE    HPI: Joanne Escalante is an 34 year old female who presents with aching, chest congestion, cough nonproductive, occasional, facial pain/pressure, nasal congestion, and sore throat.  Symptoms began 5  days ago and has unchanged.  There is no shortness of breath, wheezing, and chest pain.  Patient is eating and drinking well.  No fever, nausea, vomiting, or diarrhea.    Patient denies any recent travel or exposure to known COVID positive tested individual.      ROS:     Review of Systems   Constitutional:  Positive for fever. Negative for chills.   HENT:  Positive for congestion and sore throat. Negative for ear pain and rhinorrhea.    Eyes:  Negative.    Respiratory:  Positive for cough. Negative for shortness of breath.    Cardiovascular: Negative.  Negative for chest pain and palpitations.   Gastrointestinal:  Negative for diarrhea, nausea and vomiting.   Endocrine: Negative.    Genitourinary: Negative.    Musculoskeletal:  Positive for myalgias. Negative for arthralgias, back pain, joint swelling, neck pain and neck stiffness.   Skin: Negative.  Negative for rash and wound.   Allergic/Immunologic: Negative.  Negative for immunocompromised state.   Neurological:  Negative for dizziness, weakness, light-headedness and headaches.         Family History   Family History   Problem Relation Age of Onset    Depression Mother     Alcoholism Mother     Other - See Comments Mother         fibroids, cervical abnormalty    Depression Father          at 51 of heart attack    Alcoholism Father     Heart Disease Father         mi    Breast Cancer Maternal Grandmother     Diabetes Maternal Grandmother     Ovarian Cancer No family hx of     Colon Cancer No family hx of     Colon Polyps No family hx of         Problem history  Patient Active Problem List   Diagnosis    Umbilical hernia without obstruction and without gangrene    Anxiety disorder    Cigarette smoker    Endometriosis determined by laparoscopy    Menorrhagia with irregular cycle    Fatty liver    Horseshoe kidney    Bipolar 2 disorder, major depressive episode (H)        Allergies  Allergies   Allergen Reactions    Sulfa Antibiotics         Social History  Social History     Socioeconomic History    Marital status: Single     Spouse name: partner- Jacob    Number of children: 2    Years of education: Not on file    Highest education level: Not on file   Occupational History    Occupation: nursing asst     Employer: [employer]   Tobacco Use    Smoking status: Every Day     Current packs/day: 0.50     Average packs/day: 0.5 packs/day for 10.0 years (5.0 ttl pk-yrs)     Types: Cigarettes      Passive exposure: Current    Smokeless tobacco: Never   Vaping Use    Vaping status: Never Used   Substance and Sexual Activity    Alcohol use: Not Currently    Drug use: Not Currently     Types: Marijuana    Sexual activity: Yes     Partners: Male     Birth control/protection: Condom   Other Topics Concern    Parent/sibling w/ CABG, MI or angioplasty before 65F 55M? Yes     Comment: Father  of a heart attack at 51   Social History Narrative    Lives with her boyfriend, they have 2 children, and lives in a house.  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  (last updated 2020)      Social Drivers of Health     Financial Resource Strain: Low Risk  (3/5/2024)    Financial Resource Strain     Within the past 12 months, have you or your family members you live with been unable to get utilities (heat, electricity) when it was really needed?: No   Food Insecurity: Low Risk  (3/5/2024)    Food Insecurity     Within the past 12 months, did you worry that your food would run out before you got money to buy more?: No     Within the past 12 months, did the food you bought just not last and you didn t have money to get more?: No   Transportation Needs: Low Risk  (3/5/2024)    Transportation Needs     Within the past 12 months, has lack of transportation kept you from medical appointments, getting your medicines, non-medical meetings or appointments, work, or from getting things that you need?: No   Physical Activity: Unknown (3/5/2024)    Exercise Vital Sign     Days of Exercise per Week: 2 days     Minutes of Exercise per Session: Not on file   Stress: Stress Concern Present (3/5/2024)    [employer] - Occupational Stress Questionnaire     Feeling of Stress : Rather much   Social Connections: Unknown (3/5/2024)    Social Connection and Isolation Panel [NHANES]     Frequency of Communication with Friends and Family: Not on file     Frequency of  Social Gatherings with Friends and Family: Never     Attends Caodaism Services: Not on file     Active Member of Clubs or Organizations: Not on file     Attends Club or Organization Meetings: Not on file     Marital Status: Not on file   Interpersonal Safety: Low Risk  (12/5/2024)    Interpersonal Safety     Do you feel physically and emotionally safe where you currently live?: Yes     Within the past 12 months, have you been hit, slapped, kicked or otherwise physically hurt by someone?: No     Within the past 12 months, have you been humiliated or emotionally abused in other ways by your partner or ex-partner?: No   Housing Stability: Low Risk  (3/5/2024)    Housing Stability     Do you have housing? : Yes     Are you worried about losing your housing?: No        OBJECTIVE     Vital signs reviewed by Ti Shelton PA-C  BP (!) 136/103 (BP Location: Left arm, Patient Position: Sitting, Cuff Size: Adult Regular)   Pulse 87   Temp 98.2  F (36.8  C) (Oral)   Resp 18   Wt 90.7 kg (200 lb)   LMP 02/09/2025 (Exact Date)   SpO2 97%   BMI 37.27 kg/m       Physical Exam  Vitals and nursing note reviewed.   Constitutional:       General: She is not in acute distress.     Appearance: She is well-developed. She is not ill-appearing, toxic-appearing or diaphoretic.   HENT:      Head: Normocephalic and atraumatic.      Right Ear: Hearing, tympanic membrane, ear canal and external ear normal. Tympanic membrane is not perforated, erythematous, retracted or bulging.      Left Ear: Hearing, tympanic membrane, ear canal and external ear normal. Tympanic membrane is not perforated, erythematous, retracted or bulging.      Nose: Congestion present. No mucosal edema or rhinorrhea.      Right Sinus: No maxillary sinus tenderness or frontal sinus tenderness.      Left Sinus: No maxillary sinus tenderness or frontal sinus tenderness.      Mouth/Throat:      Pharynx: Posterior oropharyngeal erythema present. No pharyngeal  swelling, oropharyngeal exudate or uvula swelling.      Tonsils: No tonsillar exudate or tonsillar abscesses. 0 on the right. 0 on the left.   Eyes:      General:         Right eye: No discharge.         Left eye: No discharge.      Pupils: Pupils are equal, round, and reactive to light.   Cardiovascular:      Rate and Rhythm: Normal rate and regular rhythm.      Heart sounds: Normal heart sounds. No murmur heard.     No friction rub. No gallop.   Pulmonary:      Effort: Pulmonary effort is normal. No respiratory distress.      Breath sounds: Wheezing present. No decreased breath sounds, rhonchi or rales.   Chest:      Chest wall: No tenderness.   Abdominal:      General: Bowel sounds are normal. There is no distension.      Palpations: Abdomen is soft. There is no mass.      Tenderness: There is no abdominal tenderness. There is no guarding.   Musculoskeletal:      Cervical back: Normal range of motion and neck supple.   Lymphadenopathy:      Head:      Right side of head: No submental, submandibular, tonsillar, preauricular or posterior auricular adenopathy.      Left side of head: No submental, submandibular, tonsillar, preauricular or posterior auricular adenopathy.      Cervical: No cervical adenopathy.      Right cervical: No superficial or posterior cervical adenopathy.     Left cervical: No superficial or posterior cervical adenopathy.   Skin:     General: Skin is warm and dry.      Findings: No rash.   Neurological:      Mental Status: She is alert and oriented to person, place, and time.      Cranial Nerves: No cranial nerve deficit.      Deep Tendon Reflexes: Reflexes are normal and symmetric.   Psychiatric:         Behavior: Behavior normal. Behavior is cooperative.         Thought Content: Thought content normal.         Judgment: Judgment normal.           Ti Shelton PA-C  2/9/2025, 2:02 PM

## 2025-02-10 LAB — SARS-COV-2 RNA RESP QL NAA+PROBE: NEGATIVE

## 2025-02-20 ENCOUNTER — ANCILLARY ORDERS (OUTPATIENT)
Dept: MAMMOGRAPHY | Facility: CLINIC | Age: 35
End: 2025-02-20

## 2025-02-20 DIAGNOSIS — R92.8 BI-RADS CATEGORY 3 MAMMOGRAM RESULT: Primary | ICD-10-CM

## 2025-03-03 ENCOUNTER — NURSE TRIAGE (OUTPATIENT)
Dept: FAMILY MEDICINE | Facility: CLINIC | Age: 35
End: 2025-03-03

## 2025-03-03 ENCOUNTER — OFFICE VISIT (OUTPATIENT)
Dept: FAMILY MEDICINE | Facility: CLINIC | Age: 35
End: 2025-03-03

## 2025-03-03 VITALS
RESPIRATION RATE: 15 BRPM | TEMPERATURE: 98.3 F | BODY MASS INDEX: 38.25 KG/M2 | SYSTOLIC BLOOD PRESSURE: 122 MMHG | DIASTOLIC BLOOD PRESSURE: 84 MMHG | HEIGHT: 61 IN | WEIGHT: 202.6 LBS | OXYGEN SATURATION: 98 % | HEART RATE: 85 BPM

## 2025-03-03 DIAGNOSIS — L02.211 ABSCESS OF SKIN OF ABDOMEN: Primary | ICD-10-CM

## 2025-03-03 DIAGNOSIS — Z12.4 CERVICAL CANCER SCREENING: ICD-10-CM

## 2025-03-03 PROCEDURE — 99213 OFFICE O/P EST LOW 20 MIN: CPT | Performed by: FAMILY MEDICINE

## 2025-03-03 RX ORDER — DOXYCYCLINE 100 MG/1
100 CAPSULE ORAL 2 TIMES DAILY
Qty: 20 CAPSULE | Refills: 0 | Status: SHIPPED | OUTPATIENT
Start: 2025-03-03 | End: 2025-03-13

## 2025-03-03 ASSESSMENT — PAIN SCALES - GENERAL: PAINLEVEL_OUTOF10: MILD PAIN (3)

## 2025-03-03 ASSESSMENT — PATIENT HEALTH QUESTIONNAIRE - PHQ9
10. IF YOU CHECKED OFF ANY PROBLEMS, HOW DIFFICULT HAVE THESE PROBLEMS MADE IT FOR YOU TO DO YOUR WORK, TAKE CARE OF THINGS AT HOME, OR GET ALONG WITH OTHER PEOPLE: VERY DIFFICULT
SUM OF ALL RESPONSES TO PHQ QUESTIONS 1-9: 13
SUM OF ALL RESPONSES TO PHQ QUESTIONS 1-9: 13

## 2025-03-03 NOTE — TELEPHONE ENCOUNTER
"  Reason for Disposition   Patient wants to be seen    Additional Information   Negative: Widespread rash and bright red, sunburn-like and too weak to stand   Negative: Sounds like a life-threatening emergency to the triager   Negative: Painful lump or swelling at opening to anus (rectum)   Negative: Painful lump or swelling at opening to vagina (on labia)   Negative: Painful lump or swelling on scrotum   Negative: Doesn't match the SYMPTOMS of a boil   Negative: Widespread red rash   Negative: Black (necrotic), dark purple, or blisters develop in area of boil   Negative: Patient sounds very sick or weak to the triager   Negative: SEVERE pain (e.g., excruciating)   Negative: Red streak from area of infection   Negative: Fever > 100.4 F (38.0 C)   Negative: Boil overlying a joint   Negative: 2 or more boils   Negative: Boil > 1/4 inch across (> 6 mm; larger than a pencil eraser) and on face   Negative: Boil > 2 inches across (> 5 cm; larger than a golf ball or ping pong ball)   Negative: Boil > 1/2 inch across (> 12 mm; larger than a marble) and center is soft or pus colored   Negative: Spreading redness around the boil and no fever   Negative: Boil and diabetes mellitus or weak immune system (e.g., HIV positive, cancer chemo, splenectomy, organ transplant, chronic steroids)    Answer Assessment - Initial Assessment Questions  1. APPEARANCE of BOIL: \"What does the boil look like?\"       2 boils  2. LOCATION: \"Where is the boil located?\"       abd  3. NUMBER: \"How many boils are there?\"       2  4. SIZE: \"How big is the boil?\" (e.g., inches, cm; compare to size of a coin or other object)      Did not state  5. ONSET: \"When did the boil start?\"      Did not specify  6. PAIN: \"Is there any pain?\" If Yes, ask: \"How bad is the pain?\"   (Scale 1-10; or mild, moderate, severe)      \"Significant discomfort\"  7. FEVER: \"Do you have a fever?\" If Yes, ask: \"What is it, how was it measured, and when did it start?\"       no  8. " "SOURCE: \"Have you been around anyone with boils or other Staph infections?\" \"Have you ever had boils before?\"      Has had boils before  9. OTHER SYMPTOMS: \"Do you have any other symptoms?\" (e.g., shaking chills, weakness, rash elsewhere on body)      none  10. PREGNANCY: \"Is there any chance you are pregnant?\" \"When was your last menstrual period?\"        no    Protocols used: Boil (Skin Abscess)-A-OH    "

## 2025-03-03 NOTE — PATIENT INSTRUCTIONS
At [facility], we strive to deliver an exceptional experience to you, every time we see you. If you receive a survey, please let us know what we are doing well and/or what we could improve upon, as we do value your feedback.  If you have MyChart, you can expect to receive results automatically within 24 hours of their completion.  Your provider will send a note interpreting your results as well.   If you do not have MyChart, you should receive your results in about a week by mail.    Your care team:                            Family Medicine Internal Medicine   MD James Murray, MD Renee Nava, MD Triston Crockett, MD Abiola Yo, PAAlbertoC    Catracho Meyer, MD Pediatrics   Trisha Pop, MD Zamzam Blackwell, MD Emily Iglesias, APRN CNP Odette Gamboa APRN CNP   MD Shannon Natarajan, MD Hilda Bravo, CNP     Ravi Lopez, CNP Same-Day Provider (No follow-up visits)   ISH Cool, DNP Yanelis Campbell, ISH Mckeon, FNP, BC JOSH ChaseC     Clinic hours: Monday - Thursday 7 am-6 pm; Fridays 7 am-5 pm.   Urgent care: Monday - Friday 10 am- 8 pm; Saturday and Sunday 9 am-5 pm.    Clinic: [phone]       [facility]: Monday - Thursday 8 am - 7 pm; Friday 8 am - 6 pm  [facility]: [phone]

## 2025-03-03 NOTE — PROGRESS NOTES
"  Assessment & Plan     Abscess of skin of abdomen  Still firm - recommended heat and quit smoking. Oral abx treatment  - doxycycline hyclate (VIBRAMYCIN) 100 MG capsule; Take 1 capsule (100 mg) by mouth 2 times daily for 10 days.    Cervical cancer screening  Recommended    Follow up in 3 days if not improved.    Subjective   Joanne is a 34 year old, presenting for the following health issues:  Derm Problem (BOILS ON STOMACH )        3/3/2025     1:08 PM   Additional Questions   Roomed by ALIYA         3/3/2025     1:08 PM   Patient Reported Additional Medications   Patient reports taking the following new medications NO     History of Present Illness       Reason for visit:  Boil on stomach  Symptom onset:  1-2 weeks ago  Symptoms include:  Painfil red bump, another one next to it  Symptom intensity:  Moderate  Symptom progression:  Worsening  Had these symptoms before:  Yes  Has tried/received treatment for these symptoms:  No  What makes it worse:  Walking, sitting  What makes it better:  Laying down   She is taking medications regularly.                Objective    /84 (BP Location: Left arm, Patient Position: Sitting, Cuff Size: Adult Large)   Pulse 85   Temp 98.3  F (36.8  C) (Oral)   Resp 15   Ht 1.56 m (5' 1.42\")   Wt 91.9 kg (202 lb 9.6 oz)   LMP 02/09/2025 (Exact Date)   SpO2 98%   BMI 37.76 kg/m    Body mass index is 37.76 kg/m .  Physical Exam   GENERAL: alert, no distress, and obese  RESP: lungs clear to auscultation - no rales, rhonchi or wheezes  SKIN: firm abscess on right mid pannus          Signed Electronically by: Renee Galindo MD    "

## 2025-04-20 ENCOUNTER — HEALTH MAINTENANCE LETTER (OUTPATIENT)
Age: 35
End: 2025-04-20

## 2025-04-28 ENCOUNTER — OFFICE VISIT (OUTPATIENT)
Dept: FAMILY MEDICINE | Facility: CLINIC | Age: 35
End: 2025-04-28
Payer: MEDICAID

## 2025-04-28 ENCOUNTER — ANCILLARY PROCEDURE (OUTPATIENT)
Dept: GENERAL RADIOLOGY | Facility: CLINIC | Age: 35
End: 2025-04-28
Payer: MEDICAID

## 2025-04-28 VITALS
OXYGEN SATURATION: 97 % | WEIGHT: 201 LBS | DIASTOLIC BLOOD PRESSURE: 85 MMHG | SYSTOLIC BLOOD PRESSURE: 132 MMHG | HEART RATE: 87 BPM | BODY MASS INDEX: 37.46 KG/M2 | TEMPERATURE: 98.6 F

## 2025-04-28 DIAGNOSIS — M54.41 CHRONIC BILATERAL LOW BACK PAIN WITH BILATERAL SCIATICA: ICD-10-CM

## 2025-04-28 DIAGNOSIS — M54.42 CHRONIC BILATERAL LOW BACK PAIN WITH BILATERAL SCIATICA: ICD-10-CM

## 2025-04-28 DIAGNOSIS — G89.29 CHRONIC BILATERAL LOW BACK PAIN WITH BILATERAL SCIATICA: ICD-10-CM

## 2025-04-28 DIAGNOSIS — M54.16 LUMBAR RADICULOPATHY: ICD-10-CM

## 2025-04-28 DIAGNOSIS — M25.551 HIP PAIN, RIGHT: Primary | ICD-10-CM

## 2025-04-28 PROCEDURE — 3075F SYST BP GE 130 - 139MM HG: CPT

## 2025-04-28 PROCEDURE — 99214 OFFICE O/P EST MOD 30 MIN: CPT

## 2025-04-28 PROCEDURE — 3079F DIAST BP 80-89 MM HG: CPT

## 2025-04-28 PROCEDURE — 72100 X-RAY EXAM L-S SPINE 2/3 VWS: CPT | Mod: TC | Performed by: RADIOLOGY

## 2025-04-28 PROCEDURE — 1125F AMNT PAIN NOTED PAIN PRSNT: CPT

## 2025-04-28 PROCEDURE — G2211 COMPLEX E/M VISIT ADD ON: HCPCS

## 2025-04-28 RX ORDER — CYCLOBENZAPRINE HCL 10 MG
10 TABLET ORAL 3 TIMES DAILY PRN
Qty: 30 TABLET | Refills: 3 | Status: SHIPPED | OUTPATIENT
Start: 2025-04-28

## 2025-04-28 ASSESSMENT — ENCOUNTER SYMPTOMS: HIP PAIN: 1

## 2025-04-28 ASSESSMENT — PAIN SCALES - GENERAL: PAINLEVEL_OUTOF10: MODERATE PAIN (5)

## 2025-04-28 NOTE — PATIENT INSTRUCTIONS
At [facility], we strive to deliver an exceptional experience to you, every time we see you. If you receive a survey, please let us know what we are doing well and/or what we could improve upon, as we do value your feedback.  If you have MyChart, you can expect to receive results automatically within 24 hours of their completion.  Your provider will send a note interpreting your results as well.   If you do not have MyChart, you should receive your results in about a week by mail.    Your care team:                            Family Medicine Internal Medicine   MD James Murray, MD Renee Nava, MD Triston Crockett, MD Abiola Yo, PA-C    Catracho Meyer, MD Pediatrics   Trisha Pop, MD Zamzam Blackwell, ISH Velasquez CNP Odette Boston, MD Shannon Sanz, MD Hilda Bravo, CNP     Mckayla Bronson, PA-C Same-Day Provider (No follow-up visits)   ISH Cool, OSCAR Campbell, PA-C    Sugey Hernandez PA-C     Clinic hours: Monday - Thursday 7 am-6 pm; Fridays 7 am-5 pm.   Urgent care: Monday - Friday 10 am- 8 pm; Saturday and Sunday 9 am-5 pm.    Clinic: [phone]       [facility]: Monday - Thursday 8 am - 7 pm; Friday 8 am - 6 pm  [facility]: [phone]

## 2025-04-28 NOTE — PROGRESS NOTES
Assessment & Plan     Hip pain, right  - Differential diagnoses include greater trochanteric bursitis, lumbar radiculopathy, soft tissue injury/tear. Reviewed sports medicine note from 1/6/25 and XR of right hip, which was negative for osteoarthritis.  - PT referral order placed. Recommend MRI of hip and low back if symptoms worsen or fail to improve.   - Discussed pain management with heat, acetaminophen, gentle stretching, topical medications. Limiting NSAIDs due to lithium use.   - Physical Therapy  Referral  - diclofenac (VOLTAREN) 1 % topical gel  Dispense: 350 g; Refill: 3    Chronic bilateral low back pain with bilateral sciatica  - Chronic back pain, suspect pain in legs at least partially related to radiculopathy. Plan: PT, discussed pain control, gentle stretching and yoga for mobility and strengthening  - XR Lumbar Spine 2/3 Views  - Physical Therapy  Referral  - diclofenac (VOLTAREN) 1 % topical gel  Dispense: 350 g; Refill: 3  - cyclobenzaprine (FLEXERIL) 10 MG tablet  Dispense: 30 tablet; Refill: 3    Lumbar radiculopathy  - + straight leg raise on right side, consistent with lumbar radiculopathy.   - PT ordered, will obtain MRI if no improvement with conservative measures.   - XR Lumbar Spine 2/3 Views  - Physical Therapy  Referral  - diclofenac (VOLTAREN) 1 % topical gel  Dispense: 350 g; Refill: 3    Follow-up    Follow-up Visit   Expected date:  May 05, 2025 (Approximate)      Follow Up Appointment Details:     Follow-up with whom?: My Department    Follow-Up for what?: Other (Office Visit)    Additional Details: Pap appointment    How?: In Person    Is this an as-needed follow-up?: No             Follow-up Visit   Expected date:  Jun 28, 2025 (Approximate)      Follow Up Appointment Details:     Follow-up with whom?: My Department    Follow-Up for what?: Acute Issue Recheck    Additional Details: Follow up if symptoms worsen or fail to improve    How?: In Person    Is  this an as-needed follow-up?: No               Subjective   Joanne is a 34 year old, presenting for the following health issues:  Hip Pain (Radiates down to legs)      4/28/2025    12:40 PM   Additional Questions   Roomed by Purvi     Hip Pain    History of Present Illness       Reason for visit:  Hip\back\knee pain  Symptom onset:  More than a month  Symptoms include:  Pain on outside of hips, lower back pain,radiates to knees and sometimes to my feet  Symptom intensity:  Severe  Symptom progression:  Worsening  Had these symptoms before:  No  What makes it worse:  Moving,getting up in the morning, working also flares it up  What makes it better:  Not much, carlyle tried hot baths, heating blanket, tylenol,bengay rub,   She is taking medications regularly.      Right sided hip pain ongoing since November 2024. Seen by UC and sports medicine in December/early January 2025 and diagnosed with greater trochanteric pain syndrome of RLE. Patient reports that oral steroid was ineffective, relief from cortisone injection lasted 2-3 days. Since that time she has had constant pain, now present on both hips- lateral hips, sometimes radiates to knees and feet. Sometimes has some numbness and tingling.  Taking tylenol frequenlty. Heat PRN. Has tried bengay, changing sleeping position. Pain present in the morning, aching, stiffness noted. Worsens throughout the day. XR shows normal right hip alignment, no arthritis.   Chronic low back pain for many years, bad sciatica during pregnancy.     Review of Systems  Constitutional, HEENT, cardiovascular, pulmonary, gi and gu systems are negative, except as otherwise noted.      Objective    /85 (BP Location: Left arm, Patient Position: Sitting, Cuff Size: Adult Large)   Pulse 87   Temp 98.6  F (37  C) (Temporal)   Wt 91.2 kg (201 lb)   LMP 04/06/2025 (Within Days)   SpO2 97%   BMI 37.46 kg/m    Body mass index is 37.46 kg/m .    Physical Exam   GENERAL: alert and no  distress  NECK: no adenopathy, no asymmetry, masses, or scars  RESP: Normal work of breathing, no cough or audible wheezing  MS: no gross musculoskeletal defects noted, no edema  ORTHO: Hip Exam: Palpation: Tender:   right greater trochanter, right gluteus medius  Non-tender:  left greater trochanter, left gluteus medius, left iliac crest, right iliac crest  Range of Motion: Right Hip  flexion   painful, extension  full, external rotation  full, internal rotation  full, adduction  full, abduction  painful  Strength:  full strength  Special tests:  no crepitation/snapping over central inguinal region, no crepitation/snapping over greater trochanter  Lumber/Thoracic Spine Exam: Tender:  right para lumbar muscles  Non-tender:  lumbar spinous processes, left para lumbar muscles, left SI joint, right SI joint  Special tests:  positive right straight leg raise  SKIN: no suspicious lesions or rashes  NEURO: Normal strength and tone, mentation intact and speech normal  PSYCH: mentation appears normal, affect normal/bright    Xray - Reviewed and interpreted by me.  ***    The longitudinal plan of care for the diagnosis(es)/condition(s) as documented were addressed during this visit. Due to the added complexity in care, I will continue to support Joanne in the subsequent management and with ongoing continuity of care.       Signed Electronically by: ISH Goodson CNP

## 2025-04-29 ENCOUNTER — LAB (OUTPATIENT)
Dept: LAB | Facility: CLINIC | Age: 35
End: 2025-04-29
Payer: MEDICAID

## 2025-04-29 DIAGNOSIS — F41.1 GENERALIZED ANXIETY DISORDER: ICD-10-CM

## 2025-04-29 DIAGNOSIS — R11.2 NAUSEA AND VOMITING, UNSPECIFIED VOMITING TYPE: ICD-10-CM

## 2025-04-29 LAB
ALBUMIN SERPL BCG-MCNC: 4.3 G/DL (ref 3.5–5.2)
ALP SERPL-CCNC: 39 U/L (ref 40–150)
ALT SERPL W P-5'-P-CCNC: 18 U/L (ref 0–50)
ANION GAP SERPL CALCULATED.3IONS-SCNC: 11 MMOL/L (ref 7–15)
AST SERPL W P-5'-P-CCNC: 18 U/L (ref 0–45)
BILIRUB SERPL-MCNC: 0.3 MG/DL
BUN SERPL-MCNC: 10 MG/DL (ref 6–20)
CALCIUM SERPL-MCNC: 10 MG/DL (ref 8.8–10.4)
CHLORIDE SERPL-SCNC: 104 MMOL/L (ref 98–107)
CHOLEST SERPL-MCNC: 209 MG/DL
CREAT SERPL-MCNC: 0.74 MG/DL (ref 0.51–0.95)
EGFRCR SERPLBLD CKD-EPI 2021: >90 ML/MIN/1.73M2
ERYTHROCYTE [DISTWIDTH] IN BLOOD BY AUTOMATED COUNT: 12.3 % (ref 10–15)
FASTING STATUS PATIENT QL REPORTED: YES
FASTING STATUS PATIENT QL REPORTED: YES
GLUCOSE SERPL-MCNC: 103 MG/DL (ref 70–99)
HCO3 SERPL-SCNC: 22 MMOL/L (ref 22–29)
HCT VFR BLD AUTO: 43.1 % (ref 35–47)
HDLC SERPL-MCNC: 46 MG/DL
HGB BLD-MCNC: 14.5 G/DL (ref 11.7–15.7)
LDLC SERPL CALC-MCNC: 141 MG/DL
LIPASE SERPL-CCNC: 33 U/L (ref 13–60)
LITHIUM SERPL-SCNC: 1.48 MMOL/L (ref 0.6–1.2)
MCH RBC QN AUTO: 29.6 PG (ref 26.5–33)
MCHC RBC AUTO-ENTMCNC: 33.6 G/DL (ref 31.5–36.5)
MCV RBC AUTO: 88 FL (ref 78–100)
NONHDLC SERPL-MCNC: 163 MG/DL
PLATELET # BLD AUTO: 294 10E3/UL (ref 150–450)
POTASSIUM SERPL-SCNC: 4.4 MMOL/L (ref 3.4–5.3)
PROT SERPL-MCNC: 7 G/DL (ref 6.4–8.3)
RBC # BLD AUTO: 4.9 10E6/UL (ref 3.8–5.2)
SODIUM SERPL-SCNC: 137 MMOL/L (ref 135–145)
T4 SERPL-MCNC: 7 UG/DL (ref 4.5–11.7)
TRIGL SERPL-MCNC: 108 MG/DL
TSH SERPL DL<=0.005 MIU/L-ACNC: 2.41 UIU/ML (ref 0.3–4.2)
WBC # BLD AUTO: 9.8 10E3/UL (ref 4–11)

## 2025-04-29 PROCEDURE — 36415 COLL VENOUS BLD VENIPUNCTURE: CPT

## 2025-04-29 PROCEDURE — 83690 ASSAY OF LIPASE: CPT

## 2025-04-29 PROCEDURE — 84436 ASSAY OF TOTAL THYROXINE: CPT

## 2025-04-29 PROCEDURE — 80061 LIPID PANEL: CPT

## 2025-04-29 PROCEDURE — 80053 COMPREHEN METABOLIC PANEL: CPT

## 2025-04-29 PROCEDURE — 84443 ASSAY THYROID STIM HORMONE: CPT

## 2025-04-29 PROCEDURE — 85027 COMPLETE CBC AUTOMATED: CPT

## 2025-04-29 PROCEDURE — 80178 ASSAY OF LITHIUM: CPT

## 2025-05-01 ENCOUNTER — PATIENT OUTREACH (OUTPATIENT)
Dept: CARE COORDINATION | Facility: CLINIC | Age: 35
End: 2025-05-01
Payer: MEDICAID

## 2025-05-02 ENCOUNTER — MYC MEDICAL ADVICE (OUTPATIENT)
Dept: FAMILY MEDICINE | Facility: CLINIC | Age: 35
End: 2025-05-02

## 2025-05-02 DIAGNOSIS — M25.551 HIP PAIN, RIGHT: Primary | ICD-10-CM

## 2025-05-02 DIAGNOSIS — M54.16 LUMBAR RADICULOPATHY: ICD-10-CM

## 2025-05-02 PROBLEM — M54.41 CHRONIC BILATERAL LOW BACK PAIN WITH BILATERAL SCIATICA: Status: ACTIVE | Noted: 2025-05-02

## 2025-05-02 PROBLEM — M54.42 CHRONIC BILATERAL LOW BACK PAIN WITH BILATERAL SCIATICA: Status: ACTIVE | Noted: 2025-05-02

## 2025-05-02 PROBLEM — G89.29 CHRONIC BILATERAL LOW BACK PAIN WITH BILATERAL SCIATICA: Status: ACTIVE | Noted: 2025-05-02

## 2025-05-05 ENCOUNTER — NURSE TRIAGE (OUTPATIENT)
Dept: FAMILY MEDICINE | Facility: CLINIC | Age: 35
End: 2025-05-05
Payer: MEDICAID

## 2025-05-05 RX ORDER — METHYLPREDNISOLONE 4 MG/1
TABLET ORAL
Qty: 21 TABLET | Refills: 0 | Status: SHIPPED | OUTPATIENT
Start: 2025-05-05

## 2025-05-05 NOTE — TELEPHONE ENCOUNTER
Patient notified of results at 7/12/24 appointment.   RN spoke with pt and relayed provider message. Pt verbalized understanding and is agreeable with plan. Pt states she will go to ED.     Joana Thomas RN

## 2025-05-05 NOTE — TELEPHONE ENCOUNTER
Nurse Triage SBAR    Is this a 2nd Level Triage? NO  Patient is still looking to get pain medications.      Situation: Patient calling in.  Was seen for this problem on 4/28/25.  Patient is looking for pain control.     Background: Started around Thanksgiving and has been getting worse.  Went to PT on Friday and pain is worsening.      Assessment: Patient notes the pain has worsened since was seen on 4/28.  She notes she is taking the muscle relaxer but that hasn't helped much.  Pain went from moderate to severe.  Patient notes it is sharp and throbbing and is looking for pain management now. Pain goes from right hip into right leg.       Protocol Recommended Disposition:   Go To Office Now    Recommendation: Please advise if patient can get pain medications from provider.  If can, please re route to Nurse pool to call patient and tell her the medications are available for her.    Patient was offered an appointment in clinic, she declined due to previous commitments.  She talked about going to urgent care or Emergency Room.  Discussed with patient that if her pain is severe, urgent care won't have the meds she needs to help her pain.  Patient was advised that the Emergency room would have those medications she is seeking.  Patient stated she would go there.         Routed to provider    Does the patient meet one of the following criteria for ADS visit consideration? No      Reason for Disposition   SEVERE pain (e.g., excruciating, unable to do any normal activities)    Additional Information   Negative: Looks like a broken bone or dislocated joint (e.g., crooked or deformed)   Negative: Sounds like a life-threatening emergency to the triager   Negative: Followed a hip injury   Negative: Leg pain is main symptom   Negative: Back pain radiating (shooting) into hip   Negative: SEVERE pain (e.g., excruciating, unable to do any normal activities) and fever   Negative: Can't stand (bear weight) or walk   Negative: Fever  "and red area (or area very tender to touch)   Negative: Patient sounds very sick or weak to the triager    Answer Assessment - Initial Assessment Questions  1. LOCATION and RADIATION: \"Where is the pain located?\"       Right hip and goes into leg  2. QUALITY: \"What does the pain feel like?\"  (e.g., sharp, dull, aching, burning)      Sharp, aching and throbbing  3. SEVERITY: \"How bad is the pain?\" \"What does it keep you from doing?\"   (Scale 1-10; or mild, moderate, severe)      Severe; increased from moderate last week.    4. ONSET: \"When did the pain start?\" \"Does it come and go, or is it there all the time?\"      Thanksgiving  5. WORK OR EXERCISE: \"Has there been any recent work or exercise that involved this part of the body?\"       No, thinks it is wear and tear  6. CAUSE: \"What do you think is causing the hip pain?\"       Worsening,   7. AGGRAVATING FACTORS: \"What makes the hip pain worse?\" (e.g., walking, climbing stairs, running)      When up from stretches, worsening, from sitting worsening and upstairs worse  8. OTHER SYMPTOMS: \"Do you have any other symptoms?\" (e.g., back pain, pain shooting down leg,  fever, rash)      Leg pain    Protocols used: Hip Pain-A-OH    "

## 2025-05-06 ENCOUNTER — LAB (OUTPATIENT)
Dept: LAB | Facility: CLINIC | Age: 35
End: 2025-05-06
Payer: MEDICAID

## 2025-05-06 DIAGNOSIS — F31.81 BIPOLAR II DISORDER (H): ICD-10-CM

## 2025-05-06 LAB
LITHIUM SERPL-SCNC: 0.8 MMOL/L (ref 0.6–1.2)
OSMOLALITY UR: 605 MMOL/KG (ref 100–1200)

## 2025-05-06 PROCEDURE — 36415 COLL VENOUS BLD VENIPUNCTURE: CPT

## 2025-05-06 PROCEDURE — 83935 ASSAY OF URINE OSMOLALITY: CPT

## 2025-05-06 PROCEDURE — 80178 ASSAY OF LITHIUM: CPT

## 2025-05-10 ENCOUNTER — MYC MEDICAL ADVICE (OUTPATIENT)
Dept: FAMILY MEDICINE | Facility: CLINIC | Age: 35
End: 2025-05-10
Payer: COMMERCIAL

## 2025-05-10 DIAGNOSIS — M54.41 CHRONIC BILATERAL LOW BACK PAIN WITH BILATERAL SCIATICA: ICD-10-CM

## 2025-05-10 DIAGNOSIS — M54.16 LUMBAR RADICULOPATHY: Primary | ICD-10-CM

## 2025-05-10 DIAGNOSIS — G89.29 CHRONIC BILATERAL LOW BACK PAIN WITH BILATERAL SCIATICA: ICD-10-CM

## 2025-05-10 DIAGNOSIS — M54.42 CHRONIC BILATERAL LOW BACK PAIN WITH BILATERAL SCIATICA: ICD-10-CM

## 2025-05-12 NOTE — TELEPHONE ENCOUNTER
Patient last seen 4/28/25 for this issue. Is appointment needed? Please advise    Eloy Allred, RN, BSN

## 2025-05-15 ENCOUNTER — VIRTUAL VISIT (OUTPATIENT)
Dept: FAMILY MEDICINE | Facility: CLINIC | Age: 35
End: 2025-05-15
Payer: COMMERCIAL

## 2025-05-15 DIAGNOSIS — M46.1 DEGENERATIVE JOINT DISEASE OF SACROILIAC JOINT: ICD-10-CM

## 2025-05-15 DIAGNOSIS — M54.16 LUMBAR RADICULOPATHY: Primary | ICD-10-CM

## 2025-05-15 DIAGNOSIS — F31.81 BIPOLAR II DISORDER (H): Primary | ICD-10-CM

## 2025-05-15 RX ORDER — OXYCODONE HYDROCHLORIDE 5 MG/1
5 TABLET ORAL EVERY 6 HOURS PRN
Qty: 10 TABLET | Refills: 0 | Status: SHIPPED | OUTPATIENT
Start: 2025-05-15

## 2025-05-15 RX ORDER — OXYCODONE HYDROCHLORIDE 5 MG/1
5 TABLET ORAL EVERY 6 HOURS PRN
COMMUNITY
End: 2025-05-15

## 2025-05-15 NOTE — PROGRESS NOTES
"Joanne is a 34 year old who is being evaluated via a billable video visit.    How would you like to obtain your AVS? MyChart  If the video visit is dropped, the invitation should be resent by: Text to cell phone: [phone]  Will anyone else be joining your video visit? No    Assessment & Plan     Lumbar radiculopathy  - X-ray results and current symptoms reviewed with patient. Spine appointment scheduled, will hold off on advanced imaging for now.  - Continue plan of care: Refilled tizanidine, short term refill of oxycodone for as-needed use for severe pain. Continue PT, stretching, heat.   - tiZANidine (ZANAFLEX) 4 MG tablet  Dispense: 90 tablet; Refill: 1  - oxyCODONE (ROXICODONE) 5 MG tablet  Dispense: 10 tablet; Refill: 0    Degenerative joint disease of sacroiliac joint  - Mild degenerative changes on XR right SI joint. Spine appointment scheduled, will hold off on imaging for now but check labs for signs of inflammatory disorder.   - ESR: Erythrocyte sedimentation rate  - CBC with platelets  - CRP, inflammation    BMI  Estimated body mass index is 37.46 kg/m  as calculated from the following:    Height as of 3/3/25: 1.56 m (5' 1.42\").    Weight as of 4/28/25: 91.2 kg (201 lb).   Weight management plan: Discussed healthy diet and exercise guidelines    Return to care if symptoms worsen or fail to improve.     Subjective   Joanne is a 34 year old, presenting for the following health issues:  Musculoskeletal Problem        5/15/2025     9:32 AM   Additional Questions   Roomed by [employer]     Video Start Time: 1:08 PM    Musculoskeletal Problem    History of Present Illness       Reason for visit:  Low back pain, hip pain       Pt was seen at [facility] 5/8/25 and given Oxycodone and Tizanidine which helps per pt  Currently 7/10 pain level.     Doing OK, has been in a lot of pain. Appointment with spine specialist next Friday. Shooting into right side hip and down leg. Tizanidine has been helpful with pain.   Medrol " dose-pack not helpful. Adjusting lithium with psychiatry to be able to safely take ibuprofen. Using heat PRN, topical medications.   Pain has been impacting ability to move and walk.   Second PT appointment next week.     Review of Systems  Constitutional, HEENT, cardiovascular, pulmonary, gi and gu systems are negative, except as otherwise noted.      Objective    Vitals - Patient Reported  Pain Score: Severe Pain (7)  Pain Loc: Low Back    Vitals:  No vitals were obtained today due to virtual visit.    Physical Exam   GENERAL: alert and no distress  EYES: Eyes grossly normal to inspection.  No discharge or erythema, or obvious scleral/conjunctival abnormalities.  RESP: No audible wheeze, cough, or visible cyanosis.    SKIN: Visible skin clear. No significant rash, abnormal pigmentation or lesions.  NEURO: Cranial nerves grossly intact.  Mentation and speech appropriate for age.  PSYCH: Appropriate affect, tone, and pace of words      Video-Visit Details    Type of service:  Video Visit   Video End Time:1:23 PM  Originating Location (pt. Location): Home  Distant Location (provider location):  On-site  Platform used for Video Visit: Sandeep    The longitudinal plan of care for the diagnosis(es)/condition(s) as documented were addressed during this visit. Due to the added complexity in care, I will continue to support Joanne in the subsequent management and with ongoing continuity of care.     Signed Electronically by: ISH Goodson CNP

## 2025-05-20 ENCOUNTER — LAB (OUTPATIENT)
Dept: LAB | Facility: CLINIC | Age: 35
End: 2025-05-20
Payer: COMMERCIAL

## 2025-05-20 DIAGNOSIS — M46.1 DEGENERATIVE JOINT DISEASE OF SACROILIAC JOINT: ICD-10-CM

## 2025-05-20 DIAGNOSIS — F31.81 BIPOLAR II DISORDER (H): ICD-10-CM

## 2025-05-20 LAB
CRP SERPL-MCNC: 78.7 MG/L
ERYTHROCYTE [DISTWIDTH] IN BLOOD BY AUTOMATED COUNT: 12 % (ref 10–15)
ERYTHROCYTE [SEDIMENTATION RATE] IN BLOOD BY WESTERGREN METHOD: 23 MM/HR (ref 0–20)
HCT VFR BLD AUTO: 38.8 % (ref 35–47)
HGB BLD-MCNC: 13.6 G/DL (ref 11.7–15.7)
LITHIUM SERPL-SCNC: <0.1 MMOL/L (ref 0.6–1.2)
MCH RBC QN AUTO: 29.8 PG (ref 26.5–33)
MCHC RBC AUTO-ENTMCNC: 35.1 G/DL (ref 31.5–36.5)
MCV RBC AUTO: 85 FL (ref 78–100)
PLATELET # BLD AUTO: 322 10E3/UL (ref 150–450)
RBC # BLD AUTO: 4.57 10E6/UL (ref 3.8–5.2)
WBC # BLD AUTO: 13.8 10E3/UL (ref 4–11)

## 2025-05-20 PROCEDURE — 36415 COLL VENOUS BLD VENIPUNCTURE: CPT

## 2025-05-20 PROCEDURE — 80178 ASSAY OF LITHIUM: CPT

## 2025-05-20 PROCEDURE — 85027 COMPLETE CBC AUTOMATED: CPT

## 2025-05-20 PROCEDURE — 85652 RBC SED RATE AUTOMATED: CPT

## 2025-05-20 PROCEDURE — 86140 C-REACTIVE PROTEIN: CPT

## 2025-05-26 DIAGNOSIS — F31.81 BIPOLAR II DISORDER (H): Primary | ICD-10-CM

## 2025-05-27 ENCOUNTER — LAB (OUTPATIENT)
Dept: LAB | Facility: CLINIC | Age: 35
End: 2025-05-27
Payer: COMMERCIAL

## 2025-05-27 DIAGNOSIS — F31.81 BIPOLAR II DISORDER (H): ICD-10-CM

## 2025-05-27 LAB — LITHIUM SERPL-SCNC: 0.81 MMOL/L (ref 0.6–1.2)

## 2025-05-27 PROCEDURE — 36415 COLL VENOUS BLD VENIPUNCTURE: CPT

## 2025-05-27 PROCEDURE — 80178 ASSAY OF LITHIUM: CPT

## 2025-05-29 ENCOUNTER — THERAPY VISIT (OUTPATIENT)
Dept: PHYSICAL THERAPY | Facility: CLINIC | Age: 35
End: 2025-05-29
Payer: COMMERCIAL

## 2025-05-29 ENCOUNTER — PATIENT OUTREACH (OUTPATIENT)
Dept: CARE COORDINATION | Facility: CLINIC | Age: 35
End: 2025-05-29

## 2025-05-29 DIAGNOSIS — M25.551 HIP PAIN, RIGHT: ICD-10-CM

## 2025-05-29 DIAGNOSIS — G89.29 CHRONIC BILATERAL LOW BACK PAIN WITH BILATERAL SCIATICA: Primary | ICD-10-CM

## 2025-05-29 DIAGNOSIS — Z79.899 ENCOUNTER FOR LONG-TERM (CURRENT) USE OF MEDICATIONS: Primary | ICD-10-CM

## 2025-05-29 DIAGNOSIS — M54.41 CHRONIC BILATERAL LOW BACK PAIN WITH BILATERAL SCIATICA: Primary | ICD-10-CM

## 2025-05-29 DIAGNOSIS — F31.81 BIPOLAR II DISORDER (H): ICD-10-CM

## 2025-05-29 DIAGNOSIS — M54.42 CHRONIC BILATERAL LOW BACK PAIN WITH BILATERAL SCIATICA: Primary | ICD-10-CM

## 2025-05-29 DIAGNOSIS — M54.16 LUMBAR RADICULOPATHY: ICD-10-CM

## 2025-06-05 ENCOUNTER — LAB (OUTPATIENT)
Dept: LAB | Facility: CLINIC | Age: 35
End: 2025-06-05
Payer: COMMERCIAL

## 2025-06-05 DIAGNOSIS — F31.81 BIPOLAR II DISORDER (H): ICD-10-CM

## 2025-06-05 LAB — LITHIUM SERPL-SCNC: <0.1 MMOL/L (ref 0.6–1.2)

## 2025-06-09 ENCOUNTER — TELEPHONE (OUTPATIENT)
Dept: FAMILY MEDICINE | Facility: CLINIC | Age: 35
End: 2025-06-09
Payer: COMMERCIAL

## 2025-06-09 NOTE — TELEPHONE ENCOUNTER
Patient Quality Outreach    Patient is due for the following:   Cervical Cancer Screening - PAP Needed    Action(s) Taken:   Schedule a office visit for a Cervical Cancer Screening    Type of outreach:    Sent [employer] message.    Questions for provider review:    None         Chloe Wade  Chart routed to None.

## 2025-06-13 ENCOUNTER — ANCILLARY PROCEDURE (OUTPATIENT)
Dept: MAMMOGRAPHY | Facility: CLINIC | Age: 35
End: 2025-06-13
Attending: FAMILY MEDICINE
Payer: COMMERCIAL

## 2025-06-13 DIAGNOSIS — R92.8 BI-RADS CATEGORY 3 MAMMOGRAM RESULT: ICD-10-CM

## 2025-06-13 PROCEDURE — 77065 DX MAMMO INCL CAD UNI: CPT | Mod: RT | Performed by: STUDENT IN AN ORGANIZED HEALTH CARE EDUCATION/TRAINING PROGRAM

## 2025-06-13 PROCEDURE — G0279 TOMOSYNTHESIS, MAMMO: HCPCS | Performed by: STUDENT IN AN ORGANIZED HEALTH CARE EDUCATION/TRAINING PROGRAM

## 2025-06-16 ENCOUNTER — VIRTUAL VISIT (OUTPATIENT)
Dept: FAMILY MEDICINE | Facility: CLINIC | Age: 35
End: 2025-06-16
Payer: COMMERCIAL

## 2025-06-16 DIAGNOSIS — M54.16 LUMBAR RADICULOPATHY: ICD-10-CM

## 2025-06-16 PROCEDURE — 98005 SYNCH AUDIO-VIDEO EST LOW 20: CPT

## 2025-06-16 RX ORDER — OXYCODONE HYDROCHLORIDE 5 MG/1
5 TABLET ORAL EVERY 6 HOURS PRN
Qty: 10 TABLET | Refills: 0 | Status: SHIPPED | OUTPATIENT
Start: 2025-06-16

## 2025-06-16 ASSESSMENT — PATIENT HEALTH QUESTIONNAIRE - PHQ9
10. IF YOU CHECKED OFF ANY PROBLEMS, HOW DIFFICULT HAVE THESE PROBLEMS MADE IT FOR YOU TO DO YOUR WORK, TAKE CARE OF THINGS AT HOME, OR GET ALONG WITH OTHER PEOPLE: SOMEWHAT DIFFICULT
SUM OF ALL RESPONSES TO PHQ QUESTIONS 1-9: 7
SUM OF ALL RESPONSES TO PHQ QUESTIONS 1-9: 7

## 2025-06-16 NOTE — PROGRESS NOTES
"Joanne is a 34 year old who is being evaluated via a billable video visit.    How would you like to obtain your AVS? [employer]  If the video visit is dropped, the invitation should be resent by: Text to cell phone: [phone]  Will anyone else be joining your video visit? No    If patient has telephone visit, have they been educated on video visit as preferred visit method and offered to change to video visit? NOT APPLICABLE    Instructions Relayed to Patient by Virtual Roomer:     Patient is active on [employer]:   Relayed following to patient: \"It looks like you are active on [employer], are you able to join the visit this way? If not, do you need us to send you a link now or would you like your provider to send a link via text or email when they are ready to initiate the visit?\"    Patient Confirmed they will join visit via: Text Link to Cell Phone  Reminded patient to ensure they were logged on to virtual visit by arrival time listed.   Asked if patient has flexibility to initiate visit sooner than arrival time: patient stated yes, documented in appointment notes availability to initiate visit earlier than arrival time     If pediatric virtual visit, ensured pediatric patient along with parent/guardian will be present for video visit.     Patient offered the website [email or web address] and/or phone number to [employer] Help line: [phone]     Assessment & Plan     Lumbar radiculopathy  - Following with PM&R. MRI scheduled for tomorrow, plan to do steroid injection in the near future. PT is going well. She has had some improvements with pain  - Discussed multimodal pain management. She is utilizing ibuprofen, acetaminophen, muscle relaxant, heat, exercises. Refilled oxycodone, discussed short term use.   - oxyCODONE (ROXICODONE) 5 MG tablet  Dispense: 10 tablet; Refill: 0    Subjective   Joanne is a 34 year old, presenting for the following health issues:  Pain Management      6/16/2025    12:47 PM "   Additional Questions   Roomed by Marjan   Accompanied by self         6/16/2025    12:47 PM   Patient Reported Additional Medications   Patient reports taking the following new medications no     Video Start Time: 2:39 PM    History of Present Illness       Reason for visit:  Pain management   She is taking medications regularly.      Things have been going OK. Started PT, learning some good stretches. She has been working on lowering her lithium with psychiatry so she can take NSAIDs. Taking tylenol daily, ibuprofen 400-600 mg daily. Using muscle relaxants almost daily. Last used oxycodone 2-3 weeks ago. Increased gabapentin TID, which has been helping.     Review of Systems  Constitutional, HEENT, cardiovascular, pulmonary, gi and gu systems are negative, except as otherwise noted.      Objective       Vitals:  No vitals were obtained today due to virtual visit.    Physical Exam   GENERAL: alert and no distress  EYES: Eyes grossly normal to inspection.  No discharge or erythema, or obvious scleral/conjunctival abnormalities.  RESP: No audible wheeze, cough, or visible cyanosis.    SKIN: Visible skin clear. No significant rash, abnormal pigmentation or lesions.  NEURO: Cranial nerves grossly intact.  Mentation and speech appropriate for age.  PSYCH: Appropriate affect, tone, and pace of words      Video-Visit Details    Type of service:  Video Visit   Video End Time:2:48 PM  Originating Location (pt. Location): Home  Distant Location (provider location):  On-site  Platform used for Video Visit: Sandeep    The longitudinal plan of care for the diagnosis(es)/condition(s) as documented were addressed during this visit. Due to the added complexity in care, I will continue to support Joanne in the subsequent management and with ongoing continuity of care.     Signed Electronically by: ISH Goodson CNP

## 2025-06-17 ENCOUNTER — ANCILLARY PROCEDURE (OUTPATIENT)
Dept: MRI IMAGING | Facility: CLINIC | Age: 35
End: 2025-06-17
Attending: STUDENT IN AN ORGANIZED HEALTH CARE EDUCATION/TRAINING PROGRAM
Payer: COMMERCIAL

## 2025-06-17 ENCOUNTER — RESULTS FOLLOW-UP (OUTPATIENT)
Dept: PHYSICAL MEDICINE AND REHAB | Facility: CLINIC | Age: 35
End: 2025-06-17

## 2025-06-17 DIAGNOSIS — M54.42 CHRONIC BILATERAL LOW BACK PAIN WITH BILATERAL SCIATICA: ICD-10-CM

## 2025-06-17 DIAGNOSIS — M54.41 CHRONIC BILATERAL LOW BACK PAIN WITH BILATERAL SCIATICA: ICD-10-CM

## 2025-06-17 DIAGNOSIS — G89.29 CHRONIC BILATERAL LOW BACK PAIN WITH BILATERAL SCIATICA: ICD-10-CM

## 2025-06-17 DIAGNOSIS — M54.16 LUMBAR RADICULOPATHY: Primary | ICD-10-CM

## 2025-06-17 DIAGNOSIS — M54.16 LUMBAR RADICULOPATHY: ICD-10-CM

## 2025-06-17 PROCEDURE — 72148 MRI LUMBAR SPINE W/O DYE: CPT | Performed by: RADIOLOGY

## 2025-06-18 NOTE — PROGRESS NOTES
Received call back from [facility]. Covering provider wants determination for the steroid injection to come from her primary provider who will returning on 7/1/25.

## 2025-07-01 NOTE — PROGRESS NOTES
Excellent, in that case recommend we go forward with right L4-L5 TFESI. Injection has been ordered.    Orders Placed This Encounter   Procedures    PAIN Transforaminal PAM Inj Lumbosacral One Level Right

## 2025-07-01 NOTE — PROGRESS NOTES
"Received voicemail from pt's psychiatrist requesting callback between 2:30-4:30pm today to discuss.     Call placed. She states she is OK with the patient having targeted steroid injection. \"I know she is in a lot of pain and it is impacting her life and sleep. Sleep is so important to protect her bipolar disorder so there is a risk she could become manic without sleep too\".     States she would encourage patient to monitor her mood throughout \"and if she becomes manic we have ways to manage it\". States patient has been on mood stabilizers for a while and she has been well managed for a while so she is comfortable with the patient proceeding if Dr. Judge feels this is a good next step.     Thanked her for the response and let her know I would update Dr. Judge and we would be in touch if any other questions/concerns.     "

## 2025-07-02 ENCOUNTER — TELEPHONE (OUTPATIENT)
Dept: PHYSICAL MEDICINE AND REHAB | Facility: CLINIC | Age: 35
End: 2025-07-02
Payer: COMMERCIAL

## 2025-07-02 NOTE — TELEPHONE ENCOUNTER
Procedure ordered? Yes      What insurance are we billing for this procedure?  [employer]  IF SCHEDULING AT [facility] PLEASE SCHEDULE AT LEAST 7-10 BUSINESS DAYS OUT SO A PA CAN BE OBTAINED    Is a  PAIN  order available to link to injection appointment or does one need to be transcribed?  YES: Right L4-L5 TFESI (transitional L5 segment)    If needed, route to CN to assist in doing so.    Is  needed?: No   If YES, please initiate in-person  request.    Will patient have a ?  Yes    All fluoro procedures require a .  These US procedures also require a : piriformis, pudendal, scalene, & carpal tunnel.    Is patient taking any blood thinners (i.e. Plavix, coumadin, jantoven, warfarin, heparin, Xarelto, Pradaxa, Eliquis, Brilinta, or Effient, etc)? No   If YES, schedule out 2 weeks, and route to RN pool to determine if medication hold is needed.    Is patient taking aspirin? No   For CERVICAL or INTERLAMINAR procedures, route message to Care Navigation so they can seek approval from managing provider to hold aspirin for 6 days prior to the procedure.    Does patient have an allergy to contrast dye or iodine?  No  If YES, OK to schedule. Route to RN pool AND add allergy information to appointment notes    Is patient diabetic? No If YES, blood glucose level will be checked on day of procedure and will need to be 300mg/dL or below (for steroid injections).    Does patient have an active infection or treated for one within the past week? No   If YES, do NOT schedule and route to Care Navigation.     Is patient currently taking any antibiotics or have an active infection?  No  For patients on chronic, preventative, or prophylactic antibiotics, procedures may be scheduled.   For patients on antibiotics for active or recent infection: antibiotic course must have been completed and symptom-free of infection to safely proceed with injection.  Send to Care Navigation if  unsure.    Is patient actively being treated for cancer or immunocompromised? No  If YES, do NOT schedule and route to RN pool.     Any chance of pregnancy? NO   If YES, do NOT schedule and route to RN pool.    Have you had any vaccines in the last 2 weeks? NO  If YES, please route to Care Navigation to discuss before scheduling.     Reminders:  -  If you are started on any steroids or antibiotics between now and your appointment, you must contact us because it may affect our ability to perform your procedure.   reviewed    -  Informed patient that it is OK to take normal medications before the procedure and must hold blood thinners as instructed.  reviewed    -  Patients scheduled for MBBs should not take pain meds prior to injection and pain rating needs to be 5/10 or greater the day of the procedure.    -  Informed cervical injection patients that an IV will be placed as a precautionary measure.  reviewed    -  Patients should eat a light meal prior to their procedure appointment.  reviewed    -  All radiofrequency ablations are in a 40 minute time slot and not to be scheduled until in-basket message has been sent by the procedure team that the PA has been  received.  reviewed     -  Spinal cord stimulator trial scheduling is coordinated by the procedure team.    -  Care Navigation (#[phone]) is available to assist patients with additional questions.  reviewed    -  Cervical TFESIs with Dr. Houser only.    *PLEASE FORWARD TO CARE NAVIGATION IF INDICATED.  PATIENT SHOULD BE INFORMED THAT THEY WILL BE CONTACTED BY CARE NAVIGATION ONLY IF CHANGES TO MEDICATION/CARE PLAN RECOMMENDATIONS ARE NEEDED.  IF THEY DO NOT HEAR BACK FROM CARE NAVIGATION, THEY ARE FINE TO CONTINUE WITH THEIR CURRENT MEDICATIONS/CARE PLAN.*

## 2025-07-07 ENCOUNTER — OFFICE VISIT (OUTPATIENT)
Dept: SLEEP MEDICINE | Facility: CLINIC | Age: 35
End: 2025-07-07
Payer: COMMERCIAL

## 2025-07-07 ENCOUNTER — MYC MEDICAL ADVICE (OUTPATIENT)
Dept: FAMILY MEDICINE | Facility: CLINIC | Age: 35
End: 2025-07-07

## 2025-07-07 ENCOUNTER — TELEPHONE (OUTPATIENT)
Dept: PHYSICAL MEDICINE AND REHAB | Facility: CLINIC | Age: 35
End: 2025-07-07

## 2025-07-07 VITALS
OXYGEN SATURATION: 97 % | DIASTOLIC BLOOD PRESSURE: 108 MMHG | SYSTOLIC BLOOD PRESSURE: 146 MMHG | HEART RATE: 94 BPM | HEIGHT: 61 IN | BODY MASS INDEX: 38.29 KG/M2 | WEIGHT: 202.8 LBS

## 2025-07-07 DIAGNOSIS — E66.01 MORBID OBESITY (H): ICD-10-CM

## 2025-07-07 DIAGNOSIS — G47.10 HYPERSOMNIA: ICD-10-CM

## 2025-07-07 DIAGNOSIS — F41.9 ANXIETY: ICD-10-CM

## 2025-07-07 DIAGNOSIS — M54.16 LUMBAR RADICULOPATHY: ICD-10-CM

## 2025-07-07 DIAGNOSIS — R06.81 WITNESSED EPISODE OF APNEA: Primary | ICD-10-CM

## 2025-07-07 DIAGNOSIS — R06.83 SNORING: ICD-10-CM

## 2025-07-07 PROCEDURE — 3077F SYST BP >= 140 MM HG: CPT | Performed by: INTERNAL MEDICINE

## 2025-07-07 PROCEDURE — 1125F AMNT PAIN NOTED PAIN PRSNT: CPT | Performed by: INTERNAL MEDICINE

## 2025-07-07 PROCEDURE — 99204 OFFICE O/P NEW MOD 45 MIN: CPT | Performed by: INTERNAL MEDICINE

## 2025-07-07 PROCEDURE — 3080F DIAST BP >= 90 MM HG: CPT | Performed by: INTERNAL MEDICINE

## 2025-07-07 RX ORDER — IBUPROFEN 200 MG
400 TABLET ORAL EVERY 4 HOURS PRN
COMMUNITY

## 2025-07-07 RX ORDER — OXYCODONE HYDROCHLORIDE 5 MG/1
5 TABLET ORAL EVERY 6 HOURS PRN
Qty: 10 TABLET | Refills: 0 | Status: SHIPPED | OUTPATIENT
Start: 2025-07-07

## 2025-07-07 ASSESSMENT — SLEEP AND FATIGUE QUESTIONNAIRES
HOW LIKELY ARE YOU TO NOD OFF OR FALL ASLEEP WHILE SITTING QUIETLY AFTER LUNCH WITHOUT ALCOHOL: SLIGHT CHANCE OF DOZING
HOW LIKELY ARE YOU TO NOD OFF OR FALL ASLEEP IN A CAR, WHILE STOPPED FOR A FEW MINUTES IN TRAFFIC: WOULD NEVER DOZE
HOW LIKELY ARE YOU TO NOD OFF OR FALL ASLEEP WHILE SITTING INACTIVE IN A PUBLIC PLACE: SLIGHT CHANCE OF DOZING
HOW LIKELY ARE YOU TO NOD OFF OR FALL ASLEEP WHILE SITTING AND TALKING TO SOMEONE: WOULD NEVER DOZE
HOW LIKELY ARE YOU TO NOD OFF OR FALL ASLEEP WHILE WATCHING TV: MODERATE CHANCE OF DOZING
HOW LIKELY ARE YOU TO NOD OFF OR FALL ASLEEP WHILE SITTING AND READING: MODERATE CHANCE OF DOZING
HOW LIKELY ARE YOU TO NOD OFF OR FALL ASLEEP WHEN YOU ARE A PASSENGER IN A CAR FOR AN HOUR WITHOUT A BREAK: SLIGHT CHANCE OF DOZING
HOW LIKELY ARE YOU TO NOD OFF OR FALL ASLEEP WHILE LYING DOWN TO REST IN THE AFTERNOON WHEN CIRCUMSTANCES PERMIT: MODERATE CHANCE OF DOZING

## 2025-07-07 NOTE — TELEPHONE ENCOUNTER
Patient calling clinic to follow up on her message. She states she is dealing with her chronic low back pain and took her last Oxycodone 5 MG tab over the weekend. She has an appointment scheduled with PCP on 7/10 and has an appointment scheduled with the spine clinic on 7/11 for lower back injections. She is asking for a refill of the oxycodone 5 MG or an alternative to at least get her to her appointment with the spine clinic on Friday 7/11. She did also reach out to the spine clinic and they sent her to pcp.   Please advise    Eloy Allred, RN, BSN

## 2025-07-07 NOTE — TELEPHONE ENCOUNTER
"PSP:  Dr. Judge  Last clinic visit:  6/6/25   Reason for call: Pain management  Clinical information:  Call received from pt. Pt scheduled for Right L4-5 TFESI this Friday but she inquires if there is anything she can do for pain management until then. \"I've been crying all weekend and I'm just miserable\". Pt states pain is across her lower back, down her right buttock and now travels all the way down her right leg. \"It feels like my hip is grinding when I walk\".   Pt currently utilizing Gabapentin 300mg 3 times daily, Tizanidine 4 mg 3 times daily, 600mg Ibuprofen every 4 hours, 1000mg Tylenol 3 times daily, heating pad with no relief.   Pt does has appt with PCP this Thursday to discuss pain medications as well and she is going to try and call to get in sooner with them as well as she is aware Dr. Judge does not prescribe opioids.   Advice given to patient: Will update PSP to see if any recommendations. Will get patient on cancellation list for sooner injection appt as well. MyChart message or call back ok for patient.   Provider to address: Please advise.     "

## 2025-07-07 NOTE — NURSING NOTE
"Chief Complaint   Patient presents with    Snoring       Initial BP (!) 146/108   Pulse 94   Ht 1.549 m (5' 1\")   Wt 92 kg (202 lb 12.8 oz)   SpO2 97%   BMI 38.32 kg/m   Estimated body mass index is 38.32 kg/m  as calculated from the following:    Height as of this encounter: 1.549 m (5' 1\").    Weight as of this encounter: 92 kg (202 lb 12.8 oz).    Medication Reconciliation: complete    Neck circumference: 16 inches / 40 centimeters.    DME: RADHA Ovalles MA on 7/7/2025 at 2:59 PM    ESS: 9  ARAM: 19    "

## 2025-07-07 NOTE — TELEPHONE ENCOUNTER
Did call and offer patient injection appt for Wednesday afternoon. Pt states this will not work for her schedule. Will keep an eye out for other cancellations. Pt states mornings work best for her.

## 2025-07-07 NOTE — PATIENT INSTRUCTIONS
Patient education: What is a sleep study?     What is a sleep study? -- A sleep study is a test that measures how well you sleep and checks for sleep problems. For some sleep studies, you stay overnight in a sleep lab at a hospital or sleep center.     What happens during a sleep study? -- Before you go to sleep, a technician attaches small, sticky patches called  electrodes  to your head, chest, and legs. He or she will also place a small tube beneath your nose and might wrap 1 or 2 belts around your chest.   Each of these items has wires that connect to monitors. The monitors record your movement, brain activity, breathing, and other body functions while you sleep.  If you have a history of trouble falling asleep, your doctor might prescribe a medicine to help you fall asleep in the lab. If you have never taken the medicine before, your doctor might ask you take it on a night before your sleep study to see how it affects you.   Why might my doctor order a sleep study? -- Your doctor will order a sleep study if he or she thinks you have sleep apnea or a different condition that makes you:   ?Have sudden jerking leg movements while you sleep, called  periodic limb movements.    ?Feel very sleepy during the day and fall asleep all of a sudden, called  narcolepsy.    ?Have trouble falling asleep or staying asleep over a long period of time, called  chronic insomnia.    ?Do odd things while you sleep, such as walking.  How should I prepare for a sleep study? -- On the day of your sleep study, you should:   ?Avoid alcohol   ?Avoid drinking coffee, tea, sodas, and other drinks that have caffeine in the afternoon and evening   ?Take all of your regular medicines     The cost of care estimate line is [phone] . They are able to give the patient an estimate of the charges and also an estimate of their insurance coverage/patient responsibility.   After your sleep study is performed, please call us at [phone] or  [phone]  to schedule for a follow up to review the results of the sleep study.    Please bring one tab of low dose melatonin 3 mg or less to the night of the study.    Melatonin intake is completely voluntary.    You may take own melatonin after arrival to sleep center. Do not drive or operate machinery after intake of melatonin.     Please make every effort you can to sleep on your back with one pillow behind your head.    Please also call your insurance company next week to see if your sleep study is approved and find out your co-pay.    Please do not drive drowsy under any circumstances.  If you find yourself in a situation in which you are driving and feeling sleepy, please pull over right away in a safe place and take a quick nap before getting back on the road.

## 2025-07-07 NOTE — TELEPHONE ENCOUNTER
Routing to PCP for review. Please see [facility] message and advise. There is one same day appointment on Wednesday 7/9/25.    Nakia Lowe RN on 7/7/2025 at 11:15 AM

## 2025-07-07 NOTE — PROGRESS NOTES
Additional 15 minutes on the date of service was spent performing the following:    -Preparing to see the patient  -Obtaining and/or reviewing separately obtained history   -Ordering medications, tests, or procedures   -Documenting clinical information in the electronic or other health record     Assessment and Plan:    In summary Joanne Escalante is a 34 year old year old female here for sleep disturbance.  1.  Witnessed apnea/hypersomnia/snoring/anxiety/morbid obesity   Joanne Escalante has high risk for obstructive sleep apnea based on the history of witnessed apnea, hypersomnia, snoring and a crowded airway. I educated the patient on the underlying pathophysiology of obstructive sleep apnea. We reviewed the risks associated with sleep apnea, including increased cardiovascular risk and overall death. We talked about treatments briefly. I recommend getting an split-night nocturnal polysomnography with TCM. The patient should return to the clinic to discuss results and treatment option in a patient-centered approach.    The patient has given me permission to put in an order for CPAP if her sleep study is positive for BRADLEY with [facility].    History of present illness:    She is a 34 year old female who comes to the clinic with a chief complaint of snoring that has been going on for more than 5 years.  Her  has also informed her that she has significant pauses in her breathing during sleep followed by loud snoring.  Despite adequate hours of sleep she would still feel tired upon awakening.  Her poor sleep quality has worsened her mood.  The patient's body mass index is elevated at 38.32.     Ideal Sleep-Wake Cycle(devoid of societal pressure):    TIME IN BED:    1) Work/School Days:    Do you work or go to school? No   What time do you usually get into bed? 10   About how long does it take you to fall asleep? About an hour   How often do you have trouble falling asleep? 2-3   How often do you wake up  during the night? 5-6   Do you work days/evenings/nights/rotating shifts?    What wakes you up at night? Snorting self awake    Pain    External stimuli (bed partner, pets, noise, etc)    Use the bathroom   How often do you have trouble falling back to sleep? 2-3   About how long does it take to fall back to sleep? About an hour   What do you usually do if you have trouble getting back to sleep? Read a book, count to try to go to sleep   What time do you usually get out of bed to start your day? 5-6am   Do you use an alarm? Yes   2) Weekends/Non-work Days/All Other Days    What time do you usually get into bed? 10pm   About how long does it take you to fall asleep? About an hour   What time do you usually get out of bed to start your day? 5-6   Do you use an alarm? Yes   SLEEP NEED    On average, about how much sleep do you think you get? 4-5 hours   About how much sleep do you think you need? 6-7   SLEEP POSITION    Which sleep positions do you prefer? Side   Do you do any of the following activities in bed? Read    Watch TV    Use phone, computer, or tablet   How often do you take a nap on purpose? 0-1   About how long are your naps? Minimum 2 hours   Do you feel better after naps? No   How often do you doze off unintentionally? 2-3   Have you ever had a driving accident or near-miss due to sleepiness/drowsiness? No     Stop Bang Questionnaire    Question 7/7/2025  2:43 PM CDT - Filed by Patient   Do you SNORE loudly (louder than talking or loud enough to be heard through closed doors)? Yes   Do you often feel TIRED, fatigued, or sleepy during daytime? Yes   Has anyone OBSERVED you stop breathing during your sleep? Yes   Do you have or are you being treated for high blood PRESSURE? No   BMI more than 35kg/m2? No   AGE over 50 years old? No   NECK circumference > 16 inches (40cm)? No   GENDER: Male? No   STOP-BANG Total Score (range: 0 - 8) 3 (High risk of BRADLEY) Critical      ARAM:  ARAM Total Score: (Patient-Rptd)  19  Total score - Bland: (Proxy-Rptd) 9 (2025  2:43 PM)    Patient told to return to review the results of the sleep study after it has been interpreted.    Patient Active Problem List   Diagnosis    Umbilical hernia without obstruction and without gangrene    Anxiety disorder    Cigarette smoker    Endometriosis determined by laparoscopy    Menorrhagia with irregular cycle    Fatty liver    Horseshoe kidney    Bipolar 2 disorder, major depressive episode (H)    Chronic bilateral low back pain with bilateral sciatica    Hip pain, right    Lumbar radiculopathy       Past Medical History  Past Medical History:   Diagnosis Date    Abdominal wall cellulitis 2020    Anxiety     Depressive disorder     Endometriosis     ESBL (extended spectrum beta-lactamase) producing bacteria infection 2020    Irregular bowel habits 2022    Periumbilical hernia     RUQ abdominal pain 2021    Suicidal ideation 2023        Past Surgical History  Past Surgical History:   Procedure Laterality Date     SECTION  2018, 2020    x 2    HERNIORRHAPHY UMBILICAL N/A 2021    Procedure: OPEN UMBILICAL HERNIA REPAIR WITH MESH;  Surgeon: Ankit Nunez MD;  Location: [facility] OR    LAPAROSCOPY DIAGNOSTIC (GYN)  2015    Endometriosis    OTHER SURGICAL HISTORY      ablation    WOUND DEBRIDEMENT N/A 2020    Abdominal     ZZC ORAL SURGERY PROCEDURE      [place] teeth        Meds  Current Outpatient Medications   Medication Sig Dispense Refill    acetaminophen (TYLENOL) 500 MG tablet Take 1,000 mg by mouth      albuterol (PROAIR HFA/PROVENTIL HFA/VENTOLIN HFA) 108 (90 Base) MCG/ACT inhaler Inhale 2 puffs into the lungs every 6 hours as needed for shortness of breath, wheezing or cough. 18 g 0    CAPLYTA 42 MG capsule       clonazePAM (KLONOPIN) 0.5 MG tablet Take 0.5 mg by mouth 2 times daily as needed for anxiety      diclofenac (VOLTAREN) 1 % topical gel Apply 4 g topically 4 times daily. 350 g 3     gabapentin (NEURONTIN) 300 MG capsule Take 1 capsule (300 mg) by mouth 3 times daily. 90 capsule 0    ibuprofen (ADVIL/MOTRIN) 200 MG tablet Take 400 mg by mouth every 4 hours as needed for pain.      lamoTRIgine (LAMICTAL) 200 MG tablet Take 1 tablet (200 mg) by mouth daily 30 tablet 0    lithium 300 MG capsule Take 1,650 mg by mouth every evening. (Patient taking differently: Take 900 mg by mouth every evening.)      oxyCODONE (ROXICODONE) 5 MG tablet Take 1 tablet (5 mg) by mouth every 6 hours as needed for pain. 10 tablet 0    propranolol (INDERAL) 10 MG tablet Take 1 tablet by mouth 3 times daily      tiZANidine (ZANAFLEX) 4 MG tablet Take 1 tablet (4 mg) by mouth 3 times daily as needed for muscle spasms. 90 tablet 1        Allergies  Sulfa antibiotics     Social History  Social History     Socioeconomic History    Marital status: Single     Spouse name: partner- Jacob    Number of children: 2    Years of education: Not on file    Highest education level: Not on file   Occupational History    Occupation: nursing asst     Employer: [employer]   Tobacco Use    Smoking status: Every Day     Current packs/day: 0.50     Average packs/day: 0.5 packs/day for 10.0 years (5.0 ttl pk-yrs)     Types: Cigarettes     Passive exposure: Current    Smokeless tobacco: Never   Vaping Use    Vaping status: Never Used   Substance and Sexual Activity    Alcohol use: Not Currently    Drug use: Not Currently     Types: Marijuana    Sexual activity: Yes     Partners: Male     Birth control/protection: Condom   Other Topics Concern    Parent/sibling w/ CABG, MI or angioplasty before 65F 55M? Yes     Comment: Father  of a heart attack at 51   Social History Narrative    Lives with her boyfriend, they have 2 children, and lives in a house.  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  (last updated 2020)      Social Drivers of Health     Financial Resource Strain: Low Risk   (3/5/2024)    Financial Resource Strain     Within the past 12 months, have you or your family members you live with been unable to get utilities (heat, electricity) when it was really needed?: No   Food Insecurity: Low Risk  (3/5/2024)    Food Insecurity     Within the past 12 months, did you worry that your food would run out before you got money to buy more?: No     Within the past 12 months, did the food you bought just not last and you didn t have money to get more?: No   Transportation Needs: Low Risk  (3/5/2024)    Transportation Needs     Within the past 12 months, has lack of transportation kept you from medical appointments, getting your medicines, non-medical meetings or appointments, work, or from getting things that you need?: No   Physical Activity: Unknown (3/5/2024)    Exercise Vital Sign     Days of Exercise per Week: 2 days     Minutes of Exercise per Session: Not on file   Stress: Stress Concern Present (3/5/2024)    [employer] [employer] - Occupational Stress Questionnaire     Feeling of Stress : Rather much   Social Connections: Unknown (3/5/2024)    Social Connection and Isolation Panel [NHANES]     Frequency of Communication with Friends and Family: Not on file     Frequency of Social Gatherings with Friends and Family: Never     Attends [employer] Services: Not on file     Active Member of Clubs or Organizations: Not on file     Attends Club or Organization Meetings: Not on file     Marital Status: Not on file   Interpersonal Safety: Low Risk  (12/5/2024)    Interpersonal Safety     Do you feel physically and emotionally safe where you currently live?: Yes     Within the past 12 months, have you been hit, slapped, kicked or otherwise physically hurt by someone?: No     Within the past 12 months, have you been humiliated or emotionally abused in other ways by your partner or ex-partner?: No   Housing Stability: Low Risk  (3/5/2024)    Housing Stability     Do you have  "housing? : Yes     Are you worried about losing your housing?: No        Family History  Family History   Problem Relation Age of Onset    Depression Mother     Alcoholism Mother     Other - See Comments Mother         fibroids, cervical abnormalty    Depression Father          at 51 of heart attack    Alcoholism Father     Heart Disease Father         mi    Sleep Apnea Father     Breast Cancer Maternal Grandmother     Diabetes Maternal Grandmother     Ovarian Cancer No family hx of     Colon Cancer No family hx of     Colon Polyps No family hx of       Review of Systems:  Constitutional: Negative except as noted in HPI.   Eyes: Negative except as noted in HPI.   ENT: Negative except as noted in HPI.   Cardiovascular: Negative except as noted in HPI.   Respiratory: Negative except as noted in HPI.   Gastrointestinal: Negative except as noted in HPI.   Genitourinary: Negative except as noted in HPI.   Musculoskeletal: Negative except as noted in HPI.   Integumentary: Negative except as noted in HPI.   Neurological: Negative except as noted in HPI.   Psychiatric: Negative except as noted in HPI.   Endocrine: Negative except as noted in HPI.   Hematologic/Lymphatic: Negative except as noted in HPI.      Physical Exam:  BP (!) 146/108   Pulse 94   Ht 1.549 m (5' 1\")   Wt 92 kg (202 lb 12.8 oz)   SpO2 97%   BMI 38.32 kg/m    BMI:Body mass index is 38.32 kg/m .   GEN: NAD, morbidly obese  Head: Normocephalic.  EYES: PERRLA, EOMI  ENT: Oropharynx is clear, Velázquez class 4+ airway.   Nasal mucosa is moist without erythema  Neck : Thyroid is within normal limits.   CV: Regular rate and rhythm, S1 & S2 positive.  LUNGS: Bilateral breathsounds heard.   ABDOMEN: Positive bowel sounds in all quadrants, soft, no rebound or guarding  MUSCULOSKELETAL: Bilateral trace leg swelling  SKIN: warm, dry, no rashes  Neurological: Alert, Gait is normal. Strength 5/5 in bilateral extremities.  Strength 3 out of 5 in bilateral lower " extremity in flexion and extension.  This is due to the patient's low back pain in which she will be getting a steroid injection soon.  Psych: normal mood, normal affect     Labs/Studies:       Lab Results   Component Value Date    TSH 2.41 04/29/2025    TSH 2.14 02/17/2024     Lab Results   Component Value Date     (H) 04/29/2025     (H) 03/19/2024     Lab Results   Component Value Date    HGB 13.6 05/20/2025    HGB 14.5 04/29/2025     Lab Results   Component Value Date    BUN 10.0 04/29/2025    BUN 9.2 03/19/2024    CR 0.74 04/29/2025    CR 0.77 03/19/2024     Lab Results   Component Value Date    AST 18 04/29/2025    AST 21 03/19/2024    ALT 18 04/29/2025    ALT 35 03/19/2024    ALKPHOS 39 (L) 04/29/2025    ALKPHOS 46 03/19/2024    BILITOTAL 0.3 04/29/2025    BILITOTAL 0.2 03/19/2024        Recent Results (from the past 744 hours)   MA Diagnostic Right w/Robi    Narrative    EXAM: MA DIAGNOSTIC RIGHT W/ ROBI, 6/13/2025 9:00 AM    COMPARISONS: 12/13/2024    HISTORY: BI-RADS 3. Follow-up.    FINDINGS: Additional mammographic views show no significant change  right breast including lateral quadrant fibroglandular breast tissue.    BREAST DENSITY: There are scattered areas of fibroglandular density.      Impression    IMPRESSION: BI-RADS CATEGORY: 3 - Probably Benign.    RECOMMENDED FOLLOW-UP: Short Interval Follow-up 6 Months.    Diagnostic right breast mammogram possible right breast ultrasound in  6 months due at time of routine left breast screening.    Results given to the patient.    YAMILETH NOVAK MD         SYSTEM ID:  [id number]   MR Lumbar Spine w/o Contrast    Narrative    MR LUMBAR SPINE W/O CONTRAST 6/17/2025 12:23 PM    Provided History: Lumbar radiculopathy; Chronic bilateral low back  pain with bilateral sciatica; Chronic bilateral low back pain with  bilateral sciatica; Chronic bilateral low back pain with bilateral  sciatica    ICD-10: Lumbar radiculopathy; Chronic bilateral low back pain  with  bilateral sciatica; Chronic bilateral low back pain with bilateral  sciatica; Chronic bilateral low back pain with bilateral sciatica    Comparison: CT from 6/22/2021.    Technique: Sagittal T1-weighted, sagittal STIR, sagittal  diffusion-weighted (with ADC map), axial T1-weighted, and 3D  volumetric axial and sagittal reconstructed T2-weighted images of the  lumbar spine were obtained without intravenous contrast.     Findings: There are 5 lumbar-type vertebrae assumed for the purposes  of this dictation.  The tip of the conus medullaris is at L2.  Normal  lumbar vertebral alignment.  There is no significant disc height  narrowing.  Normal marrow signal. L5 segmentation anomaly with 2 left  transverse process and neural foramen versus one on the right.    On a level by level basis:    T12-L1: No spinal canal or neuroforaminal stenosis.    L1-2: No spinal canal or neuroforaminal stenosis.    L2-3: No spinal canal or neuroforaminal stenosis.    L3-4: Mild right facet arthropathy. No spinal canal or neuroforaminal  stenosis.    L4-5: Minimal disc bulge. Severe left and mild right facet  hypertrophy. Mild right ligamentum flavum thickening. Moderate left  neural foraminal stenosis. No significant spinal canal or right  neuroforaminal stenosis.    L5-S1: No spinal canal or neuroforaminal stenosis.    Paraspinous tissues are within normal limits.      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 Disc bulge and  severe left facet arthropathy with resultant moderate left neural  foraminal stenosis at L4-5. Otherwise no high-grade spinal canal or  neural foraminal stenosis.         JACOB ALVARES MD         SYSTEM ID:  [id number]       Patient was strongly advised in AVS to avoid driving, operating any heavy machinery or other hazardous situations while drowsy or sleepy. Patient was counseled on the importance of driving while  alert, to pull over if drowsy, or nap before getting into the vehicle if sleepy.     Patient verbalized understanding of these issues, agrees with the plan and all questions were answered today. Patient was given an opportuntity to voice any other symptoms or concerns not listed above. Patient did not have any other symptoms or concerns.         Radames Zambrano DO,   Board Certified in Internal Medicine and Sleep Medicine    (Note created with Dragon voice recognition and unintended spelling errors and word substitutions may occur)

## 2025-07-10 ENCOUNTER — VIRTUAL VISIT (OUTPATIENT)
Dept: FAMILY MEDICINE | Facility: CLINIC | Age: 35
End: 2025-07-10
Payer: COMMERCIAL

## 2025-07-10 DIAGNOSIS — G89.29 CHRONIC BILATERAL LOW BACK PAIN WITH BILATERAL SCIATICA: ICD-10-CM

## 2025-07-10 DIAGNOSIS — M54.16 LUMBAR RADICULOPATHY: Primary | ICD-10-CM

## 2025-07-10 DIAGNOSIS — M54.41 CHRONIC BILATERAL LOW BACK PAIN WITH BILATERAL SCIATICA: ICD-10-CM

## 2025-07-10 DIAGNOSIS — M54.42 CHRONIC BILATERAL LOW BACK PAIN WITH BILATERAL SCIATICA: ICD-10-CM

## 2025-07-10 DIAGNOSIS — M46.1 DEGENERATIVE JOINT DISEASE OF SACROILIAC JOINT: ICD-10-CM

## 2025-07-10 RX ORDER — GABAPENTIN 300 MG/1
CAPSULE ORAL
Qty: 120 CAPSULE | Refills: 1 | Status: SHIPPED | OUTPATIENT
Start: 2025-07-10

## 2025-07-10 ASSESSMENT — ENCOUNTER SYMPTOMS: BACK PAIN: 1

## 2025-07-10 NOTE — PROGRESS NOTES
Joanne is a 34 year old who is being evaluated via a billable video visit.    How would you like to obtain your AVS? MyChart  If the video visit is dropped, the invitation should be resent by: Text to cell phone: [phone]  Will anyone else be joining your video visit? No    Assessment & Plan     Lumbar radiculopathy  - Pain has been significantly impacting quality of life. Scheduled for injection tomorrow. OK to increase HS dose of gabapentin; caution with medications due to lithium use- unable to use SNRI or NSAIDS. Order for TENS unit and massage therapy placed as adjuncts.  - Tens Unit/Supplies Order for DME - ONLY FOR DME  - gabapentin (NEURONTIN) 300 MG capsule  Dispense: 120 capsule; Refill: 1  - Massage Therapy Referral    Chronic bilateral low back pain with bilateral sciatica  - See above  - Tens Unit/Supplies Order for DME - ONLY FOR DME  - gabapentin (NEURONTIN) 300 MG capsule  Dispense: 120 capsule; Refill: 1  - Massage Therapy Referral    Degenerative joint disease of sacroiliac joint  - See above  - Tens Unit/Supplies Order for DME - ONLY FOR DME  - Massage Therapy Referral      Subjective   Joanne is a 34 year old, presenting for the following health issues:  Back Pain        7/10/2025    11:39 AM   Additional Questions   Roomed by pedro pablo   Accompanied by self         7/10/2025    11:39 AM   Patient Reported Additional Medications   Patient reports taking the following new medications none     Video Start Time: 1:07 PM    Back Pain     History of Present Illness       Back Pain:  She presents for follow up of back pain. Patient's back pain is a chronic problem.  Location of back pain:  Right lower back, right middle of back, right buttock, left buttock, right hip and right side of waist  Description of back pain: burning, dull ache, gnawing, sharp, shooting and stabbing  Back pain spreads: right buttocks and right thigh    Since patient first noticed back pain, pain is: gradually worsening  Does  "back pain interfere with her job:  Not applicable       She eats 2-3 servings of fruits and vegetables daily.She consumes 1 sweetened beverage(s) daily.She exercises with enough effort to increase her heart rate 9 or less minutes per day.  She exercises with enough effort to increase her heart rate 3 or less days per week.   She is taking medications regularly.      Low back pain, injection scheduled for tomorrow with physical medicine and rehab. Pain with sitting, standing, walking. Feels like current medication regimen has not been very helpful over the past week. Gabapentin has not been helpful.     Review of Systems  Constitutional, HEENT, cardiovascular, pulmonary, gi and gu systems are negative, except as otherwise noted.      Objective    Vitals - Patient Reported  Weight (Patient Reported): 90.7 kg (200 lb)  Height (Patient Reported): 154.9 cm (5' 1\")  BMI (Based on Pt Reported Ht/Wt): 37.79  Pain Score: Severe Pain (7)  Pain Loc: Low Back (right hip)    Physical Exam   GENERAL: alert and no distress  EYES: Eyes grossly normal to inspection.  No discharge or erythema, or obvious scleral/conjunctival abnormalities.  RESP: No audible wheeze, cough, or visible cyanosis.    SKIN: Visible skin clear. No significant rash, abnormal pigmentation or lesions.  NEURO: Cranial nerves grossly intact.  Mentation and speech appropriate for age.  PSYCH: Appropriate affect, tone, and pace of words      Video-Visit Details    Type of service:  Video Visit   Video End Time:1:16 PM  Originating Location (pt. Location): Home  Distant Location (provider location):  On-site  Platform used for Video Visit: Sandeep    The longitudinal plan of care for the diagnosis(es)/condition(s) as documented were addressed during this visit. Due to the added complexity in care, I will continue to support Joanne in the subsequent management and with ongoing continuity of care.     Signed Electronically by: ISH Goodson CNP    "

## 2025-07-31 DIAGNOSIS — Z79.899 ENCOUNTER FOR LONG-TERM (CURRENT) USE OF HIGH-RISK MEDICATION: Primary | ICD-10-CM

## 2025-08-12 ENCOUNTER — NURSE TRIAGE (OUTPATIENT)
Dept: NURSING | Facility: CLINIC | Age: 35
End: 2025-08-12

## 2025-08-12 ENCOUNTER — LAB (OUTPATIENT)
Dept: LAB | Facility: CLINIC | Age: 35
End: 2025-08-12
Payer: COMMERCIAL

## 2025-08-12 DIAGNOSIS — Z79.899 ENCOUNTER FOR LONG-TERM (CURRENT) USE OF HIGH-RISK MEDICATION: ICD-10-CM

## 2025-08-12 LAB — LITHIUM SERPL-SCNC: 1.4 MMOL/L (ref 0.6–1.2)

## 2025-08-12 PROCEDURE — 80178 ASSAY OF LITHIUM: CPT

## 2025-08-12 PROCEDURE — 36415 COLL VENOUS BLD VENIPUNCTURE: CPT

## 2025-08-15 DIAGNOSIS — F31.81 BIPOLAR II DISORDER (H): Primary | ICD-10-CM

## 2025-08-23 ENCOUNTER — NURSE TRIAGE (OUTPATIENT)
Dept: NURSING | Facility: CLINIC | Age: 35
End: 2025-08-23
Payer: COMMERCIAL

## 2025-08-23 ENCOUNTER — HOSPITAL ENCOUNTER (EMERGENCY)
Facility: CLINIC | Age: 35
Discharge: HOME | End: 2025-08-23
Attending: EMERGENCY MEDICINE | Admitting: EMERGENCY MEDICINE
Payer: COMMERCIAL

## 2025-08-23 VITALS
OXYGEN SATURATION: 100 % | TEMPERATURE: 97.5 F | RESPIRATION RATE: 18 BRPM | SYSTOLIC BLOOD PRESSURE: 145 MMHG | DIASTOLIC BLOOD PRESSURE: 98 MMHG | HEART RATE: 81 BPM

## 2025-08-23 DIAGNOSIS — R25.1 SHAKINESS: Primary | ICD-10-CM

## 2025-08-23 LAB
ALBUMIN SERPL BCG-MCNC: 4.2 G/DL (ref 3.5–5.2)
ALBUMIN UR-MCNC: NEGATIVE MG/DL
ALP SERPL-CCNC: 43 U/L (ref 40–150)
ALT SERPL W P-5'-P-CCNC: 14 U/L (ref 0–50)
ANION GAP SERPL CALCULATED.3IONS-SCNC: 12 MMOL/L (ref 7–15)
APPEARANCE UR: CLEAR
AST SERPL W P-5'-P-CCNC: 14 U/L (ref 0–45)
BACTERIA #/AREA URNS HPF: ABNORMAL /HPF
BASOPHILS # BLD AUTO: <0.03 10E3/UL (ref 0–0.2)
BASOPHILS NFR BLD AUTO: 0.1 %
BILIRUB SERPL-MCNC: 0.4 MG/DL
BILIRUB UR QL STRIP: NEGATIVE
BUN SERPL-MCNC: 9.1 MG/DL (ref 6–20)
CALCIUM SERPL-MCNC: 9.8 MG/DL (ref 8.8–10.4)
CHLORIDE SERPL-SCNC: 105 MMOL/L (ref 98–107)
COLOR UR AUTO: ABNORMAL
CREAT SERPL-MCNC: 0.83 MG/DL (ref 0.51–0.95)
EGFRCR SERPLBLD CKD-EPI 2021: >90 ML/MIN/1.73M2
EOSINOPHIL # BLD AUTO: <0.03 10E3/UL (ref 0–0.7)
EOSINOPHIL NFR BLD AUTO: 0 %
ERYTHROCYTE [DISTWIDTH] IN BLOOD BY AUTOMATED COUNT: 12.1 % (ref 10–15)
GLUCOSE SERPL-MCNC: 99 MG/DL (ref 70–99)
GLUCOSE UR STRIP-MCNC: NEGATIVE MG/DL
HCG UR QL: NEGATIVE
HCO3 SERPL-SCNC: 21 MMOL/L (ref 22–29)
HCT VFR BLD AUTO: 39.9 % (ref 35–47)
HGB BLD-MCNC: 13.9 G/DL (ref 11.7–15.7)
HGB UR QL STRIP: ABNORMAL
IMM GRANULOCYTES # BLD: 0.05 10E3/UL
IMM GRANULOCYTES NFR BLD: 0.4 %
KETONES UR STRIP-MCNC: NEGATIVE MG/DL
LEUKOCYTE ESTERASE UR QL STRIP: NEGATIVE
LITHIUM SERPL-SCNC: 0.64 MMOL/L (ref 0.6–1.2)
LYMPHOCYTES # BLD AUTO: 3.05 10E3/UL (ref 0.8–5.3)
LYMPHOCYTES NFR BLD AUTO: 25 %
MCH RBC QN AUTO: 29.6 PG (ref 26.5–33)
MCHC RBC AUTO-ENTMCNC: 34.8 G/DL (ref 31.5–36.5)
MCV RBC AUTO: 85.1 FL (ref 78–100)
MONOCYTES # BLD AUTO: 0.63 10E3/UL (ref 0–1.3)
MONOCYTES NFR BLD AUTO: 5.2 %
NEUTROPHILS # BLD AUTO: 8.47 10E3/UL (ref 1.6–8.3)
NEUTROPHILS NFR BLD AUTO: 69.3 %
NITRATE UR QL: NEGATIVE
NRBC # BLD AUTO: <0.03 10E3/UL
NRBC BLD AUTO-RTO: 0 /100
PH UR STRIP: 6 [PH] (ref 5–7)
PLATELET # BLD AUTO: 327 10E3/UL (ref 150–450)
POTASSIUM SERPL-SCNC: 4 MMOL/L (ref 3.4–5.3)
PROT SERPL-MCNC: 6.9 G/DL (ref 6.4–8.3)
RBC # BLD AUTO: 4.69 10E6/UL (ref 3.8–5.2)
RBC URINE: 1 /HPF
SODIUM SERPL-SCNC: 138 MMOL/L (ref 135–145)
SP GR UR STRIP: 1 (ref 1–1.03)
SQUAMOUS EPITHELIAL: 1 /HPF
UROBILINOGEN UR STRIP-MCNC: NORMAL MG/DL
WBC # BLD AUTO: 12.21 10E3/UL (ref 4–11)
WBC URINE: 1 /HPF

## 2025-08-23 PROCEDURE — 80053 COMPREHEN METABOLIC PANEL: CPT | Performed by: EMERGENCY MEDICINE

## 2025-08-23 PROCEDURE — 36415 COLL VENOUS BLD VENIPUNCTURE: CPT | Performed by: EMERGENCY MEDICINE

## 2025-08-23 PROCEDURE — 81001 URINALYSIS AUTO W/SCOPE: CPT | Performed by: EMERGENCY MEDICINE

## 2025-08-23 PROCEDURE — 99283 EMERGENCY DEPT VISIT LOW MDM: CPT | Performed by: EMERGENCY MEDICINE

## 2025-08-23 PROCEDURE — 80178 ASSAY OF LITHIUM: CPT | Performed by: EMERGENCY MEDICINE

## 2025-08-23 PROCEDURE — 80175 DRUG SCREEN QUAN LAMOTRIGINE: CPT | Performed by: EMERGENCY MEDICINE

## 2025-08-23 PROCEDURE — 85004 AUTOMATED DIFF WBC COUNT: CPT | Performed by: EMERGENCY MEDICINE

## 2025-08-23 PROCEDURE — 81025 URINE PREGNANCY TEST: CPT | Performed by: EMERGENCY MEDICINE

## 2025-08-23 ASSESSMENT — COLUMBIA-SUICIDE SEVERITY RATING SCALE - C-SSRS
1. IN THE PAST MONTH, HAVE YOU WISHED YOU WERE DEAD OR WISHED YOU COULD GO TO SLEEP AND NOT WAKE UP?: NO
2. HAVE YOU ACTUALLY HAD ANY THOUGHTS OF KILLING YOURSELF IN THE PAST MONTH?: NO
6. HAVE YOU EVER DONE ANYTHING, STARTED TO DO ANYTHING, OR PREPARED TO DO ANYTHING TO END YOUR LIFE?: NO

## 2025-08-23 ASSESSMENT — ACTIVITIES OF DAILY LIVING (ADL)
ADLS_ACUITY_SCORE: 46

## 2025-08-25 LAB — LAMOTRIGINE SERPL-MCNC: 5.8 UG/ML

## 2025-08-29 PROBLEM — M25.551 HIP PAIN, RIGHT: Status: RESOLVED | Noted: 2025-05-02 | Resolved: 2025-08-29

## 2025-08-29 PROBLEM — M54.42 CHRONIC BILATERAL LOW BACK PAIN WITH BILATERAL SCIATICA: Status: RESOLVED | Noted: 2025-05-02 | Resolved: 2025-08-29

## 2025-08-29 PROBLEM — M54.41 CHRONIC BILATERAL LOW BACK PAIN WITH BILATERAL SCIATICA: Status: RESOLVED | Noted: 2025-05-02 | Resolved: 2025-08-29

## 2025-08-29 PROBLEM — G89.29 CHRONIC BILATERAL LOW BACK PAIN WITH BILATERAL SCIATICA: Status: RESOLVED | Noted: 2025-05-02 | Resolved: 2025-08-29

## 2025-08-29 PROBLEM — M54.16 LUMBAR RADICULOPATHY: Status: RESOLVED | Noted: 2025-05-02 | Resolved: 2025-08-29

## 2025-08-30 ENCOUNTER — NURSE TRIAGE (OUTPATIENT)
Dept: NURSING | Facility: CLINIC | Age: 35
End: 2025-08-30
Payer: COMMERCIAL

## 2025-08-30 ENCOUNTER — HOSPITAL ENCOUNTER (EMERGENCY)
Facility: CLINIC | Age: 35
Discharge: HOME | End: 2025-08-30
Attending: EMERGENCY MEDICINE
Payer: COMMERCIAL

## 2025-08-30 VITALS
DIASTOLIC BLOOD PRESSURE: 88 MMHG | OXYGEN SATURATION: 98 % | SYSTOLIC BLOOD PRESSURE: 156 MMHG | HEART RATE: 86 BPM | RESPIRATION RATE: 18 BRPM | TEMPERATURE: 98.5 F

## 2025-08-30 LAB
ALBUMIN SERPL BCG-MCNC: 4.2 G/DL (ref 3.5–5.2)
ALP SERPL-CCNC: 45 U/L (ref 40–150)
ALT SERPL W P-5'-P-CCNC: 18 U/L (ref 0–50)
ANION GAP SERPL CALCULATED.3IONS-SCNC: 13 MMOL/L (ref 7–15)
AST SERPL W P-5'-P-CCNC: 15 U/L (ref 0–45)
BASOPHILS # BLD AUTO: <0.03 10E3/UL (ref 0–0.2)
BASOPHILS NFR BLD AUTO: 0.2 %
BILIRUB SERPL-MCNC: 0.3 MG/DL
BUN SERPL-MCNC: 7.8 MG/DL (ref 6–20)
CALCIUM SERPL-MCNC: 10.5 MG/DL (ref 8.8–10.4)
CHLORIDE SERPL-SCNC: 104 MMOL/L (ref 98–107)
CK SERPL-CCNC: 88 U/L (ref 26–192)
CREAT SERPL-MCNC: 0.87 MG/DL (ref 0.51–0.95)
EGFRCR SERPLBLD CKD-EPI 2021: 90 ML/MIN/1.73M2
EOSINOPHIL # BLD AUTO: <0.03 10E3/UL (ref 0–0.7)
EOSINOPHIL NFR BLD AUTO: 0 %
ERYTHROCYTE [DISTWIDTH] IN BLOOD BY AUTOMATED COUNT: 11.9 % (ref 10–15)
GLUCOSE SERPL-MCNC: 135 MG/DL (ref 70–99)
HCO3 SERPL-SCNC: 23 MMOL/L (ref 22–29)
HCT VFR BLD AUTO: 41.7 % (ref 35–47)
HGB BLD-MCNC: 14.6 G/DL (ref 11.7–15.7)
IMM GRANULOCYTES # BLD: 0.05 10E3/UL
IMM GRANULOCYTES NFR BLD: 0.4 %
LITHIUM SERPL-SCNC: 0.56 MMOL/L (ref 0.6–1.2)
LYMPHOCYTES # BLD AUTO: 3.43 10E3/UL (ref 0.8–5.3)
LYMPHOCYTES NFR BLD AUTO: 29.6 %
MCH RBC QN AUTO: 29.8 PG (ref 26.5–33)
MCHC RBC AUTO-ENTMCNC: 35 G/DL (ref 31.5–36.5)
MCV RBC AUTO: 85.1 FL (ref 78–100)
MONOCYTES # BLD AUTO: 0.81 10E3/UL (ref 0–1.3)
MONOCYTES NFR BLD AUTO: 7 %
NEUTROPHILS # BLD AUTO: 7.27 10E3/UL (ref 1.6–8.3)
NEUTROPHILS NFR BLD AUTO: 62.8 %
NRBC # BLD AUTO: <0.03 10E3/UL
NRBC BLD AUTO-RTO: 0 /100
PLATELET # BLD AUTO: 320 10E3/UL (ref 150–450)
POTASSIUM SERPL-SCNC: 3.7 MMOL/L (ref 3.4–5.3)
PROT SERPL-MCNC: 6.9 G/DL (ref 6.4–8.3)
RBC # BLD AUTO: 4.9 10E6/UL (ref 3.8–5.2)
SODIUM SERPL-SCNC: 140 MMOL/L (ref 135–145)
WBC # BLD AUTO: 11.58 10E3/UL (ref 4–11)

## 2025-08-30 PROCEDURE — 82310 ASSAY OF CALCIUM: CPT | Performed by: EMERGENCY MEDICINE

## 2025-08-30 PROCEDURE — 250N000013 HC RX MED GY IP 250 OP 250 PS 637: Performed by: EMERGENCY MEDICINE

## 2025-08-30 PROCEDURE — 36415 COLL VENOUS BLD VENIPUNCTURE: CPT | Performed by: EMERGENCY MEDICINE

## 2025-08-30 PROCEDURE — 80178 ASSAY OF LITHIUM: CPT | Performed by: EMERGENCY MEDICINE

## 2025-08-30 PROCEDURE — 82550 ASSAY OF CK (CPK): CPT | Performed by: EMERGENCY MEDICINE

## 2025-08-30 PROCEDURE — 99283 EMERGENCY DEPT VISIT LOW MDM: CPT | Performed by: EMERGENCY MEDICINE

## 2025-08-30 PROCEDURE — 80175 DRUG SCREEN QUAN LAMOTRIGINE: CPT | Performed by: EMERGENCY MEDICINE

## 2025-08-30 PROCEDURE — 85014 HEMATOCRIT: CPT | Performed by: EMERGENCY MEDICINE

## 2025-08-30 RX ORDER — ACETAMINOPHEN 325 MG/1
650 TABLET ORAL ONCE
Status: COMPLETED | OUTPATIENT
Start: 2025-08-30 | End: 2025-08-30

## 2025-08-30 RX ADMIN — ACETAMINOPHEN 650 MG: 325 TABLET ORAL at 19:35

## 2025-08-30 ASSESSMENT — COLUMBIA-SUICIDE SEVERITY RATING SCALE - C-SSRS
5. HAVE YOU STARTED TO WORK OUT OR WORKED OUT THE DETAILS OF HOW TO KILL YOURSELF? DO YOU INTEND TO CARRY OUT THIS PLAN?: NO
1. IN THE PAST MONTH, HAVE YOU WISHED YOU WERE DEAD OR WISHED YOU COULD GO TO SLEEP AND NOT WAKE UP?: YES
6. HAVE YOU EVER DONE ANYTHING, STARTED TO DO ANYTHING, OR PREPARED TO DO ANYTHING TO END YOUR LIFE?: NO
2. HAVE YOU ACTUALLY HAD ANY THOUGHTS OF KILLING YOURSELF IN THE PAST MONTH?: YES
3. HAVE YOU BEEN THINKING ABOUT HOW YOU MIGHT KILL YOURSELF?: NO
4. HAVE YOU HAD THESE THOUGHTS AND HAD SOME INTENTION OF ACTING ON THEM?: NO

## 2025-08-30 ASSESSMENT — ACTIVITIES OF DAILY LIVING (ADL)
ADLS_ACUITY_SCORE: 46

## 2025-09-01 ENCOUNTER — OFFICE VISIT (OUTPATIENT)
Dept: URGENT CARE | Facility: URGENT CARE | Age: 35
End: 2025-09-01
Payer: COMMERCIAL

## 2025-09-01 VITALS
HEART RATE: 86 BPM | BODY MASS INDEX: 38.71 KG/M2 | RESPIRATION RATE: 16 BRPM | SYSTOLIC BLOOD PRESSURE: 115 MMHG | TEMPERATURE: 97.6 F | OXYGEN SATURATION: 97 % | HEIGHT: 61 IN | DIASTOLIC BLOOD PRESSURE: 80 MMHG | WEIGHT: 205 LBS

## 2025-09-01 DIAGNOSIS — L50.9 HIVES: Primary | ICD-10-CM

## 2025-09-01 LAB — LAMOTRIGINE SERPL-MCNC: 6.1 UG/ML

## 2025-09-01 RX ORDER — TRIAMCINOLONE ACETONIDE 1 MG/G
CREAM TOPICAL
Qty: 45 G | Refills: 0 | Status: SHIPPED | OUTPATIENT
Start: 2025-09-01

## 2025-09-01 ASSESSMENT — PAIN SCALES - GENERAL: PAINLEVEL_OUTOF10: MODERATE PAIN (6)

## 2025-09-02 ENCOUNTER — HOSPITAL ENCOUNTER (EMERGENCY)
Facility: CLINIC | Age: 35
Discharge: HOME | End: 2025-09-02
Attending: STUDENT IN AN ORGANIZED HEALTH CARE EDUCATION/TRAINING PROGRAM
Payer: COMMERCIAL

## 2025-09-02 VITALS
BODY MASS INDEX: 39.08 KG/M2 | OXYGEN SATURATION: 98 % | DIASTOLIC BLOOD PRESSURE: 87 MMHG | WEIGHT: 207 LBS | RESPIRATION RATE: 17 BRPM | SYSTOLIC BLOOD PRESSURE: 125 MMHG | HEIGHT: 61 IN | HEART RATE: 86 BPM | TEMPERATURE: 98.2 F

## 2025-09-02 DIAGNOSIS — R21 RASH: Primary | ICD-10-CM

## 2025-09-02 PROCEDURE — 99282 EMERGENCY DEPT VISIT SF MDM: CPT | Performed by: STUDENT IN AN ORGANIZED HEALTH CARE EDUCATION/TRAINING PROGRAM

## 2025-09-02 ASSESSMENT — COLUMBIA-SUICIDE SEVERITY RATING SCALE - C-SSRS
2. HAVE YOU ACTUALLY HAD ANY THOUGHTS OF KILLING YOURSELF IN THE PAST MONTH?: NO
6. HAVE YOU EVER DONE ANYTHING, STARTED TO DO ANYTHING, OR PREPARED TO DO ANYTHING TO END YOUR LIFE?: NO
1. IN THE PAST MONTH, HAVE YOU WISHED YOU WERE DEAD OR WISHED YOU COULD GO TO SLEEP AND NOT WAKE UP?: NO

## 2025-09-03 ENCOUNTER — PATIENT OUTREACH (OUTPATIENT)
Dept: CARE COORDINATION | Facility: CLINIC | Age: 35
End: 2025-09-03
Payer: COMMERCIAL

## (undated) DEVICE — SOL NACL 0.9% IRRIG 1000ML BOTTLE 2F7124

## (undated) DEVICE — GLOVE GAMMEX DERMAPRENE ULTRA SZ 8.5 LF 8517

## (undated) DEVICE — PACK MINOR SBA15MIFSE

## (undated) DEVICE — SU VICRYL 3-0 SH 27" J316H

## (undated) DEVICE — ESU GROUND PAD UNIVERSAL W/O CORD

## (undated) DEVICE — SU PROLENE 1 CTX 30" 8435H

## (undated) DEVICE — DRSG STERI STRIP 1/2X4" R1547

## (undated) DEVICE — SOL WATER IRRIG 1000ML BOTTLE 2F7114

## (undated) DEVICE — LINEN TOWEL PACK X5 5464

## (undated) DEVICE — PREP CHLORAPREP 26ML TINTED ORANGE  260815

## (undated) DEVICE — DRAPE LAP W/ARMBOARD 29410

## (undated) DEVICE — ESU PENCIL W/SMOKE EVAC CVPLP2000

## (undated) DEVICE — SU PDS II 2-0 CT-2 27"  Z333H

## (undated) RX ORDER — OXYCODONE HYDROCHLORIDE 5 MG/1
TABLET ORAL
Status: DISPENSED
Start: 2021-07-08

## (undated) RX ORDER — LIDOCAINE HYDROCHLORIDE 20 MG/ML
INJECTION, SOLUTION EPIDURAL; INFILTRATION; INTRACAUDAL; PERINEURAL
Status: DISPENSED
Start: 2021-07-08

## (undated) RX ORDER — HYDROMORPHONE HYDROCHLORIDE 1 MG/ML
INJECTION, SOLUTION INTRAMUSCULAR; INTRAVENOUS; SUBCUTANEOUS
Status: DISPENSED
Start: 2021-07-08

## (undated) RX ORDER — ONDANSETRON 2 MG/ML
INJECTION INTRAMUSCULAR; INTRAVENOUS
Status: DISPENSED
Start: 2021-07-08

## (undated) RX ORDER — DEXAMETHASONE SODIUM PHOSPHATE 4 MG/ML
INJECTION, SOLUTION INTRA-ARTICULAR; INTRALESIONAL; INTRAMUSCULAR; INTRAVENOUS; SOFT TISSUE
Status: DISPENSED
Start: 2021-07-08

## (undated) RX ORDER — CEFAZOLIN SODIUM 2 G/100ML
INJECTION, SOLUTION INTRAVENOUS
Status: DISPENSED
Start: 2021-07-08

## (undated) RX ORDER — PROPOFOL 10 MG/ML
INJECTION, EMULSION INTRAVENOUS
Status: DISPENSED
Start: 2021-07-08

## (undated) RX ORDER — KETOROLAC TROMETHAMINE 30 MG/ML
INJECTION, SOLUTION INTRAMUSCULAR; INTRAVENOUS
Status: DISPENSED
Start: 2021-07-08

## (undated) RX ORDER — BUPIVACAINE HYDROCHLORIDE AND EPINEPHRINE 5; 5 MG/ML; UG/ML
INJECTION, SOLUTION EPIDURAL; INTRACAUDAL; PERINEURAL
Status: DISPENSED
Start: 2021-07-08

## (undated) RX ORDER — FENTANYL CITRATE 50 UG/ML
INJECTION, SOLUTION INTRAMUSCULAR; INTRAVENOUS
Status: DISPENSED
Start: 2021-07-08

## (undated) RX ORDER — LIDOCAINE HYDROCHLORIDE 10 MG/ML
INJECTION, SOLUTION INFILTRATION; PERINEURAL
Status: DISPENSED
Start: 2021-07-08

## (undated) RX ORDER — NEOSTIGMINE METHYLSULFATE 1 MG/ML
VIAL (ML) INJECTION
Status: DISPENSED
Start: 2021-07-08

## (undated) RX ORDER — GLYCOPYRROLATE 0.2 MG/ML
INJECTION, SOLUTION INTRAMUSCULAR; INTRAVENOUS
Status: DISPENSED
Start: 2021-07-08